# Patient Record
Sex: FEMALE | Race: WHITE | NOT HISPANIC OR LATINO | Employment: OTHER | URBAN - METROPOLITAN AREA
[De-identification: names, ages, dates, MRNs, and addresses within clinical notes are randomized per-mention and may not be internally consistent; named-entity substitution may affect disease eponyms.]

---

## 2017-01-23 ENCOUNTER — HISTORICAL (OUTPATIENT)
Dept: ADMINISTRATIVE | Facility: HOSPITAL | Age: 57
End: 2017-01-23

## 2017-01-23 LAB
ALBUMIN SERPL-MCNC: 4.1 G/DL (ref 3.1–4.7)
ALP SERPL-CCNC: 62 IU/L (ref 40–104)
ALT (SGPT): 8 IU/L (ref 3–33)
AST SERPL-CCNC: 16 IU/L (ref 10–40)
BASOPHILS NFR BLD: 0 K/UL (ref 0–0.2)
BASOPHILS NFR BLD: 0.6 %
BILIRUB SERPL-MCNC: 0.4 MG/DL (ref 0.3–1)
BUN SERPL-MCNC: 16 MG/DL (ref 8–20)
CALCIUM SERPL-MCNC: 9.3 MG/DL (ref 7.7–10.4)
CHLORIDE: 101 MMOL/L (ref 98–110)
CO2 SERPL-SCNC: 29.4 MMOL/L (ref 22.8–31.6)
CREATININE: 0.58 MG/DL (ref 0.6–1.4)
EOSINOPHIL NFR BLD: 0.1 K/UL (ref 0–0.7)
EOSINOPHIL NFR BLD: 1.9 %
ERYTHROCYTE [DISTWIDTH] IN BLOOD BY AUTOMATED COUNT: 13 % (ref 11.7–14.9)
GLUCOSE: 85 MG/DL (ref 70–99)
GRAN #: 2.9 K/UL (ref 1.4–6.5)
GRAN%: 45.3 %
HCT VFR BLD AUTO: 43.7 % (ref 36–48)
HGB BLD-MCNC: 13.8 G/DL (ref 12–15)
IMMATURE GRANS (ABS): 0 K/UL (ref 0–1)
IMMATURE GRANULOCYTES: 0.3 %
LYMPH #: 2.9 K/UL (ref 1.2–3.4)
LYMPH%: 44.6 %
MCH RBC QN AUTO: 29.2 PG (ref 25–35)
MCHC RBC AUTO-ENTMCNC: 31.6 G/DL (ref 31–36)
MCV RBC AUTO: 92.4 FL (ref 79–98)
MONO #: 0.5 K/UL (ref 0.1–0.6)
MONO%: 7.3 %
NUCLEATED RBCS: 0 %
PLATELET # BLD AUTO: 396 K/UL (ref 140–440)
PMV BLD AUTO: 10.4 FL (ref 8.8–12.7)
POTASSIUM SERPL-SCNC: 4.1 MMOL/L (ref 3.5–5)
PROT SERPL-MCNC: 7.6 G/DL (ref 6–8.2)
RBC # BLD AUTO: 4.73 M/UL (ref 3.5–5.5)
SODIUM: 139 MMOL/L (ref 134–144)
TSH SERPL DL<=0.005 MIU/L-ACNC: 0.83 ULU/ML (ref 0.3–5.6)
WBC # BLD AUTO: 6.5 K/UL (ref 5–10)

## 2017-02-06 ENCOUNTER — HISTORICAL (OUTPATIENT)
Dept: ADMINISTRATIVE | Facility: HOSPITAL | Age: 57
End: 2017-02-06

## 2017-07-17 ENCOUNTER — TELEPHONE (OUTPATIENT)
Dept: FAMILY MEDICINE | Facility: CLINIC | Age: 57
End: 2017-07-17

## 2017-07-17 DIAGNOSIS — I10 ESSENTIAL HYPERTENSION: ICD-10-CM

## 2017-07-17 DIAGNOSIS — Z87.448: Primary | ICD-10-CM

## 2017-07-17 DIAGNOSIS — R31.9 HEMATURIA: ICD-10-CM

## 2017-08-14 ENCOUNTER — TELEPHONE (OUTPATIENT)
Dept: FAMILY MEDICINE | Facility: CLINIC | Age: 57
End: 2017-08-14

## 2017-08-14 DIAGNOSIS — Z00.00 PREVENTATIVE HEALTH CARE: Primary | ICD-10-CM

## 2017-08-28 ENCOUNTER — TELEPHONE (OUTPATIENT)
Dept: FAMILY MEDICINE | Facility: CLINIC | Age: 57
End: 2017-08-28

## 2017-08-28 NOTE — TELEPHONE ENCOUNTER
----- Message from Ashlie Roe MA sent at 8/28/2017  4:04 PM CDT -----  Contact: patient      ----- Message -----  From: Crissy Bauer  Sent: 8/28/2017   2:25 PM  To: Desi Valenzuela Staff    9/11 appt, needs lab orders sent, she did not remember where she had them done.   Please call her to let her know where to go   Patient Pt# [phone]

## 2017-08-28 NOTE — TELEPHONE ENCOUNTER
Labs were already ordered and signed by Dr. Bocanegra.  They should be up front as the resulting agency is listed as [facility].

## 2017-09-06 LAB
ALBUMIN SERPL-MCNC: 3.8 G/DL (ref 3.6–5.1)
ALBUMIN/GLOB SERPL: 1.5 (CALC) (ref 1–2.5)
ALP SERPL-CCNC: 47 U/L (ref 33–130)
ALT SERPL-CCNC: 11 U/L (ref 6–29)
APPEARANCE UR: CLEAR
AST SERPL-CCNC: 20 U/L (ref 10–35)
BACTERIA #/AREA URNS HPF: ABNORMAL /HPF
BASOPHILS # BLD AUTO: 41 CELLS/UL (ref 0–200)
BASOPHILS NFR BLD AUTO: 0.8 %
BILIRUB SERPL-MCNC: 0.6 MG/DL (ref 0.2–1.2)
BILIRUB UR QL STRIP: NEGATIVE
BUN SERPL-MCNC: 16 MG/DL (ref 7–25)
BUN/CREAT SERPL: 38 (CALC) (ref 6–22)
CALCIUM SERPL-MCNC: 8.6 MG/DL (ref 8.6–10.4)
CHLORIDE SERPL-SCNC: 110 MMOL/L (ref 98–110)
CHOLEST SERPL-MCNC: 226 MG/DL
CHOLEST/HDLC SERPL: 3.3 (CALC)
CO2 SERPL-SCNC: 24 MMOL/L (ref 20–31)
COLOR UR: YELLOW
CREAT SERPL-MCNC: 0.42 MG/DL (ref 0.5–1.05)
EOSINOPHIL # BLD AUTO: 112 CELLS/UL (ref 15–500)
EOSINOPHIL NFR BLD AUTO: 2.2 %
ERYTHROCYTE [DISTWIDTH] IN BLOOD BY AUTOMATED COUNT: 13 % (ref 11–15)
GFR SERPL CREATININE-BSD FRML MDRD: 115 ML/MIN/1.73M2
GLOBULIN SER CALC-MCNC: 2.6 G/DL (CALC) (ref 1.9–3.7)
GLUCOSE SERPL-MCNC: 92 MG/DL (ref 65–99)
GLUCOSE UR QL STRIP: NEGATIVE
HCT VFR BLD AUTO: 37.8 % (ref 35–45)
HDLC SERPL-MCNC: 69 MG/DL
HGB BLD-MCNC: 12.3 G/DL (ref 11.7–15.5)
HGB UR QL STRIP: ABNORMAL
HYALINE CASTS #/AREA URNS LPF: ABNORMAL /LPF
KETONES UR QL STRIP: NEGATIVE
LDLC SERPL CALC-MCNC: 143 MG/DL (CALC)
LEUKOCYTE ESTERASE UR QL STRIP: ABNORMAL
LYMPHOCYTES # BLD AUTO: 2071 CELLS/UL (ref 850–3900)
LYMPHOCYTES NFR BLD AUTO: 40.6 %
MCH RBC QN AUTO: 29.6 PG (ref 27–33)
MCHC RBC AUTO-ENTMCNC: 32.5 G/DL (ref 32–36)
MCV RBC AUTO: 91.1 FL (ref 80–100)
MONOCYTES # BLD AUTO: 332 CELLS/UL (ref 200–950)
MONOCYTES NFR BLD AUTO: 6.5 %
NEUTROPHILS # BLD AUTO: 2545 CELLS/UL (ref 1500–7800)
NEUTROPHILS NFR BLD AUTO: 49.9 %
NITRITE UR QL STRIP: NEGATIVE
NONHDLC SERPL-MCNC: 157 MG/DL (CALC)
PH UR STRIP: 5.5 [PH] (ref 5–8)
PLATELET # BLD AUTO: 344 THOUSAND/UL (ref 140–400)
PMV BLD REES-ECKER: 9.9 FL (ref 7.5–12.5)
POTASSIUM SERPL-SCNC: 4.2 MMOL/L (ref 3.5–5.3)
PROT SERPL-MCNC: 6.4 G/DL (ref 6.1–8.1)
PROT UR QL STRIP: NEGATIVE
RBC # BLD AUTO: 4.15 MILLION/UL (ref 3.8–5.1)
RBC #/AREA URNS HPF: ABNORMAL /HPF
SODIUM SERPL-SCNC: 143 MMOL/L (ref 135–146)
SP GR UR STRIP: 1.02 (ref 1–1.03)
SQUAMOUS #/AREA URNS HPF: ABNORMAL /HPF
TRIGL SERPL-MCNC: 51 MG/DL
TSH SERPL-ACNC: 0.53 MIU/L
WBC # BLD AUTO: 5.1 THOUSAND/UL (ref 3.8–10.8)
WBC #/AREA URNS HPF: ABNORMAL /HPF

## 2017-09-07 RX ORDER — IBUPROFEN 800 MG/1
1 TABLET ORAL EVERY 8 HOURS PRN
COMMUNITY
Start: 2017-02-07 | End: 2019-02-22

## 2017-09-07 RX ORDER — HYDROCHLOROTHIAZIDE 25 MG/1
1 TABLET ORAL DAILY
COMMUNITY
End: 2017-09-11 | Stop reason: SDUPTHER

## 2017-09-07 RX ORDER — ASPIRIN 325 MG
1 TABLET ORAL DAILY
COMMUNITY
End: 2017-09-11 | Stop reason: SDUPTHER

## 2017-09-11 ENCOUNTER — OFFICE VISIT (OUTPATIENT)
Dept: FAMILY MEDICINE | Facility: CLINIC | Age: 57
End: 2017-09-11
Payer: COMMERCIAL

## 2017-09-11 VITALS
BODY MASS INDEX: 31.82 KG/M2 | OXYGEN SATURATION: 97 % | HEIGHT: 66 IN | SYSTOLIC BLOOD PRESSURE: 136 MMHG | DIASTOLIC BLOOD PRESSURE: 80 MMHG | HEART RATE: 71 BPM | WEIGHT: 198 LBS

## 2017-09-11 DIAGNOSIS — E78.5 DYSLIPIDEMIA: ICD-10-CM

## 2017-09-11 DIAGNOSIS — J30.1 CHRONIC SEASONAL ALLERGIC RHINITIS DUE TO POLLEN: ICD-10-CM

## 2017-09-11 DIAGNOSIS — I10 ESSENTIAL (PRIMARY) HYPERTENSION: Primary | ICD-10-CM

## 2017-09-11 PROBLEM — T75.3XXA SEA SICKNESS: Status: ACTIVE | Noted: 2017-09-11

## 2017-09-11 PROBLEM — Z78.9 NON-SMOKER: Status: ACTIVE | Noted: 2017-09-11

## 2017-09-11 PROBLEM — Z86.79 HISTORY OF CARDIOVASCULAR DISORDER: Status: ACTIVE | Noted: 2017-09-11

## 2017-09-11 PROBLEM — R31.9 BLOOD IN THE URINE: Status: ACTIVE | Noted: 2017-09-11

## 2017-09-11 PROCEDURE — 99214 OFFICE O/P EST MOD 30 MIN: CPT | Mod: ,,, | Performed by: NURSE PRACTITIONER

## 2017-09-11 PROCEDURE — 3008F BODY MASS INDEX DOCD: CPT | Mod: ,,, | Performed by: NURSE PRACTITIONER

## 2017-09-11 RX ORDER — FLUTICASONE PROPIONATE 50 MCG
2 SPRAY, SUSPENSION (ML) NASAL DAILY
Qty: 3 BOTTLE | Refills: 1 | Status: SHIPPED | OUTPATIENT
Start: 2017-09-11 | End: 2018-11-08 | Stop reason: SDUPTHER

## 2017-09-11 RX ORDER — HYDROCHLOROTHIAZIDE 25 MG/1
25 TABLET ORAL DAILY
Qty: 90 TABLET | Refills: 1 | Status: SHIPPED | OUTPATIENT
Start: 2017-09-11 | End: 2018-08-21 | Stop reason: SDUPTHER

## 2017-09-11 NOTE — PROGRESS NOTES
Subjective:       Patient ID: Daylin Lynch is a 56 y.o. female.    Chief Complaint: Hypertension    Daylin is here today to discuss labs and for medication refill; states that she is doing well and trying to eat healthier.  She recently got a treadmill and has just started walking on it nightly.      Hypertension   This is a chronic problem. The current episode started more than 1 year ago. The problem is controlled. Pertinent negatives include no anxiety, blurred vision, chest pain, headaches, malaise/fatigue, neck pain, orthopnea, palpitations, peripheral edema, PND, shortness of breath or sweats. There are no associated agents to hypertension. Risk factors for coronary artery disease include dyslipidemia and obesity. Past treatments include diuretics. The current treatment provides moderate improvement. Compliance problems include exercise.  There is no history of angina, kidney disease, CAD/MI or a thyroid problem. There is no history of chronic renal disease, hyperparathyroidism or a hypertension causing med.   Hyperlipidemia   This is a chronic problem. The current episode started more than 1 year ago. Recent lipid tests were reviewed and are high. Exacerbating diseases include obesity. She has no history of chronic renal disease, diabetes, hypothyroidism, liver disease or nephrotic syndrome. There are no known factors aggravating her hyperlipidemia. Pertinent negatives include no chest pain, myalgias or shortness of breath. Current antihyperlipidemic treatment includes diet change and exercise. The current treatment provides mild improvement of lipids.       Review of Systems   Constitutional: Negative for activity change, appetite change, chills, fatigue and malaise/fatigue.   HENT: Positive for rhinorrhea and sneezing. Negative for congestion and sore throat.    Eyes: Negative for blurred vision, pain and visual disturbance.   Respiratory: Negative for cough and shortness of breath.    Cardiovascular:  Negative for chest pain, palpitations, orthopnea and PND.   Gastrointestinal: Negative for abdominal pain, constipation and diarrhea.   Endocrine: Negative for polydipsia, polyphagia and polyuria.   Genitourinary: Negative for dysuria, frequency and urgency.   Musculoskeletal: Negative for arthralgias, gait problem, myalgias and neck pain.   Skin: Negative for color change, pallor and rash.   Allergic/Immunologic: Negative for immunocompromised state.   Neurological: Negative for dizziness, syncope, numbness and headaches.   Hematological: Negative for adenopathy.   Psychiatric/Behavioral: Negative for confusion, self-injury and suicidal ideas.        Past Surgical History:   Procedure Laterality Date     SECTION, LOW TRANSVERSE      x 3    HYSTERECTOMY  2012    stents both legs         Family History   Problem Relation Age of Onset    Heart disease Mother     Heart failure Father         Social History     Social History    Marital status:      Spouse name: N/A    Number of children: N/A    Years of education: N/A     Social History Main Topics    Smoking status: Former Smoker     Quit date: 1985    Smokeless tobacco: Never Used    Alcohol use Yes    Drug use: No    Sexual activity: Yes     Other Topics Concern    None     Social History Narrative    None       Current Outpatient Prescriptions   Medication Sig Dispense Refill    aspirin 325 MG tablet Take 325 mg by mouth once daily.      fluticasone (FLONASE) 50 mcg/actuation nasal spray 2 sprays by Each Nare route Daily. 3 Bottle 1    hydrochlorothiazide (HYDRODIURIL) 25 MG tablet Take 1 tablet (25 mg total) by mouth once daily. 90 tablet 1    ibuprofen (ADVIL,MOTRIN) 800 MG tablet Take 1 tablet by mouth every 8 (eight) hours as needed.       No current facility-administered medications for this visit.        Review of patient's allergies indicates:  No Known Allergies   Objective:   Blood pressure 136/80, pulse 71, height  "5' 6" (1.676 m), weight 89.8 kg (198 lb), SpO2 97 %. Body mass index is 31.96 kg/m².       Physical Exam   Constitutional: She is oriented to person, place, and time. She appears well-developed and well-nourished.   HENT:   Head: Normocephalic and atraumatic.   Right Ear: External ear normal.   Left Ear: External ear normal.   Nose: Nose normal.   Mouth/Throat: Oropharynx is clear and moist.   Eyes: Conjunctivae, EOM and lids are normal. Pupils are equal, round, and reactive to light. Right eye exhibits no discharge. Left eye exhibits no discharge. No scleral icterus.   Neck: Normal range of motion. Neck supple. Carotid bruit is not present. No thyromegaly present.   Cardiovascular: Normal rate, regular rhythm, normal heart sounds and intact distal pulses.  Exam reveals no friction rub.    No murmur heard.  Pulmonary/Chest: Effort normal and breath sounds normal.   Abdominal: Soft. Bowel sounds are normal. She exhibits no distension. There is no tenderness.   Musculoskeletal: Normal range of motion. She exhibits no edema or tenderness.   Lymphadenopathy:     She has no cervical adenopathy.   Neurological: She is alert and oriented to person, place, and time.   Skin: Skin is warm, dry and intact.   Psychiatric: She has a normal mood and affect. Her behavior is normal. Judgment and thought content normal. She expresses no suicidal plans.        Assessment:       1. Essential (primary) hypertension    2. Dyslipidemia    3. Chronic seasonal allergic rhinitis due to pollen        Plan:       Daylin was seen today for hypertension.    Diagnoses and all orders for this visit:    Essential (primary) hypertension   Stable; continue current medications.  -     hydrochlorothiazide (HYDRODIURIL) 25 MG tablet; Take 1 tablet (25 mg total) by mouth once daily.    Dyslipidemia   Continue diet changes, increase exercise and add daily metamucil; recheck in 6 months    Chronic seasonal allergic rhinitis due to pollen   Stable; continue " current medications.  -     fluticasone (FLONASE) 50 mcg/actuation nasal spray; 2 sprays by Each Nare route Daily.

## 2017-09-11 NOTE — PATIENT INSTRUCTIONS
Allergic Rhinitis  Allergic rhinitis is an allergic reaction that affects the nose, and often the eyes. Its often known as nasal allergies. Nasal allergies are often due to things in the environment that are breathed in. Depending what you are sensitive to, nasal allergies may occur only during certain seasons. Or they may occur year round. Common indoor allergens include house dust mites, mold, cockroaches, and pet dander. Outdoor allergens include pollen from trees, grasses, and weeds.   Symptoms include a drippy, stuffy, and itchy nose. They also include sneezing and red and itchy eyes. You may feel tired more often. Severe allergies may also affect your breathing and trigger a condition called asthma.   Tests can be done to see what allergens are affecting you. You may be referred to an allergy specialist for testing and further evaluation.  Home care  Your healthcare provider may prescribe medicines to help relieve allergy symptoms. These may include oral medicines, nasal sprays, or eye drops.  Ask your provider for advice on how to avoid substances that you are allergic to. Below are a few tips for each type of allergen.  Pet dander:  · Do not have pets with fur and feathers.  · If you can't avoid having a pet, keep it out of your bedroom and off upholstered furniture.  Pollen:  · When pollen counts are high, keep windows of your car and home closed. If possible, use an air conditioner instead.  · Wear a filter mask when mowing or doing yard work.  House dust mites:  · Wash bedding every week in warm water and detergent and dry on a hot setting.  · Cover the mattress, box spring, and pillows with allergy covers.   · If possible, sleep in a room with no carpet, curtains, or upholstered furniture.  Cockroaches:  · Store food in sealed containers.  · Remove garbage from the home promptly.  · Fix water leaks  Mold:  · Keep humidity low by using a dehumidifier or air conditioner. Keep the dehumidifier and air  conditioner clean and free of mold.  · Clean moldy areas with bleach and water.  In general:  · Vacuum once or twice a week. If possible, use a vacuum with a high-efficiency particulate air (HEPA) filter.  · Do not smoke. Avoid cigarette smoke. Cigarette smoke is an irritant that can make symptoms worse.  Follow-up care  Follow up as advised by the healthcare provider or our staff. If you were referred to an allergy specialist, make this appointment promptly.  When to seek medical advice  Call your healthcare provider right away if the following occur:  · Coughing or wheezing  · Fever greater than 100.4°F (38°C)  · Hives (raised red bumps)  · Continuing symptoms, new symptoms, or worsening symptoms  Call 911 right away if you have:  · Trouble breathing  · Severe swelling of the face or severe itching of the eyes or mouth  Date Last Reviewed: 3/1/2017  © 3480-6073 [employer]. [address] [place] [address]. All rights reserved. This information is not intended as a substitute for professional medical care. Always follow your healthcare professional's instructions.        4 Steps for Eating Healthier  Changing the way you eat can improve your health. It can lower your cholesterol and blood pressure, and help you stay at a healthy weight. Your diet doesnt have to be bland and boring to be healthy. Just watch your calories and follow these steps:    1. Eat fewer unhealthy fats  · Choose more fish and lean meats instead of fatty cuts of meat.  · Skip butter and lard, and use less margarine.  · Pass on foods that have palm, coconut, or hydrogenated oils.  · Eat fewer high-fat dairy foods like cheese, ice cream, and whole milk.  · Get a heart-healthy cookbook and try some low-fat recipes.  2. Go light on salt  · Keep the saltshaker off the table.  · Limit high-salt ingredients, such as soy sauce, bouillon, and garlic salt.  · Instead of adding salt when cooking, season your food with herbs and  flavorings. Try lemon, garlic, and onion.  · Limit convenience foods, such as boxed or canned foods and restaurant food.  · Read food labels and choose lower-sodium options.  3. Limit sugar  · Pause before you add sugars to pancakes, cereal, coffee, or tea. This includes white and brown table sugar, syrup, honey, and molasses. Cut your usual amount by half.  · Use non-sugar sweeteners. Stevia, aspartame, and sucralose can satisfy a sweet tooth without adding calories.  · Swap out sugar-filled soda and other drinks. Buy sugar-free or low-calorie beverages. Remember water is always the best choice.  · Read labels and choose foods with less added sugar. Keep in mind that dairy foods and foods with fruit will have some natural sugar.  · Cut the sugar in recipes by 1/3 to 1/2. Boost the flavor with extracts like almond, vanilla, or orange. Or add spices such as cinnamon or nutmeg.  4. Eat more fiber  · Eat fresh fruits and vegetables every day.  · Boost your diet with whole grains. Go for oats, whole-grain rice, and bran.  · Add beans and lentils to your meals.  · Drink more water to match your fiber increase. This is to help prevent constipation.  Date Last Reviewed: 5/11/2015  © 9047-6801 [employer]. [address] [place] [address], [place] [address]. All rights reserved. This information is not intended as a substitute for professional medical care. Always follow your healthcare professional's instructions.        Established High Blood Pressure    High blood pressure (hypertension) is a chronic disease. Often, healthcare providers dont know what causes it. But it can be caused by certain health conditions and medicines.  If you have high blood pressure, you may not have any symptoms. If you do have symptoms, they may include headache, dizziness, changes in your vision, chest pain, and shortness of breath. But even without symptoms, high blood pressure thats not treated raises your risk for heart attack and  stroke. High blood pressure is a serious health risk and shouldnt be ignored.  A blood pressure reading is made up of two numbers: a higher number over a lower number. The top number is the systolic pressure. The bottom number is the diastolic pressure. A normal blood pressure is a systolic pressure of  less than 120 over a diastolic pressure of less than 80. You will see your blood pressure readings written together. For example, a person with a systolic pressure of 188 and a diastolic pressure of 78 will have 118/78 written in the medical record.  High blood pressure is when either the top number is 140 or higher, or the bottom number is 90 or higher. This must be the result when taking your blood pressure a number of times. The blood pressures between normal and high are called prehypertension.  Home care  If you have high blood pressure, you should do what is listed below to lower your blood pressure. If you are taking medicines for high blood pressure, these methods may reduce or end your need for medicines in the future.  · Begin a weight-loss program if you are overweight.  · Cut back on how much salt you get in your diet. Heres how to do this:  ¨ Dont eat foods that have a lot of salt. These include olives, pickles, smoked meats, and salted potato chips.  ¨ Dont add salt to your food at the table.  ¨ Use only small amounts of salt when cooking.  · Start an exercise program. Talk with your healthcare provider about the type of exercise program that would be best for you. It doesn't have to be hard. Even brisk walking for 20 minutes 3 times a week is a good form of exercise.  · Dont take medicines that stimulate the heart. This includes many over-the-counter cold and sinus decongestant pills and sprays, as well as diet pills. Check the warnings about hypertension on the label. Before buying any over-the-counter medicines or supplements, always ask the pharmacist about the product's potential interaction  with your high blood pressure and your high blood pressure medicines.  · Stimulants such as amphetamine or cocaine could be deadly for someone with high blood pressure. Never take these.  · Limit how much caffeine you get in your diet. Switch to caffeine-free products.  · Stop smoking. If you are a long-time smoker, this can be hard. Talk to your healthcare provider about medicines and nicotine replacement options to help you. Also, enroll in a stop-smoking program to make it more likely that you will quit for good.  · Learn how to handle stress. This is an important part of any program to lower blood pressure. Learn about relaxation methods like meditation, yoga, or biofeedback.  · If your provider prescribed medicines, take them exactly as directed. Missing doses may cause your blood pressure get out of control.  · If you miss a dose or doses, check with your healthcare provider or pharmacist about what to do.  · Consider buying an automatic blood pressure machine. Ask your provider for a recommendation. You can get one of these at most pharmacies.     The American Heart Association recommends the following guidelines for home blood pressure monitoring:  · Don't smoke or drink coffee for 30 minutes before taking your blood pressure.  · Go to the bathroom before the test.  · Relax for 5 minutes before taking the measurement.  · Sit with your back supported (don't sit on a couch or soft chair); keep your feet on the floor uncrossed. Place your arm on a solid flat surface (like a table) with the upper part of the arm at heart level. Place the middle of the cuff directly above the eye of the elbow. Check the monitor's instruction manual for an illustration.  · Take multiple readings. When you measure, take 2 to 3 readings one minute apart and record all of the results.  · Take your blood pressure at the same time every day, or as your healthcare provider recommends.  · Record the date, time, and blood pressure  reading.  · Take the record with you to your next medical appointment. If your blood pressure monitor has a built-in memory, simply take the monitor with you to your next appointment.  · Call your provider if you have several high readings. Don't be frightened by a single high blood pressure reading, but if you get several high readings, check in with your healthcare provider.  · Note: When blood pressure reaches a systolic (top number) of 180 or higher OR diastolic (bottom number) of 110 or higher, seek emergency medical treatment.  Follow-up care  You will need to see your healthcare provider regularly. This is to check your blood pressure and to make changes to your medicines. Make a follow-up appointment as directed. Bring the record of your home blood pressure readings to the appointment.  When to seek medical advice  Call your healthcare provider right away if any of these occur:  · Blood pressure reaches a systolic (upper number) of 180 or higher OR a diastolic (bottom number) of 110 or higher  · Chest pain or shortness of breath  · Severe headache  · Throbbing or rushing sound in the ears  · Nosebleed  · Sudden severe pain in your belly (abdomen)  · Extreme drowsiness, confusion, or fainting  · Dizziness or spinning sensation (vertigo)  · Weakness of an arm or leg or one side of the face  · You have problems speaking or seeing   Date Last Reviewed: 12/1/2016  © 4291-5692 [employer]. [address] [place] [address], [place] [address]. All rights reserved. This information is not intended as a substitute for professional medical care. Always follow your healthcare professional's instructions.

## 2017-10-06 DIAGNOSIS — I10 ESSENTIAL HYPERTENSION: ICD-10-CM

## 2017-10-06 DIAGNOSIS — I10 HYPERTENSION, UNSPECIFIED TYPE: Primary | ICD-10-CM

## 2017-10-09 ENCOUNTER — OFFICE VISIT (OUTPATIENT)
Dept: CARDIOLOGY | Facility: CLINIC | Age: 57
End: 2017-10-09
Payer: COMMERCIAL

## 2017-10-09 VITALS
WEIGHT: 197.75 LBS | HEIGHT: 67 IN | OXYGEN SATURATION: 96 % | BODY MASS INDEX: 31.04 KG/M2 | DIASTOLIC BLOOD PRESSURE: 78 MMHG | HEART RATE: 88 BPM | SYSTOLIC BLOOD PRESSURE: 126 MMHG

## 2017-10-09 DIAGNOSIS — I10 ESSENTIAL HYPERTENSION: ICD-10-CM

## 2017-10-09 DIAGNOSIS — I83.893 VARICOSE VEINS OF LOWER EXTREMITIES WITH COMPLICATIONS, BILATERAL: ICD-10-CM

## 2017-10-09 PROCEDURE — 99214 OFFICE O/P EST MOD 30 MIN: CPT | Mod: S$GLB,,, | Performed by: INTERNAL MEDICINE

## 2017-10-09 PROCEDURE — 93000 ELECTROCARDIOGRAM COMPLETE: CPT | Mod: S$GLB,,, | Performed by: INTERNAL MEDICINE

## 2017-10-09 PROCEDURE — 99999 PR PBB SHADOW E&M-EST. PATIENT-LVL III: CPT | Mod: PBBFAC,,, | Performed by: INTERNAL MEDICINE

## 2017-10-09 RX ORDER — VITAMIN B COMPLEX
1 CAPSULE ORAL DAILY
COMMUNITY
End: 2018-08-21

## 2017-10-09 RX ORDER — DICYCLOMINE HYDROCHLORIDE 20 MG/1
TABLET ORAL
Refills: 5 | COMMUNITY
Start: 2017-08-22 | End: 2018-08-21

## 2017-10-09 NOTE — PROGRESS NOTES
[facility]    CC: General cardiology follow-up  Chief Complaint   Patient presents with    [facility]     Establish Care       Patient ID: Daylin Lynch is a 57 y.o. female with a past medical history of varicose veins     HPI  Patient is doing well.  She denies any active cardiovascular symptoms.  She is fairly active taking care of her elderly mother.  She specifically denies any chest pain, shortness of breath, orthopnea, PND, syncope or presyncope.  He has had bilateral venous stenting of the lower extremity vein and she has remained asymptomatic since then.    Past Medical History:   Diagnosis Date    Allergy     Bilateral leg edema     Hyperlipidemia     Hypertension     Leg pain, bilateral     left leg more severe    Varicose vein      Past Surgical History:   Procedure Laterality Date     SECTION, LOW TRANSVERSE      x 3    HYSTERECTOMY  2012    stents both legs       Social History     Social History    Marital status:      Spouse name: N/A    Number of children: N/A    Years of education: N/A     Occupational History    Not on file.     Social History Main Topics    Smoking status: Former Smoker     Quit date: 1985    Smokeless tobacco: Never Used    Alcohol use Yes    Drug use: No    Sexual activity: Yes     Other Topics Concern    Not on file     Social History Narrative    No narrative on file     Family History   Problem Relation Age of Onset    Heart disease Mother     Heart failure Father        Review of patient's allergies indicates:  No Known Allergies    Medication List with Changes/Refills   Current Medications    ASPIRIN 325 MG TABLET    Take 325 mg by mouth once daily.    B COMPLEX VITAMINS CAPSULE    Take 1 capsule by mouth once daily.    DICYCLOMINE (BENTYL) 20 MG TABLET    TAKE 1 TABLET 4 TIMES A DAY    FLUTICASONE (FLONASE) 50 MCG/ACTUATION NASAL SPRAY    2 sprays by Each Nare route Daily.    HYDROCHLOROTHIAZIDE (HYDRODIURIL)  "25 MG TABLET    Take 1 tablet (25 mg total) by mouth once daily.    IBUPROFEN (ADVIL,MOTRIN) 800 MG TABLET    Take 1 tablet by mouth every 8 (eight) hours as needed.    RANITIDINE (ZANTAC) 300 MG TABLET    Take 300 mg by mouth nightly.       Review of Systems  Constitution: Denies chills, fever, and sweats.  HENT: Denies headaches or blurry vision.  Cardiovascular: Denies chest pain or irregular heart beat.  Respiratory: Denies cough or shortness of breath.  Gastrointestinal: Denies abdominal pain, nausea, or vomiting.  Musculoskeletal: Denies muscle cramps.  Neurological: Denies dizziness or focal weakness.  Psychiatric/Behavioral: Normal mental status.  Hematologic/Lymphatic: Denies bleeding problem or easy bruising/bleeding.  Skin: Denies rash or suspicious lesions    Physical Examination  /78 (BP Location: Left arm, Patient Position: Sitting)   Pulse 88   Ht 5' 7" (1.702 m)   Wt 89.7 kg (197 lb 12 oz)   SpO2 96%   BMI 30.97 kg/m²     Constitutional: No acute distress, conversant  HEENT: Sclera anicteric, Pupils equal, round and reactive to light, extraocular motions intact, Oropharynx clear  Neck: No JVD, no carotid bruits  Cardiovascular: regular rate and rhythm, no murmur, rubs or gallops, normal S1/S2  Pulmonary: Clear to auscultation bilaterally  Abdominal: Abdomen soft, nontender, nondistended, positive bowel sounds  Extremities: No lower extremity edema,   Pulses:  Carotid pulses are 2+ on the right side, and 2+ on the left side.  Radial pulses are 2+ on the right side, and 2+ on the left side.     Skin: No ecchymosis, erythema, or ulcers  Psych: Alert and oriented x 3, appropriate affect  Neuro: CNII-XII intact, no focal deficits    Labs:  Most Recent Data  CBC:   Lab Results   Component Value Date    WBC 5.1 09/05/2017    HGB 12.3 09/05/2017    HCT 37.8 09/05/2017     09/05/2017    MCV 91.1 09/05/2017    RDW 13.0 09/05/2017     BMP:   Lab Results   Component Value Date     " 09/05/2017    K 4.2 09/05/2017     09/05/2017    CO2 24 09/05/2017    BUN 16 09/05/2017    CREATININE 0.42 (L) 09/05/2017    GLU 92 09/05/2017    CALCIUM 8.6 09/05/2017     LFTS;   Lab Results   Component Value Date    PROT 6.4 09/05/2017    ALBUMIN 3.8 09/05/2017    BILITOT 0.6 09/05/2017    AST 20 09/05/2017    ALKPHOS 47 09/05/2017    ALT 11 09/05/2017     COAGS:   Lab Results   Component Value Date    INR 1.0 12/13/2013     FLP:   Lab Results   Component Value Date    CHOL 226 (H) 09/05/2017    HDL 69 09/05/2017    LDLCALC 143 (H) 09/05/2017    TRIG 51 09/05/2017    CHOLHDL 3.3 09/05/2017     CARDIAC: No results found for: TROPONINI, CKMB, BNP    Imaging:    EKG: Normal sinus rhythm .  Nonspecific ST-T wave changes      I have personally reviewed these images and echo data    Assessment/Plan:  Daylin was seen today for establish care.    Diagnoses and all orders for this visit:    Essential hypertension  -     EKG 12-lead    Varicose veins of lower extremities with complications, bilateral       she remains asymptomatic from cardiovascular perspective.  Currently we will do primary prevention.  I recommended healthy lifestyle, exercise at least 2-1/2 hours of aerobic exercise per week as well as some muscle strengthening exercises.  I don't think she currently needs any further investigations.    Total duration of face to face visit time 45 minutes.  Total time spent counseling greater than fifty percent of total visit time.  Counseling included discussion regarding imaging findings, diagnosis, possibilities, treatment options, risks and benefits.  The patient had many questions regarding the options and long-term effect which were all answered to my best ability.      Roderick Kimbrough MD,MRCP,RPVI,FACC,FSCAI.  Interventional Cardiology   Phone [phone]

## 2017-12-11 ENCOUNTER — TELEPHONE (OUTPATIENT)
Dept: FAMILY MEDICINE | Facility: CLINIC | Age: 57
End: 2017-12-11

## 2017-12-11 DIAGNOSIS — E78.5 DYSLIPIDEMIA: Primary | ICD-10-CM

## 2017-12-11 DIAGNOSIS — I10 ESSENTIAL HYPERTENSION: ICD-10-CM

## 2017-12-11 NOTE — TELEPHONE ENCOUNTER
Fredi for pt asking how does [facility] want us to notify them of dx code change, new orders? Or letter?. lm

## 2017-12-11 NOTE — TELEPHONE ENCOUNTER
Yes, those are labs that are routinely ordered for hypertension and dyslipidemia.  She has both.  Do I need to reorder or can we send a note with those codes.  I do not know what [facility] needs to resubmit to the insurance company.

## 2017-12-11 NOTE — TELEPHONE ENCOUNTER
----- Message from Ana Abarca sent at 12/11/2017  3:56 PM CST -----  Contact: patient [phone]  Pt had labs in Sept 5, 2017 that were coded for a well visit.  Can it be changed to a dx code.  Her insurance will not cover this as preventative.  Please advise.  lm

## 2017-12-14 NOTE — TELEPHONE ENCOUNTER
Bianca Melvin said its ok to change the dx codes for this [facility] lab order.  Pt called back and said [facility] wants us to call them at [phone] opt 2 to give them the new codes.  It is for invoice # [id number].  I am not sure of the process for getting this done.  Please advise.  lm

## 2018-01-02 ENCOUNTER — TELEPHONE (OUTPATIENT)
Dept: FAMILY MEDICINE | Facility: CLINIC | Age: 58
End: 2018-01-02

## 2018-01-02 NOTE — TELEPHONE ENCOUNTER
States one son dx with the flu and he had been at her home over the weekend. She,josh wellington and another son and elderly parent was with son dx with the flu. Should they all be on Tamaflu.

## 2018-01-03 ENCOUNTER — TELEPHONE (OUTPATIENT)
Dept: FAMILY MEDICINE | Facility: CLINIC | Age: 58
End: 2018-01-03

## 2018-01-03 RX ORDER — OSELTAMIVIR PHOSPHATE 75 MG/1
75 CAPSULE ORAL 2 TIMES DAILY
Qty: 10 CAPSULE | Refills: 0 | Status: SHIPPED | OUTPATIENT
Start: 2018-01-03 | End: 2018-01-08

## 2018-01-03 NOTE — TELEPHONE ENCOUNTER
Her son was there over the weekend and dx with flu, she now has sx of the flu and would like tamaflu

## 2018-08-13 ENCOUNTER — TELEPHONE (OUTPATIENT)
Dept: FAMILY MEDICINE | Facility: CLINIC | Age: 58
End: 2018-08-13

## 2018-08-13 DIAGNOSIS — Z11.59 NEED FOR HEPATITIS C SCREENING TEST: ICD-10-CM

## 2018-08-13 DIAGNOSIS — E78.5 DYSLIPIDEMIA: ICD-10-CM

## 2018-08-13 DIAGNOSIS — I10 ESSENTIAL (PRIMARY) HYPERTENSION: ICD-10-CM

## 2018-08-13 DIAGNOSIS — Z00.00 PREVENTATIVE HEALTH CARE: Primary | ICD-10-CM

## 2018-08-17 LAB
ALBUMIN SERPL-MCNC: 3.9 G/DL (ref 3.6–5.1)
ALBUMIN/GLOB SERPL: 1.4 (CALC) (ref 1–2.5)
ALP SERPL-CCNC: 60 U/L (ref 33–130)
ALT SERPL-CCNC: 7 U/L (ref 6–29)
APPEARANCE UR: CLEAR
AST SERPL-CCNC: 18 U/L (ref 10–35)
BASOPHILS # BLD AUTO: 53 CELLS/UL (ref 0–200)
BASOPHILS NFR BLD AUTO: 0.9 %
BILIRUB SERPL-MCNC: 0.4 MG/DL (ref 0.2–1.2)
BILIRUB UR QL STRIP: NEGATIVE
BUN SERPL-MCNC: 22 MG/DL (ref 7–25)
BUN/CREAT SERPL: NORMAL (CALC) (ref 6–22)
CALCIUM SERPL-MCNC: 9.2 MG/DL (ref 8.6–10.4)
CHLORIDE SERPL-SCNC: 108 MMOL/L (ref 98–110)
CHOLEST SERPL-MCNC: 223 MG/DL
CHOLEST/HDLC SERPL: 3.1 (CALC)
CO2 SERPL-SCNC: 28 MMOL/L (ref 20–32)
COLOR UR: YELLOW
CREAT SERPL-MCNC: 0.63 MG/DL (ref 0.5–1.05)
EOSINOPHIL # BLD AUTO: 159 CELLS/UL (ref 15–500)
EOSINOPHIL NFR BLD AUTO: 2.7 %
ERYTHROCYTE [DISTWIDTH] IN BLOOD BY AUTOMATED COUNT: 12.9 % (ref 11–15)
GFR SERPL CREATININE-BSD FRML MDRD: 100 ML/MIN/1.73M2
GLOBULIN SER CALC-MCNC: 2.7 G/DL (CALC) (ref 1.9–3.7)
GLUCOSE SERPL-MCNC: 91 MG/DL (ref 65–99)
GLUCOSE UR QL STRIP: NEGATIVE
HCT VFR BLD AUTO: 38.6 % (ref 35–45)
HCV AB S/CO SERPL IA: 0.01
HCV AB SERPL QL IA: NORMAL
HDLC SERPL-MCNC: 73 MG/DL
HGB BLD-MCNC: 12.7 G/DL (ref 11.7–15.5)
HGB UR QL STRIP: ABNORMAL
KETONES UR QL STRIP: NEGATIVE
LDLC SERPL CALC-MCNC: 134 MG/DL (CALC)
LEUKOCYTE ESTERASE UR QL STRIP: NEGATIVE
LYMPHOCYTES # BLD AUTO: 2726 CELLS/UL (ref 850–3900)
LYMPHOCYTES NFR BLD AUTO: 46.2 %
MCH RBC QN AUTO: 29.6 PG (ref 27–33)
MCHC RBC AUTO-ENTMCNC: 32.9 G/DL (ref 32–36)
MCV RBC AUTO: 90 FL (ref 80–100)
MONOCYTES # BLD AUTO: 384 CELLS/UL (ref 200–950)
MONOCYTES NFR BLD AUTO: 6.5 %
NEUTROPHILS # BLD AUTO: 2578 CELLS/UL (ref 1500–7800)
NEUTROPHILS NFR BLD AUTO: 43.7 %
NITRITE UR QL STRIP: NEGATIVE
NONHDLC SERPL-MCNC: 150 MG/DL (CALC)
PH UR STRIP: 6 [PH] (ref 5–8)
PLATELET # BLD AUTO: 340 THOUSAND/UL (ref 140–400)
PMV BLD REES-ECKER: 10.1 FL (ref 7.5–12.5)
POTASSIUM SERPL-SCNC: 4.4 MMOL/L (ref 3.5–5.3)
PROT SERPL-MCNC: 6.6 G/DL (ref 6.1–8.1)
PROT UR QL STRIP: NEGATIVE
RBC # BLD AUTO: 4.29 MILLION/UL (ref 3.8–5.1)
SODIUM SERPL-SCNC: 145 MMOL/L (ref 135–146)
SP GR UR STRIP: 1.03 (ref 1–1.03)
TRIGL SERPL-MCNC: 64 MG/DL
TSH SERPL-ACNC: 1.21 MIU/L (ref 0.4–4.5)
WBC # BLD AUTO: 5.9 THOUSAND/UL (ref 3.8–10.8)

## 2018-08-21 ENCOUNTER — OFFICE VISIT (OUTPATIENT)
Dept: FAMILY MEDICINE | Facility: CLINIC | Age: 58
End: 2018-08-21
Payer: COMMERCIAL

## 2018-08-21 VITALS
HEART RATE: 103 BPM | SYSTOLIC BLOOD PRESSURE: 120 MMHG | BODY MASS INDEX: 30.61 KG/M2 | DIASTOLIC BLOOD PRESSURE: 70 MMHG | WEIGHT: 195 LBS | OXYGEN SATURATION: 97 % | HEIGHT: 67 IN

## 2018-08-21 DIAGNOSIS — Z78.0 MENOPAUSE: ICD-10-CM

## 2018-08-21 DIAGNOSIS — E78.5 DYSLIPIDEMIA: ICD-10-CM

## 2018-08-21 DIAGNOSIS — Z23 NEED FOR TETANUS BOOSTER: ICD-10-CM

## 2018-08-21 DIAGNOSIS — I83.893 VARICOSE VEINS OF LOWER EXTREMITIES WITH COMPLICATIONS, BILATERAL: ICD-10-CM

## 2018-08-21 DIAGNOSIS — Z13.820 SCREENING FOR OSTEOPOROSIS: ICD-10-CM

## 2018-08-21 DIAGNOSIS — I10 ESSENTIAL (PRIMARY) HYPERTENSION: Primary | ICD-10-CM

## 2018-08-21 PROCEDURE — 90471 IMMUNIZATION ADMIN: CPT | Mod: ,,, | Performed by: NURSE PRACTITIONER

## 2018-08-21 PROCEDURE — 3074F SYST BP LT 130 MM HG: CPT | Mod: ,,, | Performed by: NURSE PRACTITIONER

## 2018-08-21 PROCEDURE — 3008F BODY MASS INDEX DOCD: CPT | Mod: ,,, | Performed by: NURSE PRACTITIONER

## 2018-08-21 PROCEDURE — 3078F DIAST BP <80 MM HG: CPT | Mod: ,,, | Performed by: NURSE PRACTITIONER

## 2018-08-21 PROCEDURE — 90715 TDAP VACCINE 7 YRS/> IM: CPT | Mod: ,,, | Performed by: NURSE PRACTITIONER

## 2018-08-21 PROCEDURE — 99214 OFFICE O/P EST MOD 30 MIN: CPT | Mod: 25,,, | Performed by: NURSE PRACTITIONER

## 2018-08-21 RX ORDER — HYDROCHLOROTHIAZIDE 25 MG/1
25 TABLET ORAL DAILY
Qty: 90 TABLET | Refills: 1 | Status: SHIPPED | OUTPATIENT
Start: 2018-08-21 | End: 2019-03-25 | Stop reason: SDUPTHER

## 2018-08-21 NOTE — PROGRESS NOTES
"    SUBJECTIVE:      Patient ID: Daylin Lynch is a 57 y.o. female.    Chief Complaint: Hypertension    Daylin is here today for follow up for hypertension. She states she had stopped taking her HCTZ for "a while" but her BP started running high and she has since restarted taking her medication.  She states that she is tolerating it well and denies other complaints.      She does report having stents placed in legs several years ago and is asking about follow up since her cardiologist is no longer in the area.      Hypertension   This is a chronic problem. The current episode started more than 1 year ago. The problem is controlled. Associated symptoms include peripheral edema. Pertinent negatives include no anxiety, blurred vision, chest pain, headaches, malaise/fatigue, neck pain, orthopnea, palpitations, PND, shortness of breath or sweats. There are no associated agents to hypertension. Risk factors for coronary artery disease include sedentary lifestyle and stress. Past treatments include diuretics. The current treatment provides moderate improvement. Compliance problems include diet and exercise.        Past Surgical History:   Procedure Laterality Date     SECTION, LOW TRANSVERSE      x 3    HYSTERECTOMY  2012    stents both legs       Family History   Problem Relation Age of Onset    Heart disease Mother     Heart failure Father       Social History     Socioeconomic History    Marital status:      Spouse name: None    Number of children: None    Years of education: None    Highest education level: None   Social Needs    Financial resource strain: None    Food insecurity - worry: None    Food insecurity - inability: None    Transportation needs - medical: None    Transportation needs - non-medical: None   Occupational History    None   Tobacco Use    Smoking status: Former Smoker     Last attempt to quit: 1985     Years since quittin.7    Smokeless tobacco: Never Used "   Substance and Sexual Activity    Alcohol use: Yes    Drug use: No    Sexual activity: Yes   Other Topics Concern    None   Social History Narrative    None     Current Outpatient Medications   Medication Sig Dispense Refill    aspirin 325 MG tablet Take 325 mg by mouth once daily.      fluticasone (FLONASE) 50 mcg/actuation nasal spray 2 sprays by Each Nare route Daily. 3 Bottle 1    hydroCHLOROthiazide (HYDRODIURIL) 25 MG tablet Take 1 tablet (25 mg total) by mouth once daily. 90 tablet 1    ibuprofen (ADVIL,MOTRIN) 800 MG tablet Take 1 tablet by mouth every 8 (eight) hours as needed.      ranitidine (ZANTAC) 300 MG tablet Take 300 mg by mouth nightly.  3     No current facility-administered medications for this visit.      Review of patient's allergies indicates:  No Known Allergies   Past Medical History:   Diagnosis Date    Allergy     Bilateral leg edema     Hyperlipidemia     Hypertension     Leg pain, bilateral     left leg more severe    Varicose vein      Past Surgical History:   Procedure Laterality Date     SECTION, LOW TRANSVERSE      x 3    HYSTERECTOMY      stents both legs         Review of Systems   Constitutional: Negative for activity change, appetite change, chills, diaphoresis, fatigue, fever, malaise/fatigue and unexpected weight change.   HENT: Negative for congestion, nosebleeds, rhinorrhea, sore throat and voice change.    Eyes: Negative for blurred vision, pain, discharge and visual disturbance.   Respiratory: Negative for apnea, cough, shortness of breath and wheezing.    Cardiovascular: Positive for leg swelling. Negative for chest pain, palpitations, orthopnea and PND.   Gastrointestinal: Negative for abdominal pain, constipation, diarrhea, nausea and vomiting.   Endocrine: Negative for polydipsia, polyphagia and polyuria.   Genitourinary: Negative for difficulty urinating, dysuria, frequency and urgency.   Musculoskeletal: Negative for arthralgias, gait  "problem, myalgias and neck pain.   Skin: Negative for color change, pallor and rash.   Allergic/Immunologic: Negative for immunocompromised state.   Neurological: Negative for dizziness, syncope, weakness, numbness and headaches.   Hematological: Negative for adenopathy. Does not bruise/bleed easily.   Psychiatric/Behavioral: Negative for confusion, self-injury and suicidal ideas.      OBJECTIVE:      Vitals:    08/21/18 1003   BP: 120/70   Pulse: 103   SpO2: 97%   Weight: 88.5 kg (195 lb)   Height: 5' 7" (1.702 m)     Physical Exam   Constitutional: She is oriented to person, place, and time. She appears well-developed and well-nourished. No distress.   HENT:   Head: Normocephalic and atraumatic.   Right Ear: External ear normal.   Left Ear: External ear normal.   Nose: Nose normal.   Mouth/Throat: Oropharynx is clear and moist. No oropharyngeal exudate.   Eyes: Conjunctivae, EOM and lids are normal. Pupils are equal, round, and reactive to light. Right eye exhibits no discharge. Left eye exhibits no discharge. No scleral icterus.   Neck: Normal range of motion. Neck supple. Carotid bruit is not present. No tracheal deviation present. No thyromegaly present.   Cardiovascular: Normal rate, regular rhythm, normal heart sounds and intact distal pulses. Exam reveals no gallop and no friction rub.   No murmur heard.  Varicose veins to bilateral lower extremities.   Pulmonary/Chest: Effort normal and breath sounds normal. No stridor. No respiratory distress. She has no wheezes. She has no rales.   Abdominal: Soft. Bowel sounds are normal. She exhibits no distension and no mass. There is no hepatosplenomegaly. There is no tenderness. There is no rigidity, no rebound, no guarding and no CVA tenderness.   Musculoskeletal: Normal range of motion. She exhibits edema (trace bilat lower ext). She exhibits no tenderness.   Lymphadenopathy:     She has no cervical adenopathy.   Neurological: She is alert and oriented to person, " place, and time.   Skin: Skin is warm, dry and intact. No rash noted. She is not diaphoretic. No erythema.   Psychiatric: She has a normal mood and affect. Her behavior is normal. Judgment and thought content normal. She expresses no suicidal plans.      Assessment:       1. Essential (primary) hypertension    2. Dyslipidemia    3. Varicose veins of lower extremities with complications, bilateral    4. Need for tetanus booster    5. Screening for osteoporosis    6. Menopause        Plan:       Essential (primary) hypertension   Stable; continue current medications.  -     hydroCHLOROthiazide (HYDRODIURIL) 25 MG tablet; Take 1 tablet (25 mg total) by mouth once daily.  Dispense: 90 tablet; Refill: 1    Dyslipidemia   Improving; continue to improve diet and add exercise along with fiber supplement and recheck in 6 months    Varicose veins of lower extremities with complications, bilateral   Continue daily aspirin and follow up with Dr. Almazan as needed    Need for tetanus booster  -     Tdap Vaccine    Screening for osteoporosis  -     DXA Bone Density Spine And Hip    Menopause  -     DXA Bone Density Spine And Hip        Follow-up in about 6 months (around 2/21/2019) for labs/med refill.      8/21/2018 Bianca Weeks, APRN, FNP

## 2018-11-08 DIAGNOSIS — J30.1 CHRONIC SEASONAL ALLERGIC RHINITIS DUE TO POLLEN: ICD-10-CM

## 2018-11-08 RX ORDER — FLUTICASONE PROPIONATE 50 MCG
SPRAY, SUSPENSION (ML) NASAL
Qty: 48 ML | Refills: 0 | Status: SHIPPED | OUTPATIENT
Start: 2018-11-08 | End: 2019-01-13 | Stop reason: SDUPTHER

## 2019-01-13 DIAGNOSIS — J30.1 CHRONIC SEASONAL ALLERGIC RHINITIS DUE TO POLLEN: ICD-10-CM

## 2019-01-14 RX ORDER — FLUTICASONE PROPIONATE 50 MCG
SPRAY, SUSPENSION (ML) NASAL
Qty: 16 ML | Refills: 3 | Status: SHIPPED | OUTPATIENT
Start: 2019-01-14 | End: 2019-08-05 | Stop reason: SDUPTHER

## 2019-02-22 ENCOUNTER — OFFICE VISIT (OUTPATIENT)
Dept: FAMILY MEDICINE | Facility: CLINIC | Age: 59
End: 2019-02-22
Payer: COMMERCIAL

## 2019-02-22 VITALS
DIASTOLIC BLOOD PRESSURE: 88 MMHG | SYSTOLIC BLOOD PRESSURE: 132 MMHG | WEIGHT: 204 LBS | OXYGEN SATURATION: 96 % | HEART RATE: 101 BPM | BODY MASS INDEX: 32.02 KG/M2 | HEIGHT: 67 IN

## 2019-02-22 DIAGNOSIS — M54.41 ACUTE RIGHT-SIDED LOW BACK PAIN WITH RIGHT-SIDED SCIATICA: Primary | ICD-10-CM

## 2019-02-22 DIAGNOSIS — E78.5 DYSLIPIDEMIA: ICD-10-CM

## 2019-02-22 DIAGNOSIS — I10 ESSENTIAL (PRIMARY) HYPERTENSION: ICD-10-CM

## 2019-02-22 PROCEDURE — 3079F PR MOST RECENT DIASTOLIC BLOOD PRESSURE 80-89 MM HG: ICD-10-PCS | Mod: ,,, | Performed by: NURSE PRACTITIONER

## 2019-02-22 PROCEDURE — 99214 OFFICE O/P EST MOD 30 MIN: CPT | Mod: ,,, | Performed by: NURSE PRACTITIONER

## 2019-02-22 PROCEDURE — 3075F SYST BP GE 130 - 139MM HG: CPT | Mod: ,,, | Performed by: NURSE PRACTITIONER

## 2019-02-22 PROCEDURE — 99214 PR OFFICE/OUTPT VISIT, EST, LEVL IV, 30-39 MIN: ICD-10-PCS | Mod: ,,, | Performed by: NURSE PRACTITIONER

## 2019-02-22 PROCEDURE — 3079F DIAST BP 80-89 MM HG: CPT | Mod: ,,, | Performed by: NURSE PRACTITIONER

## 2019-02-22 PROCEDURE — 3075F PR MOST RECENT SYSTOLIC BLOOD PRESS GE 130-139MM HG: ICD-10-PCS | Mod: ,,, | Performed by: NURSE PRACTITIONER

## 2019-02-22 PROCEDURE — 3008F PR BODY MASS INDEX (BMI) DOCUMENTED: ICD-10-PCS | Mod: ,,, | Performed by: NURSE PRACTITIONER

## 2019-02-22 PROCEDURE — 3008F BODY MASS INDEX DOCD: CPT | Mod: ,,, | Performed by: NURSE PRACTITIONER

## 2019-02-22 RX ORDER — CYCLOBENZAPRINE HCL 10 MG
10 TABLET ORAL NIGHTLY
Qty: 14 TABLET | Refills: 0 | Status: SHIPPED | OUTPATIENT
Start: 2019-02-22 | End: 2019-03-08

## 2019-02-22 RX ORDER — MELOXICAM 15 MG/1
15 TABLET ORAL DAILY
Qty: 30 TABLET | Refills: 0 | Status: SHIPPED | OUTPATIENT
Start: 2019-02-22 | End: 2019-03-21 | Stop reason: SDUPTHER

## 2019-02-22 NOTE — PROGRESS NOTES
SUBJECTIVE:      Patient ID: Daylin Lynch is a 58 y.o. female.    Chief Complaint: Back Pain (rt side radiates to buttocks)    Started last Saturday with back pain that radiated in to her right buttock. She did not injure herself. The pain is worse with sitting, especially on her sofa. Also hurts when lying in bed at night. She has been doing some stretching, alternating heat/ice, and taking advil which has helped. Her pain started improving yesterday.       Back Pain   This is a new problem. The problem occurs daily. The problem is unchanged. The pain is present in the lumbar spine. The quality of the pain is described as aching and burning. Radiates to: right buttock. The pain is mild. Pertinent negatives include no abdominal pain, chest pain, fever, headaches, numbness, paresthesias, pelvic pain or weakness.   Hypertension   This is a chronic problem. The current episode started more than 1 year ago. The problem is unchanged. Pertinent negatives include no chest pain, headaches, palpitations or shortness of breath. Risk factors for coronary artery disease include obesity and dyslipidemia. Past treatments include direct vasodilators. The current treatment provides significant improvement.   Hyperlipidemia   This is a chronic problem. The current episode started more than 1 year ago. Exacerbating diseases include obesity. Pertinent negatives include no chest pain or shortness of breath. Risk factors for coronary artery disease include dyslipidemia, obesity and hypertension.       Past Surgical History:   Procedure Laterality Date     SECTION, LOW TRANSVERSE      x 3    HYSTERECTOMY  2012    stents both legs       Family History   Problem Relation Age of Onset    Heart disease Mother     Heart failure Father       Social History     Socioeconomic History    Marital status:      Spouse name: None    Number of children: None    Years of education: None    Highest education level: None    Social Needs    Financial resource strain: None    Food insecurity - worry: None    Food insecurity - inability: None    Transportation needs - medical: None    Transportation needs - non-medical: None   Occupational History    None   Tobacco Use    Smoking status: Former Smoker     Last attempt to quit: 1985     Years since quittin.2    Smokeless tobacco: Never Used   Substance and Sexual Activity    Alcohol use: Yes    Drug use: No    Sexual activity: Yes   Other Topics Concern    None   Social History Narrative    None     Current Outpatient Medications   Medication Sig Dispense Refill    aspirin 325 MG tablet Take 325 mg by mouth once daily.      fluticasone (FLONASE) 50 mcg/actuation nasal spray USE 2 SPRAYS BY EACH NARE ROUTE DAILY. 16 mL 3    hydroCHLOROthiazide (HYDRODIURIL) 25 MG tablet Take 1 tablet (25 mg total) by mouth once daily. 90 tablet 1    cyclobenzaprine (FLEXERIL) 10 MG tablet Take 1 tablet (10 mg total) by mouth every evening. for 14 days 14 tablet 0    meloxicam (MOBIC) 15 MG tablet Take 1 tablet (15 mg total) by mouth once daily. 30 tablet 0     No current facility-administered medications for this visit.      Review of patient's allergies indicates:  No Known Allergies   Past Medical History:   Diagnosis Date    Allergy     Bilateral leg edema     Hyperlipidemia     Hypertension     Leg pain, bilateral     left leg more severe    Varicose vein      Past Surgical History:   Procedure Laterality Date     SECTION, LOW TRANSVERSE      x 3    HYSTERECTOMY  2012    stents both legs         Review of Systems   Constitutional: Negative for appetite change, chills, diaphoresis, fever and unexpected weight change.   HENT: Negative for ear discharge, hearing loss, trouble swallowing and voice change.    Eyes: Negative for photophobia and pain.   Respiratory: Negative for chest tightness, shortness of breath and stridor.    Cardiovascular: Negative for  "chest pain and palpitations.   Gastrointestinal: Negative for abdominal pain, blood in stool and vomiting.   Endocrine: Negative for cold intolerance and heat intolerance.   Genitourinary: Negative for difficulty urinating, flank pain and pelvic pain.   Musculoskeletal: Positive for back pain. Negative for joint swelling and neck stiffness.   Skin: Negative for pallor.   Neurological: Negative for dizziness, speech difficulty, weakness, numbness, headaches and paresthesias.   Hematological: Does not bruise/bleed easily.   Psychiatric/Behavioral: Negative for confusion.      OBJECTIVE:      Vitals:    02/22/19 1004 02/22/19 1043   BP: (!) 150/86 132/88   Pulse: 101    SpO2: 96%    Weight: 92.5 kg (204 lb)    Height: 5' 7" (1.702 m)      Physical Exam   Constitutional: She is oriented to person, place, and time. She appears well-developed and well-nourished.   HENT:   Head: Atraumatic.   Eyes: Conjunctivae and EOM are normal. Pupils are equal, round, and reactive to light.   Neck: Neck supple. No JVD present. Carotid bruit is not present. No thyromegaly present.   Cardiovascular: Normal rate, regular rhythm, normal heart sounds and intact distal pulses.   Pulmonary/Chest: Effort normal and breath sounds normal.   Abdominal: Soft. Bowel sounds are normal. She exhibits no distension. There is no tenderness.   Musculoskeletal: Normal range of motion.        Lumbar back: She exhibits tenderness and spasm. She exhibits normal range of motion.   SLR negative  Right paraspinal tenderness and spasm noted   Lymphadenopathy:     She has no cervical adenopathy.   Neurological: She is alert and oriented to person, place, and time.   Skin: Skin is warm and dry. No rash noted.   Psychiatric: She has a normal mood and affect.   Nursing note and vitals reviewed.     Assessment:       1. Acute right-sided low back pain with right-sided sciatica    2. Essential (primary) hypertension    3. Dyslipidemia        Plan:       Acute " right-sided low back pain with right-sided sciatica  - start    meloxicam (MOBIC) 15 MG tablet; Take 1 tablet (15 mg total) by mouth once daily.  Dispense: 30 tablet; Refill: 0  -    start cyclobenzaprine (FLEXERIL) 10 MG tablet; Take 1 tablet (10 mg total) by mouth every evening. for 14 days  Dispense: 14 tablet; Refill: 0    Essential (primary) hypertension  -     Comprehensive metabolic panel; Future; Expected date: 02/22/2019  -     Urinalysis; Future; Expected date: 02/22/2019    Dyslipidemia  -     Lipid panel; Future; Expected date: 02/22/2019        Follow-up in about 4 weeks (around 3/22/2019) for back pain.      2/22/2019 ROBERTA Meraz, [place]

## 2019-02-22 NOTE — PATIENT INSTRUCTIONS
Self-Care for Low Back Pain    Most people have low back pain now and then. In many cases, it isnt serious and self-care can help. Sometimes low back pain can be a sign of a bigger problem. Call your healthcare provider if your pain returns often or gets worse over time. For the long-term care of your back, get regular exercise, lose any excess weight and learn good posture.  Take a short rest  Lying down during the day may be beneficial for short periods of time if severe pain increases with sitting or standing. Long-term bed rest could be detrimental.  Reduce pain and swelling  Cold reduces swelling. Both cold and heat can reduce pain. Protect your skin by placing a towel between your body and the ice or heat source.  · For the first few days, apply an ice pack for 15 to 20 minutes .  · After the first few days, try heat for 15 minutes at a time to ease pain. Never sleep on a heating pad.  · Over-the-counter medicine can help control pain and swelling. Try aspirin or ibuprofen.  Exercise  Exercise can help your back heal. It also helps your back get stronger and more flexible, preventing any reinjury. Ask your healthcare provider about specific exercises for your back.  Use good posture to avoid reinjury  · When moving, bend at the hips and knees. Dont bend at the waist or twist around.  · When lifting, keep the object close to your body. Dont try to lift more than you can handle.  · When sitting, keep your lower back supported. Use a rolled-up towel as needed.  Seek immediate medical care if:  · Youre unable to stand or walk.  · You have a temperature over 100.4°F (38.0°C)  · You have frequent, painful, or bloody urination.  · You have severe abdominal pain.  · You have a sharp, stabbing pain.  · Your pain is constant.  · You have pain or numbness in your leg.  · You feel pain in a new area of your back.  · You notice that the pain isnt decreasing after more than a week.   Date Last Reviewed: 9/29/2015  ©  7912-2633 [employer]. [address] [place] [address], [place] [address]. All rights reserved. This information is not intended as a substitute for professional medical care. Always follow your healthcare professional's instructions.        Exercises to Strengthen Your Lower Back  Strong lower back and abdominal muscles work together to support your spine. The exercises below will help strengthen the lower back. It is important that you begin exercising slowly and increase levels gradually.  Always begin any exercise program with stretching. If you feel pain while doing any of these exercises, stop and talk to your doctor about a more specific exercise program that better suits your condition.   Low back stretch  The point of stretching is to make you more flexible and increase your range of motion. Stretch only as much as you are able. Stretch slowly. Do not push your stretch to the limit. If at any point you feel pain while stretching, this is your (temporary) limit.  · Lie on your back with your knees bent and both feet on the ground.  · Slowly raise your left knee to your chest as you flatten your lower back against the floor. Hold for 5 seconds.  · Relax and repeat the exercise with your right knee.  · Do 10 of these exercises for each leg.  · Repeat hugging both knees to your chest at the same time.  Building lower back strength  Start your exercise routine with 10 to 30 minutes a day, 1 to 3 times a day.  Initial exercises  Lying on your back:  1. Ankle pumps: Move your foot up and down, towards your head, and then away. Repeat 10 times with each foot.  2. Heel slides: Slowly bend your knee, drawing the heel of your foot towards you. Then slide your heel/foot from you, straightening your knee. Do not lift your foot off the floor (this is not a leg lift).  3. Abdominal contraction: Bend your knees and put your hands on your stomach. Tighten your stomach muscles. Hold for 5 seconds, then relax. Repeat 10  times.  4. Straight leg raise: Bend one leg at the knee and keep the other leg straight. Tighten your stomach muscles. Slowly lift your straight leg 6 to 12 inches off the floor and hold for up to 5 seconds. Repeat 10 times on each side.  Standin. Wall squats: Stand with your back against the wall. Move your feet about 12 inches away from the wall. Tighten your stomach muscles, and slowly bend your knees until they are at about a 45 degree angle. Do not go down too far. Hold about 5 seconds. Then slowly return to your starting position. Repeat 10 times.  2. Heel raises: Stand facing the wall. Slowly raise the heels of your feet up and down, while keeping your toes on the floor. If you have trouble balancing, you can touch the wall with your hands. Repeat 10 times.  More advanced exercises  When you feel comfortable enough, try these exercises.  1. Kneeling lumbar extension: Begin on your hands and knees. At the same time, raise and straighten your right arm and left leg until they are parallel to the ground. Hold for 2 seconds and come back slowly to a starting position. Repeat with left arm and right leg, alternating 10 times.  2. Prone lumbar extension: Lie face down, arms extended overhead, palms on the floor. At the same time, raise your right arm and left leg as high as comfortably possible. Hold for 10 seconds and slowly return to start. Repeat with left arm and right leg, alternating 10 times. Gradually build up to 20 times. (Advanced: Repeat this exercise raising both arms and both legs a few inches off the floor at the same time. Hold for 5 seconds and release.)  3. Pelvic tilt: Lie on the floor on your back with your knees bent at 90 degrees. Your feet should be flat on the floor. Inhale, exhale, then slowly contract your abdominal muscles bringing your navel toward your spine. Let your pelvis rock back until your lower back is flat on the floor. Hold for 10 seconds while breathing  smoothly.  4. Abdominal crunch: Perform a pelvic tilt (above) flattening your lower back against the floor. Holding the tension in your abdominal muscles, take another breath and raise your shoulder blades off the ground (this is not a full sit-up). Keep your head in line with your body (dont bend your neck forward). Hold for 2 seconds, then slowly lower.  Date Last Reviewed: 6/1/2016  © 9529-6152 [employer]. [address] [place] [address]. All rights reserved. This information is not intended as a substitute for professional medical care. Always follow your healthcare professional's instructions.        Back Exercises: Back Release  Do this exercise on your hands and knees. Keep your knees under your hips and your hands under your shoulders.      · Relax your abdominal and buttocks muscles, lift your head, and let your back sag. Be sure to keep your weight evenly distributed. Dont sit back on your hips.   · Hold for 5 seconds.  · Return to starting position.  · Tuck your head and lift (arch) your back.  · Hold for 5 seconds  · Return to starting position.  · Repeat 5 times.  Date Last Reviewed: 8/16/2015  © 1382-9193 [employer]. [address] [place] [address]. All rights reserved. This information is not intended as a substitute for professional medical care. Always follow your healthcare professional's instructions.

## 2019-02-26 LAB
ALBUMIN SERPL-MCNC: 3.8 G/DL (ref 3.6–5.1)
ALBUMIN/GLOB SERPL: 1.4 (CALC) (ref 1–2.5)
ALP SERPL-CCNC: 57 U/L (ref 33–130)
ALT SERPL-CCNC: 6 U/L (ref 6–29)
APPEARANCE UR: CLEAR
AST SERPL-CCNC: 14 U/L (ref 10–35)
BILIRUB SERPL-MCNC: 0.6 MG/DL (ref 0.2–1.2)
BILIRUB UR QL STRIP: NEGATIVE
BUN SERPL-MCNC: 18 MG/DL (ref 7–25)
BUN/CREAT SERPL: 37 (CALC) (ref 6–22)
CALCIUM SERPL-MCNC: 9.1 MG/DL (ref 8.6–10.4)
CHLORIDE SERPL-SCNC: 104 MMOL/L (ref 98–110)
CHOLEST SERPL-MCNC: 222 MG/DL
CHOLEST/HDLC SERPL: 3 (CALC)
CO2 SERPL-SCNC: 28 MMOL/L (ref 20–32)
COLOR UR: YELLOW
CREAT SERPL-MCNC: 0.49 MG/DL (ref 0.5–1.05)
GFRSERPLBLD MDRD-ARVRAT: 108 ML/MIN/1.73M2
GLOBULIN SER CALC-MCNC: 2.7 G/DL (CALC) (ref 1.9–3.7)
GLUCOSE SERPL-MCNC: 85 MG/DL (ref 65–99)
GLUCOSE UR QL STRIP: NEGATIVE
HDLC SERPL-MCNC: 75 MG/DL
HGB UR QL STRIP: ABNORMAL
KETONES UR QL STRIP: NEGATIVE
LDLC SERPL CALC-MCNC: 133 MG/DL (CALC)
LEUKOCYTE ESTERASE UR QL STRIP: ABNORMAL
NITRITE UR QL STRIP: NEGATIVE
NONHDLC SERPL-MCNC: 147 MG/DL (CALC)
PH UR STRIP: 7.5 [PH] (ref 5–8)
POTASSIUM SERPL-SCNC: 4.3 MMOL/L (ref 3.5–5.3)
PROT SERPL-MCNC: 6.5 G/DL (ref 6.1–8.1)
PROT UR QL STRIP: NEGATIVE
SODIUM SERPL-SCNC: 141 MMOL/L (ref 135–146)
SP GR UR STRIP: 1.02 (ref 1–1.03)
TRIGL SERPL-MCNC: 52 MG/DL

## 2019-03-09 ENCOUNTER — OFFICE VISIT (OUTPATIENT)
Dept: URGENT CARE | Facility: CLINIC | Age: 59
End: 2019-03-09
Payer: COMMERCIAL

## 2019-03-09 VITALS
TEMPERATURE: 98 F | RESPIRATION RATE: 15 BRPM | HEART RATE: 96 BPM | BODY MASS INDEX: 31.71 KG/M2 | HEIGHT: 67 IN | SYSTOLIC BLOOD PRESSURE: 139 MMHG | WEIGHT: 202 LBS | OXYGEN SATURATION: 97 % | DIASTOLIC BLOOD PRESSURE: 91 MMHG

## 2019-03-09 DIAGNOSIS — J32.9 SINUSITIS, UNSPECIFIED CHRONICITY, UNSPECIFIED LOCATION: ICD-10-CM

## 2019-03-09 DIAGNOSIS — J40 BRONCHITIS: Primary | ICD-10-CM

## 2019-03-09 PROCEDURE — 96372 PR INJECTION,THERAP/PROPH/DIAG2ST, IM OR SUBCUT: ICD-10-PCS | Mod: S$GLB,,, | Performed by: NURSE PRACTITIONER

## 2019-03-09 PROCEDURE — 3080F DIAST BP >= 90 MM HG: CPT | Mod: CPTII,S$GLB,, | Performed by: NURSE PRACTITIONER

## 2019-03-09 PROCEDURE — 96372 THER/PROPH/DIAG INJ SC/IM: CPT | Mod: S$GLB,,, | Performed by: NURSE PRACTITIONER

## 2019-03-09 PROCEDURE — 3075F SYST BP GE 130 - 139MM HG: CPT | Mod: CPTII,S$GLB,, | Performed by: NURSE PRACTITIONER

## 2019-03-09 PROCEDURE — 3008F PR BODY MASS INDEX (BMI) DOCUMENTED: ICD-10-PCS | Mod: CPTII,S$GLB,, | Performed by: NURSE PRACTITIONER

## 2019-03-09 PROCEDURE — 3075F PR MOST RECENT SYSTOLIC BLOOD PRESS GE 130-139MM HG: ICD-10-PCS | Mod: CPTII,S$GLB,, | Performed by: NURSE PRACTITIONER

## 2019-03-09 PROCEDURE — 99204 PR OFFICE/OUTPT VISIT, NEW, LEVL IV, 45-59 MIN: ICD-10-PCS | Mod: 25,S$GLB,, | Performed by: NURSE PRACTITIONER

## 2019-03-09 PROCEDURE — 3008F BODY MASS INDEX DOCD: CPT | Mod: CPTII,S$GLB,, | Performed by: NURSE PRACTITIONER

## 2019-03-09 PROCEDURE — 99204 OFFICE O/P NEW MOD 45 MIN: CPT | Mod: 25,S$GLB,, | Performed by: NURSE PRACTITIONER

## 2019-03-09 PROCEDURE — 3080F PR MOST RECENT DIASTOLIC BLOOD PRESSURE >= 90 MM HG: ICD-10-PCS | Mod: CPTII,S$GLB,, | Performed by: NURSE PRACTITIONER

## 2019-03-09 RX ORDER — DEXAMETHASONE SODIUM PHOSPHATE 4 MG/ML
8 INJECTION, SOLUTION INTRA-ARTICULAR; INTRALESIONAL; INTRAMUSCULAR; INTRAVENOUS; SOFT TISSUE
Status: COMPLETED | OUTPATIENT
Start: 2019-03-09 | End: 2019-03-09

## 2019-03-09 RX ORDER — CODEINE PHOSPHATE AND GUAIFENESIN 10; 100 MG/5ML; MG/5ML
5 SOLUTION ORAL EVERY 6 HOURS PRN
Qty: 120 ML | Refills: 0 | Status: SHIPPED | OUTPATIENT
Start: 2019-03-09 | End: 2019-03-12

## 2019-03-09 RX ORDER — AZITHROMYCIN 250 MG/1
TABLET, FILM COATED ORAL
Qty: 6 TABLET | Refills: 0 | Status: SHIPPED | OUTPATIENT
Start: 2019-03-09 | End: 2019-03-25 | Stop reason: ALTCHOICE

## 2019-03-09 RX ORDER — METHYLPREDNISOLONE 4 MG/1
4 TABLET ORAL DAILY
Qty: 1 PACKAGE | Refills: 0 | Status: SHIPPED | OUTPATIENT
Start: 2019-03-09 | End: 2019-03-25 | Stop reason: ALTCHOICE

## 2019-03-09 RX ORDER — ALBUTEROL SULFATE 90 UG/1
2 AEROSOL, METERED RESPIRATORY (INHALATION) EVERY 6 HOURS PRN
Qty: 18 G | Refills: 0 | Status: SHIPPED | OUTPATIENT
Start: 2019-03-09 | End: 2020-06-26

## 2019-03-09 RX ADMIN — DEXAMETHASONE SODIUM PHOSPHATE 8 MG: 4 INJECTION, SOLUTION INTRA-ARTICULAR; INTRALESIONAL; INTRAMUSCULAR; INTRAVENOUS; SOFT TISSUE at 11:03

## 2019-03-09 NOTE — PROGRESS NOTES
"Subjective:       Patient ID: Daylin Lynch is a 58 y.o. female.    Vitals:  height is 5' 7" (1.702 m) and weight is 91.6 kg (202 lb). Her temperature is 97.7 °F (36.5 °C). Her blood pressure is 139/91 (abnormal) and her pulse is 96. Her respiration is 15 and oxygen saturation is 97%.     Chief Complaint: Sinus Problem    Pt presents with cough with green sputum production x 1 week. Pt also reports nasal congestion and sinus pressure x 1 week. Pt denies f/c/n/v.       Sinus Problem   The current episode started in the past 7 days. Associated symptoms include congestion, coughing and sinus pressure. Pertinent negatives include no chills, diaphoresis, ear pain, shortness of breath or sore throat. (Sinus drip)       Constitution: Negative for chills, sweating, fatigue and fever.   HENT: Positive for congestion, sinus pain and sinus pressure. Negative for ear pain, sore throat and voice change.    Neck: Negative for painful lymph nodes.   Eyes: Negative for eye redness.   Respiratory: Positive for cough and sputum production. Negative for chest tightness, bloody sputum, COPD, shortness of breath, stridor, wheezing and asthma.    Gastrointestinal: Negative for nausea and vomiting.   Musculoskeletal: Negative for muscle ache.   Skin: Negative for rash.   Allergic/Immunologic: Negative for seasonal allergies and asthma.   Hematologic/Lymphatic: Negative for swollen lymph nodes.       Objective:      Physical Exam   Constitutional: She is oriented to person, place, and time. She appears well-developed and well-nourished. She is cooperative.  Non-toxic appearance. She does not appear ill. No distress.   HENT:   Head: Normocephalic and atraumatic.   Right Ear: Hearing, tympanic membrane, external ear and ear canal normal.   Left Ear: Hearing, tympanic membrane, external ear and ear canal normal.   Nose: Mucosal edema and rhinorrhea present. No nasal deformity. No epistaxis. Right sinus exhibits maxillary sinus tenderness. " Right sinus exhibits no frontal sinus tenderness. Left sinus exhibits maxillary sinus tenderness. Left sinus exhibits no frontal sinus tenderness.   Mouth/Throat: Uvula is midline, oropharynx is clear and moist and mucous membranes are normal. No trismus in the jaw. Normal dentition. No uvula swelling. No posterior oropharyngeal erythema.   Eyes: Conjunctivae and lids are normal. No scleral icterus.   Sclera clear bilat   Neck: Trachea normal, full passive range of motion without pain and phonation normal. Neck supple.   Cardiovascular: Normal rate, regular rhythm, normal heart sounds, intact distal pulses and normal pulses.   Pulmonary/Chest: Effort normal and breath sounds normal. No respiratory distress. She has no wheezes.   Cough with green sputum production   Abdominal: Soft. Normal appearance and bowel sounds are normal. She exhibits no distension. There is no tenderness.   Musculoskeletal: Normal range of motion. She exhibits no edema or deformity.   Neurological: She is alert and oriented to person, place, and time. She exhibits normal muscle tone. Coordination normal.   Skin: Skin is warm, dry and intact. She is not diaphoretic. No pallor.   Psychiatric: She has a normal mood and affect. Her speech is normal and behavior is normal. Judgment and thought content normal. Cognition and memory are normal.   Nursing note and vitals reviewed.      Assessment:       1. Bronchitis    2. Sinusitis, unspecified chronicity, unspecified location        Plan:         Bronchitis    Sinusitis, unspecified chronicity, unspecified location    Other orders  -     dexamethasone injection 8 mg  -     methylPREDNISolone (MEDROL DOSEPACK) 4 mg tablet; Take 1 tablet (4 mg total) by mouth once daily. use as directed  Dispense: 1 Package; Refill: 0  -     azithromycin (Z-NGOZI) 250 MG tablet; Take 2 tablets by mouth on day 1; Take 1 tablet by mouth on days 2-5  Dispense: 6 tablet; Refill: 0  -     guaifenesin-codeine 100-10 mg/5 ml  (CHERATUSSIN AC)  mg/5 mL syrup; Take 5 mLs by mouth every 6 (six) hours as needed.  Dispense: 120 mL; Refill: 0  -     albuterol (PROVENTIL HFA) 90 mcg/actuation inhaler; Inhale 2 puffs into the lungs every 6 (six) hours as needed for Wheezing. Rescue  Dispense: 18 g; Refill: 0

## 2019-03-09 NOTE — PATIENT INSTRUCTIONS
What Is Acute Bronchitis?  Acute bronchitis is when the airways in your lungs (bronchial tubes) become red and swollen (inflamed). It is usually caused by a viral infection. But it can also occur because of a bacteria or allergen. Symptoms include a cough that produces yellow or greenish mucus and can last for days or sometimes weeks.  Inside healthy lungs    Air travels in and out of the lungs through the airways. The linings of these airways produce sticky mucus. This mucus traps particles that enter the lungs. Tiny structures called cilia then sweep the particles out of the airways.     Healthy airway: Airways are normally open. Air moves in and out easily.      Healthy cilia: Tiny, hairlike cilia sweep mucus and particles up and out of the airways.   Lungs with bronchitis  Bronchitis often occurs with a cold or the flu virus. The airways become inflamed (red and swollen). There is a deep hacking cough from the extra mucus. Other symptoms may include:  · Wheezing  · Chest discomfort  · Shortness of breath  · Mild fever  A second infection, this time due to bacteria, may then occur. And airways irritated by allergens or smoke are more likely to get infected.        Inflamed airway: Inflammation and extra mucus narrow the airway, causing shortness of breath.      Impaired cilia: Extra mucus impairs cilia, causing congestion and wheezing. Smoking makes the problem worse.   Making a diagnosis  A physical exam, health history, and certain tests help your healthcare provider make the diagnosis.  Health history  Your healthcare provider will ask you about your symptoms.  The exam  Your provider listens to your chest for signs of congestion. He or she may also check your ears, nose, and throat.  Possible tests  · A sputum test for bacteria. This requires a sample of mucus from your lungs.  · A nasal or throat swab. This tests to see if you have a bacterial infection.  · A chest X-ray. This is done if your healthcare  provider thinks you have pneumonia.  · Tests to check for an underlying condition. Other tests may be done to check for things such as allergies, asthma, or COPD (chronic obstructive pulmonary disease). You may need to see a specialist for more lung function testing.  Treating a cough  The main treatment for bronchitis is easing symptoms. Avoiding smoke, allergens, and other things that trigger coughing can often help. If the infection is bacterial, you may be given antibiotics. During the illness, it's important to get plenty of sleep. To ease symptoms:  · Dont smoke. Also avoid secondhand smoke.  · Use a humidifier. Or try breathing in steam from a hot shower. This may help loosen mucus.  · Drink a lot of water and juice. They can soothe the throat and may help thin mucus.  · Sit up or use extra pillows when in bed. This helps to lessen coughing and congestion.  · Ask your provider about using medicine. Ask about using cough medicine, pain and fever medicine, or a decongestant.  Antibiotics  Most cases of bronchitis are caused by cold or flu viruses. They dont need antibiotics to treat them, even if your mucus is thick and green or yellow. Antibiotics dont treat viral illness and antibiotics have not been shown to have any benefit in cases of acute bronchitis. Taking antibiotics when they are not needed increases your risk of getting an infection later that is antibiotic-resistant. Antibiotics can also cause severe cases of diarrhea that require other antibiotics to treat.  It is important that you accept your healthcare provider's opinion to not use antibiotics. Your provider will prescribe antibiotics if the infection is caused by bacteria. If they are prescribed:  · Take all of the medicine. Take the medicine until it is used up, even if symptoms have improved. If you dont, the bronchitis may come back.  · Take the medicines as directed. For instance, some medicines should be taken with food.  · Ask about  side effects. Ask your provider or pharmacist what side effects are common, and what to do about them.  Follow-up care  You should see your provider again in 2 to 3 weeks. By this time, symptoms should have improved. An infection that lasts longer may mean you have a more serious problem.  Prevention  · Avoid tobacco smoke. If you smoke, quit. Stay away from smoky places. Ask friends and family not to smoke around you, or in your home or car.  · Get checked for allergies.  · Ask your provider about getting a yearly flu shot. Also ask about pneumococcal or pneumonia shots.  · Wash your hands often. This helps reduce the chance of picking up viruses that cause colds and flu.  Call your healthcare provider if:  · Symptoms worsen, or you have new symptoms  · Breathing problems worsen or  become severe  · Symptoms dont get better within a week, or within 3 days of taking antibiotics   Date Last Reviewed: 2/1/2017  © 7329-7053 [employer]. [address] [place] [address]. All rights reserved. This information is not intended as a substitute for professional medical care. Always follow your healthcare professional's instructions.        Sinusitis (Antibiotic Treatment)    The sinuses are air-filled spaces within the bones of the face. They connect to the inside of the nose. Sinusitis is an inflammation of the tissue lining the sinus cavity. Sinus inflammation can occur during a cold. It can also be due to allergies to pollens and other particles in the air. Sinusitis can cause symptoms of sinus congestion and fullness. A sinus infection causes fever, headache and facial pain. There is often green or yellow drainage from the nose or into the back of the throat (post-nasal drip). You have been given antibiotics to treat this condition.  Home care:  · Take the full course of antibiotics as instructed. Do not stop taking them, even if you feel better.  · Drink plenty of water, hot tea, and other  liquids. This may help thin mucus. It also may promote sinus drainage.  · Heat may help soothe painful areas of the face. Use a towel soaked in hot water. Or,  the shower and direct the hot spray onto your face. Using a vaporizer along with a menthol rub at night may also help.   · An expectorant containing guaifenesin may help thin the mucus and promote drainage from the sinuses.  · Over-the-counter decongestants may be used unless a similar medicine was prescribed. Nasal sprays work the fastest. Use one that contains phenylephrine or oxymetazoline. First blow the nose gently. Then use the spray. Do not use these medicines more often than directed on the label or symptoms may get worse. You may also use tablets containing pseudoephedrine. Avoid products that combine ingredients, because side effects may be increased. Read labels. You can also ask the pharmacist for help. (NOTE: Persons with high blood pressure should not use decongestants. They can raise blood pressure.)  · Over-the-counter antihistamines may help if allergies contributed to your sinusitis.    · Do not use nasal rinses or irrigation during an acute sinus infection, unless told to by your health care provider. Rinsing may spread the infection to other sinuses.  · Use acetaminophen or ibuprofen to control pain, unless another pain medicine was prescribed. (If you have chronic liver or kidney disease or ever had a stomach ulcer, talk with your doctor before using these medicines. Aspirin should never be used in anyone under 18 years of age who is ill with a fever. It may cause severe liver damage.)  · Don't smoke. This can worsen symptoms.  Follow-up care  Follow up with your healthcare provider or our staff if you are not improving within the next week.  When to seek medical advice  Call your healthcare provider if any of these occur:  · Facial pain or headache becoming more severe  · Stiff neck  · Unusual drowsiness or confusion  · Swelling  of the forehead or eyelids  · Vision problems, including blurred or double vision  · Fever of 100.4ºF (38ºC) or higher, or as directed by your healthcare provider  · Seizure  · Breathing problems  · Symptoms not resolving within 10 days  Date Last Reviewed: 4/13/2015  © 4884-9091 [employer]. [address] [place] [address], [place] [address]. All rights reserved. This information is not intended as a substitute for professional medical care. Always follow your healthcare professional's instructions.

## 2019-03-21 DIAGNOSIS — M54.41 ACUTE RIGHT-SIDED LOW BACK PAIN WITH RIGHT-SIDED SCIATICA: ICD-10-CM

## 2019-03-21 RX ORDER — MELOXICAM 15 MG/1
TABLET ORAL
Qty: 30 TABLET | Refills: 0 | Status: SHIPPED | OUTPATIENT
Start: 2019-03-21 | End: 2019-03-25 | Stop reason: ALTCHOICE

## 2019-03-25 ENCOUNTER — OFFICE VISIT (OUTPATIENT)
Dept: FAMILY MEDICINE | Facility: CLINIC | Age: 59
End: 2019-03-25
Payer: COMMERCIAL

## 2019-03-25 VITALS
HEIGHT: 67 IN | BODY MASS INDEX: 31.39 KG/M2 | HEART RATE: 89 BPM | SYSTOLIC BLOOD PRESSURE: 132 MMHG | WEIGHT: 200 LBS | DIASTOLIC BLOOD PRESSURE: 86 MMHG | OXYGEN SATURATION: 97 %

## 2019-03-25 DIAGNOSIS — E78.5 DYSLIPIDEMIA: ICD-10-CM

## 2019-03-25 DIAGNOSIS — K21.9 GASTROESOPHAGEAL REFLUX DISEASE, ESOPHAGITIS PRESENCE NOT SPECIFIED: Primary | ICD-10-CM

## 2019-03-25 DIAGNOSIS — M54.41 ACUTE RIGHT-SIDED LOW BACK PAIN WITH RIGHT-SIDED SCIATICA: ICD-10-CM

## 2019-03-25 DIAGNOSIS — R31.9 HEMATURIA, UNSPECIFIED TYPE: ICD-10-CM

## 2019-03-25 DIAGNOSIS — I10 ESSENTIAL (PRIMARY) HYPERTENSION: ICD-10-CM

## 2019-03-25 PROCEDURE — 3079F DIAST BP 80-89 MM HG: CPT | Mod: ,,, | Performed by: NURSE PRACTITIONER

## 2019-03-25 PROCEDURE — 3008F BODY MASS INDEX DOCD: CPT | Mod: ,,, | Performed by: NURSE PRACTITIONER

## 2019-03-25 PROCEDURE — 99214 PR OFFICE/OUTPT VISIT, EST, LEVL IV, 30-39 MIN: ICD-10-PCS | Mod: ,,, | Performed by: NURSE PRACTITIONER

## 2019-03-25 PROCEDURE — 3075F PR MOST RECENT SYSTOLIC BLOOD PRESS GE 130-139MM HG: ICD-10-PCS | Mod: ,,, | Performed by: NURSE PRACTITIONER

## 2019-03-25 PROCEDURE — 3008F PR BODY MASS INDEX (BMI) DOCUMENTED: ICD-10-PCS | Mod: ,,, | Performed by: NURSE PRACTITIONER

## 2019-03-25 PROCEDURE — 3075F SYST BP GE 130 - 139MM HG: CPT | Mod: ,,, | Performed by: NURSE PRACTITIONER

## 2019-03-25 PROCEDURE — 3079F PR MOST RECENT DIASTOLIC BLOOD PRESSURE 80-89 MM HG: ICD-10-PCS | Mod: ,,, | Performed by: NURSE PRACTITIONER

## 2019-03-25 PROCEDURE — 99214 OFFICE O/P EST MOD 30 MIN: CPT | Mod: ,,, | Performed by: NURSE PRACTITIONER

## 2019-03-25 RX ORDER — HYDROCHLOROTHIAZIDE 25 MG/1
25 TABLET ORAL DAILY
Qty: 90 TABLET | Refills: 1 | Status: SHIPPED | OUTPATIENT
Start: 2019-03-25 | End: 2019-09-25 | Stop reason: SDUPTHER

## 2019-03-25 RX ORDER — OMEPRAZOLE 20 MG/1
1 TABLET, ORALLY DISINTEGRATING, DELAYED RELEASE ORAL DAILY
Qty: 30 TABLET | Refills: 0 | Status: SHIPPED | OUTPATIENT
Start: 2019-03-25 | End: 2019-12-27

## 2019-03-25 NOTE — PROGRESS NOTES
SUBJECTIVE:      Patient ID: Daylin Lynch is a 58 y.o. female.    Chief Complaint: Back Pain (follow up  resolved) and Abdominal Pain (upper abd.)    Daylin is here today to f/u on back pain which she says is completely resolved. Complaining of upper abdominal pain and heartburn for the past 2 weeks, taking tums daily without relief. Hematuria noted on labs, pt states she has had that for years. Denies urinary symptoms.     Hypertension   This is a chronic problem. The current episode started more than 1 year ago. The problem is controlled. Pertinent negatives include no anxiety, blurred vision, chest pain, headaches, malaise/fatigue, neck pain, orthopnea, palpitations, peripheral edema, PND, shortness of breath or sweats. There are no associated agents to hypertension. Risk factors for coronary artery disease include dyslipidemia and obesity. Past treatments include diuretics. The current treatment provides moderate improvement. Compliance problems include exercise.  There is no history of angina, kidney disease or CAD/MI. There is no history of chronic renal disease, hyperparathyroidism, a hypertension causing med or a thyroid problem.   Hyperlipidemia   This is a chronic problem. The current episode started more than 1 year ago. Recent lipid tests were reviewed and are high. Exacerbating diseases include obesity. She has no history of chronic renal disease, diabetes, hypothyroidism, liver disease or nephrotic syndrome. There are no known factors aggravating her hyperlipidemia. Pertinent negatives include no chest pain, myalgias or shortness of breath. Current antihyperlipidemic treatment includes diet change and exercise. The current treatment provides mild improvement of lipids. Risk factors for coronary artery disease include dyslipidemia, hypertension, obesity and post-menopausal.       Past Surgical History:   Procedure Laterality Date     SECTION, LOW TRANSVERSE      x 3    HYSTERECTOMY       stents both legs       Family History   Problem Relation Age of Onset    Heart disease Mother     Heart failure Father       Social History     Socioeconomic History    Marital status:      Spouse name: None    Number of children: None    Years of education: None    Highest education level: None   Occupational History    None   Social Needs    Financial resource strain: None    Food insecurity:     Worry: None     Inability: None    Transportation needs:     Medical: None     Non-medical: None   Tobacco Use    Smoking status: Former Smoker     Last attempt to quit: 1985     Years since quittin.3    Smokeless tobacco: Never Used   Substance and Sexual Activity    Alcohol use: Yes    Drug use: No    Sexual activity: Yes   Lifestyle    Physical activity:     Days per week: None     Minutes per session: None    Stress: None   Relationships    Social connections:     Talks on phone: None     Gets together: None     Attends Church service: None     Active member of club or organization: None     Attends meetings of clubs or organizations: None     Relationship status: None    Intimate partner violence:     Fear of current or ex partner: None     Emotionally abused: None     Physically abused: None     Forced sexual activity: None   Other Topics Concern    None   Social History Narrative    None     Current Outpatient Medications   Medication Sig Dispense Refill    albuterol (PROVENTIL HFA) 90 mcg/actuation inhaler Inhale 2 puffs into the lungs every 6 (six) hours as needed for Wheezing. Rescue 18 g 0    aspirin 325 MG tablet Take 325 mg by mouth once daily.      fluticasone (FLONASE) 50 mcg/actuation nasal spray USE 2 SPRAYS BY EACH NARE ROUTE DAILY. 16 mL 3    hydroCHLOROthiazide (HYDRODIURIL) 25 MG tablet Take 1 tablet (25 mg total) by mouth once daily. 90 tablet 1    omeprazole 20 mg TbLD Take 1 tablet by mouth once daily. 30 tablet 0     No current  "facility-administered medications for this visit.      Review of patient's allergies indicates:  No Known Allergies   Past Medical History:   Diagnosis Date    Allergy     Bilateral leg edema     Hyperlipidemia     Hypertension     Leg pain, bilateral     left leg more severe    Varicose vein      Past Surgical History:   Procedure Laterality Date     SECTION, LOW TRANSVERSE      x 3    HYSTERECTOMY  2012    stents both legs         Review of Systems   Constitutional: Negative for appetite change, chills, diaphoresis, malaise/fatigue and unexpected weight change.   HENT: Negative for ear discharge, hearing loss, trouble swallowing and voice change.    Eyes: Negative for blurred vision, photophobia and pain.   Respiratory: Negative for chest tightness, shortness of breath and stridor.    Cardiovascular: Negative for chest pain, palpitations, orthopnea and PND.   Gastrointestinal: Negative for abdominal pain, blood in stool and vomiting.   Endocrine: Negative for cold intolerance and heat intolerance.   Genitourinary: Negative for difficulty urinating and flank pain.   Musculoskeletal: Negative for joint swelling, myalgias, neck pain and neck stiffness.   Skin: Negative for pallor.   Neurological: Negative for dizziness, speech difficulty, weakness and headaches.   Hematological: Does not bruise/bleed easily.   Psychiatric/Behavioral: Negative for confusion.      OBJECTIVE:      Vitals:    19 1058 19 1124   BP: (!) 150/80 132/86   Pulse: (!) 112 89   SpO2: 97%    Weight: 90.7 kg (200 lb)    Height: 5' 7" (1.702 m)      Physical Exam   Constitutional: She is oriented to person, place, and time. She appears well-developed and well-nourished.   HENT:   Head: Atraumatic.   Eyes: Conjunctivae are normal.   Neck: Neck supple.   Cardiovascular: Normal rate, regular rhythm, normal heart sounds and intact distal pulses.   Pulmonary/Chest: Effort normal and breath sounds normal. She has no wheezes. " She has no rhonchi. She has no rales.   Abdominal: Soft. Bowel sounds are normal. She exhibits no distension. There is no hepatosplenomegaly. There is tenderness in the epigastric area. There is no rigidity, no rebound and no guarding.   Musculoskeletal: Normal range of motion.   Lymphadenopathy:     She has no cervical adenopathy.   Neurological: She is alert and oriented to person, place, and time.   Skin: Skin is warm and dry. No rash noted.   Psychiatric: She has a normal mood and affect. Her speech is normal and behavior is normal.   Nursing note and vitals reviewed.     Assessment:       1. Gastroesophageal reflux disease, esophagitis presence not specified    2. Hematuria, unspecified type    3. Essential (primary) hypertension    4. Dyslipidemia    5. Acute right-sided low back pain with right-sided sciatica        Plan:       Gastroesophageal reflux disease, esophagitis presence not specified  -   start  omeprazole 20 mg TbLD; Take 1 tablet by mouth once daily.  Dispense: 30 tablet; Refill: 0    Hematuria, unspecified type  -     Urinalysis w/reflex to Microscopic    Essential (primary) hypertension  -  refill   hydroCHLOROthiazide (HYDRODIURIL) 25 MG tablet; Take 1 tablet (25 mg total) by mouth once daily.  Dispense: 90 tablet; Refill: 1    Dyslipidemia  Stable with diet and exercise    Acute right-sided low back pain with right-sided sciatica  Improved        Follow up in about 6 months (around 9/25/2019) for htn.      3/25/2019 ROBERTA Meraz, FNP

## 2019-03-25 NOTE — PATIENT INSTRUCTIONS
Tips to Control Acid Reflux    To control acid reflux, youll need to make some basic diet and lifestyle changes. The simple steps outlined below may be all youll need to ease discomfort.  Watch what you eat  · Avoid fatty foods and spicy foods.  · Eat fewer acidic foods, such as citrus and tomato-based foods. These can increase symptoms.  · Limit drinking alcohol, caffeine, and fizzy beverages. All increase acid reflux.  · Try limiting chocolate, peppermint, and spearmint. These can worsen acid reflux in some people.  Watch when you eat  · Avoid lying down for 3 hours after eating.  · Do not snack before going to bed.  Raise your head  Raising your head and upper body by 4 to 6 inches helps limit reflux when youre lying down. Put blocks under the head of your bed frame to raise it.  Other changes  · Lose weight, if you need to  · Dont exercise near bedtime  · Avoid tight-fitting clothes  · Limit aspirin and ibuprofen  · Stop smoking   Date Last Reviewed: 7/1/2016  © 4828-2014 [employer]. [address] [place] [address], [place] [address]. All rights reserved. This information is not intended as a substitute for professional medical care. Always follow your healthcare professional's instructions.

## 2019-08-02 ENCOUNTER — OCCUPATIONAL HEALTH (OUTPATIENT)
Dept: URGENT CARE | Facility: CLINIC | Age: 59
End: 2019-08-02

## 2019-08-02 PROCEDURE — 99499 UNLISTED E&M SERVICE: CPT | Mod: S$GLB,,, | Performed by: NURSE PRACTITIONER

## 2019-08-02 PROCEDURE — 99499 PR PHYSICAL - DOT/CDL: ICD-10-PCS | Mod: S$GLB,,, | Performed by: NURSE PRACTITIONER

## 2019-08-05 DIAGNOSIS — J30.1 CHRONIC SEASONAL ALLERGIC RHINITIS DUE TO POLLEN: ICD-10-CM

## 2019-08-05 RX ORDER — FLUTICASONE PROPIONATE 50 MCG
SPRAY, SUSPENSION (ML) NASAL
Qty: 16 ML | Refills: 3 | Status: SHIPPED | OUTPATIENT
Start: 2019-08-05 | End: 2020-06-26

## 2019-09-11 ENCOUNTER — TELEPHONE (OUTPATIENT)
Dept: FAMILY MEDICINE | Facility: CLINIC | Age: 59
End: 2019-09-11

## 2019-09-11 DIAGNOSIS — E78.5 DYSLIPIDEMIA: Primary | ICD-10-CM

## 2019-09-11 DIAGNOSIS — I10 ESSENTIAL (PRIMARY) HYPERTENSION: ICD-10-CM

## 2019-09-16 LAB
ALBUMIN SERPL-MCNC: 4.4 G/DL (ref 3.5–5.5)
ALBUMIN/GLOB SERPL: 1.8 {RATIO} (ref 1.2–2.2)
ALP SERPL-CCNC: 73 IU/L (ref 39–117)
ALT SERPL-CCNC: 7 IU/L (ref 0–32)
APPEARANCE UR: CLEAR
AST SERPL-CCNC: 18 IU/L (ref 0–40)
BACTERIA #/AREA URNS HPF: ABNORMAL /[HPF]
BASOPHILS # BLD AUTO: 0 X10E3/UL (ref 0–0.2)
BASOPHILS NFR BLD AUTO: 1 %
BILIRUB SERPL-MCNC: 0.5 MG/DL (ref 0–1.2)
BILIRUB UR QL STRIP: NEGATIVE
BUN SERPL-MCNC: 13 MG/DL (ref 6–24)
BUN/CREAT SERPL: 24 (ref 9–23)
CALCIUM SERPL-MCNC: 9.6 MG/DL (ref 8.7–10.2)
CHLORIDE SERPL-SCNC: 102 MMOL/L (ref 96–106)
CHOLEST SERPL-MCNC: 247 MG/DL (ref 100–199)
CO2 SERPL-SCNC: 28 MMOL/L (ref 20–29)
COLOR UR: YELLOW
CREAT SERPL-MCNC: 0.54 MG/DL (ref 0.57–1)
EOSINOPHIL # BLD AUTO: 0.1 X10E3/UL (ref 0–0.4)
EOSINOPHIL NFR BLD AUTO: 2 %
EPI CELLS #/AREA URNS HPF: ABNORMAL /HPF (ref 0–10)
ERYTHROCYTE [DISTWIDTH] IN BLOOD BY AUTOMATED COUNT: 14.1 % (ref 12.3–15.4)
GLOBULIN SER CALC-MCNC: 2.4 G/DL (ref 1.5–4.5)
GLUCOSE SERPL-MCNC: 96 MG/DL (ref 65–99)
GLUCOSE UR QL: NEGATIVE
HCT VFR BLD AUTO: 41.3 % (ref 34–46.6)
HDLC SERPL-MCNC: 73 MG/DL
HGB BLD-MCNC: 13.1 G/DL (ref 11.1–15.9)
HGB UR QL STRIP: ABNORMAL
IMM GRANULOCYTES # BLD AUTO: 0 X10E3/UL (ref 0–0.1)
IMM GRANULOCYTES NFR BLD AUTO: 0 %
KETONES UR QL STRIP: NEGATIVE
LDLC SERPL CALC-MCNC: 160 MG/DL (ref 0–99)
LEUKOCYTE ESTERASE UR QL STRIP: ABNORMAL
LYMPHOCYTES # BLD AUTO: 2.7 X10E3/UL (ref 0.7–3.1)
LYMPHOCYTES NFR BLD AUTO: 43 %
MCH RBC QN AUTO: 29.2 PG (ref 26.6–33)
MCHC RBC AUTO-ENTMCNC: 31.7 G/DL (ref 31.5–35.7)
MCV RBC AUTO: 92 FL (ref 79–97)
MICRO URNS: ABNORMAL
MONOCYTES # BLD AUTO: 0.4 X10E3/UL (ref 0.1–0.9)
MONOCYTES NFR BLD AUTO: 7 %
MUCOUS THREADS URNS QL MICRO: PRESENT
NEUTROPHILS # BLD AUTO: 3 X10E3/UL (ref 1.4–7)
NEUTROPHILS NFR BLD AUTO: 47 %
NITRITE UR QL STRIP: NEGATIVE
PH UR STRIP: 7 [PH] (ref 5–7.5)
PLATELET # BLD AUTO: 393 X10E3/UL (ref 150–450)
POTASSIUM SERPL-SCNC: 4.5 MMOL/L (ref 3.5–5.2)
PROT SERPL-MCNC: 6.8 G/DL (ref 6–8.5)
PROT UR QL STRIP: NEGATIVE
RBC # BLD AUTO: 4.48 X10E6/UL (ref 3.77–5.28)
RBC #/AREA URNS HPF: ABNORMAL /HPF (ref 0–2)
SODIUM SERPL-SCNC: 144 MMOL/L (ref 134–144)
SP GR UR: 1.02 (ref 1–1.03)
TRIGL SERPL-MCNC: 71 MG/DL (ref 0–149)
TSH SERPL DL<=0.005 MIU/L-ACNC: 0.78 UIU/ML (ref 0.45–4.5)
UROBILINOGEN UR STRIP-MCNC: 0.2 MG/DL (ref 0.2–1)
VLDLC SERPL CALC-MCNC: 14 MG/DL (ref 5–40)
WBC # BLD AUTO: 6.3 X10E3/UL (ref 3.4–10.8)
WBC #/AREA URNS HPF: ABNORMAL /HPF (ref 0–5)

## 2019-09-17 ENCOUNTER — TELEPHONE (OUTPATIENT)
Dept: FAMILY MEDICINE | Facility: CLINIC | Age: 59
End: 2019-09-17

## 2019-09-17 NOTE — TELEPHONE ENCOUNTER
----- Message from Jonathon Muñoz NP sent at 9/16/2019  7:37 AM CDT -----  Lipids elevated. low chol diet, add daily fiber.  I will review all labs at [facility]

## 2019-09-25 ENCOUNTER — OFFICE VISIT (OUTPATIENT)
Dept: FAMILY MEDICINE | Facility: CLINIC | Age: 59
End: 2019-09-25
Payer: COMMERCIAL

## 2019-09-25 VITALS
BODY MASS INDEX: 30.45 KG/M2 | SYSTOLIC BLOOD PRESSURE: 122 MMHG | OXYGEN SATURATION: 99 % | WEIGHT: 194 LBS | DIASTOLIC BLOOD PRESSURE: 87 MMHG | HEIGHT: 67 IN | HEART RATE: 87 BPM

## 2019-09-25 DIAGNOSIS — I10 ESSENTIAL (PRIMARY) HYPERTENSION: Primary | ICD-10-CM

## 2019-09-25 DIAGNOSIS — R31.9 HEMATURIA, UNSPECIFIED TYPE: ICD-10-CM

## 2019-09-25 DIAGNOSIS — M25.511 ACUTE PAIN OF RIGHT SHOULDER: ICD-10-CM

## 2019-09-25 DIAGNOSIS — E78.5 DYSLIPIDEMIA: ICD-10-CM

## 2019-09-25 PROCEDURE — 3074F SYST BP LT 130 MM HG: CPT | Mod: S$GLB,,, | Performed by: NURSE PRACTITIONER

## 2019-09-25 PROCEDURE — 3008F BODY MASS INDEX DOCD: CPT | Mod: S$GLB,,, | Performed by: NURSE PRACTITIONER

## 2019-09-25 PROCEDURE — 3079F DIAST BP 80-89 MM HG: CPT | Mod: S$GLB,,, | Performed by: NURSE PRACTITIONER

## 2019-09-25 PROCEDURE — 99214 OFFICE O/P EST MOD 30 MIN: CPT | Mod: S$GLB,,, | Performed by: NURSE PRACTITIONER

## 2019-09-25 PROCEDURE — 99214 PR OFFICE/OUTPT VISIT, EST, LEVL IV, 30-39 MIN: ICD-10-PCS | Mod: S$GLB,,, | Performed by: NURSE PRACTITIONER

## 2019-09-25 PROCEDURE — 3079F PR MOST RECENT DIASTOLIC BLOOD PRESSURE 80-89 MM HG: ICD-10-PCS | Mod: S$GLB,,, | Performed by: NURSE PRACTITIONER

## 2019-09-25 PROCEDURE — 3008F PR BODY MASS INDEX (BMI) DOCUMENTED: ICD-10-PCS | Mod: S$GLB,,, | Performed by: NURSE PRACTITIONER

## 2019-09-25 PROCEDURE — 3074F PR MOST RECENT SYSTOLIC BLOOD PRESSURE < 130 MM HG: ICD-10-PCS | Mod: S$GLB,,, | Performed by: NURSE PRACTITIONER

## 2019-09-25 RX ORDER — MELOXICAM 7.5 MG/1
7.5 TABLET ORAL DAILY
Qty: 14 TABLET | Refills: 0 | Status: SHIPPED | OUTPATIENT
Start: 2019-09-25 | End: 2019-12-27

## 2019-09-25 RX ORDER — ATORVASTATIN CALCIUM 10 MG/1
10 TABLET, FILM COATED ORAL NIGHTLY
Qty: 90 TABLET | Refills: 0 | Status: SHIPPED | OUTPATIENT
Start: 2019-09-25 | End: 2020-06-26

## 2019-09-25 RX ORDER — HYDROCHLOROTHIAZIDE 25 MG/1
25 TABLET ORAL DAILY
Qty: 90 TABLET | Refills: 1 | Status: ON HOLD | OUTPATIENT
Start: 2019-09-25 | End: 2020-03-17 | Stop reason: HOSPADM

## 2019-09-25 NOTE — PROGRESS NOTES
"    SUBJECTIVE:      Patient ID: Daylin Lynch is a 59 y.o. female.    Chief Complaint: Hypertension and Hyperlipidemia    Mrs Lynch is here today to f/u on HTN and hyperlipidemia. Lipids are elevated on labs, will discuss medication management. Hematuria noted on labs, we have discussed this in the past and she says she was worked up a few years ago and did not find anything. Will discuss new referral for eval of hematuria. She is complaining of ongoing neck and right shoulder pain for months. She has had a massage which did not help. Says she feels "tight" in her neck and shoulder    Hypertension   This is a chronic problem. The current episode started more than 1 year ago. The problem is controlled. Pertinent negatives include no anxiety, blurred vision, chest pain, headaches, malaise/fatigue, neck pain, orthopnea, palpitations, peripheral edema, PND, shortness of breath or sweats. There are no associated agents to hypertension. Risk factors for coronary artery disease include dyslipidemia and obesity. Past treatments include diuretics. The current treatment provides moderate improvement. Compliance problems include exercise.  There is no history of angina, kidney disease or CAD/MI. There is no history of chronic renal disease, hyperparathyroidism, a hypertension causing med or a thyroid problem.   Hyperlipidemia   This is a chronic problem. The current episode started more than 1 year ago. Recent lipid tests were reviewed and are high. Exacerbating diseases include obesity. She has no history of chronic renal disease, diabetes, hypothyroidism, liver disease or nephrotic syndrome. There are no known factors aggravating her hyperlipidemia. Pertinent negatives include no chest pain, myalgias or shortness of breath. Current antihyperlipidemic treatment includes diet change and exercise. The current treatment provides mild improvement of lipids. Risk factors for coronary artery disease include dyslipidemia, " hypertension, obesity and post-menopausal.       Past Surgical History:   Procedure Laterality Date     SECTION, LOW TRANSVERSE      x 3    HYSTERECTOMY  2012    stents both legs       Family History   Problem Relation Age of Onset    Heart disease Mother     Heart failure Father       Social History     Socioeconomic History    Marital status:      Spouse name: Not on file    Number of children: Not on file    Years of education: Not on file    Highest education level: Not on file   Occupational History    Not on file   Social Needs    Financial resource strain: Not on file    Food insecurity:     Worry: Not on file     Inability: Not on file    Transportation needs:     Medical: Not on file     Non-medical: Not on file   Tobacco Use    Smoking status: Former Smoker     Last attempt to quit: 1985     Years since quittin.8    Smokeless tobacco: Never Used   Substance and Sexual Activity    Alcohol use: Yes    Drug use: No    Sexual activity: Yes   Lifestyle    Physical activity:     Days per week: Not on file     Minutes per session: Not on file    Stress: Not on file   Relationships    Social connections:     Talks on phone: Not on file     Gets together: Not on file     Attends Religion service: Not on file     Active member of club or organization: Not on file     Attends meetings of clubs or organizations: Not on file     Relationship status: Not on file   Other Topics Concern    Not on file   Social History Narrative    Not on file     Current Outpatient Medications   Medication Sig Dispense Refill    albuterol (PROVENTIL HFA) 90 mcg/actuation inhaler Inhale 2 puffs into the lungs every 6 (six) hours as needed for Wheezing. Rescue 18 g 0    aspirin 325 MG tablet Take 325 mg by mouth once daily.      atorvastatin (LIPITOR) 10 MG tablet Take 1 tablet (10 mg total) by mouth every evening. 90 tablet 0    fluticasone propionate (FLONASE) 50 mcg/actuation nasal  spray USE 2 SPRAYS BY EACH NARE ROUTE DAILY. 16 mL 3    hydroCHLOROthiazide (HYDRODIURIL) 25 MG tablet Take 1 tablet (25 mg total) by mouth once daily. 90 tablet 1    meloxicam (MOBIC) 7.5 MG tablet Take 1 tablet (7.5 mg total) by mouth once daily. 14 tablet 0    omeprazole 20 mg TbLD Take 1 tablet by mouth once daily. 30 tablet 0     No current facility-administered medications for this visit.      Review of patient's allergies indicates:  No Known Allergies   Past Medical History:   Diagnosis Date    Allergy     Bilateral leg edema     Hyperlipidemia     Hypertension     Leg pain, bilateral     left leg more severe    Varicose vein      Past Surgical History:   Procedure Laterality Date     SECTION, LOW TRANSVERSE      x 3    HYSTERECTOMY  2012    stents both legs         Review of Systems   Constitutional: Negative for appetite change, chills, diaphoresis, malaise/fatigue and unexpected weight change.   HENT: Negative for ear discharge, hearing loss, trouble swallowing and voice change.    Eyes: Negative for blurred vision, photophobia and pain.   Respiratory: Negative for chest tightness, shortness of breath and stridor.    Cardiovascular: Negative for chest pain, palpitations, orthopnea and PND.   Gastrointestinal: Negative for abdominal pain, blood in stool and vomiting.   Endocrine: Negative for cold intolerance and heat intolerance.   Genitourinary: Negative for difficulty urinating and flank pain.   Musculoskeletal: Positive for arthralgias (shoulder pain). Negative for joint swelling, myalgias, neck pain and neck stiffness.   Skin: Negative for pallor.   Neurological: Negative for dizziness, speech difficulty, weakness, light-headedness and headaches.   Hematological: Does not bruise/bleed easily.   Psychiatric/Behavioral: Negative for confusion, dysphoric mood, self-injury, sleep disturbance and suicidal ideas. The patient is not nervous/anxious.       OBJECTIVE:      Vitals:     "09/25/19 1100   BP: 122/87   Pulse: 87   SpO2: 99%   Weight: 88 kg (194 lb)   Height: 5' 7" (1.702 m)     Physical Exam   Constitutional: She is oriented to person, place, and time. She appears well-developed and well-nourished.   HENT:   Head: Atraumatic.   Eyes: Conjunctivae are normal.   Neck: Neck supple. No JVD present. No thyromegaly present.   Cardiovascular: Normal rate, regular rhythm, normal heart sounds and intact distal pulses.   Pulmonary/Chest: Effort normal and breath sounds normal.   Abdominal: Soft. Bowel sounds are normal. She exhibits no distension. There is no tenderness.   Musculoskeletal: Normal range of motion. She exhibits no edema.        Right shoulder: She exhibits tenderness. She exhibits normal range of motion.   Neurological: She is alert and oriented to person, place, and time.   Skin: Skin is warm and dry.   Psychiatric: She has a normal mood and affect.   Nursing note and vitals reviewed.      Telephone on 09/11/2019   Component Date Value Ref Range Status    Glucose 09/14/2019 96  65 - 99 mg/dL Final    BUN, Bld 09/14/2019 13  6 - 24 mg/dL Final    Creatinine 09/14/2019 0.54* 0.57 - 1.00 mg/dL Final    eGFR if non African American 09/14/2019 105  >59 mL/min/1.73 Final    eGFR if  09/14/2019 121  >59 mL/min/1.73 Final    BUN/Creatinine Ratio 09/14/2019 24* 9 - 23 Final    Sodium 09/14/2019 144  134 - 144 mmol/L Final    Potassium 09/14/2019 4.5  3.5 - 5.2 mmol/L Final    Chloride 09/14/2019 102  96 - 106 mmol/L Final    CO2 09/14/2019 28  20 - 29 mmol/L Final    Calcium 09/14/2019 9.6  8.7 - 10.2 mg/dL Final    Total Protein 09/14/2019 6.8  6.0 - 8.5 g/dL Final    Albumin 09/14/2019 4.4  3.5 - 5.5 g/dL Final    Globulin, Total 09/14/2019 2.4  1.5 - 4.5 g/dL Final    Albumin/Globulin Ratio 09/14/2019 1.8  1.2 - 2.2 Final    Total Bilirubin 09/14/2019 0.5  0.0 - 1.2 mg/dL Final    Alkaline Phosphatase 09/14/2019 73  39 - 117 IU/L Final    AST " 09/14/2019 18  0 - 40 IU/L Final    ALT 09/14/2019 7  0 - 32 IU/L Final    WBC 09/14/2019 6.3  3.4 - 10.8 x10E3/uL Final    RBC 09/14/2019 4.48  3.77 - 5.28 x10E6/uL Final    Hemoglobin 09/14/2019 13.1  11.1 - 15.9 g/dL Final    Hematocrit 09/14/2019 41.3  34.0 - 46.6 % Final    Mean Corpuscular Volume 09/14/2019 92  79 - 97 fL Final    Mean Corpuscular Hemoglobin 09/14/2019 29.2  26.6 - 33.0 pg Final    Mean Corpuscular Hemoglobin Conc 09/14/2019 31.7  31.5 - 35.7 g/dL Final    RDW 09/14/2019 14.1  12.3 - 15.4 % Final    Platelets 09/14/2019 393  150 - 450 x10E3/uL Final    Neutrophils 09/14/2019 47  Not Estab. % Final    Lymph% 09/14/2019 43  Not Estab. % Final    Mono% 09/14/2019 7  Not Estab. % Final    Eosinophil% 09/14/2019 2  Not Estab. % Final    Basophil% 09/14/2019 1  Not Estab. % Final    Neutrophils Absolute 09/14/2019 3.0  1.4 - 7.0 x10E3/uL Final    Lymph # 09/14/2019 2.7  0.7 - 3.1 x10E3/uL Final    Mono # 09/14/2019 0.4  0.1 - 0.9 x10E3/uL Final    Eos # 09/14/2019 0.1  0.0 - 0.4 x10E3/uL Final    Baso # 09/14/2019 0.0  0.0 - 0.2 x10E3/uL Final    Immature Granulocytes 09/14/2019 0  Not Estab. % Final    Immature Grans (Abs) 09/14/2019 0.0  0.0 - 0.1 x10E3/uL Final    Cholesterol 09/14/2019 247* 100 - 199 mg/dL Final    Triglycerides 09/14/2019 71  0 - 149 mg/dL Final    HDL 09/14/2019 73  >39 mg/dL Final    VLDL Cholesterol Bud 09/14/2019 14  5 - 40 mg/dL Final    LDL Calculated 09/14/2019 160* 0 - 99 mg/dL Final    TSH 09/14/2019 0.780  0.450 - 4.500 uIU/mL Final    Specific Gravity, UA 09/14/2019 1.018  1.005 - 1.030 Final    pH, UA 09/14/2019 7.0  5.0 - 7.5 Final    Color, UA 09/14/2019 Yellow  Yellow Final    Clarity, UA 09/14/2019 Clear  Clear Final    Leukocytes, UA 09/14/2019 1+* Negative Final    Protein, UA 09/14/2019 Negative  Negative/Trace Final    Glucose, UA 09/14/2019 Negative  Negative Final    Ketones, UA 09/14/2019 Negative  Negative Final     Occult Blood UA 09/14/2019 1+* Negative Final    Bilirubin, UA 09/14/2019 Negative  Negative Final    Urobilinogen, UA 09/14/2019 0.2  0.2 - 1.0 mg/dL Final    Nitrite, UA 09/14/2019 Negative  Negative Final    Microscopic Examination 09/14/2019 See below:   Final    Microscopic was indicated and was performed.    WBC, UA 09/14/2019 0-5  0 - 5 /hpf Final    RBC, UA 09/14/2019 3-10* 0 - 2 /hpf Final    Epithelial Cells (non renal) 09/14/2019 0-10  0 - 10 /hpf Final    Mucus, UA 09/14/2019 Present  Not Estab. Final    Bacteria, UA 09/14/2019 Few  None seen/Few Final   ]  Assessment:       1. Essential (primary) hypertension    2. Hematuria, unspecified type    3. Dyslipidemia    4. Acute pain of right shoulder        Plan:       Essential (primary) hypertension  -     hydroCHLOROthiazide (HYDRODIURIL) 25 MG tablet; Take 1 tablet (25 mg total) by mouth once daily.  Dispense: 90 tablet; Refill: 1    Hematuria, unspecified type  -     Ambulatory referral to Urology    Dyslipidemia  -  start   atorvastatin (LIPITOR) 10 MG tablet; Take 1 tablet (10 mg total) by mouth every evening.  Dispense: 90 tablet; Refill: 0  -     Comprehensive metabolic panel; Future; Expected date: 09/25/2019  -     Lipid panel; Future; Expected date: 09/25/2019    Acute pain of right shoulder  -  start   meloxicam (MOBIC) 7.5 MG tablet; Take 1 tablet (7.5 mg total) by mouth once daily.  Dispense: 14 tablet; Refill: 0  -     Ambulatory Referral to Physical/Occupational Therapy        Follow up in about 3 months (around 12/25/2019) for hyperlipidemia .      9/25/2019 ROBERTA Meraz, FNP

## 2019-09-25 NOTE — Clinical Note
Mrs Lynch says she was worked up by a urologist a few years ago for hematuria and nothing was found. I am sending her to you for another work up of hematuria. Her last Ct was 2017

## 2019-09-25 NOTE — PATIENT INSTRUCTIONS
Low-Cholesterol Diet  Your body needs cholesterol to build new cells and create certain hormones. There are 2 kinds of cholesterol in your blood:     · HDL (good) cholesterol. This prevents fat deposits (plaque) from building up in your arteries. In this way it protects against heart disease and stroke.  · LDL (bad) cholesterol. This stays in your body and sticks to artery walls. Over time it may block blood flow to the heart and brain. This can cause a heart attack or stroke.  The cholesterol in your blood comes from 2 sources: cholesterol in food that you eat and cholesterol that your liver makes. You should limit the amount of cholesterol in your diet. But the cholesterol that your body makes has the greatest disease risk. And your body makes more cholesterol when your diet is high in bad fats (saturated and trans fats). There are 2 kinds of fats you can eat:  · Good fats, or unsaturated fats (mono-unsaturated and poly-unsaturated). They raise the level of good cholesterol and lower the level of bad cholesterol. Good fats are found in vegetable oils such as olive, sunflower, corn, and soybean oils, and in nuts and seeds.  · Bad fats, or saturated fats (including foods high in cholesterol) and trans fats. These raise your risk of disease. They lower the good cholesterol and raise the level of bad cholesterol. Bad fats are found in animal products, including meat, whole-milk dairy products, and butter. Some plants are also high in bad fats (coconut and palm plants). Trans fats are found in hard (stick) margarines. They are also in many fast foods and commercially baked goods. Soft margarine sold in tubs has fewer trans fats and is safer to use.  High blood cholesterol is usually due to a diet high in saturated fat, along with not being physically active. In some cases, genetics plays a role in causing high cholesterol. The tips below will help you create healthy eating habits that will help lower your blood  cholesterol level.  Create a diet high in good fats, low in bad fats (and low in cholesterol)  The following steps will help you create a diet high in good fats and low in bad fats:  · Talk with your doctor before starting a low cholesterol diet or weight loss program.  · Learn to read nutrition labels and select appropriate portion sizes.  · When cooking, use plant-based unsaturated vegetable oils (sunflower, corn, soybean, canola, peanut, and olive oils).  · Avoid saturated fats found in animal products such as meat, dairy (whole-milk, cheese and ice cream), poultry skin, and egg yolks. Plants high in saturated oils include coconut oil, palm oil, and palm kernel oil.  · If you eat meat, choose smaller portions and lean cuts, such as round, rika, sirloin, or loin. Eat more meatless meals.  · Replace meat with fish at least 2 times a week. Fish is an important source of the unsaturated fat called omega-3 fatty acids. This fat has potential to lower the risk of heart disease.  · Replace whole-milk dairy products with low-fat or nonfat products. Try soy products. Soy helps to reduce total cholesterol.  · Supplement your diet with protective fibers. Eat nuts, seeds, and whole grains rather than white rice and bread. These foods lower both cholesterol and triglyceride levels. (Triglycerides are another fat found in the blood.) Walnuts are one of the best sources of omega-3 fatty acids.  · Eat plenty of fresh fruits and vegetables daily.  · Avoid fast foods and commercial baked goods. Assume they contain saturated fat unless labeled otherwise.  Date Last Reviewed: 8/1/2016  © 4712-3267 [employer]. [address] [place] [address], [place] [address]. All rights reserved. This information is not intended as a substitute for professional medical care. Always follow your healthcare professional's instructions.

## 2019-09-26 ENCOUNTER — TELEPHONE (OUTPATIENT)
Dept: UROLOGY | Facility: CLINIC | Age: 59
End: 2019-09-26

## 2019-09-26 NOTE — TELEPHONE ENCOUNTER
Spoke w pt was informed of referral received for Hematuria from Jonathon morillo. I inquired about pts urological past pt states that she has prevouisly seen Dr Holm. Pt also declined having any outside urine/urine culture done outside of Dr Garcia office. Scheduled pts appt confirmed time and location pt voiced ok and understanding.

## 2019-10-04 ENCOUNTER — TELEPHONE (OUTPATIENT)
Dept: UROLOGY | Facility: CLINIC | Age: 59
End: 2019-10-04

## 2019-10-04 NOTE — TELEPHONE ENCOUNTER
Per Dr Ovalle office per [facility] faxed to there office. Response back from there office states Patient not treated here since 2010. No record avaiable

## 2019-10-13 NOTE — PROGRESS NOTES
[facility] Urology Consult:  Consult from: JOON Muñoz   Consult for: microhematuria    Daylin Lynch is a 58 yo F referred for eval of microhematuria    She recently had primary care fu of HTN and HLD on 19 and was noted to again have blood in urine on labs.  This was worked up prior to  by Dr Rivas (records not available)  UA 19 1+ blood, 1+ leuks, 0-5 wbc, 3-10 rbc  Chart review also notes 1+ blood on UA  and  but no micros, and 2+ blood  at which time micro negative for rbc    No history of kidney stones  Half sister had renal cell carcinoma and nephrectomy in last year found incidentally on scan  Smoker in teenage years only late teens to early 20s.  No UTI history  Has LE venous stents. Takes asa 325.  Had workup including cystoscopy 10 years ago and no significant findings  Has noncon CT in 2017 in system - on personal review has bilateral extrarenal pelves but no gross abnormality  No dysuria, urgency, frequency  Never had gross blood in urine        Past Medical History:   Diagnosis Date    Allergy     Bilateral leg edema     Hyperlipidemia     Hypertension     Leg pain, bilateral     left leg more severe    Varicose vein        Past Surgical History:   Procedure Laterality Date     SECTION, LOW TRANSVERSE      x 3    HYSTERECTOMY  2012    stents both legs         Family History   Problem Relation Age of Onset    Heart disease Mother     Heart failure Father        Social History     Socioeconomic History    Marital status:      Spouse name: Not on file    Number of children: Not on file    Years of education: Not on file    Highest education level: Not on file   Occupational History    Not on file   Social Needs    Financial resource strain: Not on file    Food insecurity:     Worry: Not on file     Inability: Not on file    Transportation needs:     Medical: Not on file     Non-medical: Not on file   Tobacco Use    Smoking  status: Former Smoker     Last attempt to quit: 1985     Years since quittin.8    Smokeless tobacco: Never Used   Substance and Sexual Activity    Alcohol use: Yes    Drug use: No    Sexual activity: Yes   Lifestyle    Physical activity:     Days per week: Not on file     Minutes per session: Not on file    Stress: Not on file   Relationships    Social connections:     Talks on phone: Not on file     Gets together: Not on file     Attends [employer] service: Not on file     Active member of club or organization: Not on file     Attends meetings of clubs or organizations: Not on file     Relationship status: Not on file   Other Topics Concern    Not on file   Social History Narrative    Not on file       Review of patient's allergies indicates:  No Known Allergies    Medications Reviewed: see MAR    ROS:    Constitutional: denies fevers, chills, night sweats, fatigue, malaise  Respiratory: negative for cough, shortness of breath, wheezing, dyspnea.  Cardiovascular: + for high blood pressure, negative for chest pain, varicose veins, ankle swelling, palpitations, syncope.  GI: negative for abdominal pain, heartburn, indigestion, nausea, vomiting, constipation, diarrhea, blood in stool.   Urology: as noted above in HPI  Endocrinology: negative for cold intolerance, excessive thirst, not feeling tired/sluggish, no heat intolerance.   Hematology/Lymph: negative for easy bleeding, easy bruising, swollen glands.  Musculoskeletal: negative for back pain, joint pain, joint swelling, neck pain.  Allergy-Immunology: negative for seasonal allergies, negative for unusual infections.   Skin: negative for boils, breast lumps, hives, itching, rash.   Neurology: negative for, dizziness, headache, tingling/numbness, tremors.   Psych: satisfied with life; negative for, anxiety, depression, suicidal thoughts.     PHYSICAL EXAM:    Vitals:    10/14/19 0950   BP: (!) 140/85   Pulse: 82   Resp: 18     Body mass index is  "30.59 kg/m². Weight: 88.6 kg (195 lb 5.2 oz) Height: 5' 7" (170.2 cm)       General: Alert, cooperative, no distress, appears stated age  Head: Normocephalic, without obvious abnormality, atraumatic  Neck: no masses, no thyromegaly, no lymphadenopathy  Eyes: PERRL, conjunctiva/corneas clear  Lungs: Respirations unlabored, normal effort, no accessory muscle use  CV: Warm and well perfused extremities  Abdomen: Soft, non-tender, no CVA tenderness, no hepatosplenomegaly, no hernia  : deferred until time of cystoscopy  Extremities: Extremities normal, atraumatic, no cyanosis or edema  Skin: Normal color, texture, and turgor, no rashes or lesions  Psych: Appropriate, well oriented, normal affect, normal mood  Neuro: Non-focal    LABS:    Recent Results (from the past 336 hour(s))   POCT URINE DIPSTICK WITHOUT MICROSCOPE    Collection Time: 10/14/19 10:05 AM   Result Value Ref Range    Color, UA yellow     Spec Grav UA 1.020     pH, UA 7     WBC, UA neg     Nitrite, UA neg     Protein neg     Glucose, UA neg     Ketones, UA neg     Urobilinogen, UA 0.2     Bilirubin neg     Blood, UA small          Assessment/Diagnosis:    1. Microhematuria  POCT URINE DIPSTICK WITHOUT MICROSCOPE    Urine culture    Creatinine, serum    Case Request Operating Room: CYSTOSCOPY    Place in Outpatient   2. Asymptomatic microscopic hematuria  CT Urogram Abd Pelvis W WO       Plans:  We did discuss the appropriate evaluation and workup for microscopic hematuria include CT urogram, urine culture, cystoscopy.  Has been many years since cystoscopic evaluation for her microscopic hematuria, and no imaging since 2017 which was noncontrasted. We did review defined microhematuria as >3-5 rbc/hpf, though now with 3-10 and continued urinalysis positivity, with history of microhematuria not evaluated in 10 years had risk benefit discussion about updated workup which she preferred to proceed with.  Cystoscopy scheduled at [facility] 11/26/19 at her request as " will be off work that week, and will get CT urogram in interim and check Ucx.

## 2019-10-14 ENCOUNTER — OFFICE VISIT (OUTPATIENT)
Dept: UROLOGY | Facility: CLINIC | Age: 59
End: 2019-10-14
Payer: COMMERCIAL

## 2019-10-14 VITALS
RESPIRATION RATE: 18 BRPM | DIASTOLIC BLOOD PRESSURE: 85 MMHG | WEIGHT: 195.31 LBS | HEART RATE: 82 BPM | HEIGHT: 67 IN | SYSTOLIC BLOOD PRESSURE: 140 MMHG | BODY MASS INDEX: 30.65 KG/M2

## 2019-10-14 DIAGNOSIS — R31.21 ASYMPTOMATIC MICROSCOPIC HEMATURIA: ICD-10-CM

## 2019-10-14 DIAGNOSIS — R31.29 MICROHEMATURIA: Primary | ICD-10-CM

## 2019-10-14 LAB
BILIRUB SERPL-MCNC: ABNORMAL MG/DL
BLOOD URINE, POC: ABNORMAL
COLOR, POC UA: YELLOW
GLUCOSE UR QL STRIP: ABNORMAL
KETONES UR QL STRIP: ABNORMAL
LEUKOCYTE ESTERASE URINE, POC: ABNORMAL
NITRITE, POC UA: ABNORMAL
PH, POC UA: 7
PROTEIN, POC: ABNORMAL
SPECIFIC GRAVITY, POC UA: 1.02
UROBILINOGEN, POC UA: 0.2

## 2019-10-14 PROCEDURE — 81002 URINALYSIS NONAUTO W/O SCOPE: CPT | Mod: S$GLB,,, | Performed by: UROLOGY

## 2019-10-14 PROCEDURE — 99999 PR PBB SHADOW E&M-EST. PATIENT-LVL IV: CPT | Mod: PBBFAC,,, | Performed by: UROLOGY

## 2019-10-14 PROCEDURE — 99999 PR PBB SHADOW E&M-EST. PATIENT-LVL IV: ICD-10-PCS | Mod: PBBFAC,,, | Performed by: UROLOGY

## 2019-10-14 PROCEDURE — 87086 URINE CULTURE/COLONY COUNT: CPT

## 2019-10-14 PROCEDURE — 99243 PR OFFICE CONSULTATION,LEVEL III: ICD-10-PCS | Mod: 25,S$GLB,, | Performed by: UROLOGY

## 2019-10-14 PROCEDURE — 81002 POCT URINE DIPSTICK WITHOUT MICROSCOPE: ICD-10-PCS | Mod: S$GLB,,, | Performed by: UROLOGY

## 2019-10-14 PROCEDURE — 99243 OFF/OP CNSLTJ NEW/EST LOW 30: CPT | Mod: 25,S$GLB,, | Performed by: UROLOGY

## 2019-10-14 RX ORDER — POLYETHYLENE GLYCOL 3350 17 G/17G
POWDER, FOR SOLUTION ORAL
COMMUNITY
End: 2019-12-27

## 2019-10-14 NOTE — LETTER
October 14, 2019      Jonathon Muñoz NP  [address]  [place] [address]           [facility]  [address] [place] [address] [address]  [facility] [place] [address]  Phone: [phone]  Fax: [phone]          Patient: Daylin Lynch   MR Number: [id number]   YOB: 1960   Date of Visit: 10/14/2019       Dear Jonathon Muñoz:    Thank you for referring Daylin Lynch to me for evaluation. Attached you will find relevant portions of my assessment and plan of care.    If you have questions, please do not hesitate to call me. I look forward to following Daylin Lynch along with you.    Sincerely,    Dejon Carlson MD    Enclosure  CC:  No Recipients    If you would like to receive this communication electronically, please contact [email or web address] or [phone] to request more information on [employer] access.    For providers and/or their staff who would like to refer a patient to [facility], please contact us through our one-stop-shop provider referral line, [facility], at [phone].    If you feel you have received this communication in error or would no longer like to receive these types of communications, please e-mail [email or web address]

## 2019-10-15 LAB — BACTERIA UR CULT: NORMAL

## 2019-10-21 ENCOUNTER — HOSPITAL ENCOUNTER (OUTPATIENT)
Dept: RADIOLOGY | Facility: HOSPITAL | Age: 59
Discharge: HOME | End: 2019-10-21
Attending: UROLOGY
Payer: COMMERCIAL

## 2019-10-21 DIAGNOSIS — R31.21 ASYMPTOMATIC MICROSCOPIC HEMATURIA: ICD-10-CM

## 2019-10-21 PROCEDURE — 74178 CT UROGRAM ABD PELVIS W WO: ICD-10-PCS | Mod: 26,,, | Performed by: RADIOLOGY

## 2019-10-21 PROCEDURE — 74178 CT ABD&PLV WO CNTR FLWD CNTR: CPT | Mod: 26,,, | Performed by: RADIOLOGY

## 2019-10-21 PROCEDURE — 25500020 PHARM REV CODE 255: Performed by: UROLOGY

## 2019-10-21 PROCEDURE — 74178 CT ABD&PLV WO CNTR FLWD CNTR: CPT | Mod: TC

## 2019-10-21 RX ADMIN — IOHEXOL 125 ML: 350 INJECTION, SOLUTION INTRAVENOUS at 11:10

## 2019-10-26 DIAGNOSIS — J30.1 CHRONIC SEASONAL ALLERGIC RHINITIS DUE TO POLLEN: ICD-10-CM

## 2019-10-28 RX ORDER — FLUTICASONE PROPIONATE 50 MCG
SPRAY, SUSPENSION (ML) NASAL
Qty: 16 ML | Refills: 3 | Status: SHIPPED | OUTPATIENT
Start: 2019-10-28 | End: 2019-12-27

## 2019-11-12 ENCOUNTER — TELEPHONE (OUTPATIENT)
Dept: UROLOGY | Facility: CLINIC | Age: 59
End: 2019-11-12

## 2019-11-12 NOTE — TELEPHONE ENCOUNTER
Patient contacted to reschedule procedure sooner to 11/22.  She is not able to reschedule due to being a  and will be out of school on scheduled date.

## 2019-11-21 ENCOUNTER — TELEPHONE (OUTPATIENT)
Dept: UROLOGY | Facility: CLINIC | Age: 59
End: 2019-11-21

## 2019-11-21 NOTE — TELEPHONE ENCOUNTER
----- Message from Maggie Mesa sent at 11/21/2019 11:14 AM CST -----  Contact: Self  Pt is calling to speak with Staff regarding cancelling her procedure & rescheduling to the first of the year.  She is requesting a returned call to [phone].    Thank you.

## 2019-11-21 NOTE — TELEPHONE ENCOUNTER
Spoke w pt states that she wants to cancel her procedure due to high deductible would like to reschedule for possibly Feb will call back when she can based on work schedule pt apologizes.

## 2019-12-24 LAB
ALBUMIN SERPL-MCNC: 4.3 G/DL (ref 3.5–5.5)
ALBUMIN/GLOB SERPL: 1.9 {RATIO} (ref 1.2–2.2)
ALP SERPL-CCNC: 74 IU/L (ref 39–117)
ALT SERPL-CCNC: 5 IU/L (ref 0–32)
AST SERPL-CCNC: 17 IU/L (ref 0–40)
BILIRUB SERPL-MCNC: 0.4 MG/DL (ref 0–1.2)
BUN SERPL-MCNC: 18 MG/DL (ref 6–24)
BUN/CREAT SERPL: 26 (ref 9–23)
CALCIUM SERPL-MCNC: 9.2 MG/DL (ref 8.7–10.2)
CHLORIDE SERPL-SCNC: 103 MMOL/L (ref 96–106)
CHOLEST SERPL-MCNC: 232 MG/DL (ref 100–199)
CO2 SERPL-SCNC: 26 MMOL/L (ref 20–29)
CREAT SERPL-MCNC: 0.7 MG/DL (ref 0.57–1)
GLOBULIN SER CALC-MCNC: 2.3 G/DL (ref 1.5–4.5)
GLUCOSE SERPL-MCNC: 100 MG/DL (ref 65–99)
HDLC SERPL-MCNC: 67 MG/DL
LDLC SERPL CALC-MCNC: 151 MG/DL (ref 0–99)
POTASSIUM SERPL-SCNC: 4.2 MMOL/L (ref 3.5–5.2)
PROT SERPL-MCNC: 6.6 G/DL (ref 6–8.5)
SODIUM SERPL-SCNC: 144 MMOL/L (ref 134–144)
TRIGL SERPL-MCNC: 68 MG/DL (ref 0–149)
VLDLC SERPL CALC-MCNC: 14 MG/DL (ref 5–40)

## 2019-12-27 ENCOUNTER — OFFICE VISIT (OUTPATIENT)
Dept: FAMILY MEDICINE | Facility: CLINIC | Age: 59
End: 2019-12-27
Payer: COMMERCIAL

## 2019-12-27 VITALS
BODY MASS INDEX: 29.19 KG/M2 | WEIGHT: 186 LBS | HEART RATE: 89 BPM | DIASTOLIC BLOOD PRESSURE: 70 MMHG | OXYGEN SATURATION: 97 % | TEMPERATURE: 98 F | SYSTOLIC BLOOD PRESSURE: 110 MMHG | HEIGHT: 67 IN

## 2019-12-27 DIAGNOSIS — I10 ESSENTIAL (PRIMARY) HYPERTENSION: Primary | ICD-10-CM

## 2019-12-27 DIAGNOSIS — E78.5 DYSLIPIDEMIA: ICD-10-CM

## 2019-12-27 PROCEDURE — 3078F DIAST BP <80 MM HG: CPT | Mod: S$GLB,,, | Performed by: NURSE PRACTITIONER

## 2019-12-27 PROCEDURE — 3078F PR MOST RECENT DIASTOLIC BLOOD PRESSURE < 80 MM HG: ICD-10-PCS | Mod: S$GLB,,, | Performed by: NURSE PRACTITIONER

## 2019-12-27 PROCEDURE — 99213 PR OFFICE/OUTPT VISIT, EST, LEVL III, 20-29 MIN: ICD-10-PCS | Mod: S$GLB,,, | Performed by: NURSE PRACTITIONER

## 2019-12-27 PROCEDURE — 99213 OFFICE O/P EST LOW 20 MIN: CPT | Mod: S$GLB,,, | Performed by: NURSE PRACTITIONER

## 2019-12-27 PROCEDURE — 3074F SYST BP LT 130 MM HG: CPT | Mod: S$GLB,,, | Performed by: NURSE PRACTITIONER

## 2019-12-27 PROCEDURE — 3008F BODY MASS INDEX DOCD: CPT | Mod: S$GLB,,, | Performed by: NURSE PRACTITIONER

## 2019-12-27 PROCEDURE — 3008F PR BODY MASS INDEX (BMI) DOCUMENTED: ICD-10-PCS | Mod: S$GLB,,, | Performed by: NURSE PRACTITIONER

## 2019-12-27 PROCEDURE — 3074F PR MOST RECENT SYSTOLIC BLOOD PRESSURE < 130 MM HG: ICD-10-PCS | Mod: S$GLB,,, | Performed by: NURSE PRACTITIONER

## 2019-12-27 RX ORDER — ACETAMINOPHEN, DIPHENHYDRAMINE HCL, PHENYLEPHRINE HCL 325; 25; 5 MG/1; MG/1; MG/1
2 TABLET ORAL NIGHTLY PRN
COMMUNITY

## 2019-12-27 NOTE — PATIENT INSTRUCTIONS
4 Steps for Eating Healthier  Changing the way you eat can improve your health. It can lower your cholesterol and blood pressure, and help you stay at a healthy weight. Your diet doesnt have to be bland and boring to be healthy. Just watch your calories and follow these steps:    1. Eat fewer unhealthy fats  · Choose more fish and lean meats instead of fatty cuts of meat.  · Skip butter and lard, and use less margarine.  · Pass on foods that have palm, coconut, or hydrogenated oils.  · Eat fewer high-fat dairy foods like cheese, ice cream, and whole milk.  · Get a heart-healthy cookbook and try some low-fat recipes.  2. Go light on salt  · Keep the saltshaker off the table.  · Limit high-salt ingredients, such as soy sauce, bouillon, and garlic salt.  · Instead of adding salt when cooking, season your food with herbs and flavorings. Try lemon, garlic, and onion.  · Limit convenience foods, such as boxed or canned foods and restaurant food.  · Read food labels and choose lower-sodium options.  3. Limit sugar  · Pause before you add sugars to pancakes, cereal, coffee, or tea. This includes white and brown table sugar, syrup, honey, and molasses. Cut your usual amount by half.  · Use non-sugar sweeteners. Stevia, aspartame, and sucralose can satisfy a sweet tooth without adding calories.  · Swap out sugar-filled soda and other drinks. Buy sugar-free or low-calorie beverages. Remember water is always the best choice.  · Read labels and choose foods with less added sugar. Keep in mind that dairy foods and foods with fruit will have some natural sugar.  · Cut the sugar in recipes by 1/3 to 1/2. Boost the flavor with extracts like almond, vanilla, or orange. Or add spices such as cinnamon or nutmeg.  4. Eat more fiber  · Eat fresh fruits and vegetables every day.  · Boost your diet with whole grains. Go for oats, whole-grain rice, and bran.  · Add beans and lentils to your meals.  · Drink more water to match your fiber  increase. This is to help prevent constipation.  Date Last Reviewed: 5/11/2015  © 9155-7967 [employer]. [address] [place] [address], [place] [address]. All rights reserved. This information is not intended as a substitute for professional medical care. Always follow your healthcare professional's instructions.

## 2019-12-27 NOTE — PROGRESS NOTES
SUBJECTIVE:      Patient ID: Daylin Lynch is a 59 y.o. female.    Chief Complaint: Hypertension (followup labs) and Hyperlipidemia    Mrs Lynch is here today to f/u on HTN and hyperlipidemia. She was started on lipitor at her previous OV. She did not start the medication because she started weight watchers. She has lost weight, eating better and exercising. Her LDL is slightly improved.     Hypertension   This is a chronic problem. The current episode started more than 1 year ago. The problem is controlled. Pertinent negatives include no anxiety, blurred vision, chest pain, headaches, malaise/fatigue, neck pain, orthopnea, palpitations, peripheral edema, PND, shortness of breath or sweats. There are no associated agents to hypertension. Risk factors for coronary artery disease include dyslipidemia and obesity. Past treatments include diuretics. The current treatment provides moderate improvement. Compliance problems include exercise.  There is no history of angina, kidney disease or CAD/MI. There is no history of chronic renal disease, hyperparathyroidism, a hypertension causing med or a thyroid problem.   Hyperlipidemia   This is a chronic problem. The current episode started more than 1 year ago. Recent lipid tests were reviewed and are high. She has no history of chronic renal disease, diabetes, hypothyroidism, liver disease, obesity or nephrotic syndrome. There are no known factors aggravating her hyperlipidemia. Pertinent negatives include no chest pain, myalgias or shortness of breath. Current antihyperlipidemic treatment includes diet change and exercise. The current treatment provides moderate improvement of lipids. Risk factors for coronary artery disease include dyslipidemia, hypertension, obesity and post-menopausal.       Past Surgical History:   Procedure Laterality Date     SECTION, LOW TRANSVERSE      x 3    HYSTERECTOMY  2012    stents both legs       Family History   Problem Relation  Age of Onset    Heart disease Mother     Heart failure Father       Social History     Socioeconomic History    Marital status:      Spouse name: Not on file    Number of children: Not on file    Years of education: Not on file    Highest education level: Not on file   Occupational History    Not on file   Social Needs    Financial resource strain: Not on file    Food insecurity:     Worry: Not on file     Inability: Not on file    Transportation needs:     Medical: Not on file     Non-medical: Not on file   Tobacco Use    Smoking status: Former Smoker     Last attempt to quit: 1985     Years since quittin.0    Smokeless tobacco: Never Used   Substance and Sexual Activity    Alcohol use: Yes    Drug use: No    Sexual activity: Yes   Lifestyle    Physical activity:     Days per week: Not on file     Minutes per session: Not on file    Stress: Not at all   Relationships    Social connections:     Talks on phone: Not on file     Gets together: Not on file     Attends Anabaptism service: Not on file     Active member of club or organization: Not on file     Attends meetings of clubs or organizations: Not on file     Relationship status: Not on file   Other Topics Concern    Not on file   Social History Narrative    Live with      Current Outpatient Medications   Medication Sig Dispense Refill    albuterol (PROVENTIL HFA) 90 mcg/actuation inhaler Inhale 2 puffs into the lungs every 6 (six) hours as needed for Wheezing. Rescue 18 g 0    aspirin 325 MG tablet Take 325 mg by mouth once daily.      fluticasone propionate (FLONASE) 50 mcg/actuation nasal spray USE 2 SPRAYS BY EACH NARE ROUTE DAILY. 16 mL 3    hydroCHLOROthiazide (HYDRODIURIL) 25 MG tablet Take 1 tablet (25 mg total) by mouth once daily. 90 tablet 1    melatonin 10 mg Tab Take 2 tablets by mouth nightly as needed.      atorvastatin (LIPITOR) 10 MG tablet Take 1 tablet (10 mg total) by mouth every evening.  "(Patient not taking: Reported on 2019) 90 tablet 0     No current facility-administered medications for this visit.      Review of patient's allergies indicates:  No Known Allergies   Past Medical History:   Diagnosis Date    Allergy     Bilateral leg edema     Hyperlipidemia     Hypertension     Leg pain, bilateral     left leg more severe    Varicose vein      Past Surgical History:   Procedure Laterality Date     SECTION, LOW TRANSVERSE      x 3    HYSTERECTOMY  2012    stents both legs         Review of Systems   Constitutional: Negative for appetite change, chills, diaphoresis, malaise/fatigue and unexpected weight change.   HENT: Negative for ear discharge, hearing loss, trouble swallowing and voice change.    Eyes: Negative for blurred vision, photophobia and pain.   Respiratory: Negative for chest tightness, shortness of breath and stridor.    Cardiovascular: Negative for chest pain, palpitations, orthopnea and PND.   Gastrointestinal: Negative for abdominal pain, blood in stool and vomiting.   Endocrine: Negative for cold intolerance and heat intolerance.   Genitourinary: Negative for difficulty urinating and flank pain.   Musculoskeletal: Negative for joint swelling, myalgias, neck pain and neck stiffness.   Skin: Negative for pallor.   Neurological: Negative for dizziness, speech difficulty, weakness and headaches.   Hematological: Does not bruise/bleed easily.   Psychiatric/Behavioral: Negative for confusion and dysphoric mood.      OBJECTIVE:      Vitals:    19 1002   BP: 110/70   BP Location: Left arm   Patient Position: Sitting   Pulse: 89   Temp: 97.9 °F (36.6 °C)   TempSrc: Temporal   SpO2: 97%   Weight: 84.4 kg (186 lb)   Height: 5' 7" (1.702 m)     Physical Exam   Constitutional: She is oriented to person, place, and time. She appears well-developed and well-nourished.   HENT:   Head: Atraumatic.   Eyes: Conjunctivae are normal.   Neck: Neck supple.   Cardiovascular: " Normal rate, regular rhythm, normal heart sounds and intact distal pulses.   Pulmonary/Chest: Effort normal and breath sounds normal.   Abdominal: Soft. Bowel sounds are normal. She exhibits no distension.   Musculoskeletal: Normal range of motion.   Neurological: She is alert and oriented to person, place, and time.   Skin: Skin is warm and dry.   Psychiatric: She has a normal mood and affect.   Nursing note and vitals reviewed.     Assessment:       1. Essential (primary) hypertension    2. Dyslipidemia        Plan:       Essential (primary) hypertension  Stable   Dyslipidemia  -     Lipid panel; Future; Expected date: 03/26/2020    Patient wishes to postpone medication at this time. Will cont diet and exercise, recheck lipid in 3mo    Follow up in about 6 months (around 6/27/2020) for htn .      12/27/2019 ROBERTA Meraz, FNP

## 2020-03-16 ENCOUNTER — HOSPITAL ENCOUNTER (OUTPATIENT)
Facility: HOSPITAL | Age: 60
Discharge: HOME | End: 2020-03-17
Attending: EMERGENCY MEDICINE | Admitting: STUDENT IN AN ORGANIZED HEALTH CARE EDUCATION/TRAINING PROGRAM
Payer: COMMERCIAL

## 2020-03-16 ENCOUNTER — TELEPHONE (OUTPATIENT)
Dept: FAMILY MEDICINE | Facility: CLINIC | Age: 60
End: 2020-03-16

## 2020-03-16 ENCOUNTER — CLINICAL SUPPORT (OUTPATIENT)
Dept: URGENT CARE | Facility: CLINIC | Age: 60
End: 2020-03-16
Payer: COMMERCIAL

## 2020-03-16 VITALS
BODY MASS INDEX: 27.94 KG/M2 | HEART RATE: 89 BPM | DIASTOLIC BLOOD PRESSURE: 84 MMHG | WEIGHT: 178 LBS | HEIGHT: 67 IN | OXYGEN SATURATION: 97 % | SYSTOLIC BLOOD PRESSURE: 144 MMHG | RESPIRATION RATE: 18 BRPM | TEMPERATURE: 97 F

## 2020-03-16 DIAGNOSIS — R55 SYNCOPE: ICD-10-CM

## 2020-03-16 DIAGNOSIS — I10 HYPERTENSION, UNSPECIFIED TYPE: ICD-10-CM

## 2020-03-16 DIAGNOSIS — R07.9 CHEST PAIN: ICD-10-CM

## 2020-03-16 DIAGNOSIS — E78.5 DYSLIPIDEMIA: ICD-10-CM

## 2020-03-16 DIAGNOSIS — I49.9 ARRHYTHMIA: ICD-10-CM

## 2020-03-16 DIAGNOSIS — R55 SYNCOPE, UNSPECIFIED SYNCOPE TYPE: Primary | ICD-10-CM

## 2020-03-16 DIAGNOSIS — R94.30 EJECTION FRACTION < 50%: ICD-10-CM

## 2020-03-16 DIAGNOSIS — R55 SYNCOPE AND COLLAPSE: ICD-10-CM

## 2020-03-16 LAB
ALBUMIN SERPL BCP-MCNC: 3.9 G/DL (ref 3.5–5.2)
ALP SERPL-CCNC: 66 U/L (ref 55–135)
ALT SERPL W/O P-5'-P-CCNC: 9 U/L (ref 10–44)
ANION GAP SERPL CALC-SCNC: 10 MMOL/L (ref 8–16)
AST SERPL-CCNC: 27 U/L (ref 10–40)
BASOPHILS # BLD AUTO: 0.05 K/UL (ref 0–0.2)
BASOPHILS NFR BLD: 0.6 % (ref 0–1.9)
BILIRUB SERPL-MCNC: 1 MG/DL (ref 0.1–1)
BNP SERPL-MCNC: 30 PG/ML (ref 0–99)
BUN SERPL-MCNC: 16 MG/DL (ref 6–20)
CALCIUM SERPL-MCNC: 9.1 MG/DL (ref 8.7–10.5)
CHLORIDE SERPL-SCNC: 100 MMOL/L (ref 95–110)
CO2 SERPL-SCNC: 30 MMOL/L (ref 23–29)
CREAT SERPL-MCNC: 0.6 MG/DL (ref 0.5–1.4)
D DIMER PPP IA.FEU-MCNC: 0.38 UG/ML FEU
DIFFERENTIAL METHOD: NORMAL
EOSINOPHIL # BLD AUTO: 0.4 K/UL (ref 0–0.5)
EOSINOPHIL NFR BLD: 4.5 % (ref 0–8)
ERYTHROCYTE [DISTWIDTH] IN BLOOD BY AUTOMATED COUNT: 13.5 % (ref 11.5–14.5)
EST. GFR  (AFRICAN AMERICAN): >60 ML/MIN/1.73 M^2
EST. GFR  (NON AFRICAN AMERICAN): >60 ML/MIN/1.73 M^2
GLUCOSE SERPL-MCNC: 81 MG/DL (ref 70–110)
GLUCOSE SERPL-MCNC: 82 MG/DL (ref 70–110)
HCT VFR BLD AUTO: 38.8 % (ref 37–48.5)
HGB BLD-MCNC: 12.5 G/DL (ref 12–16)
IMM GRANULOCYTES # BLD AUTO: 0.02 K/UL (ref 0–0.04)
IMM GRANULOCYTES NFR BLD AUTO: 0.3 % (ref 0–0.5)
LYMPHOCYTES # BLD AUTO: 3.1 K/UL (ref 1–4.8)
LYMPHOCYTES NFR BLD: 38.6 % (ref 18–48)
MCH RBC QN AUTO: 29.5 PG (ref 27–31)
MCHC RBC AUTO-ENTMCNC: 32.2 G/DL (ref 32–36)
MCV RBC AUTO: 92 FL (ref 82–98)
MONOCYTES # BLD AUTO: 0.5 K/UL (ref 0.3–1)
MONOCYTES NFR BLD: 5.9 % (ref 4–15)
NEUTROPHILS # BLD AUTO: 4 K/UL (ref 1.8–7.7)
NEUTROPHILS NFR BLD: 50.1 % (ref 38–73)
NRBC BLD-RTO: 0 /100 WBC
PLATELET # BLD AUTO: 349 K/UL (ref 150–350)
PMV BLD AUTO: 10.9 FL (ref 9.2–12.9)
POTASSIUM SERPL-SCNC: 4.4 MMOL/L (ref 3.5–5.1)
PROT SERPL-MCNC: 7.3 G/DL (ref 6–8.4)
RBC # BLD AUTO: 4.24 M/UL (ref 4–5.4)
SODIUM SERPL-SCNC: 140 MMOL/L (ref 136–145)
TROPONIN I SERPL DL<=0.01 NG/ML-MCNC: <0.03 NG/ML
TROPONIN I SERPL DL<=0.01 NG/ML-MCNC: <0.03 NG/ML
TSH SERPL DL<=0.005 MIU/L-ACNC: 0.57 UIU/ML (ref 0.34–5.6)
WBC # BLD AUTO: 7.92 K/UL (ref 3.9–12.7)

## 2020-03-16 PROCEDURE — 36415 COLL VENOUS BLD VENIPUNCTURE: CPT

## 2020-03-16 PROCEDURE — 82962 GLUCOSE BLOOD TEST: CPT

## 2020-03-16 PROCEDURE — 99214 PR OFFICE/OUTPT VISIT, EST, LEVL IV, 30-39 MIN: ICD-10-PCS | Mod: 25,S$GLB,, | Performed by: EMERGENCY MEDICINE

## 2020-03-16 PROCEDURE — 85379 FIBRIN DEGRADATION QUANT: CPT

## 2020-03-16 PROCEDURE — 99285 EMERGENCY DEPT VISIT HI MDM: CPT | Mod: 25

## 2020-03-16 PROCEDURE — G0378 HOSPITAL OBSERVATION PER HR: HCPCS

## 2020-03-16 PROCEDURE — 99214 OFFICE O/P EST MOD 30 MIN: CPT | Mod: 25,S$GLB,, | Performed by: EMERGENCY MEDICINE

## 2020-03-16 PROCEDURE — 83880 ASSAY OF NATRIURETIC PEPTIDE: CPT

## 2020-03-16 PROCEDURE — 84443 ASSAY THYROID STIM HORMONE: CPT

## 2020-03-16 PROCEDURE — 85025 COMPLETE CBC W/AUTO DIFF WBC: CPT

## 2020-03-16 PROCEDURE — 80053 COMPREHEN METABOLIC PANEL: CPT

## 2020-03-16 PROCEDURE — 84484 ASSAY OF TROPONIN QUANT: CPT | Mod: 91

## 2020-03-16 PROCEDURE — 63600175 PHARM REV CODE 636 W HCPCS: Performed by: NURSE PRACTITIONER

## 2020-03-16 PROCEDURE — 84484 ASSAY OF TROPONIN QUANT: CPT

## 2020-03-16 PROCEDURE — 25000003 PHARM REV CODE 250: Performed by: EMERGENCY MEDICINE

## 2020-03-16 PROCEDURE — 93005 ELECTROCARDIOGRAM TRACING: CPT | Performed by: INTERNAL MEDICINE

## 2020-03-16 RX ORDER — ONDANSETRON 2 MG/ML
4 INJECTION INTRAMUSCULAR; INTRAVENOUS EVERY 8 HOURS PRN
Status: DISCONTINUED | OUTPATIENT
Start: 2020-03-16 | End: 2020-03-17 | Stop reason: HOSPADM

## 2020-03-16 RX ORDER — ASPIRIN 325 MG
325 TABLET ORAL
Status: COMPLETED | OUTPATIENT
Start: 2020-03-16 | End: 2020-03-16

## 2020-03-16 RX ORDER — SODIUM CHLORIDE 0.9 % (FLUSH) 0.9 %
10 SYRINGE (ML) INJECTION
Status: DISCONTINUED | OUTPATIENT
Start: 2020-03-16 | End: 2020-03-17 | Stop reason: HOSPADM

## 2020-03-16 RX ORDER — HYDRALAZINE HYDROCHLORIDE 20 MG/ML
10 INJECTION INTRAMUSCULAR; INTRAVENOUS EVERY 6 HOURS PRN
Status: DISCONTINUED | OUTPATIENT
Start: 2020-03-16 | End: 2020-03-17 | Stop reason: HOSPADM

## 2020-03-16 RX ORDER — ASPIRIN 325 MG
325 TABLET ORAL DAILY
Status: DISCONTINUED | OUTPATIENT
Start: 2020-03-17 | End: 2020-03-17 | Stop reason: HOSPADM

## 2020-03-16 RX ORDER — ALBUTEROL SULFATE 90 UG/1
2 AEROSOL, METERED RESPIRATORY (INHALATION) EVERY 6 HOURS PRN
Status: DISCONTINUED | OUTPATIENT
Start: 2020-03-16 | End: 2020-03-17

## 2020-03-16 RX ORDER — ATORVASTATIN CALCIUM 10 MG/1
10 TABLET, FILM COATED ORAL NIGHTLY
Status: DISCONTINUED | OUTPATIENT
Start: 2020-03-16 | End: 2020-03-17 | Stop reason: HOSPADM

## 2020-03-16 RX ORDER — BISACODYL 10 MG
10 SUPPOSITORY, RECTAL RECTAL DAILY PRN
Status: DISCONTINUED | OUTPATIENT
Start: 2020-03-16 | End: 2020-03-17 | Stop reason: HOSPADM

## 2020-03-16 RX ORDER — ACETAMINOPHEN 325 MG/1
650 TABLET ORAL EVERY 4 HOURS PRN
Status: DISCONTINUED | OUTPATIENT
Start: 2020-03-16 | End: 2020-03-17 | Stop reason: HOSPADM

## 2020-03-16 RX ORDER — SODIUM CHLORIDE 9 MG/ML
INJECTION, SOLUTION INTRAVENOUS CONTINUOUS
Status: DISCONTINUED | OUTPATIENT
Start: 2020-03-16 | End: 2020-03-17 | Stop reason: HOSPADM

## 2020-03-16 RX ORDER — FLUTICASONE PROPIONATE 50 MCG
1 SPRAY, SUSPENSION (ML) NASAL DAILY
Status: DISCONTINUED | OUTPATIENT
Start: 2020-03-17 | End: 2020-03-17 | Stop reason: HOSPADM

## 2020-03-16 RX ADMIN — ASPIRIN 325 MG ORAL TABLET 325 MG: 325 PILL ORAL at 08:03

## 2020-03-16 RX ADMIN — SODIUM CHLORIDE: 0.9 INJECTION, SOLUTION INTRAVENOUS at 09:03

## 2020-03-16 NOTE — PROGRESS NOTES
"Subjective:       Patient ID: Daylin Lynch is a 59 y.o. female.    Vitals:  height is 5' 7" (1.702 m) and weight is 80.7 kg (178 lb). Her oral temperature is 97.2 °F (36.2 °C). Her blood pressure is 144/84 (abnormal) and her pulse is 89. Her respiration is 18 and oxygen saturation is 97%.     Chief Complaint: Loss of Consciousness    Pt presents with complaints of "passing out" today while in line at the local grocery store. Pt reports feeling "funny" "I just don't feel right". Also reports a 3 out of 10 constant aching/heavines, non-radiating, to left chest/shoulder. Denies any numbness or tingling. No focal deficits. Pt was talking on cell phone in the check-out line of the grocery store prior to incident and then remembers waking up with people in her face, lying supine on her back. Has no recall of the actual event. Unsure of any head injury; denies headache. Denies head pain. Denies visual changes. Cannot identify any prodrome symptoms prior to event. Does not recall any particular cough, neck movement, prolonged standing, palpitations, or chest pain prior to loss of consciousness. Denies starting any new medications, recent medication changes, recent illness, or stress. Has been following weight watchers; denies any fasting or extreme dieting, ate prior to grocery shopping. Just flew in from [place] yesterday from visiting with a family member. Patient explains that she drove home after the incident and decided to come to the urgent care to be checked out after advice from her daughter who is a nurse.       Loss of Consciousness   This is a new problem. The current episode started in the past 7 days. The problem occurs constantly. The problem has been gradually worsening. She lost consciousness for a period of 1 to 5 minutes. Pertinent negatives include no abdominal pain, back pain, chest pain, dizziness, fever, headaches, light-headedness, nausea, palpitations, vertigo, vomiting or weakness. There is no " history of a clotting disorder.       Constitution: Negative for activity change, appetite change, chills, fatigue, fever and international travel in last 60 days.   HENT: Negative for trouble swallowing.         Dry mouth   Neck: Negative for neck pain.   Cardiovascular: Positive for passing out. Negative for chest trauma, chest pain, leg swelling, palpitations and sob on exertion.        Chest heaviness    Respiratory: Negative for sleep apnea and shortness of breath.    Gastrointestinal: Negative for abdominal pain, nausea, vomiting, diarrhea and heartburn.   Genitourinary: Negative for history of kidney stones.   Musculoskeletal: Negative for back pain.   Skin: Negative for rash.   Neurological: Positive for passing out and loss of consciousness. Negative for dizziness, history of vertigo, light-headedness, facial drooping, speech difficulty, coordination disturbances, loss of balance, headaches, history of migraines, disorientation, altered mental status, numbness, tingling, seizures and tremors.   Hematologic/Lymphatic: Negative for history of blood clots.   Psychiatric/Behavioral: Negative for altered mental status, disorientation and nervous/anxious. The patient is not nervous/anxious.        Objective:      Physical Exam   Constitutional: She is oriented to person, place, and time. She appears well-developed and well-nourished. She is cooperative.  Non-toxic appearance. She does not appear ill. No distress.   HENT:   Head: Normocephalic and atraumatic.   Right Ear: Hearing, tympanic membrane, external ear and ear canal normal.   Left Ear: Hearing, tympanic membrane, external ear and ear canal normal.   Nose: Nose normal. No mucosal edema, rhinorrhea or nasal deformity. No epistaxis. Right sinus exhibits no maxillary sinus tenderness and no frontal sinus tenderness. Left sinus exhibits no maxillary sinus tenderness and no frontal sinus tenderness.   Mouth/Throat: Uvula is midline, oropharynx is clear and  "moist and mucous membranes are normal. No trismus in the jaw. Normal dentition. No uvula swelling. No posterior oropharyngeal erythema.   Eyes: Pupils are equal, round, and reactive to light. Conjunctivae, EOM and lids are normal. Right eye exhibits no discharge. Left eye exhibits no discharge. No scleral icterus.   Neck: Trachea normal, normal range of motion, full passive range of motion without pain and phonation normal. Neck supple.   Cardiovascular: Normal rate, regular rhythm, normal heart sounds, intact distal pulses and normal pulses.   Pulses:       Carotid pulses are 2+ on the right side, and 2+ on the left side.       Radial pulses are 2+ on the right side, and 2+ on the left side.        Posterior tibial pulses are 2+ on the right side, and 2+ on the left side.   Pulmonary/Chest: Effort normal and breath sounds normal. No respiratory distress.   Abdominal: Soft. Normal appearance and bowel sounds are normal. She exhibits no distension, no pulsatile midline mass and no mass. There is no tenderness.   Musculoskeletal: Normal range of motion. She exhibits no edema or deformity.   Neurological: She is alert and oriented to person, place, and time. She displays no atrophy and no tremor. No cranial nerve deficit or sensory deficit. She exhibits normal muscle tone. She displays no seizure activity. Coordination and gait normal.   Skin: Skin is warm, dry, intact, not diaphoretic and not pale.   Psychiatric: She has a normal mood and affect. Her speech is normal and behavior is normal. Judgment and thought content normal. Cognition and memory are normal.   Nursing note and vitals reviewed.        Assessment:       1. Syncope, unspecified syncope type        Plan:    Patient appears neurologically intact upon exam. VS stable. No distress. Complains of "not feeling right" and reports experiencing a syncopal episode a few hours ago. No prior syncopal history. EKG reveals ST changes. Discussed the urgency of seeking " ER care, [facility]; offered to call EMS for transport. Pt refuses EMS and requests that her  bring her to the ER. Spoke with  and reemphasized the significance of transporting immediately to ER;  verbalizes understanding.       Syncope, unspecified syncope type

## 2020-03-16 NOTE — TELEPHONE ENCOUNTER
Pt says she passed out at [facility] today. Does not feel sick and this has not happened before.

## 2020-03-16 NOTE — PATIENT INSTRUCTIONS
Uncertain Causes of Fall  You have had a fall today. But the cause of your fall is not certain. Falls can occur due to slipping, tripping or losing your balance. A fall can also occur from a fainting spell or seizure.  While a fall can happen for a simple reason (tripping over something), falls in elderly people are often caused by a combination of things:  · Age-related decline in function with worsening balance, stability, vision, and muscle strength  · Chronic illness such as heart arrhythmias, heart valve disease, vascular disease, COPD, diabetes, strokes, arthritis  · Effects or side effects of medicines  · Dehydration.  · Environmental hazards such as uneven or slippery ground, unfamiliar place, obstacles, uneven surfaces, or slippery ground  · Situational factors (related to the activity being done, e.g., rushing to the bathroom)  Because the cause of your fall today is not certain, it is possible that a fainting spell or seizure was the cause. This means that it could happen again, without warning. If you fall again, without a cause, then you should return to this facility promptly to have further tests. Otherwise, follow up with your doctor as explained below.  It is normal to feel sore and tight in your muscles and back the next day, and not just the muscles you initially injured. Remember, all the parts of your body are connected, so while initially one area hurts, the next day another may hurt. Also, when you injure yourself, it causes inflammation, which then causes the muscles to tighten up and hurt more. After the initial worsening, it should gradually improve over the next few days. However, more severe pain should be reported.  Even without a definite head injury, you can still get a concussion. Concussions and even bleeding can still occur, especially if you have had a recent injury or take blood thinner medicine. It is not unusual to have a mild headache and feel tired and even nauseous or  dizzy.  Home care  · Rest today and resume your normal activities as soon as you are feeling back to normal. It is best to remain with someone who can check on you for the next 24 hours to watch for another episode of falling.  · If you were injured during the fall, follow the advice from your doctor regarding care of your injury.  ·  If you become light-headed or dizzy, lie down immediately or sit and lean forward with your head down.  · As a precaution, do not drive a car or operate dangerous equipment, do not take a bath alone (use a shower instead) and do not swim alone until you see your doctor. A condition causing fainting or seizures must be ruled out before resuming these activities.  · You may use acetaminophen or ibuprofen to control pain, unless another pain medicine was prescribed. If you have chronic liver or kidney disease or ever had a stomach ulcer or gastrointestinal bleeding, talk with your doctor before using these medicines.  · Keep your appointments for any further testing that may have been scheduled for you.  Follow-up care  Follow up with your healthcare provider, or as advised.  If X-rays or CT scan were done, you will be notified if there is a change in the reading, especially if it affects treatment.  Call 911  Call 911 if any of these occur:  · Trouble breathing  · Confused or difficulty arousing  · Fainting or loss of consciousness  · Rapid or very slow heart rate  · Seizure  · Difficulty with speech or vision, weakness of an arm or leg  · Difficulty walking or talking, loss of balance, numbness or weakness in one side of your body, facial droop  When to seek medical advice  Call your healthcare provider right away if any of these occur:  · Another unexplained fall  · Dizziness  · Severe headache  · Nausea and vomiting  · Blood in vomit, stools (black or red color)  Date Last Reviewed: 11/5/2015  © 6943-3864 [employer]. [address] [place] [address], [place] [address]. All rights  reserved. This information is not intended as a substitute for professional medical care. Always follow your healthcare professional's instructions.        Causes of Syncope  Syncope (fainting) has many causes. Sometimes it is not serious. In other cases, syncope is a sign of a heart problem. But treatment can help    When syncope is not serious  Your healthcare provider may call your problem vasovagal syncope, reflex syncope, or orthostatic hypotension. These types of syncope are generally not serious. They can be caused by:  · Strong feelings, such as anxiety or fear. A nerve signal may briefly change your heart rate and lower your blood pressure too much.  · Standing for too long. Standing may cause blood to pool in your legs. When this happens, your brain may not receive all the blood it needs.  · Standing up too quickly. Your blood pressure may not adjust fast enough to changes in posture and may drop too low. Certain medicines can also cause this problem. Examples of medicines that can cause a drop in blood pressure include diuretics, blood pressure medicines, and medicines for chest pain. Your pharmacist or healthcare provider can discuss these with you.  · Reaction to normal body functions. When you go to the bathroom, have gastrointestinal discomfort, nausea, or pain, your heart may have a natural reflex to slow down and lower blood pressure. This can result in syncope. This may also follow exercise, eating, laughter, weight lifting, or playing musical instruments like the trumpet or trombone.  When heart trouble causes syncope  A heart problem can decrease the amount of oxygen-rich blood that reaches the brain. Heart trouble can be serious and even life threatening if not treated:  · A slow heart rate. Electrical signals tell the chambers of the heart when to pump. But the signals may be slowed or blocked (heart block) as they travel on the hearts electrical pathways. This can be caused by aging, scarred  heart tissue, or damage from heart disease. When the heart rate slows, not enough blood is pumped.  · A fast heart rate. Certain problems can make the heart race. For instance, after a heart attack, also known as acute myocardial infarction, or AMI, abnormal electrical signals may be created. These signals can make the heart suddenly beat very fast. The heart pumps before the chambers can fill with blood. So less blood reaches the brain and other parts of the body. Illegal drugs, certain medicines, heart disease, or an inherited condition can also cause this.  · A heart valve problem. Blood travels through the chambers of the heart as it is pumped. Heart valves open and close to help move blood in the right direction. But a valve may not open or close fully, if its hardened or scarred. As a result, less blood is pumped through the heart to the brain and body. Most often, syncope occurs when a person's aortic valve is critically narrowed and he or she participates in  a strenuous activity.  · A heart muscle problem. Some people develop a thickened heart muscle that blocks blood flow out of the heart to the body. This is called hypertrophic cardiomyopathy. Being dehydrated and having hypertrophic cardiomyopathy can increase the risk for syncope.  Whatever the cause of syncope, it is important to be evaluated by your healthcare provider. You may need to be seen by a cardiologist, neurologist, or an ear, nose, and throat specialist. Do not drive, operate heavy machinery, or participate in activities in which you would be at risk for falls and injury if you have syncope and have not been evaluated.  Date Last Reviewed: 5/1/2016 © 2000-2017 [email or web address]. [address] [place] [address], [place] [address]. All rights reserved. This information is not intended as a substitute for professional medical care. Always follow your healthcare professional's instructions.        Causes of Syncope  Syncope (fainting) has many  causes. Sometimes it is not serious. In other cases, syncope is a sign of a heart problem. But treatment can help    When syncope is not serious  Your healthcare provider may call your problem vasovagal syncope, reflex syncope, or orthostatic hypotension. These types of syncope are generally not serious. They can be caused by:  · Strong feelings, such as anxiety or fear. A nerve signal may briefly change your heart rate and lower your blood pressure too much.  · Standing for too long. Standing may cause blood to pool in your legs. When this happens, your brain may not receive all the blood it needs.  · Standing up too quickly. Your blood pressure may not adjust fast enough to changes in posture and may drop too low. Certain medicines can also cause this problem. Examples of medicines that can cause a drop in blood pressure include diuretics, blood pressure medicines, and medicines for chest pain. Your pharmacist or healthcare provider can discuss these with you.  · Reaction to normal body functions. When you go to the bathroom, have gastrointestinal discomfort, nausea, or pain, your heart may have a natural reflex to slow down and lower blood pressure. This can result in syncope. This may also follow exercise, eating, laughter, weight lifting, or playing musical instruments like the trumpet or trombone.  When heart trouble causes syncope  A heart problem can decrease the amount of oxygen-rich blood that reaches the brain. Heart trouble can be serious and even life threatening if not treated:  · A slow heart rate. Electrical signals tell the chambers of the heart when to pump. But the signals may be slowed or blocked (heart block) as they travel on the hearts electrical pathways. This can be caused by aging, scarred heart tissue, or damage from heart disease. When the heart rate slows, not enough blood is pumped.  · A fast heart rate. Certain problems can make the heart race. For instance, after a heart attack, also  known as acute myocardial infarction, or AMI, abnormal electrical signals may be created. These signals can make the heart suddenly beat very fast. The heart pumps before the chambers can fill with blood. So less blood reaches the brain and other parts of the body. Illegal drugs, certain medicines, heart disease, or an inherited condition can also cause this.  · A heart valve problem. Blood travels through the chambers of the heart as it is pumped. Heart valves open and close to help move blood in the right direction. But a valve may not open or close fully, if its hardened or scarred. As a result, less blood is pumped through the heart to the brain and body. Most often, syncope occurs when a person's aortic valve is critically narrowed and he or she participates in  a strenuous activity.  · A heart muscle problem. Some people develop a thickened heart muscle that blocks blood flow out of the heart to the body. This is called hypertrophic cardiomyopathy. Being dehydrated and having hypertrophic cardiomyopathy can increase the risk for syncope.  Whatever the cause of syncope, it is important to be evaluated by your healthcare provider. You may need to be seen by a cardiologist, neurologist, or an ear, nose, and throat specialist. Do not drive, operate heavy machinery, or participate in activities in which you would be at risk for falls and injury if you have syncope and have not been evaluated.  Date Last Reviewed: 5/1/2016 © 2000-2017 [employer]. [address] [place] [id number]. All rights reserved. This information is not intended as a substitute for professional medical care. Always follow your healthcare professional's instructions.

## 2020-03-16 NOTE — ED TRIAGE NOTES
Pt states standing in line at the grocery and had a syncopal episode. Pt states having no symptoms prior.

## 2020-03-17 ENCOUNTER — CLINICAL SUPPORT (OUTPATIENT)
Dept: CARDIOLOGY | Facility: HOSPITAL | Age: 60
End: 2020-03-17
Payer: COMMERCIAL

## 2020-03-17 VITALS
OXYGEN SATURATION: 98 % | TEMPERATURE: 98 F | DIASTOLIC BLOOD PRESSURE: 87 MMHG | BODY MASS INDEX: 27.62 KG/M2 | HEART RATE: 81 BPM | RESPIRATION RATE: 20 BRPM | WEIGHT: 176 LBS | HEIGHT: 67 IN | SYSTOLIC BLOOD PRESSURE: 145 MMHG

## 2020-03-17 PROBLEM — R94.30 EJECTION FRACTION < 50%: Status: ACTIVE | Noted: 2020-03-17

## 2020-03-17 PROBLEM — R55 SYNCOPE: Status: RESOLVED | Noted: 2020-03-16 | Resolved: 2020-03-17

## 2020-03-17 LAB
ANION GAP SERPL CALC-SCNC: 7 MMOL/L (ref 8–16)
AORTIC ROOT ANNULUS: 2.8 CM
AORTIC VALVE CUSP SEPERATION: 1.89 CM
AV INDEX (PROSTH): 0.86
AV MEAN GRADIENT: 4 MMHG
AV PEAK GRADIENT: 7 MMHG
AV VALVE AREA: 2.72 CM2
AV VELOCITY RATIO: 64.45
BASOPHILS # BLD AUTO: 0.05 K/UL (ref 0–0.2)
BASOPHILS NFR BLD: 0.8 % (ref 0–1.9)
BSA FOR ECHO PROCEDURE: 1.95 M2
BUN SERPL-MCNC: 14 MG/DL (ref 6–20)
CALCIUM SERPL-MCNC: 8.4 MG/DL (ref 8.7–10.5)
CHLORIDE SERPL-SCNC: 107 MMOL/L (ref 95–110)
CO2 SERPL-SCNC: 27 MMOL/L (ref 23–29)
CREAT SERPL-MCNC: 0.4 MG/DL (ref 0.5–1.4)
CV ECHO LV RWT: 0.32 CM
DIFFERENTIAL METHOD: ABNORMAL
DOP CALC AO PEAK VEL: 1.32 M/S
DOP CALC AO VTI: 23.73 CM
DOP CALC LVOT AREA: 3.2 CM2
DOP CALC LVOT DIAMETER: 2.01 CM
DOP CALC LVOT PEAK VEL: 85.07 M/S
DOP CALC LVOT STROKE VOLUME: 64.6 CM3
DOP CALCLVOT PEAK VEL VTI: 20.37 CM
E WAVE DECELERATION TIME: 248.63 MSEC
E/A RATIO: 0.93
E/E' RATIO: 9.57 M/S
ECHO LV POSTERIOR WALL: 0.95 CM (ref 0.6–1.1)
EOSINOPHIL # BLD AUTO: 0.6 K/UL (ref 0–0.5)
EOSINOPHIL NFR BLD: 8.8 % (ref 0–8)
ERYTHROCYTE [DISTWIDTH] IN BLOOD BY AUTOMATED COUNT: 13.6 % (ref 11.5–14.5)
EST. GFR  (AFRICAN AMERICAN): >60 ML/MIN/1.73 M^2
EST. GFR  (NON AFRICAN AMERICAN): >60 ML/MIN/1.73 M^2
FRACTIONAL SHORTENING: 18 % (ref 28–44)
GLUCOSE SERPL-MCNC: 104 MG/DL (ref 70–110)
HCT VFR BLD AUTO: 36.3 % (ref 37–48.5)
HGB BLD-MCNC: 11.7 G/DL (ref 12–16)
IMM GRANULOCYTES # BLD AUTO: 0.01 K/UL (ref 0–0.04)
IMM GRANULOCYTES NFR BLD AUTO: 0.2 % (ref 0–0.5)
INTERVENTRICULAR SEPTUM: 0.94 CM (ref 0.6–1.1)
LEFT ATRIUM SIZE: 3.88 CM
LEFT INTERNAL DIMENSION IN SYSTOLE: 4.88 CM (ref 2.1–4)
LEFT VENTRICLE DIASTOLIC VOLUME INDEX: 102.24 ML/M2
LEFT VENTRICLE DIASTOLIC VOLUME: 195.8 ML
LEFT VENTRICLE MASS INDEX: 119 G/M2
LEFT VENTRICLE SYSTOLIC VOLUME INDEX: 54.6 ML/M2
LEFT VENTRICLE SYSTOLIC VOLUME: 104.5 ML
LEFT VENTRICULAR INTERNAL DIMENSION IN DIASTOLE: 5.97 CM (ref 3.5–6)
LEFT VENTRICULAR MASS: 227.58 G
LV LATERAL E/E' RATIO: 8.38 M/S
LV SEPTAL E/E' RATIO: 11.17 M/S
LYMPHOCYTES # BLD AUTO: 2.5 K/UL (ref 1–4.8)
LYMPHOCYTES NFR BLD: 38.7 % (ref 18–48)
MCH RBC QN AUTO: 29.6 PG (ref 27–31)
MCHC RBC AUTO-ENTMCNC: 32.2 G/DL (ref 32–36)
MCV RBC AUTO: 92 FL (ref 82–98)
MONOCYTES # BLD AUTO: 0.4 K/UL (ref 0.3–1)
MONOCYTES NFR BLD: 6.8 % (ref 4–15)
MV PEAK A VEL: 0.72 M/S
MV PEAK E VEL: 0.67 M/S
NEUTROPHILS # BLD AUTO: 2.8 K/UL (ref 1.8–7.7)
NEUTROPHILS NFR BLD: 44.7 % (ref 38–73)
NRBC BLD-RTO: 0 /100 WBC
PLATELET # BLD AUTO: 313 K/UL (ref 150–350)
PMV BLD AUTO: 10 FL (ref 9.2–12.9)
POTASSIUM SERPL-SCNC: 3.5 MMOL/L (ref 3.5–5.1)
PV PEAK VELOCITY: 94.11 CM/S
RA PRESSURE: 3 MMHG
RBC # BLD AUTO: 3.95 M/UL (ref 4–5.4)
RIGHT VENTRICULAR END-DIASTOLIC DIMENSION: 245 CM
SODIUM SERPL-SCNC: 141 MMOL/L (ref 136–145)
TDI LATERAL: 0.08 M/S
TDI SEPTAL: 0.06 M/S
TDI: 0.07 M/S
TROPONIN I SERPL DL<=0.01 NG/ML-MCNC: <0.03 NG/ML
WBC # BLD AUTO: 6.35 K/UL (ref 3.9–12.7)

## 2020-03-17 PROCEDURE — 94761 N-INVAS EAR/PLS OXIMETRY MLT: CPT

## 2020-03-17 PROCEDURE — 80048 BASIC METABOLIC PNL TOTAL CA: CPT

## 2020-03-17 PROCEDURE — G0378 HOSPITAL OBSERVATION PER HR: HCPCS

## 2020-03-17 PROCEDURE — 93306 TTE W/DOPPLER COMPLETE: CPT

## 2020-03-17 PROCEDURE — 85025 COMPLETE CBC W/AUTO DIFF WBC: CPT

## 2020-03-17 PROCEDURE — 36415 COLL VENOUS BLD VENIPUNCTURE: CPT

## 2020-03-17 PROCEDURE — 99900035 HC TECH TIME PER 15 MIN (STAT)

## 2020-03-17 PROCEDURE — 84484 ASSAY OF TROPONIN QUANT: CPT

## 2020-03-17 PROCEDURE — 25000003 PHARM REV CODE 250: Performed by: NURSE PRACTITIONER

## 2020-03-17 PROCEDURE — 25000242 PHARM REV CODE 250 ALT 637 W/ HCPCS: Performed by: NURSE PRACTITIONER

## 2020-03-17 RX ORDER — LISINOPRIL 2.5 MG/1
2.5 TABLET ORAL DAILY
Qty: 30 TABLET | Refills: 1 | Status: SHIPPED | OUTPATIENT
Start: 2020-03-17 | End: 2020-06-26

## 2020-03-17 RX ADMIN — FLUTICASONE PROPIONATE 50 MCG: 50 SPRAY, METERED NASAL at 09:03

## 2020-03-17 RX ADMIN — ASPIRIN 325 MG ORAL TABLET 325 MG: 325 PILL ORAL at 09:03

## 2020-03-17 NOTE — ASSESSMENT & PLAN NOTE
EKG:  Sinus rhythm, rate 80, T-wave inversion in V4 to V6,  millisecond, no ST elevation  Chest radiograph showed no overt heart failure  Normal BNP  Negative troponin  Cardiac monitor showed no arrhythmia  Check orthostatic vital signs Q shift  Trend troponin  Echocardiogram  Carotid ultrasound  Gentle hydration  Hold hydrochlorothiazide

## 2020-03-17 NOTE — ED PROVIDER NOTES
Encounter Date: 3/16/2020       History     Chief Complaint   Patient presents with    Loss of Consciousness     59-year-old female presents to the emergency department states she was at a local grocery store standing in line to check out when she had a syncopal episode.  Patient denies any type of preceding symptoms she states that she called her  to meet her at the grocery store when she woke up and she went home she states she rested for few hours she called a family member who is a nurse who told her to go get checked she went to a local urgent care had a EKG and was sent here for further evaluation.  The patient has no current complaints.         Review of patient's allergies indicates:  No Known Allergies  Past Medical History:   Diagnosis Date    Allergy     Bilateral leg edema     Hyperlipidemia     Hypertension     Leg pain, bilateral     left leg more severe    Varicose vein      Past Surgical History:   Procedure Laterality Date     SECTION, LOW TRANSVERSE      x 3    HYSTERECTOMY  2012    stents both legs       Family History   Problem Relation Age of Onset    Heart disease Mother     Heart failure Father      Social History     Tobacco Use    Smoking status: Former Smoker     Last attempt to quit: 1985     Years since quittin.2    Smokeless tobacco: Never Used   Substance Use Topics    Alcohol use: Yes    Drug use: No     Review of Systems   Constitutional: Negative.    HENT: Negative.    Respiratory: Negative.    Cardiovascular: Negative.    Gastrointestinal: Negative.    Genitourinary: Negative.    Musculoskeletal: Negative.    Skin: Negative.    Neurological: Positive for syncope.   Hematological: Negative.    Psychiatric/Behavioral: Negative.    All other systems reviewed and are negative.      Physical Exam     Initial Vitals [20 1723]   BP Pulse Resp Temp SpO2   (!) 153/97 87 17 98 °F (36.7 °C) 100 %      MAP       --         Physical Exam    Nursing  note and vitals reviewed.  Constitutional: She appears well-developed and well-nourished.   HENT:   Head: Normocephalic and atraumatic.   Right Ear: External ear normal.   Left Ear: External ear normal.   Nose: Nose normal.   Mouth/Throat: Oropharynx is clear and moist.   Eyes: Conjunctivae and EOM are normal. Pupils are equal, round, and reactive to light.   Neck: Normal range of motion.   Cardiovascular: Normal rate, regular rhythm, normal heart sounds and intact distal pulses.   Pulmonary/Chest: Breath sounds normal.   Abdominal: Soft. Bowel sounds are normal.   Musculoskeletal: Normal range of motion. She exhibits no tenderness.   Neurological: She is alert and oriented to person, place, and time. She has normal strength and normal reflexes. GCS score is 15. GCS eye subscore is 4. GCS verbal subscore is 5. GCS motor subscore is 6.   Skin: Skin is warm and dry.   Psychiatric: She has a normal mood and affect.         ED Course   Procedures  Labs Reviewed   COMPREHENSIVE METABOLIC PANEL - Abnormal; Notable for the following components:       Result Value    CO2 30 (*)     All other components within normal limits   CBC W/ AUTO DIFFERENTIAL   D DIMER, QUANTITATIVE   TROPONIN I   B-TYPE NATRIURETIC PEPTIDE   POCT GLUCOSE          Imaging Results    None                                          Clinical Impression:       ICD-10-CM ICD-9-CM   1. Syncope, unspecified syncope type R55 780.2   2. Syncope R55 780.2   3. Chest pain R07.9 786.50   4. Syncope and collapse R55 780.2                                STEPHEN Haskins  03/16/20 2016

## 2020-03-17 NOTE — ASSESSMENT & PLAN NOTE
Stable  Monitor  Hold hydrochlorothiazide for now  Hydralazine IV p.r.n. for systolic blood pressure above 170

## 2020-03-17 NOTE — HPI
The patient is a 59 year old male with history of hypertension and PTA with stent of leg vein by Dr. Cuco Martínez. She states that she passed out earlier today at a grocery store while waiting in line. She believes that she was out for about a minute or so. She woke up on the floor and others had offered her juice or candies to eat. Her  came to take her home. She states that she feels tired and attempted to lay down. She reports vague heaviness left upper chest at home. She denies shortness of breath or diaphoresis. Her daughter in law who is a RN advised her to seek medical attention. She was evaluated at an urgent care and told that she had abnormal EKG. She was subsequently advised to come to the ED.     Currently, she denies pain/discomfort. She denies shortness of breath or chest pain.    Work up in the ED was unremarkable. EKG, personally reviewed, showed sinus rhythm, rate 80, with T-wave inversion in lead V4 to V6,  millisecond.  Chest radiograph, personally reviewed, showed no obvious infiltrates or overt heart failure.

## 2020-03-17 NOTE — SUBJECTIVE & OBJECTIVE
Past Medical History:   Diagnosis Date    Allergy     Bilateral leg edema     Hyperlipidemia     Hypertension     Leg pain, bilateral     left leg more severe    Varicose vein        Past Surgical History:   Procedure Laterality Date     SECTION, LOW TRANSVERSE      x 3    HYSTERECTOMY  2012    stents both legs         Review of patient's allergies indicates:  No Known Allergies    No current facility-administered medications on file prior to encounter.      Current Outpatient Medications on File Prior to Encounter   Medication Sig    albuterol (PROVENTIL HFA) 90 mcg/actuation inhaler Inhale 2 puffs into the lungs every 6 (six) hours as needed for Wheezing. Rescue    aspirin 325 MG tablet Take 325 mg by mouth once daily.    atorvastatin (LIPITOR) 10 MG tablet Take 1 tablet (10 mg total) by mouth every evening.    fluticasone propionate (FLONASE) 50 mcg/actuation nasal spray USE 2 SPRAYS BY EACH NARE ROUTE DAILY.    hydroCHLOROthiazide (HYDRODIURIL) 25 MG tablet Take 1 tablet (25 mg total) by mouth once daily.    melatonin 10 mg Tab Take 2 tablets by mouth nightly as needed.     Family History     Problem Relation (Age of Onset)    Heart disease Mother    Heart failure Father        Tobacco Use    Smoking status: Former Smoker     Last attempt to quit: 1985     Years since quittin.2    Smokeless tobacco: Never Used   Substance and Sexual Activity    Alcohol use: Yes    Drug use: No    Sexual activity: Yes     Review of Systems   Gastrointestinal: Positive for constipation.   All other systems reviewed and are negative.    Objective:     Vital Signs (Most Recent):  Temp: 98 °F (36.7 °C) (20)  Pulse: 78 (20)  Resp: 15 (20)  BP: 130/69 (20)  SpO2: 99 % (20) Vital Signs (24h Range):  Temp:  [97.2 °F (36.2 °C)-98 °F (36.7 °C)] 98 °F (36.7 °C)  Pulse:  [73-89] 78  Resp:  [15-21] 15  SpO2:  [97 %-100 %] 99 %  BP: (122-153)/(62-97)  130/69     Weight: 80.7 kg (178 lb)  Body mass index is 27.88 kg/m².    Physical Exam   Constitutional: She is oriented to person, place, and time. She appears well-developed and well-nourished.   HENT:   Head: Normocephalic and atraumatic.   Eyes: Right eye exhibits no discharge. Left eye exhibits no discharge.   Neck: Normal range of motion. Neck supple.   Cardiovascular: Normal rate and regular rhythm. Exam reveals no gallop and no friction rub.   No murmur heard.  Pulmonary/Chest: Effort normal and breath sounds normal.   Abdominal: Soft.   Genitourinary:   Genitourinary Comments: No CVA tenderness   Musculoskeletal: She exhibits no edema, tenderness or deformity.   Neurological: She is alert and oriented to person, place, and time.   No focal deficits   Skin: Skin is warm and dry. Capillary refill takes less than 2 seconds.   Psychiatric: She has a normal mood and affect.   Vitals reviewed.          Significant Labs:   CBC:   Recent Labs   Lab 03/16/20  1825   WBC 7.92   HGB 12.5   HCT 38.8        CMP:   Recent Labs   Lab 03/16/20  1825      K 4.4      CO2 30*   GLU 82   BUN 16   CREATININE 0.6   CALCIUM 9.1   PROT 7.3   ALBUMIN 3.9   BILITOT 1.0   ALKPHOS 66   AST 27   ALT 9*   ANIONGAP 10   EGFRNONAA >60.0     Cardiac Markers:   Recent Labs   Lab 03/16/20  1825   BNP 30     Troponin:   Recent Labs   Lab 03/16/20  1825   TROPONINI <0.030       Significant Imaging: I have reviewed all pertinent imaging results/findings within the past 24 hours.

## 2020-03-17 NOTE — PROGRESS NOTES
[facility] Medicine    Progress Note    Patient Name: Daylin Lynch  MRN: [id number]  Patient Class: OP- Observation   Admission Date: 3/16/2020  5:20 PM  Length of Stay: 0  Attending Physician: TYLER PRIETO MD  Primary Care Provider: STEPHEN Chowdhury  Face-to-Face encounter date: 03/17/2020  Chief Complaint: Loss of Consciousness         Patient ID: Daylin Lynch is a 59 y.o. female admitted for   Active Hospital Problems    Diagnosis  POA    *Syncope [R55]  Yes    Hypertension [I10]  Unknown      Resolved Hospital Problems   No resolved problems to display.      HPI by Pedro DUPREE 03/16/2020: The patient is a 59 year old male with history of hypertension and PTA with stent of leg vein by Dr. Cuco Martínez. She states that she passed out earlier today at a grocery store while waiting in line. She believes that she was out for about a minute or so. She woke up on the floor and others had offered her juice or candies to eat. Her  came to take her home. She states that she feels tired and attempted to lay down. She reports vague heaviness left upper chest at home. She denies shortness of breath or diaphoresis. Her daughter in law who is a RN advised her to seek medical attention. She was evaluated at an urgent care and told that she had abnormal EKG. She was subsequently advised to come to the ED.   Currently, she denies pain/discomfort. She denies shortness of breath or chest pain.  Work up in the ED was unremarkable. EKG, personally reviewed, showed sinus rhythm, rate 80, with T-wave inversion in lead V4 to V6,  millisecond.  Chest radiograph, personally reviewed, showed no obvious infiltrates or overt heart failure.    Subjective:    Interval History: Patient seen and examined. Sitting in bed, reports doing much better than yesterday. Did mentioned that she was told to take her hydrochlorothiazide at night which makes her urinate all night and the morning yesterday she did not  drink water but did had breakfast and went for closer show being.  Patient said while on the phone she passed out.  Not sure how long she was out and none of the bystander informed her of the fact..   Orthostatic blood pressure were negative yesterday..   No Family present at bedside.  No concerns/issues overnight reported by the patient or the nursing staff.  Discussed with patient that her carotid ultrasound and cardiac enzymes are so far negative, awaiting echocardiogram, verbalized understanding.  Patient does not have a cardiologist.  But used to see the vascular surgeon Dr. Petersen in the past    Review of Systems All other Review of Systems were found to be negative expect for that mentioned already in HPI.     Objective:     Physical Exam  Vitals:    03/17/20 0752   BP: 107/68   Pulse: 80   Resp: 18   Temp: 97.7 °F (36.5 °C)     Wt Readings from Last 1 Encounters:   03/17/20 79.8 kg (176 lb)      Body mass index is 27.57 kg/m².    Vitals reviewed.  Constitutional: No distress.  Comfortable  HENT:  Normocephalic  Head: Atraumatic.   Cardiovascular: Normal rate, regular rhythm and normal heart sounds.   Pulmonary/Chest: Effort normal. No wheezes.   Abdominal: Soft. Bowel sounds are normal. No distension and no mass. No tenderness  Neurological: Alert.   Skin: Skin is warm and dry.     Following labs were Reviewed   CBC:  Recent Labs   Lab 03/17/20  0534   WBC 6.35   HGB 11.7*   HCT 36.3*        CMP:  Recent Labs   Lab 03/16/20  1825 03/17/20  0534   CALCIUM 9.1 8.4*   ALBUMIN 3.9  --    PROT 7.3  --     141   K 4.4 3.5   CO2 30* 27    107   BUN 16 14   CREATININE 0.6 0.4*   ALKPHOS 66  --    ALT 9*  --    AST 27  --    BILITOT 1.0  --      Last 72 hour POCT GLUCOSE  No results found for: POCTGLUCOSE     Microbiology Results (last 7 days)     ** No results found for the last 168 hours. **            US Carotid Bilateral   Final Result      Mild atheromatous changes without evidence of  hemodynamically significant carotid arterial stenosis.         Electronically signed by: Forest Malik IV., MD   Date:    03/17/2020   Time:    07:12      X-Ray Chest PA And Lateral   Final Result      No acute cardiopulmonary abnormality.         Electronically signed by: Vince Harvey MD   Date:    03/17/2020   Time:    07:33            Scheduled Meds:   aspirin  325 mg Oral Daily    atorvastatin  10 mg Oral QHS    fluticasone propionate  1 spray Each Nostril Daily     Continuous Infusions:   sodium chloride 0.9% 75 mL/hr at 03/16/20 2133     PRN Meds:.acetaminophen, albuterol, bisacodyL, hydrALAZINE, ondansetron, promethazine (PHENERGAN) IVPB, sodium chloride 0.9%      Assessment & Plan:     * Syncope  Differential diagnosis includes: Hypovolemic due to Diuretic vs hypotension vs vasovagal being in the check out line   EKG:  Sinus rhythm, rate 80, T-wave inversion in V4 to V6,  millisecond, no ST elevation on admission  Chest radiograph showed no overt heart failure  Normal BNP  Troponins x3 negative  Orthostatic done yesterday was negative, requested this morning a repeat orthostatic values  Echocardiogram- pending  Carotid ultrasound shows mild atheromatous changes without evidence of hemodynamically significant carotid arterial stenosis  Gentle hydration with normal saline at 75 cc/hour  Hold hydrochlorothiazide for now        Hypertension  Stable and controlled  Continue Monitor  Hold hydrochlorothiazide for now  Hydralazine IV p.r.n. for systolic blood pressure above 170    Discharge Planning:   No mobility needs. Patient will be discharged in once workup is complete    Above encounter included review of the medical records, interviewing and examining the patient face-to-face, discussion with family and other health care providers, ordering and interpreting lab/test results and formulating a plan of care.     Medical Decision Making:      [_] Low Complexity  [x] Moderate Complexity  [ ] High  Shagufta Benson MD  Department of Hospital Medicine   [facility]

## 2020-03-17 NOTE — HOSPITAL COURSE
59-year-old white female with history of hypertension and peripheral vascular disease with stent of leg vein in the past admitted 03/16/2020 after she had a syncopal episode at [facility].  Patient reports she was standing in her line for checkout when she passed out.  Patient does not know how long she was out but when she woke up, bystanders were feeding her candy and juices.  Patient did informed that she was started on hydrochlorothiazide for lower extremity edema and was told to take it every night.  Patient reports 4-5 times urination per night.  Patient did mention that she did not drink enough water that morning before going to grocery shopping for her in-laws.  Patient said her  came and picked her up though she felt tired and attempted to lay down but felt heaviness in her left upper chest so she came to the ER.  Work up in the ED was unremarkable. EKG, personally reviewed, showed sinus rhythm, rate 80, with T-wave inversion in lead V4 to V6,  millisecond. Chest radiograph, personally reviewed, showed no obvious infiltrates or overt heart failure.  Hydrochlorothiazide was held  Patient was admitted as observation on cardiac floor.  Cardiac enzymes were ordered.  Cardiac enzymes remain negative x3.  EKG remained unchanged.  Patient denies any history of chest pain during hospital 1st set or orthostatic BP readings were negative on 3/16/2020  2nd set done 03/17/2020 slightly positive but remained asymptomatic   Carotid ultrasound was ordered which showed mild atheromatous changes without evidence of hemodynamically significant carotid artery disease  Echocardiogram was also done which showed mild eccentric left ventricular hypertrophy with normal right ventricular systolic function but moderately decreased left ventricular systolic function with estimated EF 40%.  Mild grade 1 left ventricular diastolic dysfunction consistent with impaired relaxation  I consulted with Dr. VANESSA Ames.  Discussed the  case.  Discussed that I will discontinue her hydrochlorothiazide and instead start her on lisinopril 2.5 mg daily.  Dr. VANESSA Ames said they can follow-up the patient in their office within this week.  Agreed with the plan.  I discussed the above with the patient in depth.  Discussed I will send her new prescription to her [facility] pharmacy.  Also discussed with patient low-sodium diet, continue regular walking as exercise and hydrate well.  Patient verbalized understanding  Patient's wants to go home.  Advised patient to call Dr. VNAESSA Ames office and make an appointment for this week.  Verbalized understanding    For physical exam, see note from earlier

## 2020-03-17 NOTE — PROGRESS NOTES
[facility]     Daylin Lynch   [id number]   3/17/2020         Cardiac Rehab Phase Taught: Phase 1    Teaching Method: Verbal    Handouts: None    Educational Videos: None    Understanding:  Learning indicated by feedback and Verbalize understanding    Comments: pt in bed, discussed CAD and risk factors, questions answered. Pt voiced understanding. Will follow for further education    Total Time Spent:15mins            Vanessa Travis RN

## 2020-03-17 NOTE — DISCHARGE SUMMARY
Critical access hospital Medicine  Discharge Summary      Patient Name: Daylin Lynch  MRN: [id number]  Admission Date: 3/16/2020  Hospital Length of Stay: 0 days  Discharge Date and Time:  03/17/2020 2:53 PM  Attending Physician: Rodolfo Benson MD   Discharging Provider: Rodolfo Benson MD  Primary Care Provider: STEPHEN Chowdhury      HPI:   The patient is a 59 year old male with history of hypertension and PTA with stent of leg vein by Dr. Cuco Martínez. She states that she passed out earlier today at a grocery store while waiting in line. She believes that she was out for about a minute or so. She woke up on the floor and others had offered her juice or candies to eat. Her  came to take her home. She states that she feels tired and attempted to lay down. She reports vague heaviness left upper chest at home. She denies shortness of breath or diaphoresis. Her daughter in law who is a RN advised her to seek medical attention. She was evaluated at an urgent care and told that she had abnormal EKG. She was subsequently advised to come to the ED.     Currently, she denies pain/discomfort. She denies shortness of breath or chest pain.    Work up in the ED was unremarkable. EKG, personally reviewed, showed sinus rhythm, rate 80, with T-wave inversion in lead V4 to V6,  millisecond.  Chest radiograph, personally reviewed, showed no obvious infiltrates or overt heart failure.    * No surgery found *      Hospital Course:   59-year-old white female with history of hypertension and peripheral vascular disease with stent of leg vein in the past admitted 03/16/2020 after she had a syncopal episode at [facility].  Patient reports she was standing in her line for checkout when she passed out.  Patient does not know how long she was out but when she woke up, bystanders were feeding her candy and juices.  Patient did informed that she was started on hydrochlorothiazide for lower extremity edema and was told to take it  every night.  Patient reports 4-5 times urination per night.  Patient did mention that she did not drink enough water that morning before going to grocery shopping for her in-laws.  Patient said her  came and picked her up though she felt tired and attempted to lay down but felt heaviness in her left upper chest so she came to the ER.  Work up in the ED was unremarkable. EKG, personally reviewed, showed sinus rhythm, rate 80, with T-wave inversion in lead V4 to V6,  millisecond. Chest radiograph, personally reviewed, showed no obvious infiltrates or overt heart failure.  Hydrochlorothiazide was held  Patient was admitted as observation on cardiac floor.  Cardiac enzymes were ordered.  Cardiac enzymes remain negative x3.  EKG remained unchanged.  Patient denies any history of chest pain during hospital 1st set or orthostatic BP readings were negative on 3/16/2020  2nd set done 03/17/2020 slightly positive but remained asymptomatic   Carotid ultrasound was ordered which showed mild atheromatous changes without evidence of hemodynamically significant carotid artery disease  Echocardiogram was also done which showed mild eccentric left ventricular hypertrophy with normal right ventricular systolic function but moderately decreased left ventricular systolic function with estimated EF 40%.  Mild grade 1 left ventricular diastolic dysfunction consistent with impaired relaxation  I consulted with Dr. VANESSA Ames.  Discussed the case.  Discussed that I will discontinue her hydrochlorothiazide and instead start her on lisinopril 2.5 mg daily.  Dr. VANESSA Ames said they can follow-up the patient in their office within this week.  Agreed with the plan.  I discussed the above with the patient in depth.  Discussed I will send her new prescription to her [facility] pharmacy.  Also discussed with patient low-sodium diet, continue regular walking as exercise and hydrate well.  Patient verbalized understanding  Patient's wants to  go home.  Advised patient to call Dr. VANESSA aLmas office and make an appointment for this week.  Verbalized understanding    For physical exam, see note from earlier     Consults:   Consults (From admission, onward)        Status Ordering Provider     Inpatient consult to Hospitalist  Once     Provider:  ROBERTA Park MICHELLE          No new Assessment & Plan notes have been filed under this hospital service since the last note was generated.  Service: Hospital Medicine    Final Active Diagnoses:    Diagnosis Date Noted POA    Ejection fraction 40% 03/17/2020 [R94.30] 03/17/2020 Yes    Hypertension [I10] 09/11/2017 Yes      Problems Resolved During this Admission:    Diagnosis Date Noted Date Resolved POA    PRINCIPAL PROBLEM:  Syncope [R55] 03/16/2020 03/17/2020 Yes       Discharged Condition: good    Disposition: Home or Self Care    Follow Up:  Follow-up Information     STEPHEN Chowdhury. Schedule an appointment as soon as possible for a visit in 2 weeks.    Specialty:  Family Medicine  Why:  Post hospital discharge follow-up for syncope.  Note HCTZ discontinued  Contact information:  [address] [facility] [id number] [phone]             Janak Lamas MD. Schedule an appointment as soon as possible for a visit in 3 days.    Specialties:  Cardiovascular Disease, Cardiology, INTERVENTIONAL CARDIOLOGY  Why:  Post hospital discharge follow-up for syncope, lisinopril 2.5 started, EF 40%.  Case personally discussed with Dr. VANESSA Lamas  Contact information:  [address]  [address]  [place] [address]  [phone]                 Patient Instructions:      Ambulatory referral/consult to Cardiology   Standing Status: Future   Referral Priority: Routine Referral Type: Consultation   Referral Reason: Specialty Services Required   Referred to Provider: JANAK LAMAS Requested Specialty: Cardiology   Number of Visits Requested: 1     Diet Cardiac   Order Comments:  Low-salt diet     Notify your health care provider if you experience any of the following:  persistent dizziness, light-headedness, or visual disturbances     Activity as tolerated       Significant Diagnostic Studies: Labs:   BMP:   Recent Labs   Lab 03/16/20 1825 03/17/20  0534   GLU 82 104    141   K 4.4 3.5    107   CO2 30* 27   BUN 16 14   CREATININE 0.6 0.4*   CALCIUM 9.1 8.4*   , CBC   Recent Labs   Lab 03/16/20 1825 03/17/20  0534   WBC 7.92 6.35   HGB 12.5 11.7*   HCT 38.8 36.3*    313   , INR   Lab Results   Component Value Date    INR 1.0 12/13/2013   , Lipid Panel   Lab Results   Component Value Date    CHOL 232 (H) 12/23/2019    HDL 67 12/23/2019    LDLCALC 151 (H) 12/23/2019    TRIG 68 12/23/2019    CHOLHDL 3.0 02/25/2019   , Troponin   Recent Labs   Lab 03/17/20  0534   TROPONINI <0.030    and A1C: No results for input(s): HGBA1C in the last 4320 hours.    Pending Diagnostic Studies:     None         Medications:  Reconciled Home Medications:      Medication List      START taking these medications    lisinopriL 2.5 MG tablet  Commonly known as:  PRINIVIL,ZESTRIL  Take 1 tablet (2.5 mg total) by mouth once daily.        CONTINUE taking these medications    albuterol 90 mcg/actuation inhaler  Commonly known as:  PROVENTIL HFA  Inhale 2 puffs into the lungs every 6 (six) hours as needed for Wheezing. Rescue     aspirin 325 MG tablet  Take 325 mg by mouth once daily.     atorvastatin 10 MG tablet  Commonly known as:  LIPITOR  Take 1 tablet (10 mg total) by mouth every evening.     fluticasone propionate 50 mcg/actuation nasal spray  Commonly known as:  FLONASE  USE 2 SPRAYS BY EACH NARE ROUTE DAILY.     melatonin 10 mg Tab  Take 2 tablets by mouth nightly as needed.        STOP taking these medications    hydroCHLOROthiazide 25 MG tablet  Commonly known as:  HYDRODIURIL            Indwelling Lines/Drains at time of discharge:   Lines/Drains/Airways     None                 Time spent  on the discharge of patient: 30 minutes  Patient was seen and examined on the date of discharge and determined to be suitable for discharge.         Rodolfo Benson MD  Department of Hospital Medicine  [facility]

## 2020-03-17 NOTE — PLAN OF CARE
03/17/20 0847   Patient Assessment/Suction   Level of Consciousness (AVPU) alert   Respiratory Effort Normal;Unlabored   Expansion/Accessory Muscles/Retractions no use of accessory muscles;no retractions   All Lung Fields Breath Sounds clear   Rhythm/Pattern, Respiratory unlabored;pattern regular;depth regular   PRE-TX-O2   O2 Device (Oxygen Therapy) room air   SpO2 97 %   Pulse Oximetry Type Intermittent   $ Pulse Oximetry - Multiple Charge Pulse Oximetry - Multiple   Pulse 86   Resp 16   Inhaler   $ Inhaler Charges PRN treatment not required   Respiratory Evaluation   $ Care Plan Tech Time 15 min   Evaluation For New Orders

## 2020-03-17 NOTE — H&P
[facility] Medicine  History & Physical  Date of service : 3/16/2020  At 2015    Patient Name: Daylin Lynch  MRN: [id number]  Admission Date: 3/16/2020  Attending Physician: Lizet Conley DO   Primary Care Provider: STEPHEN Chowdhury         Patient information was obtained from patient and ER records.     Subjective:     Principal Problem:Syncope    Chief Complaint:   Chief Complaint   Patient presents with    Loss of Consciousness        HPI: The patient is a 59 year old male with history of hypertension and PTA with stent of leg vein by Dr. Cuco Martínez. She states that she passed out earlier today at a grocery store while waiting in line. She believes that she was out for about a minute or so. She woke up on the floor and others had offered her juice or candies to eat. Her  came to take her home. She states that she feels tired and attempted to lay down. She reports vague heaviness left upper chest at home. She denies shortness of breath or diaphoresis. Her daughter in law who is a RN advised her to seek medical attention. She was evaluated at an urgent care and told that she had abnormal EKG. She was subsequently advised to come to the ED.     Currently, she denies pain/discomfort. She denies shortness of breath or chest pain.    Work up in the ED was unremarkable. EKG, personally reviewed, showed sinus rhythm, rate 80, with T-wave inversion in lead V4 to V6,  millisecond.  Chest radiograph, personally reviewed, showed no obvious infiltrates or overt heart failure.    Past Medical History:   Diagnosis Date    Allergy     Bilateral leg edema     Hyperlipidemia     Hypertension     Leg pain, bilateral     left leg more severe    Varicose vein        Past Surgical History:   Procedure Laterality Date     SECTION, LOW TRANSVERSE      x 3    HYSTERECTOMY  2012    stents both legs         Review of patient's allergies indicates:  No Known Allergies    No  current facility-administered medications on file prior to encounter.      Current Outpatient Medications on File Prior to Encounter   Medication Sig    albuterol (PROVENTIL HFA) 90 mcg/actuation inhaler Inhale 2 puffs into the lungs every 6 (six) hours as needed for Wheezing. Rescue    aspirin 325 MG tablet Take 325 mg by mouth once daily.    atorvastatin (LIPITOR) 10 MG tablet Take 1 tablet (10 mg total) by mouth every evening.    fluticasone propionate (FLONASE) 50 mcg/actuation nasal spray USE 2 SPRAYS BY EACH NARE ROUTE DAILY.    hydroCHLOROthiazide (HYDRODIURIL) 25 MG tablet Take 1 tablet (25 mg total) by mouth once daily.    melatonin 10 mg Tab Take 2 tablets by mouth nightly as needed.     Family History     Problem Relation (Age of Onset)    Heart disease Mother    Heart failure Father        Tobacco Use    Smoking status: Former Smoker     Last attempt to quit: 1985     Years since quittin.2    Smokeless tobacco: Never Used   Substance and Sexual Activity    Alcohol use: Yes    Drug use: No    Sexual activity: Yes     Review of Systems   Gastrointestinal: Positive for constipation.   All other systems reviewed and are negative.    Objective:     Vital Signs (Most Recent):  Temp: 98 °F (36.7 °C) (20 1723)  Pulse: 78 (20)  Resp: 15 (20)  BP: 130/69 (20)  SpO2: 99 % (20) Vital Signs (24h Range):  Temp:  [97.2 °F (36.2 °C)-98 °F (36.7 °C)] 98 °F (36.7 °C)  Pulse:  [73-89] 78  Resp:  [15-21] 15  SpO2:  [97 %-100 %] 99 %  BP: (122-153)/(62-97) 130/69     Weight: 80.7 kg (178 lb)  Body mass index is 27.88 kg/m².    Physical Exam   Constitutional: She is oriented to person, place, and time. She appears well-developed and well-nourished.   HENT:   Head: Normocephalic and atraumatic.   Eyes: Right eye exhibits no discharge. Left eye exhibits no discharge.   Neck: Normal range of motion. Neck supple.   Cardiovascular: Normal rate and regular  rhythm. Exam reveals no gallop and no friction rub.   No murmur heard.  Pulmonary/Chest: Effort normal and breath sounds normal.   Abdominal: Soft.   Genitourinary:   Genitourinary Comments: No CVA tenderness   Musculoskeletal: She exhibits no edema, tenderness or deformity.   Neurological: She is alert and oriented to person, place, and time.   No focal deficits   Skin: Skin is warm and dry. Capillary refill takes less than 2 seconds.   Psychiatric: She has a normal mood and affect.   Vitals reviewed.          Significant Labs:   CBC:   Recent Labs   Lab 03/16/20  1825   WBC 7.92   HGB 12.5   HCT 38.8        CMP:   Recent Labs   Lab 03/16/20 1825      K 4.4      CO2 30*   GLU 82   BUN 16   CREATININE 0.6   CALCIUM 9.1   PROT 7.3   ALBUMIN 3.9   BILITOT 1.0   ALKPHOS 66   AST 27   ALT 9*   ANIONGAP 10   EGFRNONAA >60.0     Cardiac Markers:   Recent Labs   Lab 03/16/20 1825   BNP 30     Troponin:   Recent Labs   Lab 03/16/20 1825   TROPONINI <0.030       Significant Imaging: I have reviewed all pertinent imaging results/findings within the past 24 hours.    Assessment/Plan:     * Syncope  EKG:  Sinus rhythm, rate 80, T-wave inversion in V4 to V6,  millisecond, no ST elevation  Chest radiograph showed no overt heart failure  Normal BNP  Negative troponin  Cardiac monitor showed no arrhythmia  Check orthostatic vital signs Q shift  Trend troponin  Echocardiogram  Carotid ultrasound  Gentle hydration  Hold hydrochlorothiazide      Hypertension  Stable  Monitor  Hold hydrochlorothiazide for now  Hydralazine IV p.r.n. for systolic blood pressure above 170      VTE Risk Mitigation (From admission, onward)    None             ROBERTA Sales  Department of Hospital Medicine   [facility]

## 2020-03-17 NOTE — PLAN OF CARE
03/17/20 1140   Discharge Assessment   Assessment Type Discharge Planning Reassessment     5 Wishes booklet given to and discussed with pt. And pt verbalized an understanding. CM will follow for DC Planning Needs.

## 2020-05-03 RX ORDER — LISINOPRIL 2.5 MG/1
TABLET ORAL
Qty: 30 TABLET | Refills: 1 | OUTPATIENT
Start: 2020-05-03

## 2020-05-21 RX ORDER — LISINOPRIL 2.5 MG/1
TABLET ORAL
Qty: 30 TABLET | Refills: 1 | OUTPATIENT
Start: 2020-05-21

## 2020-06-26 ENCOUNTER — OFFICE VISIT (OUTPATIENT)
Dept: FAMILY MEDICINE | Facility: CLINIC | Age: 60
End: 2020-06-26
Payer: COMMERCIAL

## 2020-06-26 VITALS
SYSTOLIC BLOOD PRESSURE: 110 MMHG | HEIGHT: 67 IN | HEART RATE: 92 BPM | DIASTOLIC BLOOD PRESSURE: 70 MMHG | WEIGHT: 179 LBS | BODY MASS INDEX: 28.09 KG/M2 | TEMPERATURE: 99 F | OXYGEN SATURATION: 97 %

## 2020-06-26 DIAGNOSIS — I42.9 CARDIOMYOPATHY, UNSPECIFIED TYPE: ICD-10-CM

## 2020-06-26 DIAGNOSIS — I10 ESSENTIAL (PRIMARY) HYPERTENSION: ICD-10-CM

## 2020-06-26 DIAGNOSIS — E78.5 DYSLIPIDEMIA: Primary | ICD-10-CM

## 2020-06-26 DIAGNOSIS — Z12.11 SCREEN FOR COLON CANCER: ICD-10-CM

## 2020-06-26 DIAGNOSIS — Z12.31 SCREENING MAMMOGRAM, ENCOUNTER FOR: ICD-10-CM

## 2020-06-26 PROCEDURE — 3008F BODY MASS INDEX DOCD: CPT | Mod: S$GLB,,, | Performed by: NURSE PRACTITIONER

## 2020-06-26 PROCEDURE — 3074F PR MOST RECENT SYSTOLIC BLOOD PRESSURE < 130 MM HG: ICD-10-PCS | Mod: S$GLB,,, | Performed by: NURSE PRACTITIONER

## 2020-06-26 PROCEDURE — 99214 PR OFFICE/OUTPT VISIT, EST, LEVL IV, 30-39 MIN: ICD-10-PCS | Mod: S$GLB,,, | Performed by: NURSE PRACTITIONER

## 2020-06-26 PROCEDURE — 3078F PR MOST RECENT DIASTOLIC BLOOD PRESSURE < 80 MM HG: ICD-10-PCS | Mod: S$GLB,,, | Performed by: NURSE PRACTITIONER

## 2020-06-26 PROCEDURE — 3074F SYST BP LT 130 MM HG: CPT | Mod: S$GLB,,, | Performed by: NURSE PRACTITIONER

## 2020-06-26 PROCEDURE — 3078F DIAST BP <80 MM HG: CPT | Mod: S$GLB,,, | Performed by: NURSE PRACTITIONER

## 2020-06-26 PROCEDURE — 99214 OFFICE O/P EST MOD 30 MIN: CPT | Mod: S$GLB,,, | Performed by: NURSE PRACTITIONER

## 2020-06-26 PROCEDURE — 3008F PR BODY MASS INDEX (BMI) DOCUMENTED: ICD-10-PCS | Mod: S$GLB,,, | Performed by: NURSE PRACTITIONER

## 2020-06-26 RX ORDER — CARVEDILOL 6.25 MG/1
6.25 TABLET ORAL 2 TIMES DAILY
COMMUNITY
Start: 2020-06-01

## 2020-06-26 RX ORDER — SACUBITRIL AND VALSARTAN 49; 51 MG/1; MG/1
TABLET, FILM COATED ORAL
COMMUNITY

## 2020-06-26 NOTE — PATIENT INSTRUCTIONS
4 Steps for Eating Healthier  Changing the way you eat can improve your health. It can lower your cholesterol and blood pressure, and help you stay at a healthy weight. Your diet doesnt have to be bland and boring to be healthy. Just watch your calories and follow these steps:    1. Eat fewer unhealthy fats  · Choose more fish and lean meats instead of fatty cuts of meat.  · Skip butter and lard, and use less margarine.  · Pass on foods that have palm, coconut, or hydrogenated oils.  · Eat fewer high-fat dairy foods like cheese, ice cream, and whole milk.  · Get a heart-healthy cookbook and try some low-fat recipes.  2. Go light on salt  · Keep the saltshaker off the table.  · Limit high-salt ingredients, such as soy sauce, bouillon, and garlic salt.  · Instead of adding salt when cooking, season your food with herbs and flavorings. Try lemon, garlic, and onion.  · Limit convenience foods, such as boxed or canned foods and restaurant food.  · Read food labels and choose lower-sodium options.  3. Limit sugar  · Pause before you add sugars to pancakes, cereal, coffee, or tea. This includes white and brown table sugar, syrup, honey, and molasses. Cut your usual amount by half.  · Use non-sugar sweeteners. Stevia, aspartame, and sucralose can satisfy a sweet tooth without adding calories.  · Swap out sugar-filled soda and other drinks. Buy sugar-free or low-calorie beverages. Remember water is always the best choice.  · Read labels and choose foods with less added sugar. Keep in mind that dairy foods and foods with fruit will have some natural sugar.  · Cut the sugar in recipes by 1/3 to 1/2. Boost the flavor with extracts like almond, vanilla, or orange. Or add spices such as cinnamon or nutmeg.  4. Eat more fiber  · Eat fresh fruits and vegetables every day.  · Boost your diet with whole grains. Go for oats, whole-grain rice, and bran.  · Add beans and lentils to your meals.  · Drink more water to match your fiber  increase. This is to help prevent constipation.  Date Last Reviewed: 5/11/2015  © 1856-3369 [employer]. [address] [place] [address], [place] [address]. All rights reserved. This information is not intended as a substitute for professional medical care. Always follow your healthcare professional's instructions.

## 2020-06-26 NOTE — PROGRESS NOTES
SUBJECTIVE:      Patient ID: Daylin Lynch is a 59 y.o. female.    Chief Complaint: Hypertension    Mrs Lynch is here today to f/u on HTN and hyperlipidemia. She had a syncopal episode in March, was hospitalized and had a complete work up. She was found to have cardiomyopathy. Now following with Dr Jaffe for cardiology and had labs drawn earlier this month.  She is feeling good today but a little stressed with her daughters wedding next month     Hypertension  This is a chronic problem. The current episode started more than 1 year ago. The problem is controlled. Pertinent negatives include no anxiety, blurred vision, chest pain, headaches, malaise/fatigue, neck pain, orthopnea, palpitations, peripheral edema, PND, shortness of breath or sweats. There are no associated agents to hypertension. Risk factors for coronary artery disease include dyslipidemia and obesity. Past treatments include angiotensin blockers. The current treatment provides moderate improvement. Compliance problems include exercise.  There is no history of angina, kidney disease or CAD/MI. There is no history of chronic renal disease, hyperparathyroidism, a hypertension causing med or a thyroid problem.   Hyperlipidemia  This is a chronic problem. The current episode started more than 1 year ago. Recent lipid tests were reviewed and are high. She has no history of chronic renal disease, diabetes, hypothyroidism, liver disease, obesity or nephrotic syndrome. There are no known factors aggravating her hyperlipidemia. Pertinent negatives include no chest pain, myalgias or shortness of breath. Current antihyperlipidemic treatment includes diet change and exercise. The current treatment provides moderate improvement of lipids. Risk factors for coronary artery disease include dyslipidemia, hypertension, obesity and post-menopausal.       Past Surgical History:   Procedure Laterality Date     SECTION, LOW TRANSVERSE      x 3    HYSTERECTOMY   2012    stents both legs       Family History   Problem Relation Age of Onset    Heart disease Mother     Heart failure Father       Social History     Socioeconomic History    Marital status:      Spouse name: Not on file    Number of children: Not on file    Years of education: Not on file    Highest education level: Not on file   Occupational History    Not on file   Social Needs    Financial resource strain: Not on file    Food insecurity     Worry: Not on file     Inability: Not on file    Transportation needs     Medical: Not on file     Non-medical: Not on file   Tobacco Use    Smoking status: Former Smoker     Quit date: 1985     Years since quittin.5    Smokeless tobacco: Never Used   Substance and Sexual Activity    Alcohol use: Yes    Drug use: No    Sexual activity: Yes   Lifestyle    Physical activity     Days per week: Not on file     Minutes per session: Not on file    Stress: Not at all   Relationships    Social connections     Talks on phone: Not on file     Gets together: Not on file     Attends Confucianist service: Not on file     Active member of club or organization: Not on file     Attends meetings of clubs or organizations: Not on file     Relationship status: Not on file   Other Topics Concern    Not on file   Social History Narrative    Live with      Current Outpatient Medications   Medication Sig Dispense Refill    aspirin 325 MG tablet Take 325 mg by mouth once daily.      carvediloL (COREG) 6.25 MG tablet Take 3.125 mg by mouth 2 (two) times daily.      melatonin 10 mg Tab Take 2 tablets by mouth nightly as needed.      sacubitriL-valsartan (ENTRESTO) 49-51 mg per tablet Entresto 49 mg-51 mg tablet   Take 1 tablet twice a day by oral route.       No current facility-administered medications for this visit.      Review of patient's allergies indicates:  No Known Allergies   Past Medical History:   Diagnosis Date    Allergy     Bilateral leg  "edema     Hyperlipidemia     Hypertension     Leg pain, bilateral     left leg more severe    Varicose vein      Past Surgical History:   Procedure Laterality Date     SECTION, LOW TRANSVERSE      x 3    HYSTERECTOMY  2012    stents both legs         Review of Systems   Constitutional: Negative for appetite change, chills, diaphoresis, malaise/fatigue and unexpected weight change.   HENT: Negative for ear discharge, hearing loss, trouble swallowing and voice change.    Eyes: Negative for blurred vision, photophobia and pain.   Respiratory: Negative for chest tightness, shortness of breath and stridor.    Cardiovascular: Negative for chest pain, palpitations, orthopnea and PND.   Gastrointestinal: Negative for abdominal pain, blood in stool and vomiting.   Endocrine: Negative for cold intolerance and heat intolerance.   Genitourinary: Negative for difficulty urinating and flank pain.   Musculoskeletal: Negative for joint swelling, myalgias, neck pain and neck stiffness.   Skin: Negative for pallor.   Neurological: Negative for dizziness, speech difficulty, weakness and headaches.   Hematological: Does not bruise/bleed easily.   Psychiatric/Behavioral: Negative for confusion, dysphoric mood and sleep disturbance.      OBJECTIVE:      Vitals:    20 0958   BP: 110/70   Pulse: 92   Temp: 98.6 °F (37 °C)   TempSrc: Skin   SpO2: 97%   Weight: 81.2 kg (179 lb)   Height: 5' 7" (1.702 m)     Physical Exam  Vitals signs and nursing note reviewed.   Constitutional:       Appearance: She is well-developed.   HENT:      Head: Atraumatic.   Eyes:      Conjunctiva/sclera: Conjunctivae normal.   Neck:      Musculoskeletal: Neck supple.      Vascular: No carotid bruit.   Cardiovascular:      Rate and Rhythm: Normal rate and regular rhythm.      Pulses: Normal pulses.      Heart sounds: Normal heart sounds.   Pulmonary:      Effort: Pulmonary effort is normal.      Breath sounds: Normal breath sounds.   Abdominal: "      General: Bowel sounds are normal. There is no distension.      Palpations: Abdomen is soft.      Tenderness: There is no abdominal tenderness.   Musculoskeletal: Normal range of motion.   Skin:     General: Skin is warm and dry.   Neurological:      Mental Status: She is alert and oriented to person, place, and time.   Psychiatric:         Mood and Affect: Mood normal.         Behavior: Behavior normal.         Thought Content: Thought content normal.        Assessment:       1. Dyslipidemia    2. Essential (primary) hypertension    3. Cardiomyopathy, unspecified type    4. Screen for colon cancer    5. Screening mammogram, encounter for        Plan:       Dyslipidemia  -     Lipid Panel; Future; Expected date: 06/26/2020    Essential (primary) hypertension  -     Urinalysis; Future; Expected date: 06/26/2020  -     Lipid Panel; Future; Expected date: 06/26/2020  Stable on current meds    Cardiomyopathy, unspecified type  F/u with Dr Jaffe as scheduled    Screen for colon cancer  -     Ambulatory referral/consult to Gastroenterology; Future; Expected date: 07/03/2020    Screening mammogram, encounter for  -     Mammo Digital Screening Bilat without CA; Future; Expected date: 06/26/2020        Follow up in about 6 months (around 12/26/2020) for htn .      6/26/2020 Jonathon Muñoz, ROBERTA, FNP

## 2020-07-07 ENCOUNTER — HOSPITAL ENCOUNTER (OUTPATIENT)
Dept: RADIOLOGY | Facility: HOSPITAL | Age: 60
Discharge: HOME | End: 2020-07-07
Attending: NURSE PRACTITIONER
Payer: COMMERCIAL

## 2020-07-07 ENCOUNTER — TELEPHONE (OUTPATIENT)
Dept: FAMILY MEDICINE | Facility: CLINIC | Age: 60
End: 2020-07-07

## 2020-07-07 ENCOUNTER — LAB VISIT (OUTPATIENT)
Dept: LAB | Facility: HOSPITAL | Age: 60
End: 2020-07-07
Attending: NURSE PRACTITIONER
Payer: COMMERCIAL

## 2020-07-07 DIAGNOSIS — E78.5 DYSLIPIDEMIA: ICD-10-CM

## 2020-07-07 DIAGNOSIS — I10 ESSENTIAL (PRIMARY) HYPERTENSION: ICD-10-CM

## 2020-07-07 DIAGNOSIS — Z12.31 SCREENING MAMMOGRAM, ENCOUNTER FOR: ICD-10-CM

## 2020-07-07 LAB
BACTERIA #/AREA URNS HPF: NEGATIVE /HPF
BILIRUB UR QL STRIP: NEGATIVE
CHOLEST SERPL-MCNC: 235 MG/DL (ref 120–199)
CHOLEST/HDLC SERPL: 3.4 {RATIO} (ref 2–5)
CLARITY UR: CLEAR
COLOR UR: YELLOW
GLUCOSE UR QL STRIP: NEGATIVE
HDLC SERPL-MCNC: 69 MG/DL (ref 40–75)
HDLC SERPL: 29.4 % (ref 20–50)
HGB UR QL STRIP: ABNORMAL
HYALINE CASTS #/AREA URNS LPF: 12 /LPF
KETONES UR QL STRIP: NEGATIVE
LDLC SERPL CALC-MCNC: 156 MG/DL (ref 63–159)
LEUKOCYTE ESTERASE UR QL STRIP: NEGATIVE
MICROSCOPIC COMMENT: ABNORMAL
NITRITE UR QL STRIP: NEGATIVE
NONHDLC SERPL-MCNC: 166 MG/DL
PH UR STRIP: 6 [PH] (ref 5–8)
PROT UR QL STRIP: NEGATIVE
RBC #/AREA URNS HPF: 9 /HPF (ref 0–4)
SP GR UR STRIP: 1.02 (ref 1–1.03)
SQUAMOUS #/AREA URNS HPF: 2 /HPF
TRIGL SERPL-MCNC: 50 MG/DL (ref 30–150)
URN SPEC COLLECT METH UR: ABNORMAL
UROBILINOGEN UR STRIP-ACNC: NEGATIVE EU/DL
WBC #/AREA URNS HPF: 3 /HPF (ref 0–5)

## 2020-07-07 PROCEDURE — 36415 COLL VENOUS BLD VENIPUNCTURE: CPT

## 2020-07-07 PROCEDURE — 81001 URINALYSIS AUTO W/SCOPE: CPT

## 2020-07-07 PROCEDURE — 77067 SCR MAMMO BI INCL CAD: CPT | Mod: TC,PO

## 2020-07-07 PROCEDURE — 80061 LIPID PANEL: CPT

## 2020-07-07 NOTE — TELEPHONE ENCOUNTER
----- Message from Jonathon Muñoz NP sent at 7/7/2020 12:50 PM CDT -----  LDL is elevated, low chol diet and we need to fax results to her cardiologist Dr Jaffe. She should f/u with him concerning her cholesterol if not she needs to let us know. Hematuria followed by Dr Carlson, please fax to his office

## 2020-11-18 ENCOUNTER — OFFICE VISIT (OUTPATIENT)
Dept: URGENT CARE | Facility: CLINIC | Age: 60
End: 2020-11-18
Payer: COMMERCIAL

## 2020-11-18 VITALS
DIASTOLIC BLOOD PRESSURE: 77 MMHG | SYSTOLIC BLOOD PRESSURE: 114 MMHG | OXYGEN SATURATION: 96 % | HEART RATE: 88 BPM | TEMPERATURE: 98 F | RESPIRATION RATE: 16 BRPM

## 2020-11-18 DIAGNOSIS — R05.9 COUGH: Primary | ICD-10-CM

## 2020-11-18 LAB
CTP QC/QA: YES
SARS-COV-2 RDRP RESP QL NAA+PROBE: NEGATIVE

## 2020-11-18 PROCEDURE — 99213 PR OFFICE/OUTPT VISIT, EST, LEVL III, 20-29 MIN: ICD-10-PCS | Mod: S$GLB,,, | Performed by: EMERGENCY MEDICINE

## 2020-11-18 PROCEDURE — U0002 COVID-19 LAB TEST NON-CDC: HCPCS | Mod: QW,S$GLB,, | Performed by: EMERGENCY MEDICINE

## 2020-11-18 PROCEDURE — 99213 OFFICE O/P EST LOW 20 MIN: CPT | Mod: S$GLB,,, | Performed by: EMERGENCY MEDICINE

## 2020-11-18 PROCEDURE — U0002: ICD-10-PCS | Mod: QW,S$GLB,, | Performed by: EMERGENCY MEDICINE

## 2020-11-18 NOTE — PROGRESS NOTES
Subjective:       Patient ID: Daylin Lynch is a 60 y.o. female.    Chief Complaint: No chief complaint on file.    Patient is here for COVID testing. She was exposed and complains of a cough.     Review of Systems   Constitutional: Negative for chills, fatigue and fever.   HENT: Negative for congestion.    Respiratory: Positive for cough. Negative for shortness of breath.    Cardiovascular: Negative for chest pain.   Gastrointestinal: Negative for abdominal pain, diarrhea, nausea and vomiting.   Genitourinary: Negative for dysuria.   Musculoskeletal: Negative for myalgias and neck stiffness.   Neurological: Negative for dizziness, light-headedness and headaches.       Objective:      Vitals:    11/18/20 1537   BP: 114/77   Pulse: 88   Resp: 16   Temp: 98 °F (36.7 °C)     Physical Exam  Constitutional:       General: She is not in acute distress.     Appearance: Normal appearance. She is normal weight. She is not ill-appearing.   HENT:      Head: Normocephalic and atraumatic.      Right Ear: External ear normal.      Left Ear: External ear normal.   Eyes:      General: No scleral icterus.  Cardiovascular:      Rate and Rhythm: Normal rate and regular rhythm.      Heart sounds: Normal heart sounds.   Pulmonary:      Effort: Pulmonary effort is normal.      Breath sounds: Normal breath sounds.   Skin:     Capillary Refill: Capillary refill takes less than 2 seconds.   Neurological:      Mental Status: She is alert and oriented to person, place, and time.   Psychiatric:         Mood and Affect: Mood normal.         Behavior: Behavior normal.         Thought Content: Thought content normal.         Judgment: Judgment normal.         Assessment:       1. Cough        Plan:       Cough  -     POCT COVID-19 Rapid Screening    Rapid COVID test is negative. Patient notified in clinic.    No follow-ups on file.

## 2020-12-23 ENCOUNTER — OFFICE VISIT (OUTPATIENT)
Dept: FAMILY MEDICINE | Facility: CLINIC | Age: 60
End: 2020-12-23
Payer: COMMERCIAL

## 2020-12-23 VITALS
TEMPERATURE: 98 F | WEIGHT: 189 LBS | DIASTOLIC BLOOD PRESSURE: 70 MMHG | BODY MASS INDEX: 29.66 KG/M2 | HEART RATE: 81 BPM | SYSTOLIC BLOOD PRESSURE: 100 MMHG | HEIGHT: 67 IN | OXYGEN SATURATION: 96 %

## 2020-12-23 DIAGNOSIS — Z23 NEED FOR INFLUENZA VACCINATION: ICD-10-CM

## 2020-12-23 DIAGNOSIS — E78.5 DYSLIPIDEMIA: ICD-10-CM

## 2020-12-23 DIAGNOSIS — I10 ESSENTIAL (PRIMARY) HYPERTENSION: ICD-10-CM

## 2020-12-23 DIAGNOSIS — R53.83 FATIGUE, UNSPECIFIED TYPE: Primary | ICD-10-CM

## 2020-12-23 PROCEDURE — 90686 FLU VACCINE (QUAD) GREATER THAN OR EQUAL TO 3YO PRESERVATIVE FREE IM: ICD-10-PCS | Mod: S$GLB,,, | Performed by: NURSE PRACTITIONER

## 2020-12-23 PROCEDURE — 99214 OFFICE O/P EST MOD 30 MIN: CPT | Mod: 25,S$GLB,, | Performed by: NURSE PRACTITIONER

## 2020-12-23 PROCEDURE — 90686 IIV4 VACC NO PRSV 0.5 ML IM: CPT | Mod: S$GLB,,, | Performed by: NURSE PRACTITIONER

## 2020-12-23 PROCEDURE — 3008F BODY MASS INDEX DOCD: CPT | Mod: S$GLB,,, | Performed by: NURSE PRACTITIONER

## 2020-12-23 PROCEDURE — 3074F PR MOST RECENT SYSTOLIC BLOOD PRESSURE < 130 MM HG: ICD-10-PCS | Mod: S$GLB,,, | Performed by: NURSE PRACTITIONER

## 2020-12-23 PROCEDURE — 99214 PR OFFICE/OUTPT VISIT, EST, LEVL IV, 30-39 MIN: ICD-10-PCS | Mod: 25,S$GLB,, | Performed by: NURSE PRACTITIONER

## 2020-12-23 PROCEDURE — 90471 IMMUNIZATION ADMIN: CPT | Mod: S$GLB,,, | Performed by: NURSE PRACTITIONER

## 2020-12-23 PROCEDURE — 3078F PR MOST RECENT DIASTOLIC BLOOD PRESSURE < 80 MM HG: ICD-10-PCS | Mod: S$GLB,,, | Performed by: NURSE PRACTITIONER

## 2020-12-23 PROCEDURE — 3078F DIAST BP <80 MM HG: CPT | Mod: S$GLB,,, | Performed by: NURSE PRACTITIONER

## 2020-12-23 PROCEDURE — 3008F PR BODY MASS INDEX (BMI) DOCUMENTED: ICD-10-PCS | Mod: S$GLB,,, | Performed by: NURSE PRACTITIONER

## 2020-12-23 PROCEDURE — 3074F SYST BP LT 130 MM HG: CPT | Mod: S$GLB,,, | Performed by: NURSE PRACTITIONER

## 2020-12-23 PROCEDURE — 90471 FLU VACCINE (QUAD) GREATER THAN OR EQUAL TO 3YO PRESERVATIVE FREE IM: ICD-10-PCS | Mod: S$GLB,,, | Performed by: NURSE PRACTITIONER

## 2020-12-23 RX ORDER — ESCITALOPRAM OXALATE 10 MG/1
10 TABLET ORAL DAILY
COMMUNITY
Start: 2020-12-10

## 2020-12-23 NOTE — PROGRESS NOTES
SUBJECTIVE:      Patient ID: Daylin Lynch is a 60 y.o. female.    Chief Complaint: Hypertension    Mrs Lynch is here today to f/u on HTN and hyperlipidemia. She is following with Dr Jaffe for cardiomyopathy. Had a defibrillator placed in Sept. Seeing Dr Jaffe next month. She has been feeling fatigued for the past few months. She does not have the energy she used to. She has filed for social security.     Hypertension  This is a chronic problem. The current episode started more than 1 year ago. The problem is controlled. Pertinent negatives include no anxiety, blurred vision, chest pain, headaches, malaise/fatigue, neck pain, orthopnea, palpitations, peripheral edema, PND, shortness of breath or sweats. There are no associated agents to hypertension. Risk factors for coronary artery disease include dyslipidemia and obesity. Past treatments include angiotensin blockers. The current treatment provides moderate improvement. Compliance problems include exercise.  There is no history of angina, kidney disease or CAD/MI. There is no history of chronic renal disease, hyperparathyroidism, a hypertension causing med or a thyroid problem.   Hyperlipidemia  This is a chronic problem. The current episode started more than 1 year ago. The problem is controlled. Recent lipid tests were reviewed and are high. She has no history of chronic renal disease, diabetes, hypothyroidism, liver disease, obesity or nephrotic syndrome. There are no known factors aggravating her hyperlipidemia. Pertinent negatives include no chest pain, myalgias or shortness of breath. Current antihyperlipidemic treatment includes diet change and exercise. The current treatment provides moderate improvement of lipids. Risk factors for coronary artery disease include dyslipidemia, hypertension, obesity and post-menopausal.       Past Surgical History:   Procedure Laterality Date     SECTION, LOW TRANSVERSE      x 3    HYSTERECTOMY      pace  maker  2020    stents both legs       Family History   Problem Relation Age of Onset    Heart disease Mother     Heart failure Father       Social History     Socioeconomic History    Marital status:      Spouse name: Not on file    Number of children: Not on file    Years of education: Not on file    Highest education level: Not on file   Occupational History    Not on file   Social Needs    Financial resource strain: Not on file    Food insecurity     Worry: Not on file     Inability: Not on file    Transportation needs     Medical: Not on file     Non-medical: Not on file   Tobacco Use    Smoking status: Former Smoker     Quit date: 1985     Years since quittin.0    Smokeless tobacco: Never Used   Substance and Sexual Activity    Alcohol use: Yes    Drug use: No    Sexual activity: Yes   Lifestyle    Physical activity     Days per week: Not on file     Minutes per session: Not on file    Stress: Not at all   Relationships    Social connections     Talks on phone: Not on file     Gets together: Not on file     Attends [employer] service: Not on file     Active member of club or organization: Not on file     Attends meetings of clubs or organizations: Not on file     Relationship status: Not on file   Other Topics Concern    Not on file   Social History Narrative    Live with      Current Outpatient Medications   Medication Sig Dispense Refill    aspirin 325 MG tablet Take 325 mg by mouth once daily.      carvediloL (COREG) 6.25 MG tablet Take 6.25 mg by mouth 2 (two) times daily.       escitalopram oxalate (LEXAPRO) 10 MG tablet Take 10 mg by mouth once daily.      melatonin 10 mg Tab Take 2 tablets by mouth nightly as needed.      sacubitriL-valsartan (ENTRESTO) 49-51 mg per tablet Entresto 49 mg-51 mg tablet   Take 1 tablet twice a day by oral route.       No current facility-administered medications for this visit.      Review of patient's allergies  "indicates:  No Known Allergies   Past Medical History:   Diagnosis Date    Allergy     Bilateral leg edema     Hyperlipidemia     Hypertension     Leg pain, bilateral     left leg more severe    Varicose vein      Past Surgical History:   Procedure Laterality Date     SECTION, LOW TRANSVERSE      x 3    HYSTERECTOMY      pace maker  2020    stents both legs         Review of Systems   Constitutional: Negative for appetite change, chills, diaphoresis, malaise/fatigue and unexpected weight change.   HENT: Negative for ear discharge, hearing loss, trouble swallowing and voice change.    Eyes: Negative for blurred vision, photophobia and pain.   Respiratory: Negative for chest tightness, shortness of breath and stridor.    Cardiovascular: Negative for chest pain, palpitations, orthopnea and PND.   Gastrointestinal: Negative for abdominal pain, blood in stool and vomiting.   Endocrine: Negative for cold intolerance and heat intolerance.   Genitourinary: Negative for difficulty urinating and flank pain.   Musculoskeletal: Negative for joint swelling, myalgias, neck pain and neck stiffness.   Skin: Negative for pallor.   Neurological: Negative for dizziness, speech difficulty, weakness, light-headedness and headaches.   Hematological: Does not bruise/bleed easily.   Psychiatric/Behavioral: Negative for confusion and dysphoric mood. The patient is not nervous/anxious.       OBJECTIVE:      Vitals:    20 1008   BP: 100/70   Pulse: 81   Temp: 98.1 °F (36.7 °C)   SpO2: 96%   Weight: 85.7 kg (189 lb)   Height: 5' 7" (1.702 m)     Physical Exam  Vitals signs and nursing note reviewed.   Constitutional:       General: She is not in acute distress.     Appearance: She is well-developed.   HENT:      Head: Normocephalic and atraumatic.      Right Ear: Tympanic membrane normal.      Left Ear: Tympanic membrane normal.      Nose: Nose normal.      Mouth/Throat:      Pharynx: Uvula midline.   Eyes:    "   General: Lids are normal.      Conjunctiva/sclera: Conjunctivae normal.      Pupils: Pupils are equal, round, and reactive to light.      Right eye: Pupil is round and reactive.      Left eye: Pupil is round and reactive.   Neck:      Musculoskeletal: Normal range of motion and neck supple.      Thyroid: No thyromegaly.      Vascular: No JVD.      Trachea: Trachea normal.   Cardiovascular:      Rate and Rhythm: Normal rate and regular rhythm.      Pulses: Normal pulses.      Heart sounds: Normal heart sounds. No murmur.   Pulmonary:      Effort: Pulmonary effort is normal. No tachypnea or respiratory distress.      Breath sounds: Normal breath sounds.   Abdominal:      General: Bowel sounds are normal.      Palpations: Abdomen is soft.      Tenderness: There is no abdominal tenderness.   Musculoskeletal: Normal range of motion.   Lymphadenopathy:      Cervical: No cervical adenopathy.   Skin:     General: Skin is warm and dry.      Findings: No rash.   Neurological:      Mental Status: She is alert and oriented to person, place, and time.   Psychiatric:         Mood and Affect: Mood normal.         Speech: Speech normal.         Behavior: Behavior normal. Behavior is cooperative.         Thought Content: Thought content normal.        Assessment:       1. Fatigue, unspecified type    2. Dyslipidemia    3. Essential (primary) hypertension    4. Need for influenza vaccination        Plan:       Fatigue, unspecified type  -     Vitamin B12; Future; Expected date: 01/06/2021  -     Vitamin D; Future; Expected date: 01/06/2021  -     TSH; Future; Expected date: 02/03/2021  -     CBC Auto Differential; Future; Expected date: 01/06/2021    Dyslipidemia  -     Comprehensive Metabolic Panel; Future; Expected date: 01/06/2021  -     Lipid Panel; Future; Expected date: 01/06/2021    Essential (primary) hypertension  -     TSH; Future; Expected date: 02/03/2021  -     CBC Auto Differential; Future; Expected date:  01/06/2021  -     Comprehensive Metabolic Panel; Future; Expected date: 01/06/2021    Need for influenza vaccination  -     Influenza - Quadrivalent (PF)        Follow up in about 6 months (around 6/23/2021) for htn.      12/23/2020 ROBERTA Meraz, FNP

## 2020-12-23 NOTE — PATIENT INSTRUCTIONS
4 Steps for Eating Healthier  Changing the way you eat can improve your health. It can lower your cholesterol and blood pressure, and help you stay at a healthy weight. Your diet doesnt have to be bland and boring to be healthy. Just watch your calories and follow these steps:    1. Eat fewer unhealthy fats  · Choose more fish and lean meats instead of fatty cuts of meat.  · Skip butter and lard, and use less margarine.  · Pass on foods that have palm, coconut, or hydrogenated oils.  · Eat fewer high-fat dairy foods like cheese, ice cream, and whole milk.  · Get a heart-healthy cookbook and try some low-fat recipes.  2. Go light on salt  · Keep the saltshaker off the table.  · Limit high-salt ingredients, such as soy sauce, bouillon, and garlic salt.  · Instead of adding salt when cooking, season your food with herbs and flavorings. Try lemon, garlic, and onion.  · Limit convenience foods, such as boxed or canned foods and restaurant food.  · Read food labels and choose lower-sodium options.  3. Limit sugar  · Pause before you add sugars to pancakes, cereal, coffee, or tea. This includes white and brown table sugar, syrup, honey, and molasses. Cut your usual amount by half.  · Use non-sugar sweeteners. Stevia, aspartame, and sucralose can satisfy a sweet tooth without adding calories.  · Swap out sugar-filled soda and other drinks. Buy sugar-free or low-calorie beverages. Remember water is always the best choice.  · Read labels and choose foods with less added sugar. Keep in mind that dairy foods and foods with fruit will have some natural sugar.  · Cut the sugar in recipes by 1/3 to 1/2. Boost the flavor with extracts like almond, vanilla, or orange. Or add spices such as cinnamon or nutmeg.  4. Eat more fiber  · Eat fresh fruits and vegetables every day.  · Boost your diet with whole grains. Go for oats, whole-grain rice, and bran.  · Add beans and lentils to your meals.  · Drink more water to match your fiber  increase. This is to help prevent constipation.  Date Last Reviewed: 5/11/2015  © 9106-5661 [employer]. [address] [place] [address], [place] [address]. All rights reserved. This information is not intended as a substitute for professional medical care. Always follow your healthcare professional's instructions.

## 2020-12-28 ENCOUNTER — LAB VISIT (OUTPATIENT)
Dept: LAB | Facility: HOSPITAL | Age: 60
End: 2020-12-28
Attending: NURSE PRACTITIONER
Payer: COMMERCIAL

## 2020-12-28 DIAGNOSIS — R53.83 FATIGUE, UNSPECIFIED TYPE: ICD-10-CM

## 2020-12-28 DIAGNOSIS — I10 ESSENTIAL (PRIMARY) HYPERTENSION: ICD-10-CM

## 2020-12-28 DIAGNOSIS — E78.5 DYSLIPIDEMIA: ICD-10-CM

## 2020-12-28 LAB
25(OH)D3+25(OH)D2 SERPL-MCNC: 13 NG/ML (ref 30–96)
ALBUMIN SERPL BCP-MCNC: 3.5 G/DL (ref 3.5–5.2)
ALP SERPL-CCNC: 49 U/L (ref 55–135)
ALT SERPL W/O P-5'-P-CCNC: 12 U/L (ref 10–44)
ANION GAP SERPL CALC-SCNC: 9 MMOL/L (ref 8–16)
AST SERPL-CCNC: 21 U/L (ref 10–40)
BASOPHILS # BLD AUTO: 0.06 K/UL (ref 0–0.2)
BASOPHILS NFR BLD: 1 % (ref 0–1.9)
BILIRUB SERPL-MCNC: 0.7 MG/DL (ref 0.1–1)
BUN SERPL-MCNC: 14 MG/DL (ref 6–20)
CALCIUM SERPL-MCNC: 8.8 MG/DL (ref 8.7–10.5)
CHLORIDE SERPL-SCNC: 109 MMOL/L (ref 95–110)
CHOLEST SERPL-MCNC: 245 MG/DL (ref 120–199)
CHOLEST/HDLC SERPL: 3.3 {RATIO} (ref 2–5)
CO2 SERPL-SCNC: 26 MMOL/L (ref 23–29)
CREAT SERPL-MCNC: 0.5 MG/DL (ref 0.5–1.4)
DIFFERENTIAL METHOD: ABNORMAL
EOSINOPHIL # BLD AUTO: 0.1 K/UL (ref 0–0.5)
EOSINOPHIL NFR BLD: 2.2 % (ref 0–8)
ERYTHROCYTE [DISTWIDTH] IN BLOOD BY AUTOMATED COUNT: 13.3 % (ref 11.5–14.5)
EST. GFR  (AFRICAN AMERICAN): >60 ML/MIN/1.73 M^2
EST. GFR  (NON AFRICAN AMERICAN): >60 ML/MIN/1.73 M^2
GLUCOSE SERPL-MCNC: 101 MG/DL (ref 70–110)
HCT VFR BLD AUTO: 37.8 % (ref 37–48.5)
HDLC SERPL-MCNC: 75 MG/DL (ref 40–75)
HDLC SERPL: 30.6 % (ref 20–50)
HGB BLD-MCNC: 11.9 G/DL (ref 12–16)
IMM GRANULOCYTES # BLD AUTO: 0.02 K/UL (ref 0–0.04)
IMM GRANULOCYTES NFR BLD AUTO: 0.3 % (ref 0–0.5)
LDLC SERPL CALC-MCNC: 159.8 MG/DL (ref 63–159)
LYMPHOCYTES # BLD AUTO: 2.6 K/UL (ref 1–4.8)
LYMPHOCYTES NFR BLD: 42 % (ref 18–48)
MCH RBC QN AUTO: 30.2 PG (ref 27–31)
MCHC RBC AUTO-ENTMCNC: 31.5 G/DL (ref 32–36)
MCV RBC AUTO: 96 FL (ref 82–98)
MONOCYTES # BLD AUTO: 0.4 K/UL (ref 0.3–1)
MONOCYTES NFR BLD: 6.4 % (ref 4–15)
NEUTROPHILS # BLD AUTO: 3 K/UL (ref 1.8–7.7)
NEUTROPHILS NFR BLD: 48.1 % (ref 38–73)
NONHDLC SERPL-MCNC: 170 MG/DL
NRBC BLD-RTO: 0 /100 WBC
PLATELET # BLD AUTO: 360 K/UL (ref 150–350)
PMV BLD AUTO: 9.9 FL (ref 9.2–12.9)
POTASSIUM SERPL-SCNC: 4 MMOL/L (ref 3.5–5.1)
PROT SERPL-MCNC: 6.4 G/DL (ref 6–8.4)
RBC # BLD AUTO: 3.94 M/UL (ref 4–5.4)
SODIUM SERPL-SCNC: 144 MMOL/L (ref 136–145)
TRIGL SERPL-MCNC: 51 MG/DL (ref 30–150)
TSH SERPL DL<=0.005 MIU/L-ACNC: 0.67 UIU/ML (ref 0.34–5.6)
VIT B12 SERPL-MCNC: 169 PG/ML (ref 210–950)
WBC # BLD AUTO: 6.26 K/UL (ref 3.9–12.7)

## 2020-12-28 PROCEDURE — 82306 VITAMIN D 25 HYDROXY: CPT

## 2020-12-28 PROCEDURE — 82607 VITAMIN B-12: CPT

## 2020-12-28 PROCEDURE — 36415 COLL VENOUS BLD VENIPUNCTURE: CPT

## 2020-12-28 PROCEDURE — 84443 ASSAY THYROID STIM HORMONE: CPT

## 2020-12-28 PROCEDURE — 80053 COMPREHEN METABOLIC PANEL: CPT

## 2020-12-28 PROCEDURE — 80061 LIPID PANEL: CPT

## 2020-12-28 PROCEDURE — 85025 COMPLETE CBC W/AUTO DIFF WBC: CPT

## 2020-12-29 ENCOUNTER — TELEPHONE (OUTPATIENT)
Dept: FAMILY MEDICINE | Facility: CLINIC | Age: 60
End: 2020-12-29

## 2020-12-29 NOTE — TELEPHONE ENCOUNTER
----- Message from Jonathon Muñoz NP sent at 12/29/2020  7:41 AM CST -----  Patient needs office visit to discuss results. Can be virtual visit

## 2020-12-30 ENCOUNTER — TELEPHONE (OUTPATIENT)
Dept: FAMILY MEDICINE | Facility: CLINIC | Age: 60
End: 2020-12-30

## 2020-12-30 ENCOUNTER — OFFICE VISIT (OUTPATIENT)
Dept: FAMILY MEDICINE | Facility: CLINIC | Age: 60
End: 2020-12-30
Payer: COMMERCIAL

## 2020-12-30 DIAGNOSIS — E78.00 ELEVATED LDL CHOLESTEROL LEVEL: ICD-10-CM

## 2020-12-30 DIAGNOSIS — R53.83 FATIGUE, UNSPECIFIED TYPE: ICD-10-CM

## 2020-12-30 DIAGNOSIS — E55.9 VITAMIN D DEFICIENCY: ICD-10-CM

## 2020-12-30 DIAGNOSIS — E53.8 VITAMIN B 12 DEFICIENCY: Primary | ICD-10-CM

## 2020-12-30 PROCEDURE — 99214 OFFICE O/P EST MOD 30 MIN: CPT | Mod: 95,,, | Performed by: NURSE PRACTITIONER

## 2020-12-30 PROCEDURE — 99214 PR OFFICE/OUTPT VISIT, EST, LEVL IV, 30-39 MIN: ICD-10-PCS | Mod: 95,,, | Performed by: NURSE PRACTITIONER

## 2020-12-30 RX ORDER — ASPIRIN 325 MG
50000 TABLET, DELAYED RELEASE (ENTERIC COATED) ORAL
Qty: 12 CAPSULE | Refills: 0 | Status: SHIPPED | OUTPATIENT
Start: 2020-12-30 | End: 2021-04-07

## 2020-12-30 RX ORDER — CYANOCOBALAMIN 1000 UG/ML
1000 INJECTION, SOLUTION INTRAMUSCULAR; SUBCUTANEOUS DAILY
Qty: 7 ML | Refills: 0 | Status: SHIPPED | OUTPATIENT
Start: 2020-12-30 | End: 2021-01-07 | Stop reason: SDUPTHER

## 2020-12-30 NOTE — PATIENT INSTRUCTIONS
Vitamin B-12  Does this test have other names?  VB12, serum cobalamin  What is this test?  This test measures the level of vitamin B-12 in your blood. You need this vitamin to make red blood cells and for your nervous system to function as it should.  You get vitamin B-12 from eating foods that come from animals, such as meat, eggs, and dairy products. Vitamin B-12 is also added to some cereals. You can also take this vitamin as a supplement in pill form.  Why do I need this test?  You may need this test if your healthcare provider suspects that your vitamin B-12 level is low. A low level of vitamin B-12 is called vitamin B-12 deficiency. You are more likely to have vitamin deficiency if you are an older adult, have a digestive disorder called malabsorption, have had gastrointestinal surgery, or eat a vegan diet. Women who are pregnant or breastfeeding and eat a vegetarian-type diet are at high risk for this deficiency.  You may also need this test if you've been diagnosed with or your healthcare provider suspects a disease called pernicious anemia. Pernicious anemia affects the lining of your stomach and makes it hard to absorb vitamin B-12.  These are common symptoms of vitamin B-12 deficiency:  · Fatigue  · Weakness  · Weight loss  · Tingling or numbness of the hands and feet  · Constipation  · Poor balance  · Confusion  · Depression  · Memory loss  · Soreness of the mouth or tongue  What other tests might I have along with this test?  Your healthcare provider may order other tests to help find out the cause of your vitamin B-12 deficiency. These tests may include:  · Complete blood count  · Peripheral blood smear, which involves looking at your blood cells under a microscope  · Folic acid level. This vitamin is also important for red blood cell production.  What do my test results mean?  Many things may affect your lab test results. These include the method each lab uses to do the test. Even if your test  results are different from the normal value, you may not have a problem. To learn what the results mean for you, talk with your healthcare provider.  Vitamin B-12 is measured in picograms per milliliter (pg/mL). Normal results are:  · 200 to 835 pg/mL for adults  · 160 to 1,300 pg/mL for newborns  If your results are low, you may have:  · Pernicious anemia  · Malabsorption from inflammatory bowel disease or other causes  · Poor absorption because of surgery  · Tapeworm infection  · Too little intake of animal protein  · Folic acid deficiency  · Iron deficiency  If your levels are high, you may have:  · Liver or kidney disease  · Diabetes  · Obesity  · White blood cell cancer  High levels may also mean that you have chronic obstructive pulmonary disease , congestive heart failure, or a thickening of the blood called polycythemia vera.  How is this test done?  The test requires a blood sample, which is drawn through a needle from a vein in your arm.  Does this test pose any risks?  Taking a blood sample with a needle carries risks that include bleeding, infection, bruising, or feeling dizzy. When the needle pricks your arm, you may feel a slight stinging sensation or pain. Afterward, the site may be slightly sore.  What might affect my test results?  Certain conditions may affect your test results. These include:  · Pregnancy  · Recent blood transfusions  · Smoking  Medicines in general may also affect your results. Specific medicines include supplements of vitamin A or C and birth control pills.  How do I get ready for this test?  You must fast before this test. You may be able to drink water, but you should not eat or drink anything else after midnight on the night before the test. If you are not having the test in the morning, ask your healthcare provider how long you need to fast before the test. You should not have a vitamin B-12 injection before the test. Also be sure your provider knows about all medicines,  herbs, vitamins, and supplements you are taking. This includes medicines that don't need a prescription and any illicit drugs you may use.  © 4895-1638 [employer]. [address] [place] [address], [place] [address]. All rights reserved. This information is not intended as a substitute for professional medical care. Always follow your healthcare professional's instructions.

## 2020-12-30 NOTE — PROGRESS NOTES
Subjective:        The chief complaint leading to consultation is:f/u fatigue  The patient location is:  Home [facility]  Visit type: Virtual visit with synchronous audio/video or audio only  This was a video visit in lieu of in-person visit due to the coronavirus emergency. Patient acknowledged and consented to the video visit encounter.     Patient is present via virtual visit to f/u on fatigue and review labs. B12 and D are low on labs, she is currently not supplementing. LDL is also elevated on labs, will discuss management.     Fatigue  This is a new problem. The current episode started more than 1 month ago. The problem occurs daily. The problem has been gradually worsening. Associated symptoms include fatigue. Pertinent negatives include no abdominal pain, arthralgias, chest pain, chills, diaphoresis, headaches, joint swelling, neck pain, vomiting or weakness. Nothing aggravates the symptoms. She has tried nothing for the symptoms.       Past Surgical History:   Procedure Laterality Date     SECTION, LOW TRANSVERSE      x 3    HYSTERECTOMY      pace maker  2020    stents both legs       Past Medical History:   Diagnosis Date    Allergy     Bilateral leg edema     Hyperlipidemia     Hypertension     Leg pain, bilateral     left leg more severe    Varicose vein      Family History   Problem Relation Age of Onset    Heart disease Mother     Heart failure Father         Social History:   Marital Status:   Alcohol History:  reports current alcohol use.  Tobacco History:  reports that she quit smoking about 35 years ago. She has never used smokeless tobacco.  Drug History:  reports no history of drug use.    Review of patient's allergies indicates:  No Known Allergies    Current Outpatient Medications   Medication Sig Dispense Refill    aspirin 325 MG tablet Take 325 mg by mouth once daily.      carvediloL (COREG) 6.25 MG tablet Take 6.25 mg by mouth 2 (two) times daily.        cholecalciferol, vitamin D3, 1,250 mcg (50,000 unit) capsule Take 1 capsule (50,000 Units total) by mouth every 7 days. 12 capsule 0    cyanocobalamin 1,000 mcg/mL injection Inject 1 mL (1,000 mcg total) into the skin once daily. for 7 days 7 mL 0    escitalopram oxalate (LEXAPRO) 10 MG tablet Take 10 mg by mouth once daily.      melatonin 10 mg Tab Take 2 tablets by mouth nightly as needed.      sacubitriL-valsartan (ENTRESTO) 49-51 mg per tablet Entresto 49 mg-51 mg tablet   Take 1 tablet twice a day by oral route.       No current facility-administered medications for this visit.        Review of Systems   Constitutional: Positive for fatigue. Negative for activity change, appetite change, chills, diaphoresis and unexpected weight change.   HENT: Negative for ear discharge, hearing loss, rhinorrhea, trouble swallowing and voice change.    Eyes: Negative for photophobia, pain, discharge and visual disturbance.   Respiratory: Negative for chest tightness, shortness of breath, wheezing and stridor.    Cardiovascular: Negative for chest pain and palpitations.   Gastrointestinal: Negative for abdominal pain, blood in stool, constipation, diarrhea and vomiting.   Endocrine: Negative for cold intolerance, heat intolerance, polydipsia and polyuria.   Genitourinary: Negative for difficulty urinating, dysuria, flank pain, hematuria and menstrual problem.   Musculoskeletal: Negative for arthralgias, joint swelling, neck pain and neck stiffness.   Skin: Negative for pallor.   Neurological: Negative for speech difficulty, weakness and headaches.   Hematological: Does not bruise/bleed easily.   Psychiatric/Behavioral: Negative for confusion and dysphoric mood. The patient is not nervous/anxious.          Objective:        Physical Exam:   Physical Exam  Pulmonary:      Effort: Pulmonary effort is normal. No respiratory distress.   Psychiatric:         Attention and Perception: Attention normal.         Mood and  Affect: Mood normal.         Speech: Speech normal.         Behavior: Behavior normal. Behavior is cooperative.         Thought Content: Thought content normal.       Lab Visit on 12/28/2020   Component Date Value Ref Range Status    Vitamin B-12 12/28/2020 169* 210 - 950 pg/mL Final    Vit D, 25-Hydroxy 12/28/2020 13* 30 - 96 ng/mL Final    Comment: Vitamin D deficiency.........<10 ng/mL                              Vitamin D insufficiency......10-29 ng/mL       Vitamin D sufficiency........> or equal to 30 ng/mL  Vitamin D toxicity............>100 ng/mL      TSH 12/28/2020 0.670  0.340 - 5.600 uIU/mL Final    WBC 12/28/2020 6.26  3.90 - 12.70 K/uL Final    RBC 12/28/2020 3.94* 4.00 - 5.40 M/uL Final    Hemoglobin 12/28/2020 11.9* 12.0 - 16.0 g/dL Final    Hematocrit 12/28/2020 37.8  37.0 - 48.5 % Final    MCV 12/28/2020 96  82 - 98 fL Final    MCH 12/28/2020 30.2  27.0 - 31.0 pg Final    MCHC 12/28/2020 31.5* 32.0 - 36.0 g/dL Final    RDW 12/28/2020 13.3  11.5 - 14.5 % Final    Platelets 12/28/2020 360* 150 - 350 K/uL Final    MPV 12/28/2020 9.9  9.2 - 12.9 fL Final    Immature Granulocytes 12/28/2020 0.3  0.0 - 0.5 % Final    Gran # (ANC) 12/28/2020 3.0  1.8 - 7.7 K/uL Final    Immature Grans (Abs) 12/28/2020 0.02  0.00 - 0.04 K/uL Final    Comment: Mild elevation in immature granulocytes is non specific and   can be seen in a variety of conditions including stress response,   acute inflammation, trauma and pregnancy. Correlation with other   laboratory and clinical findings is essential.      Lymph # 12/28/2020 2.6  1.0 - 4.8 K/uL Final    Mono # 12/28/2020 0.4  0.3 - 1.0 K/uL Final    Eos # 12/28/2020 0.1  0.0 - 0.5 K/uL Final    Baso # 12/28/2020 0.06  0.00 - 0.20 K/uL Final    nRBC 12/28/2020 0  0 /100 WBC Final    Gran % 12/28/2020 48.1  38.0 - 73.0 % Final    Lymph % 12/28/2020 42.0  18.0 - 48.0 % Final    Mono % 12/28/2020 6.4  4.0 - 15.0 % Final    Eosinophil % 12/28/2020 2.2  0.0  - 8.0 % Final    Basophil % 12/28/2020 1.0  0.0 - 1.9 % Final    Differential Method 12/28/2020 Automated   Final    Sodium 12/28/2020 144  136 - 145 mmol/L Final    Potassium 12/28/2020 4.0  3.5 - 5.1 mmol/L Final    Chloride 12/28/2020 109  95 - 110 mmol/L Final    CO2 12/28/2020 26  23 - 29 mmol/L Final    Glucose 12/28/2020 101  70 - 110 mg/dL Final    BUN 12/28/2020 14  6 - 20 mg/dL Final    Creatinine 12/28/2020 0.5  0.5 - 1.4 mg/dL Final    Calcium 12/28/2020 8.8  8.7 - 10.5 mg/dL Final    Total Protein 12/28/2020 6.4  6.0 - 8.4 g/dL Final    Albumin 12/28/2020 3.5  3.5 - 5.2 g/dL Final    Total Bilirubin 12/28/2020 0.7  0.1 - 1.0 mg/dL Final    Comment: For infants and newborns, interpretation of results should be based  on gestational age, weight and in agreement with clinical  observations.  Premature Infant recommended reference ranges:  Up to 24 hours.............<8.0 mg/dL  Up to 48 hours............<12.0 mg/dL  3-5 days..................<15.0 mg/dL  6-29 days.................<15.0 mg/dL      Alkaline Phosphatase 12/28/2020 49* 55 - 135 U/L Final    AST 12/28/2020 21  10 - 40 U/L Final    ALT 12/28/2020 12  10 - 44 U/L Final    Anion Gap 12/28/2020 9  8 - 16 mmol/L Final    eGFR if African American 12/28/2020 >60.0  >60 mL/min/1.73 m^2 Final    eGFR if non African American 12/28/2020 >60.0  >60 mL/min/1.73 m^2 Final    Comment: Calculation used to obtain the estimated glomerular filtration  rate (eGFR) is the CKD-EPI equation.       Cholesterol 12/28/2020 245* 120 - 199 mg/dL Final    Comment: The National Cholesterol Education Program (NCEP) has set the  following guidelines (reference ranges) for Cholesterol:  Optimal.....................<200 mg/dL  Borderline High.............200-239 mg/dL  High........................> or = 240 mg/dL      Triglycerides 12/28/2020 51  30 - 150 mg/dL Final    Comment: The National Cholesterol Education Program (NCEP) has set the  following  guidelines (reference values) for triglycerides:  Normal......................<150 mg/dL  Borderline High.............150-199 mg/dL  High........................200-499 mg/dL      HDL 12/28/2020 75  40 - 75 mg/dL Final    Comment: The National Cholesterol Education Program (NCEP) has set the  following guidelines (reference values) for HDL Cholesterol:  Low...............<40 mg/dL  Optimal...........>60 mg/dL      LDL Cholesterol 12/28/2020 159.8* 63.0 - 159.0 mg/dL Final    Comment: The National Cholesterol Education Program (NCEP) has set the  following guidelines (reference values) for LDL Cholesterol:  Optimal.......................<130 mg/dL  Borderline High...............130-159 mg/dL  High..........................160-189 mg/dL  Very High.....................>190 mg/dL      HDL/Cholesterol Ratio 12/28/2020 30.6  20.0 - 50.0 % Final    Total Cholesterol/HDL Ratio 12/28/2020 3.3  2.0 - 5.0 Final    Non-HDL Cholesterol 12/28/2020 170  mg/dL Final    Comment: Risk category and Non-HDL cholesterol goals:  Coronary heart disease (CHD)or equivalent (10-year risk of CHD >20%):  Non-HDL cholesterol goal     <130 mg/dL  Two or more CHD risk factors and 10-year risk of CHD <= 20%:  Non-HDL cholesterol goal     <160 mg/dL  0 to 1 CHD risk factor:  Non-HDL cholesterol goal     <190 mg/dL     ]       Assessment:       1. Vitamin B 12 deficiency    2. Vitamin D deficiency    3. Fatigue, unspecified type    4. Elevated LDL cholesterol level      Plan:   Vitamin B 12 deficiency  - start  cyanocobalamin 1,000 mcg/mL injection; Inject 1 mL (1,000 mcg total) into the skin once daily. for 7 days  Dispense: 7 mL; Refill: 0  B12 1000mcg daily x7d then weekly x6 weeks then monthly    Vitamin D deficiency  -   start  cholecalciferol, vitamin D3, 1,250 mcg (50,000 unit) capsule; Take 1 capsule (50,000 Units total) by mouth every 7 days.  Dispense: 12 capsule; Refill: 0  D3 50,000 units weekly x12 weeks then 2000 units  daily    Fatigue, unspecified type  Vitamin def likely contributing, will replace vitamins and add otc multivitamin    Elevated LDL cholesterol level  LDL is elevated; recommend high fiber, low fat diet, daily metamucil supplement and daily aerobic exercise. We discussed medication management. She will discuss with Dr Jaffe next month at her appt    Follow up in about 3 months (around 3/30/2021) for fatigue.    Total time spent with patient:20 minutes    Each patient to whom he or she provides medical services by telemedicine is:  (1) informed of the relationship between the physician and patient and the respective role of any other health care provider with respect to management of the patient; and (2) notified that he or she may decline to receive medical services by telemedicine and may withdraw from such care at any time.

## 2020-12-30 NOTE — TELEPHONE ENCOUNTER
----- Message from Jonathon Muñoz NP sent at 12/30/2020  7:51 AM CST -----  Please schedule 3mo f/u. She will need to call for labs prior to ov

## 2021-01-06 ENCOUNTER — TELEPHONE (OUTPATIENT)
Dept: FAMILY MEDICINE | Facility: CLINIC | Age: 61
End: 2021-01-06

## 2021-01-06 DIAGNOSIS — E53.8 VITAMIN B 12 DEFICIENCY: ICD-10-CM

## 2021-01-07 RX ORDER — CYANOCOBALAMIN 1000 UG/ML
1000 INJECTION, SOLUTION INTRAMUSCULAR; SUBCUTANEOUS
Qty: 6 ML | Refills: 0 | Status: SHIPPED | OUTPATIENT
Start: 2021-01-07 | End: 2021-02-02

## 2021-01-13 ENCOUNTER — TELEPHONE (OUTPATIENT)
Dept: UROLOGY | Facility: CLINIC | Age: 61
End: 2021-01-13

## 2021-01-19 ENCOUNTER — TELEPHONE (OUTPATIENT)
Dept: FAMILY MEDICINE | Facility: CLINIC | Age: 61
End: 2021-01-19

## 2021-02-10 ENCOUNTER — TELEPHONE (OUTPATIENT)
Dept: FAMILY MEDICINE | Facility: CLINIC | Age: 61
End: 2021-02-10

## 2021-02-10 DIAGNOSIS — E53.8 VITAMIN B 12 DEFICIENCY: ICD-10-CM

## 2021-02-11 RX ORDER — CYANOCOBALAMIN 1000 UG/ML
1000 INJECTION, SOLUTION INTRAMUSCULAR; SUBCUTANEOUS
Qty: 3 ML | Refills: 1 | Status: SHIPPED | OUTPATIENT
Start: 2021-02-11 | End: 2021-03-22 | Stop reason: SDUPTHER

## 2021-03-08 ENCOUNTER — PATIENT MESSAGE (OUTPATIENT)
Dept: FAMILY MEDICINE | Facility: CLINIC | Age: 61
End: 2021-03-08

## 2021-03-09 ENCOUNTER — IMMUNIZATION (OUTPATIENT)
Dept: PRIMARY CARE CLINIC | Facility: CLINIC | Age: 61
End: 2021-03-09
Payer: COMMERCIAL

## 2021-03-09 DIAGNOSIS — Z23 NEED FOR VACCINATION: Primary | ICD-10-CM

## 2021-03-09 PROCEDURE — 0001A COVID-19, MRNA, LNP-S, PF, 30 MCG/0.3 ML DOSE VACCINE: CPT | Mod: CV19,S$GLB,, | Performed by: FAMILY MEDICINE

## 2021-03-09 PROCEDURE — 0001A COVID-19, MRNA, LNP-S, PF, 30 MCG/0.3 ML DOSE VACCINE: ICD-10-PCS | Mod: CV19,S$GLB,, | Performed by: FAMILY MEDICINE

## 2021-03-09 PROCEDURE — 91300 COVID-19, MRNA, LNP-S, PF, 30 MCG/0.3 ML DOSE VACCINE: CPT | Mod: S$GLB,,, | Performed by: FAMILY MEDICINE

## 2021-03-09 PROCEDURE — 91300 COVID-19, MRNA, LNP-S, PF, 30 MCG/0.3 ML DOSE VACCINE: ICD-10-PCS | Mod: S$GLB,,, | Performed by: FAMILY MEDICINE

## 2021-03-15 ENCOUNTER — TELEPHONE (OUTPATIENT)
Dept: FAMILY MEDICINE | Facility: CLINIC | Age: 61
End: 2021-03-15

## 2021-03-15 DIAGNOSIS — E53.8 VITAMIN B 12 DEFICIENCY: ICD-10-CM

## 2021-03-15 DIAGNOSIS — I10 ESSENTIAL (PRIMARY) HYPERTENSION: ICD-10-CM

## 2021-03-15 DIAGNOSIS — E78.2 MIXED HYPERLIPIDEMIA: ICD-10-CM

## 2021-03-15 DIAGNOSIS — E55.9 VITAMIN D DEFICIENCY: Primary | ICD-10-CM

## 2021-03-19 ENCOUNTER — LAB VISIT (OUTPATIENT)
Dept: LAB | Facility: HOSPITAL | Age: 61
End: 2021-03-19
Attending: NURSE PRACTITIONER
Payer: COMMERCIAL

## 2021-03-19 DIAGNOSIS — E78.2 MIXED HYPERLIPIDEMIA: ICD-10-CM

## 2021-03-19 DIAGNOSIS — E53.8 VITAMIN B 12 DEFICIENCY: ICD-10-CM

## 2021-03-19 DIAGNOSIS — I10 ESSENTIAL (PRIMARY) HYPERTENSION: ICD-10-CM

## 2021-03-19 DIAGNOSIS — E55.9 VITAMIN D DEFICIENCY: ICD-10-CM

## 2021-03-19 LAB
25(OH)D3+25(OH)D2 SERPL-MCNC: 39 NG/ML (ref 30–96)
ALBUMIN SERPL BCP-MCNC: 3.9 G/DL (ref 3.5–5.2)
ALP SERPL-CCNC: 48 U/L (ref 55–135)
ALT SERPL W/O P-5'-P-CCNC: 16 U/L (ref 10–44)
ANION GAP SERPL CALC-SCNC: 11 MMOL/L (ref 8–16)
AST SERPL-CCNC: 28 U/L (ref 10–40)
BASOPHILS # BLD AUTO: 0.05 K/UL (ref 0–0.2)
BASOPHILS NFR BLD: 0.8 % (ref 0–1.9)
BILIRUB SERPL-MCNC: 0.7 MG/DL (ref 0.1–1)
BUN SERPL-MCNC: 15 MG/DL (ref 6–20)
CALCIUM SERPL-MCNC: 9.3 MG/DL (ref 8.7–10.5)
CHLORIDE SERPL-SCNC: 102 MMOL/L (ref 95–110)
CHOLEST SERPL-MCNC: 274 MG/DL (ref 120–199)
CHOLEST/HDLC SERPL: 3.6 {RATIO} (ref 2–5)
CO2 SERPL-SCNC: 29 MMOL/L (ref 23–29)
CREAT SERPL-MCNC: 0.6 MG/DL (ref 0.5–1.4)
DIFFERENTIAL METHOD: ABNORMAL
EOSINOPHIL # BLD AUTO: 0.2 K/UL (ref 0–0.5)
EOSINOPHIL NFR BLD: 2.7 % (ref 0–8)
ERYTHROCYTE [DISTWIDTH] IN BLOOD BY AUTOMATED COUNT: 13.5 % (ref 11.5–14.5)
EST. GFR  (AFRICAN AMERICAN): >60 ML/MIN/1.73 M^2
EST. GFR  (NON AFRICAN AMERICAN): >60 ML/MIN/1.73 M^2
GLUCOSE SERPL-MCNC: 96 MG/DL (ref 70–110)
HCT VFR BLD AUTO: 40.5 % (ref 37–48.5)
HDLC SERPL-MCNC: 76 MG/DL (ref 40–75)
HDLC SERPL: 27.7 % (ref 20–50)
HGB BLD-MCNC: 12.8 G/DL (ref 12–16)
IMM GRANULOCYTES # BLD AUTO: 0.02 K/UL (ref 0–0.04)
IMM GRANULOCYTES NFR BLD AUTO: 0.3 % (ref 0–0.5)
LDLC SERPL CALC-MCNC: 187 MG/DL (ref 63–159)
LYMPHOCYTES # BLD AUTO: 2.5 K/UL (ref 1–4.8)
LYMPHOCYTES NFR BLD: 43.1 % (ref 18–48)
MCH RBC QN AUTO: 30.1 PG (ref 27–31)
MCHC RBC AUTO-ENTMCNC: 31.6 G/DL (ref 32–36)
MCV RBC AUTO: 95 FL (ref 82–98)
MONOCYTES # BLD AUTO: 0.4 K/UL (ref 0.3–1)
MONOCYTES NFR BLD: 6.6 % (ref 4–15)
NEUTROPHILS # BLD AUTO: 2.7 K/UL (ref 1.8–7.7)
NEUTROPHILS NFR BLD: 46.5 % (ref 38–73)
NONHDLC SERPL-MCNC: 198 MG/DL
NRBC BLD-RTO: 0 /100 WBC
PLATELET # BLD AUTO: 392 K/UL (ref 150–350)
PMV BLD AUTO: 10 FL (ref 9.2–12.9)
POTASSIUM SERPL-SCNC: 4.3 MMOL/L (ref 3.5–5.1)
PROT SERPL-MCNC: 7.4 G/DL (ref 6–8.4)
RBC # BLD AUTO: 4.25 M/UL (ref 4–5.4)
SODIUM SERPL-SCNC: 142 MMOL/L (ref 136–145)
TRIGL SERPL-MCNC: 55 MG/DL (ref 30–150)
VIT B12 SERPL-MCNC: 566 PG/ML (ref 210–950)
WBC # BLD AUTO: 5.89 K/UL (ref 3.9–12.7)

## 2021-03-19 PROCEDURE — 80053 COMPREHEN METABOLIC PANEL: CPT | Performed by: NURSE PRACTITIONER

## 2021-03-19 PROCEDURE — 85025 COMPLETE CBC W/AUTO DIFF WBC: CPT | Performed by: NURSE PRACTITIONER

## 2021-03-19 PROCEDURE — 82607 VITAMIN B-12: CPT | Performed by: NURSE PRACTITIONER

## 2021-03-19 PROCEDURE — 80061 LIPID PANEL: CPT | Performed by: NURSE PRACTITIONER

## 2021-03-19 PROCEDURE — 82306 VITAMIN D 25 HYDROXY: CPT | Performed by: NURSE PRACTITIONER

## 2021-03-19 PROCEDURE — 36415 COLL VENOUS BLD VENIPUNCTURE: CPT | Performed by: NURSE PRACTITIONER

## 2021-03-22 DIAGNOSIS — E53.8 VITAMIN B 12 DEFICIENCY: ICD-10-CM

## 2021-03-22 DIAGNOSIS — E55.9 VITAMIN D DEFICIENCY: ICD-10-CM

## 2021-03-22 RX ORDER — ASPIRIN 325 MG
50000 TABLET, DELAYED RELEASE (ENTERIC COATED) ORAL
Qty: 12 CAPSULE | Refills: 0 | Status: CANCELLED | OUTPATIENT
Start: 2021-03-22

## 2021-03-22 RX ORDER — CYANOCOBALAMIN 1000 UG/ML
1000 INJECTION, SOLUTION INTRAMUSCULAR; SUBCUTANEOUS
Qty: 3 ML | Refills: 1 | Status: SHIPPED | OUTPATIENT
Start: 2021-03-22 | End: 2021-04-07 | Stop reason: SDUPTHER

## 2021-03-30 ENCOUNTER — IMMUNIZATION (OUTPATIENT)
Dept: PRIMARY CARE CLINIC | Facility: CLINIC | Age: 61
End: 2021-03-30
Payer: COMMERCIAL

## 2021-03-30 DIAGNOSIS — Z23 NEED FOR VACCINATION: Primary | ICD-10-CM

## 2021-03-30 PROCEDURE — 91300 COVID-19, MRNA, LNP-S, PF, 30 MCG/0.3 ML DOSE VACCINE: CPT | Mod: S$GLB,,, | Performed by: FAMILY MEDICINE

## 2021-03-30 PROCEDURE — 91300 COVID-19, MRNA, LNP-S, PF, 30 MCG/0.3 ML DOSE VACCINE: ICD-10-PCS | Mod: S$GLB,,, | Performed by: FAMILY MEDICINE

## 2021-03-30 PROCEDURE — 0002A COVID-19, MRNA, LNP-S, PF, 30 MCG/0.3 ML DOSE VACCINE: ICD-10-PCS | Mod: CV19,S$GLB,, | Performed by: FAMILY MEDICINE

## 2021-03-30 PROCEDURE — 0002A COVID-19, MRNA, LNP-S, PF, 30 MCG/0.3 ML DOSE VACCINE: CPT | Mod: CV19,S$GLB,, | Performed by: FAMILY MEDICINE

## 2021-04-07 ENCOUNTER — OFFICE VISIT (OUTPATIENT)
Dept: FAMILY MEDICINE | Facility: CLINIC | Age: 61
End: 2021-04-07
Payer: COMMERCIAL

## 2021-04-07 VITALS
OXYGEN SATURATION: 98 % | DIASTOLIC BLOOD PRESSURE: 70 MMHG | HEIGHT: 67 IN | SYSTOLIC BLOOD PRESSURE: 112 MMHG | TEMPERATURE: 98 F | BODY MASS INDEX: 31.02 KG/M2 | WEIGHT: 197.63 LBS | HEART RATE: 84 BPM

## 2021-04-07 DIAGNOSIS — E55.9 VITAMIN D DEFICIENCY: Primary | ICD-10-CM

## 2021-04-07 DIAGNOSIS — M54.41 ACUTE RIGHT-SIDED LOW BACK PAIN WITH RIGHT-SIDED SCIATICA: ICD-10-CM

## 2021-04-07 DIAGNOSIS — I10 ESSENTIAL (PRIMARY) HYPERTENSION: ICD-10-CM

## 2021-04-07 DIAGNOSIS — E78.2 MIXED HYPERLIPIDEMIA: ICD-10-CM

## 2021-04-07 DIAGNOSIS — E53.8 VITAMIN B 12 DEFICIENCY: ICD-10-CM

## 2021-04-07 PROCEDURE — 3078F DIAST BP <80 MM HG: CPT | Mod: S$GLB,,, | Performed by: NURSE PRACTITIONER

## 2021-04-07 PROCEDURE — 3074F PR MOST RECENT SYSTOLIC BLOOD PRESSURE < 130 MM HG: ICD-10-PCS | Mod: S$GLB,,, | Performed by: NURSE PRACTITIONER

## 2021-04-07 PROCEDURE — 96372 THER/PROPH/DIAG INJ SC/IM: CPT | Mod: S$GLB,,, | Performed by: NURSE PRACTITIONER

## 2021-04-07 PROCEDURE — 3008F BODY MASS INDEX DOCD: CPT | Mod: S$GLB,,, | Performed by: NURSE PRACTITIONER

## 2021-04-07 PROCEDURE — 99214 PR OFFICE/OUTPT VISIT, EST, LEVL IV, 30-39 MIN: ICD-10-PCS | Mod: 25,S$GLB,, | Performed by: NURSE PRACTITIONER

## 2021-04-07 PROCEDURE — 99214 OFFICE O/P EST MOD 30 MIN: CPT | Mod: 25,S$GLB,, | Performed by: NURSE PRACTITIONER

## 2021-04-07 PROCEDURE — 1125F AMNT PAIN NOTED PAIN PRSNT: CPT | Mod: S$GLB,,, | Performed by: NURSE PRACTITIONER

## 2021-04-07 PROCEDURE — 1125F PR PAIN SEVERITY QUANTIFIED, PAIN PRESENT: ICD-10-PCS | Mod: S$GLB,,, | Performed by: NURSE PRACTITIONER

## 2021-04-07 PROCEDURE — 3078F PR MOST RECENT DIASTOLIC BLOOD PRESSURE < 80 MM HG: ICD-10-PCS | Mod: S$GLB,,, | Performed by: NURSE PRACTITIONER

## 2021-04-07 PROCEDURE — 3008F PR BODY MASS INDEX (BMI) DOCUMENTED: ICD-10-PCS | Mod: S$GLB,,, | Performed by: NURSE PRACTITIONER

## 2021-04-07 PROCEDURE — 96372 PR INJECTION,THERAP/PROPH/DIAG2ST, IM OR SUBCUT: ICD-10-PCS | Mod: S$GLB,,, | Performed by: NURSE PRACTITIONER

## 2021-04-07 PROCEDURE — 3074F SYST BP LT 130 MM HG: CPT | Mod: S$GLB,,, | Performed by: NURSE PRACTITIONER

## 2021-04-07 RX ORDER — ROSUVASTATIN CALCIUM 5 MG/1
5 TABLET, COATED ORAL DAILY
COMMUNITY

## 2021-04-07 RX ORDER — BACLOFEN 10 MG/1
10 TABLET ORAL 2 TIMES DAILY
Qty: 60 TABLET | Refills: 0 | Status: SHIPPED | OUTPATIENT
Start: 2021-04-07 | End: 2021-05-02

## 2021-04-07 RX ORDER — METHYLPREDNISOLONE ACETATE 80 MG/ML
80 INJECTION, SUSPENSION INTRA-ARTICULAR; INTRALESIONAL; INTRAMUSCULAR; SOFT TISSUE
Status: COMPLETED | OUTPATIENT
Start: 2021-04-07 | End: 2021-04-07

## 2021-04-07 RX ORDER — CYANOCOBALAMIN 1000 UG/ML
1000 INJECTION, SOLUTION INTRAMUSCULAR; SUBCUTANEOUS
Qty: 3 ML | Refills: 1 | Status: SHIPPED | OUTPATIENT
Start: 2021-04-07 | End: 2021-07-06

## 2021-04-07 RX ADMIN — METHYLPREDNISOLONE ACETATE 80 MG: 80 INJECTION, SUSPENSION INTRA-ARTICULAR; INTRALESIONAL; INTRAMUSCULAR; SOFT TISSUE at 02:04

## 2021-04-30 DIAGNOSIS — M54.16 RADICULOPATHY OF LUMBAR REGION: Primary | ICD-10-CM

## 2021-05-07 ENCOUNTER — HOSPITAL ENCOUNTER (OUTPATIENT)
Dept: RADIOLOGY | Facility: HOSPITAL | Age: 61
Discharge: HOME | End: 2021-05-07
Attending: NEUROLOGICAL SURGERY
Payer: COMMERCIAL

## 2021-05-07 DIAGNOSIS — M54.16 RADICULOPATHY OF LUMBAR REGION: ICD-10-CM

## 2021-05-07 PROCEDURE — 72148 MRI LUMBAR SPINE W/O DYE: CPT | Mod: TC

## 2021-06-01 ENCOUNTER — TELEPHONE (OUTPATIENT)
Dept: FAMILY MEDICINE | Facility: CLINIC | Age: 61
End: 2021-06-01

## 2021-06-30 ENCOUNTER — TELEPHONE (OUTPATIENT)
Dept: FAMILY MEDICINE | Facility: CLINIC | Age: 61
End: 2021-06-30

## 2021-06-30 DIAGNOSIS — E78.2 MIXED HYPERLIPIDEMIA: ICD-10-CM

## 2021-06-30 DIAGNOSIS — E53.8 VITAMIN B 12 DEFICIENCY: Primary | ICD-10-CM

## 2021-07-06 ENCOUNTER — TELEPHONE (OUTPATIENT)
Dept: FAMILY MEDICINE | Facility: CLINIC | Age: 61
End: 2021-07-06

## 2021-07-06 DIAGNOSIS — E53.8 VITAMIN B 12 DEFICIENCY: Primary | ICD-10-CM

## 2021-07-06 DIAGNOSIS — E78.2 MIXED HYPERLIPIDEMIA: ICD-10-CM

## 2021-07-22 ENCOUNTER — LAB VISIT (OUTPATIENT)
Dept: LAB | Facility: HOSPITAL | Age: 61
End: 2021-07-22
Attending: NURSE PRACTITIONER
Payer: COMMERCIAL

## 2021-07-22 DIAGNOSIS — E78.2 MIXED HYPERLIPIDEMIA: ICD-10-CM

## 2021-07-22 DIAGNOSIS — E53.8 VITAMIN B 12 DEFICIENCY: ICD-10-CM

## 2021-07-22 LAB
ALBUMIN SERPL BCP-MCNC: 3.7 G/DL (ref 3.5–5.2)
ALP SERPL-CCNC: 51 U/L (ref 55–135)
ALT SERPL W/O P-5'-P-CCNC: 14 U/L (ref 10–44)
ANION GAP SERPL CALC-SCNC: 11 MMOL/L (ref 8–16)
AST SERPL-CCNC: 20 U/L (ref 10–40)
BILIRUB SERPL-MCNC: 0.8 MG/DL (ref 0.1–1)
BUN SERPL-MCNC: 15 MG/DL (ref 6–20)
CALCIUM SERPL-MCNC: 9.1 MG/DL (ref 8.7–10.5)
CHLORIDE SERPL-SCNC: 105 MMOL/L (ref 95–110)
CHOLEST SERPL-MCNC: 190 MG/DL (ref 120–199)
CHOLEST/HDLC SERPL: 2.6 {RATIO} (ref 2–5)
CO2 SERPL-SCNC: 26 MMOL/L (ref 23–29)
CREAT SERPL-MCNC: 0.5 MG/DL (ref 0.5–1.4)
EST. GFR  (AFRICAN AMERICAN): >60 ML/MIN/1.73 M^2
EST. GFR  (NON AFRICAN AMERICAN): >60 ML/MIN/1.73 M^2
GLUCOSE SERPL-MCNC: 97 MG/DL (ref 70–110)
HDLC SERPL-MCNC: 73 MG/DL (ref 40–75)
HDLC SERPL: 38.4 % (ref 20–50)
LDLC SERPL CALC-MCNC: 104.8 MG/DL (ref 63–159)
NONHDLC SERPL-MCNC: 117 MG/DL
POTASSIUM SERPL-SCNC: 3.8 MMOL/L (ref 3.5–5.1)
PROT SERPL-MCNC: 7.3 G/DL (ref 6–8.4)
SODIUM SERPL-SCNC: 142 MMOL/L (ref 136–145)
TRIGL SERPL-MCNC: 61 MG/DL (ref 30–150)
VIT B12 SERPL-MCNC: 623 PG/ML (ref 210–950)

## 2021-07-22 PROCEDURE — 82607 VITAMIN B-12: CPT | Performed by: NURSE PRACTITIONER

## 2021-07-22 PROCEDURE — 80053 COMPREHEN METABOLIC PANEL: CPT | Performed by: NURSE PRACTITIONER

## 2021-07-22 PROCEDURE — 36415 COLL VENOUS BLD VENIPUNCTURE: CPT | Performed by: NURSE PRACTITIONER

## 2021-07-22 PROCEDURE — 80061 LIPID PANEL: CPT | Performed by: NURSE PRACTITIONER

## 2021-07-23 ENCOUNTER — PATIENT MESSAGE (OUTPATIENT)
Dept: FAMILY MEDICINE | Facility: CLINIC | Age: 61
End: 2021-07-23

## 2021-09-15 DIAGNOSIS — Z12.31 ENCOUNTER FOR SCREENING MAMMOGRAM FOR BREAST CANCER: Primary | ICD-10-CM

## 2021-09-17 ENCOUNTER — PATIENT MESSAGE (OUTPATIENT)
Dept: FAMILY MEDICINE | Facility: CLINIC | Age: 61
End: 2021-09-17

## 2021-09-17 ENCOUNTER — HOSPITAL ENCOUNTER (OUTPATIENT)
Dept: RADIOLOGY | Facility: HOSPITAL | Age: 61
Discharge: HOME | End: 2021-09-17
Attending: NURSE PRACTITIONER
Payer: COMMERCIAL

## 2021-09-17 DIAGNOSIS — Z12.31 ENCOUNTER FOR SCREENING MAMMOGRAM FOR BREAST CANCER: ICD-10-CM

## 2021-09-17 PROCEDURE — 77067 SCR MAMMO BI INCL CAD: CPT | Mod: TC,PO

## 2021-10-07 ENCOUNTER — OFFICE VISIT (OUTPATIENT)
Dept: FAMILY MEDICINE | Facility: CLINIC | Age: 61
End: 2021-10-07
Payer: COMMERCIAL

## 2021-10-07 VITALS
HEIGHT: 67 IN | TEMPERATURE: 98 F | BODY MASS INDEX: 30.29 KG/M2 | DIASTOLIC BLOOD PRESSURE: 70 MMHG | OXYGEN SATURATION: 95 % | WEIGHT: 193 LBS | HEART RATE: 98 BPM | SYSTOLIC BLOOD PRESSURE: 90 MMHG

## 2021-10-07 DIAGNOSIS — I10 ESSENTIAL (PRIMARY) HYPERTENSION: ICD-10-CM

## 2021-10-07 DIAGNOSIS — E53.8 VITAMIN B 12 DEFICIENCY: Primary | ICD-10-CM

## 2021-10-07 DIAGNOSIS — E55.9 VITAMIN D DEFICIENCY: ICD-10-CM

## 2021-10-07 PROCEDURE — 3008F PR BODY MASS INDEX (BMI) DOCUMENTED: ICD-10-PCS | Mod: S$GLB,,, | Performed by: NURSE PRACTITIONER

## 2021-10-07 PROCEDURE — 1160F PR REVIEW ALL MEDS BY PRESCRIBER/CLIN PHARMACIST DOCUMENTED: ICD-10-PCS | Mod: S$GLB,,, | Performed by: NURSE PRACTITIONER

## 2021-10-07 PROCEDURE — 4010F PR ACE/ARB THEARPY RXD/TAKEN: ICD-10-PCS | Mod: S$GLB,,, | Performed by: NURSE PRACTITIONER

## 2021-10-07 PROCEDURE — 99213 OFFICE O/P EST LOW 20 MIN: CPT | Mod: S$GLB,,, | Performed by: NURSE PRACTITIONER

## 2021-10-07 PROCEDURE — 3078F DIAST BP <80 MM HG: CPT | Mod: S$GLB,,, | Performed by: NURSE PRACTITIONER

## 2021-10-07 PROCEDURE — 3074F PR MOST RECENT SYSTOLIC BLOOD PRESSURE < 130 MM HG: ICD-10-PCS | Mod: S$GLB,,, | Performed by: NURSE PRACTITIONER

## 2021-10-07 PROCEDURE — 99213 PR OFFICE/OUTPT VISIT, EST, LEVL III, 20-29 MIN: ICD-10-PCS | Mod: S$GLB,,, | Performed by: NURSE PRACTITIONER

## 2021-10-07 PROCEDURE — 4010F ACE/ARB THERAPY RXD/TAKEN: CPT | Mod: S$GLB,,, | Performed by: NURSE PRACTITIONER

## 2021-10-07 PROCEDURE — 3074F SYST BP LT 130 MM HG: CPT | Mod: S$GLB,,, | Performed by: NURSE PRACTITIONER

## 2021-10-07 PROCEDURE — 3078F PR MOST RECENT DIASTOLIC BLOOD PRESSURE < 80 MM HG: ICD-10-PCS | Mod: S$GLB,,, | Performed by: NURSE PRACTITIONER

## 2021-10-07 PROCEDURE — 1160F RVW MEDS BY RX/DR IN RCRD: CPT | Mod: S$GLB,,, | Performed by: NURSE PRACTITIONER

## 2021-10-07 PROCEDURE — 3008F BODY MASS INDEX DOCD: CPT | Mod: S$GLB,,, | Performed by: NURSE PRACTITIONER

## 2021-10-07 RX ORDER — SPIRONOLACTONE 25 MG/1
25 TABLET ORAL DAILY
COMMUNITY
Start: 2021-09-19 | End: 2022-06-09 | Stop reason: DRUGHIGH

## 2021-10-07 RX ORDER — FUROSEMIDE 40 MG/1
1 TABLET ORAL
COMMUNITY
Start: 2021-09-07

## 2021-10-07 RX ORDER — EZETIMIBE 10 MG/1
1 TABLET ORAL DAILY
COMMUNITY
Start: 2021-09-09

## 2021-10-07 RX ORDER — ASPIRIN 325 MG
TABLET, DELAYED RELEASE (ENTERIC COATED) ORAL
COMMUNITY
End: 2022-10-10

## 2021-10-10 LAB
25(OH)D3+25(OH)D2 SERPL-MCNC: 53.3 NG/ML (ref 30–100)
BASOPHILS # BLD AUTO: 0 X10E3/UL (ref 0–0.2)
BASOPHILS NFR BLD AUTO: 1 %
EOSINOPHIL # BLD AUTO: 0.1 X10E3/UL (ref 0–0.4)
EOSINOPHIL NFR BLD AUTO: 2 %
ERYTHROCYTE [DISTWIDTH] IN BLOOD BY AUTOMATED COUNT: 12.5 % (ref 11.7–15.4)
HCT VFR BLD AUTO: 39.2 % (ref 34–46.6)
HGB BLD-MCNC: 12.8 G/DL (ref 11.1–15.9)
IMM GRANULOCYTES # BLD AUTO: 0 X10E3/UL (ref 0–0.1)
IMM GRANULOCYTES NFR BLD AUTO: 0 %
LYMPHOCYTES # BLD AUTO: 2.2 X10E3/UL (ref 0.7–3.1)
LYMPHOCYTES NFR BLD AUTO: 46 %
MCH RBC QN AUTO: 30.2 PG (ref 26.6–33)
MCHC RBC AUTO-ENTMCNC: 32.7 G/DL (ref 31.5–35.7)
MCV RBC AUTO: 93 FL (ref 79–97)
MONOCYTES # BLD AUTO: 0.3 X10E3/UL (ref 0.1–0.9)
MONOCYTES NFR BLD AUTO: 7 %
NEUTROPHILS # BLD AUTO: 2 X10E3/UL (ref 1.4–7)
NEUTROPHILS NFR BLD AUTO: 44 %
PLATELET # BLD AUTO: 354 X10E3/UL (ref 150–450)
RBC # BLD AUTO: 4.24 X10E6/UL (ref 3.77–5.28)
VIT B12 SERPL-MCNC: 951 PG/ML (ref 232–1245)
WBC # BLD AUTO: 4.7 X10E3/UL (ref 3.4–10.8)

## 2021-10-11 ENCOUNTER — TELEPHONE (OUTPATIENT)
Dept: FAMILY MEDICINE | Facility: CLINIC | Age: 61
End: 2021-10-11

## 2021-10-11 RX ORDER — UBIDECARENONE 75 MG
500 CAPSULE ORAL DAILY
COMMUNITY
End: 2022-04-07

## 2021-12-29 ENCOUNTER — IMMUNIZATION (OUTPATIENT)
Dept: PRIMARY CARE CLINIC | Facility: CLINIC | Age: 61
End: 2021-12-29
Payer: COMMERCIAL

## 2021-12-29 DIAGNOSIS — Z23 NEED FOR VACCINATION: Primary | ICD-10-CM

## 2021-12-29 PROCEDURE — 91300 COVID-19, MRNA, LNP-S, PF, 30 MCG/0.3 ML DOSE VACCINE: ICD-10-PCS | Mod: S$GLB,,, | Performed by: FAMILY MEDICINE

## 2021-12-29 PROCEDURE — 0004A COVID-19, MRNA, LNP-S, PF, 30 MCG/0.3 ML DOSE VACCINE: CPT | Mod: S$GLB,,, | Performed by: FAMILY MEDICINE

## 2021-12-29 PROCEDURE — 91300 COVID-19, MRNA, LNP-S, PF, 30 MCG/0.3 ML DOSE VACCINE: CPT | Mod: S$GLB,,, | Performed by: FAMILY MEDICINE

## 2021-12-29 PROCEDURE — 0004A COVID-19, MRNA, LNP-S, PF, 30 MCG/0.3 ML DOSE VACCINE: ICD-10-PCS | Mod: S$GLB,,, | Performed by: FAMILY MEDICINE

## 2022-03-08 ENCOUNTER — OFFICE VISIT (OUTPATIENT)
Dept: FAMILY MEDICINE | Facility: CLINIC | Age: 62
End: 2022-03-08
Payer: COMMERCIAL

## 2022-03-08 VITALS
SYSTOLIC BLOOD PRESSURE: 90 MMHG | HEIGHT: 67 IN | BODY MASS INDEX: 31.55 KG/M2 | HEART RATE: 84 BPM | TEMPERATURE: 97 F | WEIGHT: 201 LBS | OXYGEN SATURATION: 96 % | DIASTOLIC BLOOD PRESSURE: 72 MMHG

## 2022-03-08 DIAGNOSIS — R05.9 COUGH: Primary | ICD-10-CM

## 2022-03-08 LAB
CTP QC/QA: YES
SARS-COV-2 RDRP RESP QL NAA+PROBE: NEGATIVE

## 2022-03-08 PROCEDURE — 4010F PR ACE/ARB THEARPY RXD/TAKEN: ICD-10-PCS | Mod: S$GLB,,, | Performed by: INTERNAL MEDICINE

## 2022-03-08 PROCEDURE — 99213 PR OFFICE/OUTPT VISIT, EST, LEVL III, 20-29 MIN: ICD-10-PCS | Mod: S$GLB,,, | Performed by: INTERNAL MEDICINE

## 2022-03-08 PROCEDURE — U0002 COVID-19 LAB TEST NON-CDC: HCPCS | Mod: QW,S$GLB,, | Performed by: INTERNAL MEDICINE

## 2022-03-08 PROCEDURE — U0002: ICD-10-PCS | Mod: QW,S$GLB,, | Performed by: INTERNAL MEDICINE

## 2022-03-08 PROCEDURE — 3008F BODY MASS INDEX DOCD: CPT | Mod: S$GLB,,, | Performed by: INTERNAL MEDICINE

## 2022-03-08 PROCEDURE — 3008F PR BODY MASS INDEX (BMI) DOCUMENTED: ICD-10-PCS | Mod: S$GLB,,, | Performed by: INTERNAL MEDICINE

## 2022-03-08 PROCEDURE — 99213 OFFICE O/P EST LOW 20 MIN: CPT | Mod: S$GLB,,, | Performed by: INTERNAL MEDICINE

## 2022-03-08 PROCEDURE — 4010F ACE/ARB THERAPY RXD/TAKEN: CPT | Mod: S$GLB,,, | Performed by: INTERNAL MEDICINE

## 2022-03-08 RX ORDER — CETIRIZINE HYDROCHLORIDE 10 MG/1
10 TABLET ORAL DAILY
Refills: 0 | COMMUNITY
Start: 2022-03-08 | End: 2023-10-06

## 2022-03-08 NOTE — PROGRESS NOTES
Subjective:       Patient ID: Daylin Lynch is a 61 y.o. female.    Chief Complaint: Cough (No covid test done) and Nasal Congestion    Cough  This is a new problem. The current episode started in the past 7 days. Episode frequency: mostly at night. The cough is non-productive. Associated symptoms include nasal congestion, postnasal drip, rhinorrhea, shortness of breath and wheezing. Pertinent negatives include no chest pain, chills, ear pain, eye redness, fever, headaches, hemoptysis, myalgias, rash or sore throat. Exacerbated by: she was visiting her daughter in [place] and they were doing some renovations so there was a lot of sheetrock dust in the house. She has tried OTC cough suppressant for the symptoms. The treatment provided mild relief. Her past medical history is significant for bronchitis. There is no history of environmental allergies. seeing Pulmonology tomorrow for disability evaluation     Review of Systems   Constitutional: Negative for activity change, appetite change, chills, diaphoresis, fatigue, fever and unexpected weight change.   HENT: Positive for congestion, postnasal drip, rhinorrhea and voice change. Negative for ear discharge, ear pain, hearing loss, mouth sores, nosebleeds, sinus pressure, sinus pain, sneezing, sore throat, tinnitus and trouble swallowing.    Eyes: Negative for photophobia, pain, discharge, redness, itching and visual disturbance.   Respiratory: Positive for cough, chest tightness, shortness of breath and wheezing. Negative for apnea, hemoptysis and choking.    Cardiovascular: Positive for leg swelling. Negative for chest pain and palpitations.   Gastrointestinal: Negative for abdominal distention, abdominal pain, blood in stool, constipation, diarrhea, nausea and vomiting.   Endocrine: Negative for cold intolerance, heat intolerance, polydipsia and polyuria.   Genitourinary: Negative for decreased urine volume, difficulty urinating, dyspareunia, dysuria, enuresis,  frequency, genital sores, hematuria, menstrual problem, pelvic pain, urgency, vaginal bleeding, vaginal discharge and vaginal pain.   Musculoskeletal: Negative for arthralgias, back pain, gait problem, joint swelling, myalgias, neck pain and neck stiffness.   Skin: Negative for rash and wound.   Allergic/Immunologic: Negative for environmental allergies, food allergies and immunocompromised state.   Neurological: Positive for dizziness and light-headedness. Negative for tremors, seizures, syncope, facial asymmetry, speech difficulty, weakness, numbness and headaches.   Hematological: Negative for adenopathy. Does not bruise/bleed easily.   Psychiatric/Behavioral: Negative for confusion, decreased concentration, hallucinations, self-injury, sleep disturbance and suicidal ideas. The patient is not nervous/anxious.        Past Medical History:   Diagnosis Date    Allergy     Bilateral leg edema     Hyperlipidemia     Hypertension     Leg pain, bilateral     left leg more severe    Varicose vein       Past Surgical History:   Procedure Laterality Date     SECTION, LOW TRANSVERSE      x 3    HYSTERECTOMY      pace maker  2020    SPINE SURGERY  2021    stents both legs         Family History   Problem Relation Age of Onset    Heart disease Mother     Heart failure Father        Social History     Socioeconomic History    Marital status:    Tobacco Use    Smoking status: Former Smoker     Quit date: 1985     Years since quittin.2    Smokeless tobacco: Never Used   Substance and Sexual Activity    Alcohol use: Yes    Drug use: No    Sexual activity: Yes   Social History Narrative    Live with        Current Outpatient Medications   Medication Sig Dispense Refill    aspirin 325 MG tablet Take 325 mg by mouth once daily.      carvediloL (COREG) 6.25 MG tablet Take 6.25 mg by mouth 2 (two) times daily.       cholecalciferol, vitamin D3, 1,250 mcg (50,000 unit)  "capsule cholecalciferol (vitamin D3) 1,250 mcg (50,000 unit) capsule   Take 1 capsule every week by oral route for 84 days.      cyanocobalamin 500 MCG tablet Take 500 mcg by mouth once daily.      escitalopram oxalate (LEXAPRO) 10 MG tablet Take 10 mg by mouth once daily.      ezetimibe (ZETIA) 10 mg tablet 1 tablet once daily.      furosemide (LASIX) 40 MG tablet 1 tablet as needed.      melatonin 10 mg Tab Take 2 tablets by mouth nightly as needed.      rosuvastatin (CRESTOR) 5 MG tablet Take 5 mg by mouth once daily.      sacubitriL-valsartan (ENTRESTO) 49-51 mg per tablet Entresto 49 mg-51 mg tablet   Take 1 tablet twice a day by oral route.      spironolactone (ALDACTONE) 25 MG tablet Take 25 mg by mouth once daily.       No current facility-administered medications for this visit.       Review of patient's allergies indicates:  No Known Allergies  Objective:    HPI     Cough      Additional comments: No covid test done          Last edited by Rj Mendoza MA on 3/8/2022 10:57 AM. (History)      Pulse 84, temperature 96.7 °F (35.9 °C), temperature source Temporal, height 5' 7" (1.702 m), weight 91.2 kg (201 lb), SpO2 96 %. Body mass index is 31.48 kg/m².   Physical Exam        Assessment:       No diagnosis found.    Plan:       There are no diagnoses linked to this encounter.       "

## 2022-03-22 ENCOUNTER — TELEPHONE (OUTPATIENT)
Dept: FAMILY MEDICINE | Facility: CLINIC | Age: 62
End: 2022-03-22
Payer: COMMERCIAL

## 2022-03-22 DIAGNOSIS — E55.9 VITAMIN D DEFICIENCY: ICD-10-CM

## 2022-03-22 DIAGNOSIS — E53.8 VITAMIN B 12 DEFICIENCY: Primary | ICD-10-CM

## 2022-03-22 DIAGNOSIS — E78.2 MIXED HYPERLIPIDEMIA: ICD-10-CM

## 2022-03-22 DIAGNOSIS — I10 ESSENTIAL (PRIMARY) HYPERTENSION: ICD-10-CM

## 2022-03-22 NOTE — TELEPHONE ENCOUNTER
Pt called requesting lab orders. Would like a u/a as well. She said she noticed some blood stains in undergarment and does not have a cycle anymore.

## 2022-03-25 ENCOUNTER — TELEPHONE (OUTPATIENT)
Dept: FAMILY MEDICINE | Facility: CLINIC | Age: 62
End: 2022-03-25
Payer: COMMERCIAL

## 2022-03-25 ENCOUNTER — PATIENT MESSAGE (OUTPATIENT)
Dept: FAMILY MEDICINE | Facility: CLINIC | Age: 62
End: 2022-03-25
Payer: COMMERCIAL

## 2022-03-25 LAB
25(OH)D3+25(OH)D2 SERPL-MCNC: 65.3 NG/ML (ref 30–100)
ALBUMIN SERPL-MCNC: 4.4 G/DL (ref 3.8–4.9)
ALBUMIN/GLOB SERPL: 1.6 {RATIO} (ref 1.2–2.2)
ALP SERPL-CCNC: 62 IU/L (ref 44–121)
ALT SERPL-CCNC: 16 IU/L (ref 0–32)
APPEARANCE UR: CLEAR
AST SERPL-CCNC: 30 IU/L (ref 0–40)
BASOPHILS # BLD AUTO: 0 X10E3/UL (ref 0–0.2)
BASOPHILS NFR BLD AUTO: 1 %
BILIRUB SERPL-MCNC: 0.5 MG/DL (ref 0–1.2)
BILIRUB UR QL STRIP: NEGATIVE
BUN SERPL-MCNC: 24 MG/DL (ref 8–27)
BUN/CREAT SERPL: 28 (ref 12–28)
CALCIUM SERPL-MCNC: 9.6 MG/DL (ref 8.7–10.3)
CHLORIDE SERPL-SCNC: 104 MMOL/L (ref 96–106)
CHOLEST SERPL-MCNC: 164 MG/DL (ref 100–199)
CO2 SERPL-SCNC: 23 MMOL/L (ref 20–29)
COLOR UR: YELLOW
CREAT SERPL-MCNC: 0.86 MG/DL (ref 0.57–1)
EOSINOPHIL # BLD AUTO: 0.2 X10E3/UL (ref 0–0.4)
EOSINOPHIL NFR BLD AUTO: 5 %
ERYTHROCYTE [DISTWIDTH] IN BLOOD BY AUTOMATED COUNT: 12.7 % (ref 11.7–15.4)
EST. GFR  (NO RACE VARIABLE): 77 ML/MIN/1.73
GLOBULIN SER CALC-MCNC: 2.8 G/DL (ref 1.5–4.5)
GLUCOSE SERPL-MCNC: 95 MG/DL (ref 65–99)
GLUCOSE UR QL STRIP: NEGATIVE
HCT VFR BLD AUTO: 41.6 % (ref 34–46.6)
HDLC SERPL-MCNC: 54 MG/DL
HGB BLD-MCNC: 13.1 G/DL (ref 11.1–15.9)
HGB UR QL STRIP: NEGATIVE
IMM GRANULOCYTES # BLD AUTO: 0 X10E3/UL (ref 0–0.1)
IMM GRANULOCYTES NFR BLD AUTO: 0 %
KETONES UR QL STRIP: NEGATIVE
LDLC SERPL CALC-MCNC: 95 MG/DL (ref 0–99)
LEUKOCYTE ESTERASE UR QL STRIP: NEGATIVE
LYMPHOCYTES # BLD AUTO: 2.3 X10E3/UL (ref 0.7–3.1)
LYMPHOCYTES NFR BLD AUTO: 48 %
MCH RBC QN AUTO: 29.7 PG (ref 26.6–33)
MCHC RBC AUTO-ENTMCNC: 31.5 G/DL (ref 31.5–35.7)
MCV RBC AUTO: 94 FL (ref 79–97)
MICRO URNS: NORMAL
MONOCYTES # BLD AUTO: 0.4 X10E3/UL (ref 0.1–0.9)
MONOCYTES NFR BLD AUTO: 8 %
NEUTROPHILS # BLD AUTO: 1.8 X10E3/UL (ref 1.4–7)
NEUTROPHILS NFR BLD AUTO: 38 %
NITRITE UR QL STRIP: NEGATIVE
PH UR STRIP: 5.5 [PH] (ref 5–7.5)
PLATELET # BLD AUTO: 419 X10E3/UL (ref 150–450)
POTASSIUM SERPL-SCNC: 5.3 MMOL/L (ref 3.5–5.2)
PROT SERPL-MCNC: 7.2 G/DL (ref 6–8.5)
PROT UR QL STRIP: NORMAL
RBC # BLD AUTO: 4.41 X10E6/UL (ref 3.77–5.28)
SODIUM SERPL-SCNC: 141 MMOL/L (ref 134–144)
SP GR UR STRIP: 1.02 (ref 1–1.03)
TRIGL SERPL-MCNC: 78 MG/DL (ref 0–149)
TSH SERPL DL<=0.005 MIU/L-ACNC: 1.12 UIU/ML (ref 0.45–4.5)
UROBILINOGEN UR STRIP-MCNC: 0.2 MG/DL (ref 0.2–1)
VIT B12 SERPL-MCNC: >2000 PG/ML (ref 232–1245)
VLDLC SERPL CALC-MCNC: 15 MG/DL (ref 5–40)
WBC # BLD AUTO: 4.8 X10E3/UL (ref 3.4–10.8)

## 2022-03-25 NOTE — TELEPHONE ENCOUNTER
----- Message from Jonathon Muñoz NP sent at 3/25/2022  7:09 AM CDT -----  Potassium is a little high, decrease potassium in the diet. Will review all labs at ov

## 2022-03-25 NOTE — TELEPHONE ENCOUNTER
Called pt to review labs and recommendations. No answer. Left voicemail and portal message. Awaiting response.

## 2022-04-07 ENCOUNTER — OFFICE VISIT (OUTPATIENT)
Dept: FAMILY MEDICINE | Facility: CLINIC | Age: 62
End: 2022-04-07
Payer: COMMERCIAL

## 2022-04-07 VITALS
OXYGEN SATURATION: 97 % | HEART RATE: 80 BPM | BODY MASS INDEX: 31.39 KG/M2 | DIASTOLIC BLOOD PRESSURE: 70 MMHG | HEIGHT: 67 IN | TEMPERATURE: 98 F | SYSTOLIC BLOOD PRESSURE: 100 MMHG | WEIGHT: 200 LBS

## 2022-04-07 DIAGNOSIS — E55.9 VITAMIN D DEFICIENCY: ICD-10-CM

## 2022-04-07 DIAGNOSIS — E78.2 MIXED HYPERLIPIDEMIA: ICD-10-CM

## 2022-04-07 DIAGNOSIS — I10 ESSENTIAL (PRIMARY) HYPERTENSION: Primary | ICD-10-CM

## 2022-04-07 DIAGNOSIS — E53.8 VITAMIN B 12 DEFICIENCY: ICD-10-CM

## 2022-04-07 PROCEDURE — 99214 OFFICE O/P EST MOD 30 MIN: CPT | Mod: S$GLB,,, | Performed by: NURSE PRACTITIONER

## 2022-04-07 PROCEDURE — 3008F BODY MASS INDEX DOCD: CPT | Mod: S$GLB,,, | Performed by: NURSE PRACTITIONER

## 2022-04-07 PROCEDURE — 1160F PR REVIEW ALL MEDS BY PRESCRIBER/CLIN PHARMACIST DOCUMENTED: ICD-10-PCS | Mod: S$GLB,,, | Performed by: NURSE PRACTITIONER

## 2022-04-07 PROCEDURE — 4010F PR ACE/ARB THEARPY RXD/TAKEN: ICD-10-PCS | Mod: S$GLB,,, | Performed by: NURSE PRACTITIONER

## 2022-04-07 PROCEDURE — 4010F ACE/ARB THERAPY RXD/TAKEN: CPT | Mod: S$GLB,,, | Performed by: NURSE PRACTITIONER

## 2022-04-07 PROCEDURE — 3074F PR MOST RECENT SYSTOLIC BLOOD PRESSURE < 130 MM HG: ICD-10-PCS | Mod: S$GLB,,, | Performed by: NURSE PRACTITIONER

## 2022-04-07 PROCEDURE — 1160F RVW MEDS BY RX/DR IN RCRD: CPT | Mod: S$GLB,,, | Performed by: NURSE PRACTITIONER

## 2022-04-07 PROCEDURE — 99214 PR OFFICE/OUTPT VISIT, EST, LEVL IV, 30-39 MIN: ICD-10-PCS | Mod: S$GLB,,, | Performed by: NURSE PRACTITIONER

## 2022-04-07 PROCEDURE — 3078F PR MOST RECENT DIASTOLIC BLOOD PRESSURE < 80 MM HG: ICD-10-PCS | Mod: S$GLB,,, | Performed by: NURSE PRACTITIONER

## 2022-04-07 PROCEDURE — 3008F PR BODY MASS INDEX (BMI) DOCUMENTED: ICD-10-PCS | Mod: S$GLB,,, | Performed by: NURSE PRACTITIONER

## 2022-04-07 PROCEDURE — 3078F DIAST BP <80 MM HG: CPT | Mod: S$GLB,,, | Performed by: NURSE PRACTITIONER

## 2022-04-07 PROCEDURE — 3074F SYST BP LT 130 MM HG: CPT | Mod: S$GLB,,, | Performed by: NURSE PRACTITIONER

## 2022-04-07 RX ORDER — CODEINE PHOSPHATE AND GUAIFENESIN 10; 100 MG/5ML; MG/5ML
SOLUTION ORAL
COMMUNITY
End: 2022-06-09 | Stop reason: SDUPTHER

## 2022-04-07 RX ORDER — VERICIGUAT 2.5 MG/1
1 TABLET, FILM COATED ORAL DAILY
COMMUNITY
End: 2022-06-09

## 2022-04-07 NOTE — PROGRESS NOTES
SUBJECTIVE:      Patient ID: Daylin Lynch is a 61 y.o. female.    Chief Complaint: Hypertension    Mrs Lynch is here today to f/u on HTN and Vitamin def. She is following with Dr Jaffe for cardiomyopathy. She is taking her vitamin supplements as prescribed.She is filing for disability      Hypertension  This is a chronic problem. The current episode started more than 1 year ago. The problem is controlled. Pertinent negatives include no anxiety, blurred vision, chest pain, headaches, malaise/fatigue, neck pain, orthopnea, palpitations, peripheral edema, PND, shortness of breath or sweats. There are no associated agents to hypertension. Risk factors for coronary artery disease include dyslipidemia and obesity. Past treatments include angiotensin blockers. The current treatment provides moderate improvement. Compliance problems include exercise.  There is no history of angina, kidney disease or CAD/MI. There is no history of chronic renal disease, hyperparathyroidism, a hypertension causing med or a thyroid problem.   Hyperlipidemia  This is a chronic problem. The current episode started more than 1 year ago. The problem is controlled. Recent lipid tests were reviewed and are normal. She has no history of chronic renal disease, diabetes, hypothyroidism, liver disease, obesity or nephrotic syndrome. There are no known factors aggravating her hyperlipidemia. Pertinent negatives include no chest pain, myalgias or shortness of breath. Current antihyperlipidemic treatment includes diet change, exercise and ezetimibe. The current treatment provides moderate improvement of lipids. Risk factors for coronary artery disease include dyslipidemia, hypertension, obesity and post-menopausal.       Past Surgical History:   Procedure Laterality Date     SECTION, LOW TRANSVERSE      x 3    HYSTERECTOMY      pace maker  2020    SPINE SURGERY  2021    stents both legs       Family History   Problem Relation  Age of Onset    Heart disease Mother     Heart failure Father       Social History     Socioeconomic History    Marital status:    Tobacco Use    Smoking status: Former Smoker     Quit date: 1985     Years since quittin.3    Smokeless tobacco: Never Used   Substance and Sexual Activity    Alcohol use: Yes    Drug use: No    Sexual activity: Yes   Social History Narrative    Live with      Current Outpatient Medications   Medication Sig Dispense Refill    aspirin 325 MG tablet Take 325 mg by mouth once daily.      carvediloL (COREG) 6.25 MG tablet Take 6.25 mg by mouth 2 (two) times daily.       cetirizine (ZYRTEC) 10 MG tablet Take 1 tablet (10 mg total) by mouth once daily.  0    cholecalciferol, vitamin D3, 1,250 mcg (50,000 unit) capsule cholecalciferol (vitamin D3) 1,250 mcg (50,000 unit) capsule   Take 1 capsule every week by oral route for 84 days.      escitalopram oxalate (LEXAPRO) 10 MG tablet Take 10 mg by mouth once daily.      ezetimibe (ZETIA) 10 mg tablet 1 tablet once daily.      furosemide (LASIX) 40 MG tablet 1 tablet as needed.      guaiFENesin-codeine 100-10 mg/5 ml (TUSSI-ORGANIDIN NR)  mg/5 mL syrup Guaiatussin AC 10 mg-100 mg/5 mL oral liquid   TAKE 10 ML BY MOUTH EVERY 4 HOURS      melatonin 10 mg Tab Take 2 tablets by mouth nightly as needed.      rosuvastatin (CRESTOR) 5 MG tablet Take 5 mg by mouth once daily.      sacubitriL-valsartan (ENTRESTO) 49-51 mg per tablet Entresto 49 mg-51 mg tablet   Take 1 tablet twice a day by oral route.      spironolactone (ALDACTONE) 25 MG tablet Take 25 mg by mouth once daily.      vericiguat (VERQUVO) 2.5 mg Tab 1 tablet Daily.       No current facility-administered medications for this visit.     Review of patient's allergies indicates:  No Known Allergies   Past Medical History:   Diagnosis Date    Allergy     Bilateral leg edema     Hyperlipidemia     Hypertension     Leg pain, bilateral     left  "leg more severe    Varicose vein      Past Surgical History:   Procedure Laterality Date     SECTION, LOW TRANSVERSE      x 3    HYSTERECTOMY  2012    pace maker  2020    SPINE SURGERY  2021    stents both legs         Review of Systems   Constitutional: Negative for activity change, appetite change, chills, diaphoresis, malaise/fatigue and unexpected weight change.   HENT: Positive for rhinorrhea. Negative for ear discharge, hearing loss, trouble swallowing and voice change.    Eyes: Negative for blurred vision, photophobia, pain, discharge and visual disturbance.   Respiratory: Negative for chest tightness, shortness of breath, wheezing and stridor.    Cardiovascular: Negative for chest pain, palpitations, orthopnea and PND.   Gastrointestinal: Negative for abdominal pain, blood in stool, constipation, diarrhea and vomiting.   Endocrine: Negative for cold intolerance, heat intolerance, polydipsia and polyuria.   Genitourinary: Negative for difficulty urinating, dysuria, flank pain, hematuria and menstrual problem.   Musculoskeletal: Negative for arthralgias, joint swelling, myalgias, neck pain and neck stiffness.   Skin: Negative for pallor.   Neurological: Negative for speech difficulty, weakness and headaches.   Hematological: Does not bruise/bleed easily.   Psychiatric/Behavioral: Negative for confusion, dysphoric mood and self-injury. The patient is not nervous/anxious.       OBJECTIVE:      Vitals:    22 1042   BP: 100/70   Pulse: 80   Temp: 97.9 °F (36.6 °C)   SpO2: 97%   Weight: 90.7 kg (200 lb)   Height: 5' 7" (1.702 m)     Physical Exam  Vitals and nursing note reviewed.   Constitutional:       General: She is not in acute distress.     Appearance: She is well-developed.   HENT:      Head: Normocephalic and atraumatic.      Right Ear: Tympanic membrane normal.      Left Ear: Tympanic membrane normal.      Nose: Nose normal.      Mouth/Throat:      Pharynx: Uvula midline. "   Eyes:      General: Lids are normal.      Conjunctiva/sclera: Conjunctivae normal.      Pupils: Pupils are equal, round, and reactive to light.      Right eye: Pupil is round and reactive.      Left eye: Pupil is round and reactive.   Neck:      Thyroid: No thyromegaly.      Vascular: No JVD.      Trachea: Trachea normal.   Cardiovascular:      Rate and Rhythm: Normal rate and regular rhythm.      Pulses: Normal pulses.      Heart sounds: Normal heart sounds.   Pulmonary:      Effort: Pulmonary effort is normal. No tachypnea or respiratory distress.      Breath sounds: Normal breath sounds. No wheezing.   Abdominal:      General: Bowel sounds are normal.      Palpations: Abdomen is soft.      Tenderness: There is no abdominal tenderness.   Musculoskeletal:         General: Normal range of motion.      Cervical back: Normal range of motion and neck supple.      Right lower leg: No edema.      Left lower leg: No edema.   Lymphadenopathy:      Cervical: No cervical adenopathy.   Skin:     General: Skin is warm and dry.      Findings: No rash.   Neurological:      Mental Status: She is alert and oriented to person, place, and time.   Psychiatric:         Mood and Affect: Mood normal.         Speech: Speech normal.         Behavior: Behavior normal. Behavior is cooperative.         Thought Content: Thought content normal.         Judgment: Judgment normal.         Telephone on 03/22/2022   Component Date Value Ref Range Status    WBC 03/24/2022 4.8  3.4 - 10.8 x10E3/uL Final    RBC 03/24/2022 4.41  3.77 - 5.28 x10E6/uL Final    Hemoglobin 03/24/2022 13.1  11.1 - 15.9 g/dL Final    Hematocrit 03/24/2022 41.6  34.0 - 46.6 % Final    MCV 03/24/2022 94  79 - 97 fL Final    MCH 03/24/2022 29.7  26.6 - 33.0 pg Final    MCHC 03/24/2022 31.5  31.5 - 35.7 g/dL Final    RDW 03/24/2022 12.7  11.7 - 15.4 % Final    Platelets 03/24/2022 419  150 - 450 x10E3/uL Final    Neutrophils 03/24/2022 38  Not Estab. % Final     Lymphs 03/24/2022 48  Not Estab. % Final    Monocytes 03/24/2022 8  Not Estab. % Final    Eos 03/24/2022 5  Not Estab. % Final    Basos 03/24/2022 1  Not Estab. % Final    Neutrophils (Absolute) 03/24/2022 1.8  1.4 - 7.0 x10E3/uL Final    Lymphs (Absolute) 03/24/2022 2.3  0.7 - 3.1 x10E3/uL Final    Monocytes(Absolute) 03/24/2022 0.4  0.1 - 0.9 x10E3/uL Final    Eos (Absolute) 03/24/2022 0.2  0.0 - 0.4 x10E3/uL Final    Baso (Absolute) 03/24/2022 0.0  0.0 - 0.2 x10E3/uL Final    Immature Granulocytes 03/24/2022 0  Not Estab. % Final    Immature Grans (Abs) 03/24/2022 0.0  0.0 - 0.1 x10E3/uL Final    Glucose 03/24/2022 95  65 - 99 mg/dL Final    BUN 03/24/2022 24  8 - 27 mg/dL Final    Creatinine 03/24/2022 0.86  0.57 - 1.00 mg/dL Final    eGFR no Race variable 03/24/2022 77  >59 mL/min/1.73 Final    BUN/Creatinine Ratio 03/24/2022 28  12 - 28 Final    Sodium 03/24/2022 141  134 - 144 mmol/L Final    Potassium 03/24/2022 5.3 (A) 3.5 - 5.2 mmol/L Final    Chloride 03/24/2022 104  96 - 106 mmol/L Final    CO2 03/24/2022 23  20 - 29 mmol/L Final    Calcium 03/24/2022 9.6  8.7 - 10.3 mg/dL Final    Protein, Total 03/24/2022 7.2  6.0 - 8.5 g/dL Final    Albumin 03/24/2022 4.4  3.8 - 4.9 g/dL Final    Globulin, Total 03/24/2022 2.8  1.5 - 4.5 g/dL Final    Albumin/Globulin Ratio 03/24/2022 1.6  1.2 - 2.2 Final    Total Bilirubin 03/24/2022 0.5  0.0 - 1.2 mg/dL Final    Alkaline Phosphatase 03/24/2022 62  44 - 121 IU/L Final    AST 03/24/2022 30  0 - 40 IU/L Final    ALT 03/24/2022 16  0 - 32 IU/L Final    Cholesterol 03/24/2022 164  100 - 199 mg/dL Final    Triglycerides 03/24/2022 78  0 - 149 mg/dL Final    HDL 03/24/2022 54  >39 mg/dL Final    VLDL Cholesterol Bud 03/24/2022 15  5 - 40 mg/dL Final    LDL Calculated 03/24/2022 95  0 - 99 mg/dL Final    TSH 03/24/2022 1.120  0.450 - 4.500 uIU/mL Final    Specific Gravity, UA 03/24/2022 1.025  1.005 - 1.030 Final    pH, UA 03/24/2022 5.5   5.0 - 7.5 Final    Color, UA 03/24/2022 Yellow  Yellow Final    Clarity, UA 03/24/2022 Clear  Clear Final    Leukocytes, UA 03/24/2022 Negative  Negative Final    Protein, UA 03/24/2022 Trace  Negative/Trace Final    Glucose, UA 03/24/2022 Negative  Negative Final    Ketones, UA 03/24/2022 Negative  Negative Final    Occult Blood UA 03/24/2022 Negative  Negative Final    Bilirubin, UA 03/24/2022 Negative  Negative Final    Urobilinogen, UA 03/24/2022 0.2  0.2 - 1.0 mg/dL Final    Nitrite, UA 03/24/2022 Negative  Negative Final    Microscopic Examination 03/24/2022 Comment   Final    Microscopic not indicated and not performed.    Vit D, 25-Hydroxy 03/24/2022 65.3  30.0 - 100.0 ng/mL Final    Comment: Vitamin D deficiency has been defined by the [place] of  [employer] and an Endocrine Society practice guideline as a  level of serum 25-OH vitamin D less than 20 ng/mL (1,2).  The Endocrine Society went on to further define vitamin D  insufficiency as a level between 21 and 29 ng/mL (2).  1. IOM ([place] [employer]). 2010. Dietary reference     intakes for calcium and D. [place]: The     National Academies Press.  2. Ny MF, Carrie NC, Martínez HARDY, et al.     Evaluation, treatment, and prevention of vitamin D     deficiency: an Endocrine Society clinical practice     guideline. JCEM. 2011 Jul; 96(7):1911-30.      Vitamin B-12 03/24/2022 >2000 (A) 232 - 1245 pg/mL Final   Office Visit on 03/08/2022   Component Date Value Ref Range Status    POC Rapid COVID 03/08/2022 Negative  Negative Final     Acceptable 03/08/2022 Yes   Final   ]  Assessment:       1. Essential (primary) hypertension    2. Vitamin B 12 deficiency    3. Vitamin D deficiency    4. Mixed hyperlipidemia        Plan:       Essential (primary) hypertension  Stable on current meds    Vitamin B 12 deficiency  Decrease B12 to 500mg 3x/week    Vitamin D deficiency  Stable on current otc supplement    Mixed  hyperlipidemia  Stable on current meds      Follow up in about 6 months (around 10/7/2022) for htn.      4/7/2022 ROBERTA Meraz, FNP

## 2022-06-09 ENCOUNTER — HOSPITAL ENCOUNTER (OUTPATIENT)
Dept: RADIOLOGY | Facility: HOSPITAL | Age: 62
Discharge: HOME | DRG: 194 | End: 2022-06-09
Attending: INTERNAL MEDICINE
Payer: COMMERCIAL

## 2022-06-09 ENCOUNTER — OFFICE VISIT (OUTPATIENT)
Dept: FAMILY MEDICINE | Facility: CLINIC | Age: 62
End: 2022-06-09
Payer: COMMERCIAL

## 2022-06-09 VITALS
HEIGHT: 67 IN | SYSTOLIC BLOOD PRESSURE: 104 MMHG | HEART RATE: 95 BPM | RESPIRATION RATE: 18 BRPM | TEMPERATURE: 98 F | WEIGHT: 204.81 LBS | BODY MASS INDEX: 32.15 KG/M2 | OXYGEN SATURATION: 95 % | DIASTOLIC BLOOD PRESSURE: 68 MMHG

## 2022-06-09 DIAGNOSIS — R05.3 CHRONIC COUGH: Primary | ICD-10-CM

## 2022-06-09 DIAGNOSIS — R05.3 CHRONIC COUGH: ICD-10-CM

## 2022-06-09 PROBLEM — R93.1 ECHOCARDIOGRAM ABNORMAL: Status: ACTIVE | Noted: 2020-06-01

## 2022-06-09 PROBLEM — I50.22 CHRONIC SYSTOLIC HEART FAILURE: Status: ACTIVE | Noted: 2020-09-03

## 2022-06-09 PROBLEM — I42.9 PRIMARY CARDIOMYOPATHY: Status: ACTIVE | Noted: 2020-06-01

## 2022-06-09 PROBLEM — E53.8 COBALAMIN DEFICIENCY: Status: ACTIVE | Noted: 2021-01-18

## 2022-06-09 PROBLEM — I49.5 SINUS NODE DYSFUNCTION: Status: ACTIVE | Noted: 2020-09-25

## 2022-06-09 PROBLEM — I87.2 PERIPHERAL VENOUS INSUFFICIENCY: Status: ACTIVE | Noted: 2021-04-22

## 2022-06-09 PROBLEM — E55.9 VITAMIN D DEFICIENCY: Status: ACTIVE | Noted: 2021-01-18

## 2022-06-09 PROBLEM — I50.20 HEART FAILURE WITH REDUCED EJECTION FRACTION: Status: ACTIVE | Noted: 2021-01-18

## 2022-06-09 PROBLEM — E78.5 HYPERLIPIDEMIA: Status: ACTIVE | Noted: 2021-03-22

## 2022-06-09 PROBLEM — Z95.810 AUTOMATIC IMPLANTABLE CARDIAC DEFIBRILLATOR IN SITU: Status: ACTIVE | Noted: 2020-09-25

## 2022-06-09 PROBLEM — R06.09 DYSPNEA ON EXERTION: Status: ACTIVE | Noted: 2020-06-01

## 2022-06-09 PROCEDURE — 3008F BODY MASS INDEX DOCD: CPT | Mod: CPTII,S$GLB,, | Performed by: INTERNAL MEDICINE

## 2022-06-09 PROCEDURE — 3078F DIAST BP <80 MM HG: CPT | Mod: CPTII,S$GLB,, | Performed by: INTERNAL MEDICINE

## 2022-06-09 PROCEDURE — 3074F PR MOST RECENT SYSTOLIC BLOOD PRESSURE < 130 MM HG: ICD-10-PCS | Mod: CPTII,S$GLB,, | Performed by: INTERNAL MEDICINE

## 2022-06-09 PROCEDURE — 3008F PR BODY MASS INDEX (BMI) DOCUMENTED: ICD-10-PCS | Mod: CPTII,S$GLB,, | Performed by: INTERNAL MEDICINE

## 2022-06-09 PROCEDURE — 4010F ACE/ARB THERAPY RXD/TAKEN: CPT | Mod: CPTII,S$GLB,, | Performed by: INTERNAL MEDICINE

## 2022-06-09 PROCEDURE — 3074F SYST BP LT 130 MM HG: CPT | Mod: CPTII,S$GLB,, | Performed by: INTERNAL MEDICINE

## 2022-06-09 PROCEDURE — 4010F PR ACE/ARB THEARPY RXD/TAKEN: ICD-10-PCS | Mod: CPTII,S$GLB,, | Performed by: INTERNAL MEDICINE

## 2022-06-09 PROCEDURE — 99213 OFFICE O/P EST LOW 20 MIN: CPT | Mod: S$GLB,,, | Performed by: INTERNAL MEDICINE

## 2022-06-09 PROCEDURE — 1159F PR MEDICATION LIST DOCUMENTED IN MEDICAL RECORD: ICD-10-PCS | Mod: CPTII,S$GLB,, | Performed by: INTERNAL MEDICINE

## 2022-06-09 PROCEDURE — 1159F MED LIST DOCD IN RCRD: CPT | Mod: CPTII,S$GLB,, | Performed by: INTERNAL MEDICINE

## 2022-06-09 PROCEDURE — 1160F RVW MEDS BY RX/DR IN RCRD: CPT | Mod: CPTII,S$GLB,, | Performed by: INTERNAL MEDICINE

## 2022-06-09 PROCEDURE — 71046 X-RAY EXAM CHEST 2 VIEWS: CPT | Mod: TC,PO

## 2022-06-09 PROCEDURE — 3078F PR MOST RECENT DIASTOLIC BLOOD PRESSURE < 80 MM HG: ICD-10-PCS | Mod: CPTII,S$GLB,, | Performed by: INTERNAL MEDICINE

## 2022-06-09 PROCEDURE — 99213 PR OFFICE/OUTPT VISIT, EST, LEVL III, 20-29 MIN: ICD-10-PCS | Mod: S$GLB,,, | Performed by: INTERNAL MEDICINE

## 2022-06-09 PROCEDURE — 1160F PR REVIEW ALL MEDS BY PRESCRIBER/CLIN PHARMACIST DOCUMENTED: ICD-10-PCS | Mod: CPTII,S$GLB,, | Performed by: INTERNAL MEDICINE

## 2022-06-09 RX ORDER — CODEINE PHOSPHATE AND GUAIFENESIN 10; 100 MG/5ML; MG/5ML
5 SOLUTION ORAL NIGHTLY PRN
Qty: 118 ML | Refills: 1 | Status: SHIPPED | OUTPATIENT
Start: 2022-06-09 | End: 2022-06-21

## 2022-06-09 RX ORDER — PANTOPRAZOLE SODIUM 40 MG/1
40 TABLET, DELAYED RELEASE ORAL DAILY
Qty: 30 TABLET | Refills: 1 | Status: SHIPPED | OUTPATIENT
Start: 2022-06-09 | End: 2022-07-01

## 2022-06-09 RX ORDER — SPIRONOLACTONE 50 MG/1
50 TABLET, FILM COATED ORAL DAILY
COMMUNITY
Start: 2022-04-24

## 2022-06-09 NOTE — PATIENT INSTRUCTIONS
Reduce exposure to your allergic triggers    Indoor Allergens    House dust mites  Dust mites are microscopic creatures that live in house dust and feed on dead skin flakes.     Encase mattresses, pillows and box springs in allergen-proof coverings  Wash bedding weekly in 130?F hot water  Keep house clean by vacuuming and reducing clutter  Wear an appropriate mask while cleaning and avoid area 20 minutes after cleaning  Change furnace and air conditioner filters  Use a humidifier to reduce the humidity in your home    Cockroaches  Cockroach saliva, fecal material, shed skins are the main sensitizers for humans.    Wash dishes, vacuum, keep food and garbage in closed containers and take out garbage regularly  Dont store paper bags, newspapers or cardboard boxes in your home  Place bait traps or call a professional  to eliminate cockroaches  Seal plumbing openings, cracks and crevices    Molds (Indoor)  Molds live both indoors and outdoors.  They give off spores that can cause allergic reactions throughout the year.     Identify and clean moldy areas with fungicide or bleach  Use a home dehumidifier to reduce the humidity in your home  Fix water leaks  Clean furnace filters, refrigerator and dehumidifier (and clean drip pans with bleach)  Thoroughly dry clothes before storing    Rodents   Integrated pest management (IPM) approaches offer effective means of eliminating rodents from the home.     Seal holes and cracks from home to outside  Seal passages through interior floors, walls, ceiling and gaps between the bottom of cabinetry or built-in furniture and the floor  Keep bushes and trees at least three feet from homes  Ensure trash is stored in secure containers  Store good in rodent-proof containers            Animal dander  Allergy to an animal (such as a cat or dog) is actually a sensitivity to the pets skin flakes and fur.     Confine the pet to a room with a polished floor and wipeable  furniture  Restrict your furry pet from the bedroom and keep the animal off furniture  Use high-efficiency particulate air (HEPA) filters and vacuum   Wash your pet weekly in warm water and soap  If you own a furry pet, try to keep it outdoors or find it a new home      Outdoor Allergens    Pollens  Pollens are the tiny airborne particles given off by trees, weeds and grasses.      Shower after working outside - wash hair, eyes and eyelashes  Remove work clothes outdoors after working outside and carry them in a bag to the washing machine  Take allergy medicines 30 minutes before going outdoors  Stay indoors when pollen counts are high for pollens you are allergic to.  Check reports for pollen count forecasts or log on to www.pollen.com  Have someone else do your yard work or wear a microfiber facemask  At home and when driving, keep windows closed and when possible use an air conditioner  Use high efficiency particulate air (HEPA) filters for furnace and vacuum     Molds (Outdoor)    Avoid mowing grass, handling mulch, compost or raking leaves  Avoid using fans that draw in outside air.  When possible, use an air conditioner on recirculate and keep windows and doors closed    If specific IgE sensitization is not detected, your doctor may consider the following non-allergic triggers:   Cigarette smoke  Alcohol  Paint/cleaning agents  Air pollution  Temperature change  Perfume  Infection  Aerosol sprays

## 2022-06-09 NOTE — PROGRESS NOTES
Subjective:       Patient ID: Daylin Lynch is a 61 y.o. female.    Chief Complaint: Cough (Excessive cough/ chest burning for wks)    Cough  This is a recurrent problem. The current episode started more than 1 month ago. The problem has been waxing and waning. The problem occurs every few minutes. The cough is non-productive. Associated symptoms include chest pain, heartburn (recently has had burning in chest and bitter taste in her mouth; worse when she is laying down), rhinorrhea (mainly in the morning) and shortness of breath (mostly feels SOB when flying). Pertinent negatives include no chills, ear congestion, ear pain, eye redness, fever, headaches, hemoptysis, myalgias, nasal congestion, postnasal drip, rash, sore throat, sweats, weight loss or wheezing. The symptoms are aggravated by lying down. Risk factors for lung disease include animal exposure. She has tried prescription cough suppressant (antihsitamine) for the symptoms. The treatment provided moderate relief. Her past medical history is significant for bronchitis. There is no history of asthma, bronchiectasis, COPD, emphysema, environmental allergies or pneumonia.     Review of Systems   Constitutional: Negative for activity change, appetite change, chills, diaphoresis, fatigue, fever, unexpected weight change and weight loss.   HENT: Positive for rhinorrhea (mainly in the morning). Negative for congestion, ear discharge, ear pain, hearing loss, mouth sores, nosebleeds, postnasal drip, sinus pressure, sinus pain, sneezing, sore throat, tinnitus, trouble swallowing and voice change.    Eyes: Negative for photophobia, pain, discharge, redness, itching and visual disturbance.   Respiratory: Positive for cough and shortness of breath (mostly feels SOB when flying). Negative for apnea, hemoptysis, choking, chest tightness and wheezing.    Cardiovascular: Positive for chest pain. Negative for palpitations and leg swelling.   Gastrointestinal: Positive for  heartburn (recently has had burning in chest and bitter taste in her mouth; worse when she is laying down). Negative for abdominal distention, abdominal pain, blood in stool, constipation, diarrhea, nausea and vomiting.   Endocrine: Negative for cold intolerance, heat intolerance, polydipsia and polyuria.   Genitourinary: Negative for decreased urine volume, difficulty urinating, dyspareunia, dysuria, enuresis, frequency, genital sores, hematuria, menstrual problem, pelvic pain, urgency, vaginal bleeding, vaginal discharge and vaginal pain.   Musculoskeletal: Negative for arthralgias, back pain, gait problem, joint swelling, myalgias, neck pain and neck stiffness.   Skin: Negative for rash and wound.   Allergic/Immunologic: Negative for environmental allergies, food allergies and immunocompromised state.   Neurological: Negative for dizziness, tremors, seizures, syncope, facial asymmetry, speech difficulty, weakness, light-headedness, numbness and headaches.   Hematological: Negative for adenopathy. Does not bruise/bleed easily.   Psychiatric/Behavioral: Negative for confusion, decreased concentration, hallucinations, self-injury, sleep disturbance and suicidal ideas. The patient is not nervous/anxious.        Past Medical History:   Diagnosis Date    Allergy     Bilateral leg edema     Hyperlipidemia     Hypertension     Leg pain, bilateral     left leg more severe    Varicose vein       Past Surgical History:   Procedure Laterality Date     SECTION, LOW TRANSVERSE      x 3    HYSTERECTOMY      pace maker  2020    SPINE SURGERY  2021    stents both legs         Family History   Problem Relation Age of Onset    Heart disease Mother     Heart failure Father        Social History     Socioeconomic History    Marital status:    Tobacco Use    Smoking status: Former Smoker     Quit date: 1985     Years since quittin.5    Smokeless tobacco: Never Used   Substance and  "Sexual Activity    Alcohol use: Yes    Drug use: No    Sexual activity: Yes   Social History Narrative    Live with        Current Outpatient Medications   Medication Sig Dispense Refill    aspirin 325 MG tablet Take 325 mg by mouth once daily.      carvediloL (COREG) 6.25 MG tablet Take 6.25 mg by mouth 2 (two) times daily.       cetirizine (ZYRTEC) 10 MG tablet Take 1 tablet (10 mg total) by mouth once daily.  0    cholecalciferol, vitamin D3, 1,250 mcg (50,000 unit) capsule cholecalciferol (vitamin D3) 1,250 mcg (50,000 unit) capsule   Take 1 capsule every week by oral route for 84 days.      escitalopram oxalate (LEXAPRO) 10 MG tablet Take 10 mg by mouth once daily.      ezetimibe (ZETIA) 10 mg tablet 1 tablet once daily.      furosemide (LASIX) 40 MG tablet 1 tablet as needed.      melatonin 10 mg Tab Take 2 tablets by mouth nightly as needed.      rosuvastatin (CRESTOR) 5 MG tablet Take 5 mg by mouth once daily.      sacubitriL-valsartan (ENTRESTO) 49-51 mg per tablet Entresto 49 mg-51 mg tablet   Take 1 tablet twice a day by oral route.      guaiFENesin-codeine 100-10 mg/5 ml (TUSSI-ORGANIDIN NR)  mg/5 mL syrup Take 5 mLs by mouth nightly as needed for Cough. 118 mL 1    pantoprazole (PROTONIX) 40 MG tablet Take 1 tablet (40 mg total) by mouth once daily. 30 tablet 1    spironolactone (ALDACTONE) 50 MG tablet Take 50 mg by mouth once daily.       No current facility-administered medications for this visit.       Review of patient's allergies indicates:  No Known Allergies  Objective:    HPI     Cough      Additional comments: Excessive cough/ chest burning for wks          Last edited by Lashawn Calle LPN on 6/9/2022  2:47 PM. (History)      Blood pressure 104/68, pulse 95, temperature 97.9 °F (36.6 °C), resp. rate 18, height 5' 7" (1.702 m), weight 92.9 kg (204 lb 12.8 oz), SpO2 95 %. Body mass index is 32.08 kg/m².   Physical Exam  Vitals and nursing note reviewed. "   Constitutional:       General: She is not in acute distress.     Appearance: She is well-developed. She is obese. She is not ill-appearing, toxic-appearing or diaphoretic.   HENT:      Head: Normocephalic and atraumatic.   Eyes:      General: No scleral icterus.        Right eye: No discharge.         Left eye: No discharge.      Conjunctiva/sclera: Conjunctivae normal.   Neck:      Vascular: No carotid bruit.   Cardiovascular:      Rate and Rhythm: Normal rate and regular rhythm.      Heart sounds: Normal heart sounds. No murmur heard.  Pulmonary:      Effort: Pulmonary effort is normal. No respiratory distress.      Breath sounds: Normal breath sounds. No decreased breath sounds, wheezing, rhonchi or rales.   Abdominal:      General: There is no distension.      Palpations: Abdomen is soft.      Tenderness: There is no abdominal tenderness. There is no guarding or rebound.   Musculoskeletal:      Right lower leg: No edema.      Left lower leg: No edema.   Skin:     General: Skin is warm and dry.   Neurological:      Mental Status: She is alert.      Motor: No tremor.   Psychiatric:         Mood and Affect: Mood normal.         Speech: Speech normal.         Behavior: Behavior normal.             Assessment:       1. Chronic cough        Plan:       Daylin was seen today for cough.    Diagnoses and all orders for this visit:    Chronic cough  Comments:  GERd vs AR vs CHF are most likley causes.  Already on antihistamine.  Given burning and bitter taste, trial PPI  Orders:  -     pantoprazole (PROTONIX) 40 MG tablet; Take 1 tablet (40 mg total) by mouth once daily.  -     X-Ray Chest PA And Lateral; Future  -     guaiFENesin-codeine 100-10 mg/5 ml (TUSSI-ORGANIDIN NR)  mg/5 mL syrup; Take 5 mLs by mouth nightly as needed for Cough.

## 2022-06-12 ENCOUNTER — HOSPITAL ENCOUNTER (EMERGENCY)
Facility: HOSPITAL | Age: 62
Discharge: HOME | End: 2022-06-12
Attending: EMERGENCY MEDICINE
Payer: COMMERCIAL

## 2022-06-12 ENCOUNTER — HOSPITAL ENCOUNTER (INPATIENT)
Facility: HOSPITAL | Age: 62
LOS: 2 days | Discharge: HOME | DRG: 194 | End: 2022-06-14
Attending: FAMILY MEDICINE | Admitting: FAMILY MEDICINE
Payer: COMMERCIAL

## 2022-06-12 VITALS
WEIGHT: 204 LBS | SYSTOLIC BLOOD PRESSURE: 135 MMHG | TEMPERATURE: 98 F | BODY MASS INDEX: 32.02 KG/M2 | OXYGEN SATURATION: 97 % | HEIGHT: 67 IN | RESPIRATION RATE: 18 BRPM | DIASTOLIC BLOOD PRESSURE: 82 MMHG | HEART RATE: 92 BPM

## 2022-06-12 DIAGNOSIS — R05.9 COUGH: ICD-10-CM

## 2022-06-12 DIAGNOSIS — Z95.810 AUTOMATIC IMPLANTABLE CARDIAC DEFIBRILLATOR IN SITU: ICD-10-CM

## 2022-06-12 DIAGNOSIS — R06.02 SHORTNESS OF BREATH: ICD-10-CM

## 2022-06-12 DIAGNOSIS — R79.89 ELEVATED TROPONIN: Primary | ICD-10-CM

## 2022-06-12 DIAGNOSIS — R07.9 CHEST PAIN, UNSPECIFIED TYPE: ICD-10-CM

## 2022-06-12 DIAGNOSIS — R07.9 CHEST PAIN: ICD-10-CM

## 2022-06-12 DIAGNOSIS — I21.4 NSTEMI (NON-ST ELEVATED MYOCARDIAL INFARCTION): ICD-10-CM

## 2022-06-12 DIAGNOSIS — J18.9 ATYPICAL PNEUMONIA: Primary | ICD-10-CM

## 2022-06-12 PROBLEM — R69 MULTIPLE CHRONIC DISEASES: Status: ACTIVE | Noted: 2022-06-12

## 2022-06-12 LAB
ALBUMIN SERPL BCP-MCNC: 3.4 G/DL (ref 3.5–5.2)
ALP SERPL-CCNC: 61 U/L (ref 55–135)
ALT SERPL W/O P-5'-P-CCNC: 17 U/L (ref 10–44)
ANION GAP SERPL CALC-SCNC: 11 MMOL/L (ref 8–16)
AST SERPL-CCNC: 28 U/L (ref 10–40)
BASOPHILS # BLD AUTO: 0.05 K/UL (ref 0–0.2)
BASOPHILS NFR BLD: 0.8 % (ref 0–1.9)
BILIRUB SERPL-MCNC: 0.3 MG/DL (ref 0.1–1)
BNP SERPL-MCNC: 20 PG/ML (ref 0–99)
BUN SERPL-MCNC: 18 MG/DL (ref 6–20)
CALCIUM SERPL-MCNC: 8.9 MG/DL (ref 8.7–10.5)
CHLORIDE SERPL-SCNC: 106 MMOL/L (ref 95–110)
CO2 SERPL-SCNC: 23 MMOL/L (ref 23–29)
CREAT SERPL-MCNC: 1.1 MG/DL (ref 0.5–1.4)
DIFFERENTIAL METHOD: ABNORMAL
EOSINOPHIL # BLD AUTO: 0.5 K/UL (ref 0–0.5)
EOSINOPHIL NFR BLD: 8.4 % (ref 0–8)
ERYTHROCYTE [DISTWIDTH] IN BLOOD BY AUTOMATED COUNT: 13.3 % (ref 11.5–14.5)
EST. GFR  (AFRICAN AMERICAN): >60 ML/MIN/1.73 M^2
EST. GFR  (NON AFRICAN AMERICAN): 55 ML/MIN/1.73 M^2
GLUCOSE SERPL-MCNC: 105 MG/DL (ref 70–110)
HCT VFR BLD AUTO: 35.2 % (ref 37–48.5)
HGB BLD-MCNC: 11.7 G/DL (ref 12–16)
IMM GRANULOCYTES # BLD AUTO: 0.01 K/UL (ref 0–0.04)
IMM GRANULOCYTES NFR BLD AUTO: 0.2 % (ref 0–0.5)
INFLUENZA A, MOLECULAR: NEGATIVE
INFLUENZA B, MOLECULAR: NEGATIVE
LYMPHOCYTES # BLD AUTO: 2 K/UL (ref 1–4.8)
LYMPHOCYTES NFR BLD: 34.1 % (ref 18–48)
MCH RBC QN AUTO: 30.9 PG (ref 27–31)
MCHC RBC AUTO-ENTMCNC: 33.2 G/DL (ref 32–36)
MCV RBC AUTO: 93 FL (ref 82–98)
MONOCYTES # BLD AUTO: 0.6 K/UL (ref 0.3–1)
MONOCYTES NFR BLD: 10.4 % (ref 4–15)
NEUTROPHILS # BLD AUTO: 2.8 K/UL (ref 1.8–7.7)
NEUTROPHILS NFR BLD: 46.1 % (ref 38–73)
NRBC BLD-RTO: 0 /100 WBC
PLATELET # BLD AUTO: 310 K/UL (ref 150–450)
PMV BLD AUTO: 9.8 FL (ref 9.2–12.9)
POTASSIUM SERPL-SCNC: 4 MMOL/L (ref 3.5–5.1)
PROT SERPL-MCNC: 6.8 G/DL (ref 6–8.4)
RBC # BLD AUTO: 3.79 M/UL (ref 4–5.4)
SARS-COV-2 RDRP RESP QL NAA+PROBE: NEGATIVE
SODIUM SERPL-SCNC: 140 MMOL/L (ref 136–145)
SPECIMEN SOURCE: NORMAL
TROPONIN I SERPL DL<=0.01 NG/ML-MCNC: 0.6 NG/ML (ref 0–0.03)
WBC # BLD AUTO: 5.98 K/UL (ref 3.9–12.7)

## 2022-06-12 PROCEDURE — 93010 EKG 12-LEAD: ICD-10-PCS | Mod: ,,, | Performed by: SPECIALIST

## 2022-06-12 PROCEDURE — 93010 ELECTROCARDIOGRAM REPORT: CPT | Mod: ,,, | Performed by: SPECIALIST

## 2022-06-12 PROCEDURE — 83880 ASSAY OF NATRIURETIC PEPTIDE: CPT | Performed by: EMERGENCY MEDICINE

## 2022-06-12 PROCEDURE — 99285 EMERGENCY DEPT VISIT HI MDM: CPT | Mod: 25

## 2022-06-12 PROCEDURE — U0002 COVID-19 LAB TEST NON-CDC: HCPCS | Performed by: EMERGENCY MEDICINE

## 2022-06-12 PROCEDURE — 21400001 HC TELEMETRY ROOM

## 2022-06-12 PROCEDURE — 84484 ASSAY OF TROPONIN QUANT: CPT | Performed by: EMERGENCY MEDICINE

## 2022-06-12 PROCEDURE — 93005 ELECTROCARDIOGRAM TRACING: CPT

## 2022-06-12 PROCEDURE — 25000003 PHARM REV CODE 250: Performed by: FAMILY MEDICINE

## 2022-06-12 PROCEDURE — 85025 COMPLETE CBC W/AUTO DIFF WBC: CPT | Performed by: EMERGENCY MEDICINE

## 2022-06-12 PROCEDURE — 25000003 PHARM REV CODE 250: Performed by: EMERGENCY MEDICINE

## 2022-06-12 PROCEDURE — 36415 COLL VENOUS BLD VENIPUNCTURE: CPT | Performed by: EMERGENCY MEDICINE

## 2022-06-12 PROCEDURE — 80053 COMPREHEN METABOLIC PANEL: CPT | Performed by: EMERGENCY MEDICINE

## 2022-06-12 PROCEDURE — 63600175 PHARM REV CODE 636 W HCPCS: Performed by: FAMILY MEDICINE

## 2022-06-12 PROCEDURE — 87502 INFLUENZA DNA AMP PROBE: CPT | Performed by: EMERGENCY MEDICINE

## 2022-06-12 RX ORDER — MAG HYDROX/ALUMINUM HYD/SIMETH 200-200-20
30 SUSPENSION, ORAL (FINAL DOSE FORM) ORAL 4 TIMES DAILY PRN
Status: DISCONTINUED | OUTPATIENT
Start: 2022-06-12 | End: 2022-06-14 | Stop reason: HOSPADM

## 2022-06-12 RX ORDER — ASPIRIN 325 MG
325 TABLET ORAL
Status: COMPLETED | OUTPATIENT
Start: 2022-06-12 | End: 2022-06-12

## 2022-06-12 RX ORDER — CARVEDILOL 6.25 MG/1
6.25 TABLET ORAL 2 TIMES DAILY
Status: DISCONTINUED | OUTPATIENT
Start: 2022-06-12 | End: 2022-06-14 | Stop reason: HOSPADM

## 2022-06-12 RX ORDER — ASPIRIN 325 MG
325 TABLET ORAL DAILY
Status: DISCONTINUED | OUTPATIENT
Start: 2022-06-13 | End: 2022-06-14 | Stop reason: HOSPADM

## 2022-06-12 RX ORDER — HYDROCODONE BITARTRATE AND ACETAMINOPHEN 5; 325 MG/1; MG/1
1 TABLET ORAL EVERY 6 HOURS PRN
Status: DISCONTINUED | OUTPATIENT
Start: 2022-06-12 | End: 2022-06-14 | Stop reason: HOSPADM

## 2022-06-12 RX ORDER — CODEINE PHOSPHATE AND GUAIFENESIN 10; 100 MG/5ML; MG/5ML
5 SOLUTION ORAL EVERY 4 HOURS PRN
Status: DISCONTINUED | OUTPATIENT
Start: 2022-06-12 | End: 2022-06-14 | Stop reason: HOSPADM

## 2022-06-12 RX ORDER — BUDESONIDE 0.5 MG/2ML
0.5 INHALANT ORAL EVERY 12 HOURS
Status: DISCONTINUED | OUTPATIENT
Start: 2022-06-12 | End: 2022-06-14 | Stop reason: HOSPADM

## 2022-06-12 RX ORDER — IBUPROFEN 200 MG
16 TABLET ORAL
Status: DISCONTINUED | OUTPATIENT
Start: 2022-06-12 | End: 2022-06-14 | Stop reason: HOSPADM

## 2022-06-12 RX ORDER — ATORVASTATIN CALCIUM 20 MG/1
20 TABLET, FILM COATED ORAL DAILY
Status: DISCONTINUED | OUTPATIENT
Start: 2022-06-13 | End: 2022-06-14 | Stop reason: HOSPADM

## 2022-06-12 RX ORDER — PANTOPRAZOLE SODIUM 40 MG/1
40 TABLET, DELAYED RELEASE ORAL
Status: DISCONTINUED | OUTPATIENT
Start: 2022-06-13 | End: 2022-06-14 | Stop reason: HOSPADM

## 2022-06-12 RX ORDER — ACETAMINOPHEN 325 MG/1
650 TABLET ORAL EVERY 8 HOURS PRN
Status: DISCONTINUED | OUTPATIENT
Start: 2022-06-12 | End: 2022-06-14 | Stop reason: HOSPADM

## 2022-06-12 RX ORDER — ACETAMINOPHEN 325 MG/1
650 TABLET ORAL EVERY 4 HOURS PRN
Status: DISCONTINUED | OUTPATIENT
Start: 2022-06-12 | End: 2022-06-14 | Stop reason: HOSPADM

## 2022-06-12 RX ORDER — ENOXAPARIN SODIUM 100 MG/ML
40 INJECTION SUBCUTANEOUS EVERY 24 HOURS
Status: DISCONTINUED | OUTPATIENT
Start: 2022-06-12 | End: 2022-06-14 | Stop reason: HOSPADM

## 2022-06-12 RX ORDER — LANOLIN ALCOHOL/MO/W.PET/CERES
800 CREAM (GRAM) TOPICAL
Status: DISCONTINUED | OUTPATIENT
Start: 2022-06-12 | End: 2022-06-14 | Stop reason: HOSPADM

## 2022-06-12 RX ORDER — NALOXONE HCL 0.4 MG/ML
0.02 VIAL (ML) INJECTION
Status: DISCONTINUED | OUTPATIENT
Start: 2022-06-12 | End: 2022-06-14 | Stop reason: HOSPADM

## 2022-06-12 RX ORDER — FUROSEMIDE 40 MG/1
40 TABLET ORAL DAILY
Status: DISCONTINUED | OUTPATIENT
Start: 2022-06-13 | End: 2022-06-14 | Stop reason: HOSPADM

## 2022-06-12 RX ORDER — IPRATROPIUM BROMIDE AND ALBUTEROL SULFATE 2.5; .5 MG/3ML; MG/3ML
3 SOLUTION RESPIRATORY (INHALATION) EVERY 6 HOURS PRN
Status: DISCONTINUED | OUTPATIENT
Start: 2022-06-12 | End: 2022-06-14 | Stop reason: HOSPADM

## 2022-06-12 RX ORDER — MORPHINE SULFATE 4 MG/ML
4 INJECTION, SOLUTION INTRAMUSCULAR; INTRAVENOUS EVERY 4 HOURS PRN
Status: DISCONTINUED | OUTPATIENT
Start: 2022-06-12 | End: 2022-06-14 | Stop reason: HOSPADM

## 2022-06-12 RX ORDER — SODIUM CHLORIDE 0.9 % (FLUSH) 0.9 %
10 SYRINGE (ML) INJECTION EVERY 12 HOURS PRN
Status: DISCONTINUED | OUTPATIENT
Start: 2022-06-12 | End: 2022-06-14 | Stop reason: HOSPADM

## 2022-06-12 RX ORDER — TALC
6 POWDER (GRAM) TOPICAL NIGHTLY PRN
Status: DISCONTINUED | OUTPATIENT
Start: 2022-06-12 | End: 2022-06-14 | Stop reason: HOSPADM

## 2022-06-12 RX ORDER — IBUPROFEN 200 MG
24 TABLET ORAL
Status: DISCONTINUED | OUTPATIENT
Start: 2022-06-12 | End: 2022-06-14 | Stop reason: HOSPADM

## 2022-06-12 RX ORDER — ONDANSETRON 2 MG/ML
4 INJECTION INTRAMUSCULAR; INTRAVENOUS EVERY 8 HOURS PRN
Status: DISCONTINUED | OUTPATIENT
Start: 2022-06-12 | End: 2022-06-14 | Stop reason: HOSPADM

## 2022-06-12 RX ORDER — GLUCAGON 1 MG
1 KIT INJECTION
Status: DISCONTINUED | OUTPATIENT
Start: 2022-06-12 | End: 2022-06-14 | Stop reason: HOSPADM

## 2022-06-12 RX ORDER — SPIRONOLACTONE 50 MG/1
50 TABLET, FILM COATED ORAL DAILY
Status: DISCONTINUED | OUTPATIENT
Start: 2022-06-13 | End: 2022-06-14 | Stop reason: HOSPADM

## 2022-06-12 RX ORDER — ESCITALOPRAM OXALATE 10 MG/1
10 TABLET ORAL DAILY
Status: DISCONTINUED | OUTPATIENT
Start: 2022-06-13 | End: 2022-06-14 | Stop reason: HOSPADM

## 2022-06-12 RX ORDER — EZETIMIBE 10 MG/1
10 TABLET ORAL DAILY
Status: DISCONTINUED | OUTPATIENT
Start: 2022-06-13 | End: 2022-06-14 | Stop reason: HOSPADM

## 2022-06-12 RX ORDER — PROCHLORPERAZINE EDISYLATE 5 MG/ML
5 INJECTION INTRAMUSCULAR; INTRAVENOUS EVERY 6 HOURS PRN
Status: DISCONTINUED | OUTPATIENT
Start: 2022-06-12 | End: 2022-06-14 | Stop reason: HOSPADM

## 2022-06-12 RX ADMIN — CARVEDILOL 6.25 MG: 6.25 TABLET, FILM COATED ORAL at 11:06

## 2022-06-12 RX ADMIN — SACUBITRIL AND VALSARTAN 1 TABLET: 49; 51 TABLET, FILM COATED ORAL at 11:06

## 2022-06-12 RX ADMIN — GUAIFENESIN AND CODEINE PHOSPHATE 5 ML: 100; 10 SOLUTION ORAL at 11:06

## 2022-06-12 RX ADMIN — ASPIRIN 325 MG ORAL TABLET 325 MG: 325 PILL ORAL at 07:06

## 2022-06-12 RX ADMIN — ENOXAPARIN SODIUM 40 MG: 40 INJECTION SUBCUTANEOUS at 11:06

## 2022-06-12 NOTE — ED PROVIDER NOTES
Encounter Date: 2022    SCRIBE #1 NOTE: I, Tash Centeno, am scribing for, and in the presence of, Ashlie Smith MD.       History     Chief Complaint   Patient presents with    Cough     Cxr Thursday  / worsening      Time seen by provider: 5:11 PM on 2022    Daylin Lynch is a 61 y.o. female who presents to the ED with an onset of cough, SOB, and CP which began 1 week ago. Patient reports associated Sx of rhinorrhea.  Patient states she presented to PCP 4 days ago who completed a chest X-Ray which was clear. She endorses taking a negative COVID-19 test today. Patient notes she has gained about 10 pounds over the past month. The patient denies N/V/D, abdominal pain, hematuria, blood in stool, or any other symptoms at this time. PMHx of HLD, CHF, HTN. PSHx of hysterectomy, pacemaker placement, and spine surgery.       The history is provided by the patient.     Review of patient's allergies indicates:  No Known Allergies  Past Medical History:   Diagnosis Date    Allergy     Bilateral leg edema     Hyperlipidemia     Hypertension     Leg pain, bilateral     left leg more severe    Varicose vein      Past Surgical History:   Procedure Laterality Date     SECTION, LOW TRANSVERSE      x 3    HYSTERECTOMY      pace maker  2020    SPINE SURGERY  2021    stents both legs       Family History   Problem Relation Age of Onset    Heart disease Mother     Heart failure Father      Social History     Tobacco Use    Smoking status: Former Smoker     Quit date: 1985     Years since quittin.5    Smokeless tobacco: Never Used   Substance Use Topics    Alcohol use: Yes    Drug use: No     Review of Systems   Constitutional: Positive for unexpected weight change (gain). Negative for fever.   HENT: Positive for rhinorrhea. Negative for sore throat.    Respiratory: Positive for cough and shortness of breath.    Cardiovascular: Positive for chest pain.   Gastrointestinal:  Negative for abdominal pain, blood in stool, diarrhea, nausea and vomiting.   Genitourinary: Negative for dysuria and hematuria.   Musculoskeletal: Negative for back pain.   Skin: Negative for rash.   Neurological: Negative for weakness.   Hematological: Does not bruise/bleed easily.       Physical Exam     Initial Vitals [06/12/22 1701]   BP Pulse Resp Temp SpO2   121/70 102 18 98.3 °F (36.8 °C) 95 %      MAP       --         Physical Exam    Nursing note and vitals reviewed.  Constitutional: She appears well-developed and well-nourished. She is not diaphoretic. No distress.   HENT:   Head: Normocephalic and atraumatic.   Eyes: EOM are normal. Pupils are equal, round, and reactive to light.   Neck: Neck supple.   Normal range of motion.  Cardiovascular: Normal rate, regular rhythm, normal heart sounds and intact distal pulses. Exam reveals no gallop and no friction rub.    No murmur heard.  Pulmonary/Chest: Breath sounds normal. No respiratory distress. She has no wheezes. She has no rhonchi. She has no rales.   Patient has a defibrillator in the L upper chest.    Abdominal: Abdomen is soft. Bowel sounds are normal. There is no abdominal tenderness. There is no rebound and no guarding.   Musculoskeletal:         General: Normal range of motion.      Cervical back: Normal range of motion and neck supple.     Neurological: She is alert and oriented to person, place, and time.   Skin: Skin is warm and dry.   Psychiatric: Her behavior is normal. Judgment and thought content normal. Her mood appears anxious.         ED Course   Procedures  Labs Reviewed   CBC W/ AUTO DIFFERENTIAL - Abnormal; Notable for the following components:       Result Value    RBC 3.79 (*)     Hemoglobin 11.7 (*)     Hematocrit 35.2 (*)     Eosinophil % 8.4 (*)     All other components within normal limits   COMPREHENSIVE METABOLIC PANEL - Abnormal; Notable for the following components:    Albumin 3.4 (*)     eGFR if non  55 (*)      All other components within normal limits   TROPONIN I - Abnormal; Notable for the following components:    Troponin I 0.600 (*)     All other components within normal limits   INFLUENZA A & B BY MOLECULAR   B-TYPE NATRIURETIC PEPTIDE   SARS-COV-2 RNA AMPLIFICATION, QUAL     EKG Readings: (Independently Interpreted)   Initial Reading: No STEMI. Rhythm: Normal Sinus Rhythm. Heart Rate: 85. Conduction: 1st Degree AV Block. Axis: Normal.   There are new Q-waves in V3,4, and 5 from previous EKG on 03/2020.        Imaging Results          X-Ray Chest PA And Lateral (In process)                  Medications   aspirin tablet 325 mg (325 mg Oral Given 6/12/22 1940)     Medical Decision Making:   History:   Old Medical Records: I decided to obtain old medical records.  Initial Assessment:   61-year-old female presents with a cough and shortness of breath.  Differential Diagnosis:   Initial differential diagnosis included but not limited to CHF, pneumonia, and viral illness.  Independently Interpreted Test(s):   I have ordered and independently interpreted X-rays - see prior notes.  I have ordered and independently interpreted EKG Reading(s) - see prior notes  Clinical Tests:   Lab Tests: Ordered and Reviewed  Radiological Study: Ordered and Reviewed  Medical Tests: Ordered and Reviewed  ED Management:  The patient was emergently evaluated in the emergency department, her evaluation was significant for an elderly female with a normal lung exam.  The patient's EKG was significant for new Q-waves in V3, V4, and V5 per my independent interpretation.  Patient's chest x-ray showed no acute abnormalities.  The patient's labs were significant for an elevated troponin, but a normal BNP and normal creatinine.  The case was discussed with our cardiologist on-call, Dr. Fry.  We both believe that the patient that the patient likely has had an MI recently.  He recommends transfer to [facility] where she can get a  cardiac catheterization and further care.  The case was discussed with the [facility] transfer center as well as the hospitalist and cardiologist as [facility].  She has been accepted there in transfer.  Other:   I have discussed this case with another health care provider.          Scribe Attestation:   Scribe #1: I performed the above scribed service and the documentation accurately describes the services I performed. I attest to the accuracy of the note.              I, Dr. Agusto Smith, personally performed the services described in this documentation. All medical record entries made by the scribe were at my direction and in my presence.  I have reviewed the chart and agree that the record reflects my personal performance and is accurate and complete. Agusto Smith MD.  8:58 PM 06/12/2022      Clinical Impression:   Final diagnoses:  [R06.02] Shortness of breath  [R05.9] Cough  [R77.8] Elevated troponin (Primary)          ED Disposition Condition    Transfer to Another Facility Stable              Agusto Smith MD  06/12/22 6676

## 2022-06-13 ENCOUNTER — CLINICAL SUPPORT (OUTPATIENT)
Dept: CARDIOLOGY | Facility: HOSPITAL | Age: 62
DRG: 194 | End: 2022-06-13
Attending: FAMILY MEDICINE
Payer: COMMERCIAL

## 2022-06-13 VITALS — HEIGHT: 67 IN | WEIGHT: 203 LBS | BODY MASS INDEX: 31.86 KG/M2

## 2022-06-13 PROBLEM — R05.9 COUGH: Status: ACTIVE | Noted: 2022-06-13

## 2022-06-13 PROBLEM — J18.9 ATYPICAL PNEUMONIA: Status: ACTIVE | Noted: 2022-06-13

## 2022-06-13 LAB
ANION GAP SERPL CALC-SCNC: 8 MMOL/L (ref 8–16)
AV INDEX (PROSTH): 0.71
AV MEAN GRADIENT: 4 MMHG
AV VALVE AREA: 2.34 CM2
BASOPHILS # BLD AUTO: 0.03 K/UL (ref 0–0.2)
BASOPHILS NFR BLD: 0.4 % (ref 0–1.9)
BSA FOR ECHO PROCEDURE: 2.09 M2
BUN SERPL-MCNC: 21 MG/DL (ref 6–20)
CALCIUM SERPL-MCNC: 8.5 MG/DL (ref 8.7–10.5)
CHLORIDE SERPL-SCNC: 104 MMOL/L (ref 95–110)
CK MB SERPL-MCNC: 0.9 NG/ML (ref 0.1–6.5)
CK SERPL-CCNC: 147 U/L (ref 20–180)
CO2 SERPL-SCNC: 26 MMOL/L (ref 23–29)
CREAT SERPL-MCNC: 0.8 MG/DL (ref 0.5–1.4)
DIFFERENTIAL METHOD: ABNORMAL
DOP CALC AO VTI: 25.01 CM
DOP CALC LVOT AREA: 3.3 CM2
DOP CALC LVOT DIAMETER: 2.05 CM
DOP CALC LVOT PEAK VEL: 90.44 M/S
DOP CALC LVOT STROKE VOLUME: 58.56 CM3
DOP CALCLVOT PEAK VEL VTI: 17.75 CM
E WAVE DECELERATION TIME: 194.76 MSEC
E/A RATIO: 0.87
E/E' RATIO: 6.42 M/S
EJECTION FRACTION: 40 %
EOSINOPHIL # BLD AUTO: 0.5 K/UL (ref 0–0.5)
EOSINOPHIL NFR BLD: 6.4 % (ref 0–8)
ERYTHROCYTE [DISTWIDTH] IN BLOOD BY AUTOMATED COUNT: 13.4 % (ref 11.5–14.5)
EST. GFR  (AFRICAN AMERICAN): >60 ML/MIN/1.73 M^2
EST. GFR  (NON AFRICAN AMERICAN): >60 ML/MIN/1.73 M^2
FRACTIONAL SHORTENING: 26 % (ref 28–44)
GLUCOSE SERPL-MCNC: 95 MG/DL (ref 70–110)
HCT VFR BLD AUTO: 33.8 % (ref 37–48.5)
HGB BLD-MCNC: 10.8 G/DL (ref 12–16)
IMM GRANULOCYTES # BLD AUTO: 0.02 K/UL (ref 0–0.04)
IMM GRANULOCYTES NFR BLD AUTO: 0.3 % (ref 0–0.5)
LEFT ATRIUM SIZE: 4.53 CM
LEFT INTERNAL DIMENSION IN SYSTOLE: 4.11 CM (ref 2.1–4)
LEFT VENTRICLE DIASTOLIC VOLUME INDEX: 84.86 ML/M2
LEFT VENTRICLE DIASTOLIC VOLUME: 172.26 ML
LEFT VENTRICLE SYSTOLIC VOLUME INDEX: 34.2 ML/M2
LEFT VENTRICLE SYSTOLIC VOLUME: 69.39 ML
LEFT VENTRICULAR INTERNAL DIMENSION IN DIASTOLE: 5.56 CM (ref 3.5–6)
LV LATERAL E/E' RATIO: 7.63 M/S
LV SEPTAL E/E' RATIO: 5.55 M/S
LYMPHOCYTES # BLD AUTO: 2.2 K/UL (ref 1–4.8)
LYMPHOCYTES NFR BLD: 31.7 % (ref 18–48)
MAGNESIUM SERPL-MCNC: 2 MG/DL (ref 1.6–2.6)
MCH RBC QN AUTO: 29.8 PG (ref 27–31)
MCHC RBC AUTO-ENTMCNC: 32 G/DL (ref 32–36)
MCV RBC AUTO: 93 FL (ref 82–98)
MONOCYTES # BLD AUTO: 0.7 K/UL (ref 0.3–1)
MONOCYTES NFR BLD: 9.9 % (ref 4–15)
MV PEAK A VEL: 0.7 M/S
MV PEAK E VEL: 0.61 M/S
NEUTROPHILS # BLD AUTO: 3.6 K/UL (ref 1.8–7.7)
NEUTROPHILS NFR BLD: 51.3 % (ref 38–73)
NRBC BLD-RTO: 0 /100 WBC
PISA TR MAX VEL: 1.34 M/S
PLATELET # BLD AUTO: 296 K/UL (ref 150–450)
PMV BLD AUTO: 9.9 FL (ref 9.2–12.9)
POTASSIUM SERPL-SCNC: 3.9 MMOL/L (ref 3.5–5.1)
RA PRESSURE: 3 MMHG
RBC # BLD AUTO: 3.63 M/UL (ref 4–5.4)
RIGHT VENTRICULAR END-DIASTOLIC DIMENSION: 332 CM
SODIUM SERPL-SCNC: 138 MMOL/L (ref 136–145)
TDI LATERAL: 0.08 M/S
TDI SEPTAL: 0.11 M/S
TDI: 0.1 M/S
TR MAX PG: 7 MMHG
TRICUSPID ANNULAR PLANE SYSTOLIC EXCURSION: 181 CM
TROPONIN I SERPL DL<=0.01 NG/ML-MCNC: <0.03 NG/ML
TROPONIN I SERPL DL<=0.01 NG/ML-MCNC: <0.03 NG/ML
TV REST PULMONARY ARTERY PRESSURE: 10 MMHG
WBC # BLD AUTO: 7.04 K/UL (ref 3.9–12.7)

## 2022-06-13 PROCEDURE — 84484 ASSAY OF TROPONIN QUANT: CPT | Mod: 91 | Performed by: FAMILY MEDICINE

## 2022-06-13 PROCEDURE — 83735 ASSAY OF MAGNESIUM: CPT | Performed by: FAMILY MEDICINE

## 2022-06-13 PROCEDURE — 25000242 PHARM REV CODE 250 ALT 637 W/ HCPCS: Performed by: FAMILY MEDICINE

## 2022-06-13 PROCEDURE — 93005 ELECTROCARDIOGRAM TRACING: CPT | Performed by: INTERNAL MEDICINE

## 2022-06-13 PROCEDURE — 21400001 HC TELEMETRY ROOM

## 2022-06-13 PROCEDURE — 82553 CREATINE MB FRACTION: CPT | Performed by: FAMILY MEDICINE

## 2022-06-13 PROCEDURE — 84484 ASSAY OF TROPONIN QUANT: CPT | Performed by: FAMILY MEDICINE

## 2022-06-13 PROCEDURE — 93010 EKG 12-LEAD: ICD-10-PCS | Mod: ,,, | Performed by: INTERNAL MEDICINE

## 2022-06-13 PROCEDURE — 25000003 PHARM REV CODE 250: Performed by: FAMILY MEDICINE

## 2022-06-13 PROCEDURE — 63600175 PHARM REV CODE 636 W HCPCS: Performed by: FAMILY MEDICINE

## 2022-06-13 PROCEDURE — 93306 TTE W/DOPPLER COMPLETE: CPT

## 2022-06-13 PROCEDURE — 63600175 PHARM REV CODE 636 W HCPCS: Performed by: HOSPITALIST

## 2022-06-13 PROCEDURE — 80048 BASIC METABOLIC PNL TOTAL CA: CPT | Performed by: FAMILY MEDICINE

## 2022-06-13 PROCEDURE — 63700000 PHARM REV CODE 250 ALT 637 W/O HCPCS: Performed by: HOSPITALIST

## 2022-06-13 PROCEDURE — 94761 N-INVAS EAR/PLS OXIMETRY MLT: CPT

## 2022-06-13 PROCEDURE — 94640 AIRWAY INHALATION TREATMENT: CPT

## 2022-06-13 PROCEDURE — 93010 ELECTROCARDIOGRAM REPORT: CPT | Mod: ,,, | Performed by: INTERNAL MEDICINE

## 2022-06-13 PROCEDURE — 93306 ECHO (CUPID ONLY): ICD-10-PCS | Mod: 26,,, | Performed by: INTERNAL MEDICINE

## 2022-06-13 PROCEDURE — 99900031 HC PATIENT EDUCATION (STAT)

## 2022-06-13 PROCEDURE — 25500020 PHARM REV CODE 255: Performed by: HOSPITALIST

## 2022-06-13 PROCEDURE — 99223 1ST HOSP IP/OBS HIGH 75: CPT | Mod: ,,, | Performed by: INTERNAL MEDICINE

## 2022-06-13 PROCEDURE — 99900035 HC TECH TIME PER 15 MIN (STAT)

## 2022-06-13 PROCEDURE — 36415 COLL VENOUS BLD VENIPUNCTURE: CPT | Performed by: FAMILY MEDICINE

## 2022-06-13 PROCEDURE — 82550 ASSAY OF CK (CPK): CPT | Performed by: FAMILY MEDICINE

## 2022-06-13 PROCEDURE — 85025 COMPLETE CBC W/AUTO DIFF WBC: CPT | Performed by: FAMILY MEDICINE

## 2022-06-13 PROCEDURE — 93306 TTE W/DOPPLER COMPLETE: CPT | Mod: 26,,, | Performed by: INTERNAL MEDICINE

## 2022-06-13 PROCEDURE — 99223 PR INITIAL HOSPITAL CARE,LEVL III: ICD-10-PCS | Mod: ,,, | Performed by: INTERNAL MEDICINE

## 2022-06-13 PROCEDURE — 25000003 PHARM REV CODE 250: Performed by: HOSPITALIST

## 2022-06-13 RX ORDER — AZITHROMYCIN 250 MG/1
500 TABLET, FILM COATED ORAL ONCE
Status: COMPLETED | OUTPATIENT
Start: 2022-06-13 | End: 2022-06-13

## 2022-06-13 RX ORDER — AZITHROMYCIN 250 MG/1
250 TABLET, FILM COATED ORAL DAILY
Status: DISCONTINUED | OUTPATIENT
Start: 2022-06-14 | End: 2022-06-14 | Stop reason: HOSPADM

## 2022-06-13 RX ADMIN — SACUBITRIL AND VALSARTAN 1 TABLET: 49; 51 TABLET, FILM COATED ORAL at 12:06

## 2022-06-13 RX ADMIN — Medication 6 MG: at 08:06

## 2022-06-13 RX ADMIN — AZITHROMYCIN MONOHYDRATE 500 MG: 250 TABLET ORAL at 12:06

## 2022-06-13 RX ADMIN — ATORVASTATIN CALCIUM 20 MG: 20 TABLET, FILM COATED ORAL at 12:06

## 2022-06-13 RX ADMIN — GUAIFENESIN AND CODEINE PHOSPHATE 5 ML: 100; 10 SOLUTION ORAL at 08:06

## 2022-06-13 RX ADMIN — GUAIFENESIN AND CODEINE PHOSPHATE 5 ML: 100; 10 SOLUTION ORAL at 05:06

## 2022-06-13 RX ADMIN — SPIRONOLACTONE 50 MG: 50 TABLET ORAL at 12:06

## 2022-06-13 RX ADMIN — CARVEDILOL 6.25 MG: 6.25 TABLET, FILM COATED ORAL at 12:06

## 2022-06-13 RX ADMIN — BUDESONIDE 0.5 MG: 0.5 INHALANT RESPIRATORY (INHALATION) at 08:06

## 2022-06-13 RX ADMIN — ACETAMINOPHEN 650 MG: 325 TABLET ORAL at 01:06

## 2022-06-13 RX ADMIN — ESCITALOPRAM OXALATE 10 MG: 10 TABLET ORAL at 12:06

## 2022-06-13 RX ADMIN — EZETIMIBE 10 MG: 10 TABLET ORAL at 12:06

## 2022-06-13 RX ADMIN — CARVEDILOL 6.25 MG: 6.25 TABLET, FILM COATED ORAL at 08:06

## 2022-06-13 RX ADMIN — FUROSEMIDE 40 MG: 40 TABLET ORAL at 12:06

## 2022-06-13 RX ADMIN — GUAIFENESIN AND CODEINE PHOSPHATE 5 ML: 100; 10 SOLUTION ORAL at 09:06

## 2022-06-13 RX ADMIN — IPRATROPIUM BROMIDE AND ALBUTEROL SULFATE 3 ML: .5; 3 SOLUTION RESPIRATORY (INHALATION) at 01:06

## 2022-06-13 RX ADMIN — METHYLPREDNISOLONE SODIUM SUCCINATE 60 MG: 40 INJECTION, POWDER, FOR SOLUTION INTRAMUSCULAR; INTRAVENOUS at 12:06

## 2022-06-13 RX ADMIN — ACETAMINOPHEN 650 MG: 325 TABLET ORAL at 08:06

## 2022-06-13 RX ADMIN — SACUBITRIL AND VALSARTAN 1 TABLET: 49; 51 TABLET, FILM COATED ORAL at 08:06

## 2022-06-13 RX ADMIN — ENOXAPARIN SODIUM 40 MG: 40 INJECTION SUBCUTANEOUS at 05:06

## 2022-06-13 RX ADMIN — ASPIRIN 325 MG ORAL TABLET 325 MG: 325 PILL ORAL at 12:06

## 2022-06-13 RX ADMIN — IOHEXOL 100 ML: 350 INJECTION, SOLUTION INTRAVENOUS at 11:06

## 2022-06-13 RX ADMIN — IPRATROPIUM BROMIDE AND ALBUTEROL SULFATE 3 ML: .5; 3 SOLUTION RESPIRATORY (INHALATION) at 08:06

## 2022-06-13 RX ADMIN — CEFTRIAXONE SODIUM 1 G: 1 INJECTION, POWDER, FOR SOLUTION INTRAMUSCULAR; INTRAVENOUS at 01:06

## 2022-06-13 RX ADMIN — BUDESONIDE 0.5 MG: 0.5 INHALANT RESPIRATORY (INHALATION) at 01:06

## 2022-06-13 RX ADMIN — PANTOPRAZOLE SODIUM 40 MG: 40 TABLET, DELAYED RELEASE ORAL at 05:06

## 2022-06-13 NOTE — HPI
61-year-old with a past medical history significant for CHF, AICD placed 9/20 hypertension, hyperlipidemia transferred from [place] for suspected recent NSTEMI.    Symptoms initially started with intractable nonproductive cough 4 days ago.  COVID and flu negative.  Worse lying down.  Associated chest pain with the cough.  Chest pain is nonexertional and no associated symptoms of diaphoresis palpitations.  Patient has been taking guaifenesin with codeine with some improvement of the cough.    Patient reports history of CHF.  At 1 point her EF was 25% for which an AICD was placed.  She reports her most recent EF few months ago was around  50%  Reports that she last had a cardiac catheterization 2020. She has had any stents placed.      At [place] ED patient was noted have elevated troponin at 0.6.  EKG significant for  new Q-waves in V3, V4, and V5.  Lab work otherwise unrevealing  Chest x-ray without cardiomegaly or evidence of consolidation CHF on my read.  Official radiology read to follow.    Family history:  CAD-father, 2 sisters  Cancer-denies    Social history  Remote tobacco use when teenager.  No current use.

## 2022-06-13 NOTE — H&P
[facility]  History & Physical    Patient Name: Daylin Lynch  MRN: [id number]  Patient Class: IP- Inpatient  Admission Date: 6/12/2022  Attending Physician: Cam Buck MD  Primary Care Provider: Jonathon Muñoz NP         Patient information was obtained from patient, spouse/SO, past medical records and ER records.     Subjective:     Principal Problem:NSTEMI (non-ST elevated myocardial infarction)    Chief Complaint: No chief complaint on file.       HPI: 61-year-old with a past medical history significant for CHF, AICD placed 9/20 hypertension, hyperlipidemia transferred from [facility] for suspected recent NSTEMI.    Symptoms initially started with intractable nonproductive cough 4 days ago.  COVID and flu negative.  Worse lying down.  Associated chest pain with the cough.  Chest pain is nonexertional and no associated symptoms of diaphoresis palpitations.  Patient has been taking guaifenesin with codeine with some improvement of the cough.    Patient reports history of CHF.  At 1 point her EF was 25% for which an AICD was placed.  She reports her most recent EF few months ago was around  50%  Reports that she last had a cardiac catheterization 2020. She has had any stents placed.      At [facility] ED patient was noted have elevated troponin at 0.6.  EKG significant for  new Q-waves in V3, V4, and V5.  Lab work otherwise unrevealing  Chest x-ray without cardiomegaly or evidence of consolidation CHF on my read.  Official radiology read to follow.    Family history:  CAD-father, 2 sisters  Cancer-denies    Social history  Remote tobacco use when teenager.  No current use.      No new subjective & objective note has been filed under this hospital service since the last note was generated.    Assessment/Plan:     * NSTEMI (non-ST elevated myocardial infarction)  Subacute given new Q-waves.  Troponin elevated at 0.6.  Trend Troponins  Consult cardiology  NPO after midnight in  anticipation cardiac catheterization  Will obtain limited echo to evaluate EF.    Multiple chronic diseases    Continue home medications      VTE Risk Mitigation (From admission, onward)         Ordered     enoxaparin injection 40 mg  Daily         06/12/22 2239     IP VTE HIGH RISK PATIENT  Once         06/12/22 2239     Place sequential compression device  Until discontinued         06/12/22 2239                   Cam Buck MD  Department of Hospital Medicine   [facility]

## 2022-06-13 NOTE — NURSING
Pt arrived to room via EMS. Pt denies any chest pains. Pt oriented to room, call light in reach, bed in lowest position, bed wheels locked. Educated on fall risk, pt verbalizes understanding and declines bed alarm. No acute distress noted at this time.

## 2022-06-13 NOTE — PROGRESS NOTES
[facility]  Progress Note    Patient name: Daylin Lynch  MRN: [id number]  Admit Date: 6/12/2022   LOS: 1 day     SUBJECTIVE:     Principal problem: Atypical pneumonia    Interval History:  Patient was seen and examined bedside, upon further asking patient is mentions that her main problem is constant cough, cough related chest pain that led to presentation to ED.  no active chest pain on my interview.  Cardiac enzymes negative x2.     Scheduled Meds:   aspirin  325 mg Oral Daily    atorvastatin  20 mg Oral Daily    [START ON 6/14/2022] azithromycin  250 mg Oral Daily    budesonide  0.5 mg Nebulization Q12H    carvediloL  6.25 mg Oral BID    cefTRIAXone (ROCEPHIN) IVPB  1 g Intravenous Q24H    enoxaparin  40 mg Subcutaneous Daily    EScitalopram oxalate  10 mg Oral Daily    ezetimibe  10 mg Oral Daily    furosemide  40 mg Oral Daily    pantoprazole  40 mg Oral Before breakfast    sacubitriL-valsartan  1 tablet Oral BID    spironolactone  50 mg Oral Daily     Continuous Infusions:  PRN Meds:acetaminophen, acetaminophen, albuterol-ipratropium, aluminum-magnesium hydroxide-simethicone, dextrose 10%, dextrose 10%, glucagon (human recombinant), glucose, glucose, guaiFENesin-codeine 100-10 mg/5 ml, HYDROcodone-acetaminophen, magnesium oxide, magnesium oxide, melatonin, morphine, naloxone, ondansetron, potassium bicarbonate, potassium bicarbonate, potassium bicarbonate, prochlorperazine, sodium chloride 0.9%    Review of patient's allergies indicates:  No Known Allergies    Review of Systems: As per interval history    OBJECTIVE:     Vital Signs (Most Recent)  Temp: 98.4 °F (36.9 °C) (06/13/22 1518)  Pulse: 70 (06/13/22 1518)  Resp: 20 (06/13/22 1518)  BP: (!) 101/59 (06/13/22 1518)  SpO2: 97 % (06/13/22 1518)    Vital Signs Range (Last 24H):  Temp:  [97.6 °F (36.4 °C)-98.5 °F (36.9 °C)]   Pulse:  []   Resp:  [18-20]   BP: ()/(52-89)   SpO2:  [93 %-97 %]     I & O  (Last 24H):    Intake/Output Summary (Last 24 hours) at 6/13/2022 1658  Last data filed at 6/13/2022 1508  Gross per 24 hour   Intake 120 ml   Output 2200 ml   Net -2080 ml       Physical Exam:  General: Patient resting comfortably in no acute distress. Appears as stated age. Calm  Eyes: No conjunctival injection. No scleral icterus.  ENT: Hearing grossly intact. No discharge from ears. No nasal discharge.   Neck: Supple, trachea midline. No JVD  CVS: RRR. No LE edema BL  Lungs:  Bilateral wheezing noted, left lower lung rhonchi noted, No tachypnea or accessory muscle use.   Abdomen:  Soft, nontender and nondistended.  No organomegaly  Neuro: AOx3. Moves all extremities. Follows commands. Responds appropriately   Skin:  No rash or erythema noted    Laboratory:  I have reviewed all pertinent lab results within the past 24 hours.    Diagnostic Results:  Reviewed all imaging    ASSESSMENT/PLAN:     61-year-old lady with numerous medical problems including chronic 6 systolic CHF who has been suffering with dry cough presented to ED with cough and cough related pain found to have mildly elevated troponin and was subsequently transferred to [facility].  Overall symptomatology is more consistent with pulmonary in origin, today CTA chest showed atypical pneumonia    Active Hospital Problems    Diagnosis  POA    *Atypical pneumonia [J18.9]  Yes    Cough [R05.9]  Yes    Multiple chronic diseases [R69]  Yes    Chronic systolic heart failure [I50.22]  Yes    Dyspnea on exertion [R06.00]  Yes    Hypertension [I10]  Yes      Resolved Hospital Problems   No resolved problems to display.         Plan:   Overall her symptomatology is more consistent with pulmonary etiology.  She has wheezing, some rhonchi in left lower lung field, ordered CTA chest which confirmed atypical pneumonia  Will start Rocephin and azithromycin  Will give 1 dose of IV steroid  DuoNebs, antitussives  Discussed with Cardiology, no suspicion for acute coronary  syndrome  Incentive spirometer  2D echo findings noted  Continue essential home medications      VTE Risk Mitigation (From admission, onward)         Ordered     enoxaparin injection 40 mg  Daily         06/12/22 2239     IP VTE HIGH RISK PATIENT  Once         06/12/22 2239     Place sequential compression device  Until discontinued         06/12/22 2239                    Department Hospital Medicine  [facility]  Stephenie Claery MD  Date of service: 06/13/2022

## 2022-06-13 NOTE — CONSULTS
[facility]  Department of Cardiology  Consult Note      PATIENT NAME: Daylin Lynch  MRN: [id number]  TODAY'S DATE: 06/13/2022  ADMIT DATE: 6/12/2022                          CONSULT REQUESTED BY: Stephenie Cleary MD    SUBJECTIVE     PRINCIPAL PROBLEM: NSTEMI (non-ST elevated myocardial infarction)      REASON FOR CONSULT:  Chest pain       HPI:  61-year-old female with a past medical history of CHF status post AICD placement, central hypertension who presented to the ED at [facility] for suspected NSTEMI with a troponin of 0.6.  However repeat troponin draws here at [facility] revealed 2-troponins.  She states that she has had a chronic nonproductive cough since January and has been treated for bronchitis with no improvement.  She has chest pain with coughing as well as shortness of breath.  She does not know what started the cough.  She has been COVID and flu negative.  There reports that there were new Q-waves in anterior lateral leads on EKG at [facility] however I do not see any recent EKG in the chart both paper or electronic.     Per H and P:    61-year-old with a past medical history significant for CHF, AICD placed 9/20 hypertension, hyperlipidemia transferred from [facility] for suspected recent NSTEMI.     Symptoms initially started with intractable nonproductive cough 4 days ago.  COVID and flu negative.  Worse lying down.  Associated chest pain with the cough.  Chest pain is nonexertional and no associated symptoms of diaphoresis palpitations.  Patient has been taking guaifenesin with codeine with some improvement of the cough.     Patient reports history of CHF.  At 1 point her EF was 25% for which an AICD was placed.  She reports her most recent EF few months ago was around  50%  Reports that she last had a cardiac catheterization 2020. She has had any stents placed.       At [facility] ED patient was noted have elevated troponin at 0.6.  EKG significant for  new Q-waves in V3, V4, and  V5.  Lab work otherwise unrevealing  Chest x-ray without cardiomegaly or evidence of consolidation CHF on my read.  Official radiology read to follow.    Review of patient's allergies indicates:  No Known Allergies    Past Medical History:   Diagnosis Date    Allergy     Bilateral leg edema     Hyperlipidemia     Hypertension     Leg pain, bilateral     left leg more severe    Varicose vein      Past Surgical History:   Procedure Laterality Date     SECTION, LOW TRANSVERSE      x 3    HYSTERECTOMY      pace maker  2020    SPINE SURGERY  2021    stents both legs       Social History     Tobacco Use    Smoking status: Former Smoker     Quit date: 1985     Years since quittin.5    Smokeless tobacco: Never Used   Substance Use Topics    Alcohol use: Yes    Drug use: No        REVIEW OF SYSTEMS  CONSTITUTIONAL: Negative for chills, fatigue and fever.   EYES: No double vision, No blurred vision  NEURO: No headaches, No dizziness  RESPIRATORY: + cough + SOB    CARDIOVASCULAR: + pleuritic chest pains.  Negative for palpitations and leg swelling.   GI: Negative for abdominal pain, No melena, diarrhea, nausea and vomiting.   : Negative for dysuria and frequency, Negative for hematuria  SKIN: Negative for bruising, Negative for edema or discoloration noted.   ENDOCRINE: Negative for polyphagia, Negative for heat intolerance, Negative for cold intolerance  PSYCHIATRIC: Negative for depression, Negative for anxiety, Negative for memory loss  MUSCULOSKELETAL: Negative for neck pain, Negative for muscle weakness, Negative for back pain     OBJECTIVE     VITAL SIGNS (Most Recent)  Temp: 98.1 °F (36.7 °C) (22 0712)  Pulse: 81 (22 0807)  Resp: 20 (22 0850)  BP: 108/66 (22 0712)  SpO2: 97 % (22 0807)    VENTILATION STATUS  Resp: 20 (22 0850)  SpO2: 97 % (22 0807)       I & O (Last 24H):    Intake/Output Summary (Last 24 hours) at 2022  1105  Last data filed at 6/13/2022 0800  Gross per 24 hour   Intake 0 ml   Output 800 ml   Net -800 ml       WEIGHTS  Wt Readings from Last 3 Encounters:   06/13/22 0300 92.5 kg (203 lb 14.8 oz)   06/12/22 2140 92.5 kg (203 lb 14.8 oz)   06/13/22 1053 92.1 kg (203 lb)   06/12/22 1701 92.5 kg (204 lb)       PHYSICAL EXAM  GENERAL: well built, well nourished, well-developed in no apparent distress alert and oriented.   HEENT: Normocephalic. Pupils normal and conjunctivae normal.  Mucous membranes normal, no cyanosis or icterus, trachea central,no pallor or icterus is noted..   NECK: No JVD. No bruit..   THYROID: Thyroid not enlarged. No nodules present..   CARDIAC: Regular rate and rhythm. S1 is normal.S2 is normal.No gallops, clicks or murmurs noted at this time.  CHEST ANATOMY: normal.   LUNGS: Expiratory wheezes throughout    ABDOMEN: Soft no masses or organomegaly.  No abdomen pulsations or bruits.  Normal bowel sounds. No pulsations and no masses felt, No guarding or rebound.   URINARY: No pardo catheter   EXTREMITIES: Mild nonpitting edema   PERIPHERAL VASCULAR SYSTEM: Good palpable distal pulses.   CENTRAL NERVOUS SYSTEM: No focal motor or sensory deficits noted.   SKIN: Skin without lesions, moist, well perfused.   MUSCLE STRENGTH & TONE: No noteable weakness, atrophy or abnormal movement.     HOME MEDICATIONS:  Current Facility-Administered Medications on File Prior to Encounter   Medication Dose Route Frequency Provider Last Rate Last Admin    [COMPLETED] aspirin tablet 325 mg  325 mg Oral ED 1 Time Agusto Smith MD   325 mg at 06/12/22 1940     Current Outpatient Medications on File Prior to Encounter   Medication Sig Dispense Refill    aspirin 325 MG tablet Take 325 mg by mouth once daily.      carvediloL (COREG) 6.25 MG tablet Take 6.25 mg by mouth 2 (two) times daily.       cetirizine (ZYRTEC) 10 MG tablet Take 1 tablet (10 mg total) by mouth once daily.  0    cholecalciferol, vitamin D3, 1,250  mcg (50,000 unit) capsule cholecalciferol (vitamin D3) 1,250 mcg (50,000 unit) capsule   Take 1 capsule every week by oral route for 84 days.      escitalopram oxalate (LEXAPRO) 10 MG tablet Take 10 mg by mouth once daily.      ezetimibe (ZETIA) 10 mg tablet 1 tablet once daily.      furosemide (LASIX) 40 MG tablet 1 tablet as needed.      guaiFENesin-codeine 100-10 mg/5 ml (TUSSI-ORGANIDIN NR)  mg/5 mL syrup Take 5 mLs by mouth nightly as needed for Cough. 118 mL 1    melatonin 10 mg Tab Take 2 tablets by mouth nightly as needed.      pantoprazole (PROTONIX) 40 MG tablet Take 1 tablet (40 mg total) by mouth once daily. 30 tablet 1    rosuvastatin (CRESTOR) 5 MG tablet Take 5 mg by mouth once daily.      sacubitriL-valsartan (ENTRESTO) 49-51 mg per tablet Entresto 49 mg-51 mg tablet   Take 1 tablet twice a day by oral route.      spironolactone (ALDACTONE) 50 MG tablet Take 50 mg by mouth once daily.         SCHEDULED MEDS:   aspirin  325 mg Oral Daily    atorvastatin  20 mg Oral Daily    [START ON 6/14/2022] azithromycin  250 mg Oral Daily    azithromycin  500 mg Oral Once    budesonide  0.5 mg Nebulization Q12H    carvediloL  6.25 mg Oral BID    enoxaparin  40 mg Subcutaneous Daily    EScitalopram oxalate  10 mg Oral Daily    ezetimibe  10 mg Oral Daily    furosemide  40 mg Oral Daily    methylPREDNISolone sodium succinate injection  60 mg Intravenous Once    pantoprazole  40 mg Oral Before breakfast    sacubitriL-valsartan  1 tablet Oral BID    spironolactone  50 mg Oral Daily       CONTINUOUS INFUSIONS:    PRN MEDS:acetaminophen, acetaminophen, albuterol-ipratropium, aluminum-magnesium hydroxide-simethicone, dextrose 10%, dextrose 10%, glucagon (human recombinant), glucose, glucose, guaiFENesin-codeine 100-10 mg/5 ml, HYDROcodone-acetaminophen, magnesium oxide, magnesium oxide, melatonin, morphine, naloxone, ondansetron, potassium bicarbonate, potassium bicarbonate, potassium  bicarbonate, prochlorperazine, sodium chloride 0.9%    LABS AND DIAGNOSTICS     CBC LAST 3 DAYS  Recent Labs   Lab 06/12/22 1715 06/13/22  0433   WBC 5.98 7.04   RBC 3.79* 3.63*   HGB 11.7* 10.8*   HCT 35.2* 33.8*   MCV 93 93   MCH 30.9 29.8   MCHC 33.2 32.0   RDW 13.3 13.4    296   MPV 9.8 9.9   GRAN 46.1  2.8 51.3  3.6   LYMPH 34.1  2.0 31.7  2.2   MONO 10.4  0.6 9.9  0.7   BASO 0.05 0.03   NRBC 0 0       COAGULATION LAST 3 DAYS  No results for input(s): LABPT, INR, APTT in the last 168 hours.    CHEMISTRY LAST 3 DAYS  Recent Labs   Lab 06/12/22 1715 06/13/22  0433    138   K 4.0 3.9    104   CO2 23 26   ANIONGAP 11 8   BUN 18 21*   CREATININE 1.1 0.8    95   CALCIUM 8.9 8.5*   MG  --  2.0   ALBUMIN 3.4*  --    PROT 6.8  --    ALKPHOS 61  --    ALT 17  --    AST 28  --    BILITOT 0.3  --        CARDIAC PROFILE LAST 3 DAYS  Recent Labs   Lab 06/12/22 1715 06/13/22  0030 06/13/22  0433   BNP 20  --   --    CPK  --   --  147   CPKMB  --   --  0.9   TROPONINI 0.600* <0.030 <0.030       ENDOCRINE LAST 3 DAYS  No results for input(s): TSH, PROCAL in the last 168 hours.    LAST ARTERIAL BLOOD GAS  ABG  No results for input(s): PH, PO2, PCO2, HCO3, BE in the last 168 hours.    LAST 7 DAYS MICROBIOLOGY   Microbiology Results (last 7 days)     ** No results found for the last 168 hours. **          MOST RECENT IMAGING  X-Ray Chest PA And Lateral  Narrative: EXAMINATION:  XR CHEST PA AND LATERAL    CLINICAL HISTORY:  Cough, unspecified    TECHNIQUE:  PA and lateral views of the chest were performed.    COMPARISON:  Chest of June 9, 2022    FINDINGS:  An AICD is noted in place on the left with leads to the right heart.  The cardiac size is within normal limits.  Intrapulmonary mass or infiltrate is not seen.  There is no pneumothorax or pleural effusion.  Impression: AICD in place otherwise negative chest    Electronically signed by: Joseph Garza,  MD  Date:    06/13/2022  Time:    07:52      ECHOCARDIOGRAM RESULTS (last 5)  Results for orders placed during the hospital encounter of 03/16/20    Echo Color Flow Doppler? Yes    Interpretation Summary  · Mild eccentric left ventricular hypertrophy.  · Normal right ventricular systolic function.  · Moderate mitral regurgitation.  · Mild left ventricular enlargement.  · Moderately decreased left ventricular systolic function. The estimated ejection fraction is 40%.  · Grade I (mild) left ventricular diastolic dysfunction consistent with impaired relaxation.      CURRENT/PREVIOUS VISIT EKG  Results for orders placed or performed during the hospital encounter of 03/16/20   EKG 12-lead    Collection Time: 03/16/20  5:34 PM    Narrative    Test Reason : R55,    Vent. Rate : 080 BPM     Atrial Rate : 080 BPM     P-R Int : 192 ms          QRS Dur : 108 ms      QT Int : 402 ms       P-R-T Axes : 017 -21 073 degrees     QTc Int : 463 ms    Normal sinus rhythm  Minimal voltage criteria for LVH, may be normal variant  Nonspecific T wave abnormality  Prolonged QT  Abnormal ECG  When compared with ECG of 09-OCT-2017 08:09,  Inverted T waves have replaced nonspecific T wave abnormality in Lateral  leads  Confirmed by Janak Ames MD ([id number]) on 3/17/2020 4:30:02 PM    Referred By: [email or web address]   SELF           Confirmed By:Janak Ames MD           ASSESSMENT/PLAN:     Active Hospital Problems    Diagnosis    *NSTEMI (non-ST elevated myocardial infarction)    Multiple chronic diseases    Chronic systolic heart failure    Dyspnea on exertion    Hypertension       ASSESSMENT & PLAN:   # Chest pain   # Dry cough   # Chronic systolic heart failure AICD in situ   # Essential HTN       RECOMMENDATIONS:  Echo interpretation pending   Repeat EKG to assess for reported Q-waves  Patient what seems to have been an erroneous troponin elevated at [facility]   Chest pain occurs with cough as it has been nagging for 6 months   Discussed  with primary who is ordering CT Scan as well as steroids and antibiotics   She can follow up with primary cardiologists upon discharge         Lacy Briones PA-C  [facility]  Department of Cardiology  Date of Service: 06/13/2022

## 2022-06-13 NOTE — ASSESSMENT & PLAN NOTE
Subacute given new Q-waves.  Troponin elevated at 0.6.  Trend Troponins  Consult cardiology  NPO after midnight in anticipation cardiac catheterization  Will obtain limited echo to evaluate EF.

## 2022-06-13 NOTE — ED NOTES
Daylin Lynch now accepted in transfer to [facility] room 2504 by Dr. Buck; call report to:  [phone].  Transfer Center arranging ASAP transport.

## 2022-06-13 NOTE — PLAN OF CARE
[facility]  Initial Discharge Assessment       Primary Care Provider: Jonathon Muñoz NP    Admission Diagnosis: NSTEMI (non-ST elevated myocardial infarction) [I21.4]    Admission Date: 6/12/2022  Expected Discharge Date:     Discharge Barriers Identified: (P) None    Payor: BLUE CROSS BLUE SHIELD / Plan: [place] PPO / Product Type: PPO /     Extended Emergency Contact Information  Primary Emergency Contact: Boy Lynch  Address: [address] [place] [address]           [place] [address] [facility]  Home Phone: [phone]  Mobile Phone: [phone]  Relation: Spouse    Discharge Plan A: (P) Home with family  Discharge Plan B: (P) Home with family      [id number] [place] - [address]  [address] [facility] [place] [address]  Phone: [phone] Fax: [phone]      Initial Assessment (most recent)       Adult Discharge Assessment - 06/13/22 1450          Discharge Assessment    Assessment Type Discharge Planning Assessment (P)      Confirmed/corrected address, phone number and insurance Yes (P)      Confirmed Demographics Correct on Facesheet (P)      Source of Information patient;family (P)      When was your last doctors appointment? 06/09/22 (P)      Does patient/caregiver understand observation status Yes (P)      Communicated ARRON with patient/caregiver Date not available/Unable to determine (P)      Reason For Admission possible NSTEMI (P)      Lives With spouse (P)      Facility Arrived From: home (P)      Do you expect to return to your current living situation? Yes (P)      Do you have help at home or someone to help you manage your care at home? Yes (P)      Who are your caregiver(s) and their phone number(s)? Boy Lynch (Spouse)   [phone] (Mobile) (P)      Prior to hospitilization cognitive status: Alert/Oriented (P)      Current cognitive status: Alert/Oriented (P)      Walking or Climbing Stairs Difficulty none (P)      Dressing/Bathing  Difficulty none (P)      Equipment Currently Used at Home none (P)      Readmission within 30 days? No (P)      Patient currently being followed by outpatient case management? No (P)      Do you currently have service(s) that help you manage your care at home? No (P)      Do you take prescription medications? Yes (P)      Do you have prescription coverage? Yes (P)      Coverage [place] (P)      Do you have any problems affording any of your prescribed medications? No (P)      Is the patient taking medications as prescribed? yes (P)      Who is going to help you get home at discharge? Boy Lynch (Spouse)   [phone] (Mobile) (P)      How do you get to doctors appointments? car, drives self (P)      Are you on dialysis? No (P)      Do you take coumadin? No (P)      Discharge Plan A Home with family (P)      Discharge Plan B Home with family (P)      DME Needed Upon Discharge  none (P)      Discharge Plan discussed with: Spouse/sig other;Patient (P)      Name(s) and Number(s) Boy Lynch (Spouse)   [phone] (Mobile) (P)      Discharge Barriers Identified None (P)         Relationship/Environment    Name(s) of Who Lives With Patient Boy Lynch (Spouse)   [phone] (Mobile) (P)

## 2022-06-13 NOTE — PROVIDER TRANSFER
(Physician in Lead of Transfers)   Outside Transfer Acceptance Note / Regional Referral Center    Referring facility: [facility]   Referring provider: LORIE PETE  Accepting facility: [facility]  Accepting provider: CESAR WHITE  Reason for transfer:  Higher level of care  Transfer diagnosis: NSTEMI  Transfer specialty requested: Cardiology  Transfer specialty notified: Yes  Transfer level: 2  Isolation status: No active isolations   Admission class or status: IP- Inpatient      Narrative     Patient is a 61 y.o. female who has a past medical history of Allergy, Bilateral leg edema, Hyperlipidemia, Hypertension, Leg pain, bilateral, and Varicose vein presented with chest pain. Work up showed elevated troponin of 0.600. EKG with no ST changes. See below labs and imaging. Hemodynamically stable. No evidence of fluid overload and BNP WNL. Facility seeking transfer for cardiology evaluation. Stable for transfer.     Objective     Vitals: Temp: 98.3 °F (36.8 °C) (06/12/22 1701)  Pulse: 83 (06/12/22 1903)  Resp: 18 (06/12/22 1701)  BP: (!) 102/59 (06/12/22 1903)  SpO2: 95 % (06/12/22 1903)    Recent Labs: see EPIC  CBC:  Recent Labs   Lab 06/12/22  1715   WBC 5.98   HGB 11.7*   HCT 35.2*        CMP:  Recent Labs   Lab 06/12/22  1715   CALCIUM 8.9   ALBUMIN 3.4*   PROT 6.8      K 4.0   CO2 23      BUN 18   CREATININE 1.1   ALKPHOS 61   ALT 17   AST 28   BILITOT 0.3     No results for input(s): LACTATE in the last 72 hours.  Recent Labs   Lab 06/12/22  1715   TROPONINI 0.600*     BNP  Recent Labs   Lab 06/12/22  1715   BNP 20     ABG  No results for input(s): PH, PO2, PCO2, HCO3, BE in the last 168 hours.   Lab Results   Component Value Date    INR 1.0 12/13/2013       Recent imaging:   X-Ray Chest PA And Lateral  Narrative: EXAMINATION:  XR CHEST PA AND LATERAL    CLINICAL HISTORY:  Chronic cough    FINDINGS:  PA and lateral chest compared with 03/16/2020 shows  normal cardiomediastinal silhouette. Dual lead left transvenous ICD has been placed in the interval.    Lungs are clear. Pulmonary vasculature is normal. No acute osseous abnormality.  Impression: No acute cardiopulmonary abnormality.    Electronically signed by: Vince Harvey MD  Date:    06/10/2022  Time:    06:44      Airway:     Vent settings:     IV access:        Peripheral IV - Single Lumen 06/12/22 1740 20 G Right Wrist (Active)   Line Status Saline locked;Flushed 06/12/22 1743     Allergies: Review of patient's allergies indicates:  No Known Allergies   NPO: No      Anticoagulation:   Anticoagulants     None           Instructions      [facility]  Admit to Hospital Medicine  Upon patient arrival to floor, please contact Hospital Medicine on call.

## 2022-06-14 VITALS
BODY MASS INDEX: 31.38 KG/M2 | OXYGEN SATURATION: 95 % | RESPIRATION RATE: 18 BRPM | TEMPERATURE: 98 F | HEART RATE: 83 BPM | DIASTOLIC BLOOD PRESSURE: 67 MMHG | WEIGHT: 199.94 LBS | SYSTOLIC BLOOD PRESSURE: 126 MMHG | HEIGHT: 67 IN

## 2022-06-14 PROBLEM — R06.09 DYSPNEA ON EXERTION: Status: RESOLVED | Noted: 2020-06-01 | Resolved: 2022-06-14

## 2022-06-14 LAB
ANION GAP SERPL CALC-SCNC: 11 MMOL/L (ref 8–16)
BASOPHILS # BLD AUTO: 0.01 K/UL (ref 0–0.2)
BASOPHILS NFR BLD: 0.1 % (ref 0–1.9)
BUN SERPL-MCNC: 22 MG/DL (ref 6–20)
CALCIUM SERPL-MCNC: 8.7 MG/DL (ref 8.7–10.5)
CHLORIDE SERPL-SCNC: 103 MMOL/L (ref 95–110)
CO2 SERPL-SCNC: 21 MMOL/L (ref 23–29)
CREAT SERPL-MCNC: 0.7 MG/DL (ref 0.5–1.4)
DIFFERENTIAL METHOD: ABNORMAL
EOSINOPHIL # BLD AUTO: 0 K/UL (ref 0–0.5)
EOSINOPHIL NFR BLD: 0 % (ref 0–8)
ERYTHROCYTE [DISTWIDTH] IN BLOOD BY AUTOMATED COUNT: 13.2 % (ref 11.5–14.5)
EST. GFR  (AFRICAN AMERICAN): >60 ML/MIN/1.73 M^2
EST. GFR  (NON AFRICAN AMERICAN): >60 ML/MIN/1.73 M^2
GLUCOSE SERPL-MCNC: 238 MG/DL (ref 70–110)
HCT VFR BLD AUTO: 36.6 % (ref 37–48.5)
HGB BLD-MCNC: 11.9 G/DL (ref 12–16)
IMM GRANULOCYTES # BLD AUTO: 0.03 K/UL (ref 0–0.04)
IMM GRANULOCYTES NFR BLD AUTO: 0.4 % (ref 0–0.5)
LYMPHOCYTES # BLD AUTO: 1.6 K/UL (ref 1–4.8)
LYMPHOCYTES NFR BLD: 19.8 % (ref 18–48)
MAGNESIUM SERPL-MCNC: 2.1 MG/DL (ref 1.6–2.6)
MCH RBC QN AUTO: 30.7 PG (ref 27–31)
MCHC RBC AUTO-ENTMCNC: 32.5 G/DL (ref 32–36)
MCV RBC AUTO: 94 FL (ref 82–98)
MONOCYTES # BLD AUTO: 0.2 K/UL (ref 0.3–1)
MONOCYTES NFR BLD: 2.2 % (ref 4–15)
NEUTROPHILS # BLD AUTO: 6.1 K/UL (ref 1.8–7.7)
NEUTROPHILS NFR BLD: 77.5 % (ref 38–73)
NRBC BLD-RTO: 0 /100 WBC
PLATELET # BLD AUTO: 331 K/UL (ref 150–450)
PMV BLD AUTO: 9.9 FL (ref 9.2–12.9)
POTASSIUM SERPL-SCNC: 3.6 MMOL/L (ref 3.5–5.1)
RBC # BLD AUTO: 3.88 M/UL (ref 4–5.4)
SODIUM SERPL-SCNC: 135 MMOL/L (ref 136–145)
WBC # BLD AUTO: 7.87 K/UL (ref 3.9–12.7)

## 2022-06-14 PROCEDURE — 94761 N-INVAS EAR/PLS OXIMETRY MLT: CPT

## 2022-06-14 PROCEDURE — 83735 ASSAY OF MAGNESIUM: CPT | Performed by: FAMILY MEDICINE

## 2022-06-14 PROCEDURE — 25000003 PHARM REV CODE 250: Performed by: FAMILY MEDICINE

## 2022-06-14 PROCEDURE — 80048 BASIC METABOLIC PNL TOTAL CA: CPT | Performed by: FAMILY MEDICINE

## 2022-06-14 PROCEDURE — 63700000 PHARM REV CODE 250 ALT 637 W/O HCPCS: Performed by: HOSPITALIST

## 2022-06-14 PROCEDURE — 94640 AIRWAY INHALATION TREATMENT: CPT

## 2022-06-14 PROCEDURE — 85025 COMPLETE CBC W/AUTO DIFF WBC: CPT | Performed by: FAMILY MEDICINE

## 2022-06-14 PROCEDURE — 25000003 PHARM REV CODE 250: Performed by: HOSPITALIST

## 2022-06-14 PROCEDURE — 63600175 PHARM REV CODE 636 W HCPCS: Performed by: HOSPITALIST

## 2022-06-14 PROCEDURE — 36415 COLL VENOUS BLD VENIPUNCTURE: CPT | Performed by: FAMILY MEDICINE

## 2022-06-14 PROCEDURE — 99900035 HC TECH TIME PER 15 MIN (STAT)

## 2022-06-14 PROCEDURE — 25000242 PHARM REV CODE 250 ALT 637 W/ HCPCS: Performed by: FAMILY MEDICINE

## 2022-06-14 RX ORDER — PREDNISONE 20 MG/1
20 TABLET ORAL DAILY
Qty: 3 TABLET | Refills: 0 | Status: SHIPPED | OUTPATIENT
Start: 2022-06-15 | End: 2022-06-18

## 2022-06-14 RX ORDER — LEVOFLOXACIN 750 MG/1
750 TABLET ORAL DAILY
Qty: 5 TABLET | Refills: 0 | Status: SHIPPED | OUTPATIENT
Start: 2022-06-15 | End: 2022-06-21

## 2022-06-14 RX ORDER — ALBUTEROL SULFATE 90 UG/1
2 AEROSOL, METERED RESPIRATORY (INHALATION) EVERY 6 HOURS PRN
Qty: 18 G | Refills: 0 | Status: SHIPPED | OUTPATIENT
Start: 2022-06-14 | End: 2022-06-24 | Stop reason: SDUPTHER

## 2022-06-14 RX ADMIN — SPIRONOLACTONE 50 MG: 50 TABLET ORAL at 08:06

## 2022-06-14 RX ADMIN — POTASSIUM BICARBONATE 35 MEQ: 391 TABLET, EFFERVESCENT ORAL at 08:06

## 2022-06-14 RX ADMIN — CEFTRIAXONE SODIUM 1 G: 1 INJECTION, POWDER, FOR SOLUTION INTRAMUSCULAR; INTRAVENOUS at 08:06

## 2022-06-14 RX ADMIN — PANTOPRAZOLE SODIUM 40 MG: 40 TABLET, DELAYED RELEASE ORAL at 05:06

## 2022-06-14 RX ADMIN — GUAIFENESIN AND CODEINE PHOSPHATE 5 ML: 100; 10 SOLUTION ORAL at 02:06

## 2022-06-14 RX ADMIN — SACUBITRIL AND VALSARTAN 1 TABLET: 49; 51 TABLET, FILM COATED ORAL at 08:06

## 2022-06-14 RX ADMIN — BUDESONIDE 0.5 MG: 0.5 INHALANT RESPIRATORY (INHALATION) at 08:06

## 2022-06-14 RX ADMIN — FUROSEMIDE 40 MG: 40 TABLET ORAL at 08:06

## 2022-06-14 RX ADMIN — EZETIMIBE 10 MG: 10 TABLET ORAL at 08:06

## 2022-06-14 RX ADMIN — AZITHROMYCIN MONOHYDRATE 250 MG: 250 TABLET ORAL at 08:06

## 2022-06-14 RX ADMIN — ATORVASTATIN CALCIUM 20 MG: 20 TABLET, FILM COATED ORAL at 08:06

## 2022-06-14 RX ADMIN — ESCITALOPRAM OXALATE 10 MG: 10 TABLET ORAL at 08:06

## 2022-06-14 RX ADMIN — CARVEDILOL 6.25 MG: 6.25 TABLET, FILM COATED ORAL at 08:06

## 2022-06-14 RX ADMIN — ASPIRIN 325 MG ORAL TABLET 325 MG: 325 PILL ORAL at 08:06

## 2022-06-14 NOTE — CARE UPDATE
06/14/22 0807   Patient Assessment/Suction   Level of Consciousness (AVPU) alert   Respiratory Effort Unlabored   Expansion/Accessory Muscles/Retractions no use of accessory muscles   All Lung Fields Breath Sounds equal bilaterally;diminished   PRE-TX-O2   O2 Device (Oxygen Therapy) room air   SpO2 95 %   Pulse Oximetry Type Intermittent   $ Pulse Oximetry - Multiple Charge Pulse Oximetry - Multiple   Pulse 83   Resp 18   Aerosol Therapy   $ Aerosol Therapy Charges Aerosol Treatment   Daily Review of Necessity (SVN) completed   Respiratory Treatment Status (SVN) given   Treatment Route (SVN) mask;oxygen   Patient Position (SVN) HOB elevated   Post Treatment Assessment (SVN) breath sounds unchanged   Signs of Intolerance (SVN) none   Respiratory Evaluation   $ Care Plan Tech Time 15 min

## 2022-06-14 NOTE — PLAN OF CARE
Discharge orders and chart reviewed with no further post-acute discharge needs identified at this time.  At this time, patient is cleared for discharge from Case Management standpoint.        06/14/22 1018   Final Note   Assessment Type Final Discharge Note   Anticipated Discharge Disposition Home   Hospital Resources/Appts/Education Provided Appointments scheduled and added to AVS   Post-Acute Status   Post-Acute Authorization Other   Other Status No Post-Acute Service Needs   Discharge Delays None known at this time

## 2022-06-14 NOTE — PLAN OF CARE
Problem: Adult Inpatient Plan of Care  Goal: Plan of Care Review  Outcome: Ongoing, Progressing  Goal: Patient-Specific Goal (Individualized)  Outcome: Ongoing, Progressing  Goal: Absence of Hospital-Acquired Illness or Injury  Outcome: Ongoing, Progressing  Goal: Optimal Comfort and Wellbeing  Outcome: Ongoing, Progressing  Goal: Readiness for Transition of Care  Outcome: Ongoing, Progressing     Problem: Fall Injury Risk  Goal: Absence of Fall and Fall-Related Injury  Outcome: Ongoing, Progressing     Problem: Chest Pain  Goal: Resolution of Chest Pain Symptoms  Outcome: Ongoing, Progressing     Problem: Fluid Imbalance (Pneumonia)  Goal: Fluid Balance  Outcome: Ongoing, Progressing  Intervention: Monitor and Manage Fluid Balance  Flowsheets (Taken 6/14/2022 0410)  Fluid/Electrolyte Management:   fluids provided   oral rehydration therapy initiated     Problem: Infection (Pneumonia)  Goal: Resolution of Infection Signs and Symptoms  Outcome: Ongoing, Progressing  Intervention: Prevent Infection Progression  Flowsheets (Taken 6/14/2022 0410)  Infection Management: aseptic technique maintained     Problem: Respiratory Compromise (Pneumonia)  Goal: Effective Oxygenation and Ventilation  Outcome: Ongoing, Progressing  Intervention: Promote Airway Secretion Clearance  Flowsheets (Taken 6/14/2022 0410)  Breathing Techniques/Airway Clearance: deep/controlled cough encouraged  Cough And Deep Breathing: done independently per patient  Intervention: Optimize Oxygenation and Ventilation  Flowsheets (Taken 6/14/2022 0410)  Head of Bed (HOB) Positioning: HOB elevated

## 2022-06-14 NOTE — RESPIRATORY THERAPY
06/13/22 2034   Patient Assessment/Suction   Level of Consciousness (AVPU) alert   Respiratory Effort Normal;Unlabored   Expansion/Accessory Muscles/Retractions no use of accessory muscles   Rhythm/Pattern, Respiratory unlabored;pattern regular;depth regular   PRE-TX-O2   O2 Device (Oxygen Therapy) room air   SpO2 (!) 92 %   Pulse Oximetry Type Intermittent   $ Pulse Oximetry - Multiple Charge Pulse Oximetry - Multiple   Pulse 83   Resp 18   Aerosol Therapy   $ Aerosol Therapy Charges Aerosol Treatment   Daily Review of Necessity (SVN) completed   Respiratory Treatment Status (SVN) given   Treatment Route (SVN) mask;oxygen   Patient Position (SVN) HOB elevated   Post Treatment Assessment (SVN) breath sounds unchanged   Signs of Intolerance (SVN) none   Breath Sounds Post-Respiratory Treatment   Throughout All Fields Post-Treatment All Fields   Throughout All Fields Post-Treatment no change   Post-treatment Heart Rate (beats/min) 82   Post-treatment Resp Rate (breaths/min) 18   Education   $ Education Bronchodilator;15 min   Respiratory Evaluation   $ Care Plan Tech Time 15 min

## 2022-06-14 NOTE — HOSPITAL COURSE
61-year-old lady with numerous medical problems including chronic systolic CHF who has been suffering with dry cough for last couple months presented to ED with cough and cough related pain found to have mildly elevated troponin and was subsequently transferred to [facility].  Serial EKGs, cardiac enzymes remained negative for acute coronary syndrome.  Stress test ruled out any reversible ischemia.  Her ejection fraction is overall stable from previous echo.  Overall symptomatology is more consistent with pulmonary in origin, CTA chest showed atypical pneumonia.  She responded well after starting Rocephin and azithromycin as well as 1 dose of IV steroid.  Patient has chronic postnasal drip.  She was discharged home in hemodynamically stable condition with prescription of Levaquin, p.r.n. albuterol.  She was advised to follow-up with her PCP, I advised her that if her symptoms do not improve she may discuss with her cardiologist if Entresto is the culprit of chronic dry cough.  I also generated outpatient pulmonary referral.     Instructions provided to follow up with primary care physician as outpatient. Patient verbalized understanding and is aware to contact primary care physician or return to ED if new or worsening symptoms.    Physical exam on the day of discharge:  General: Patient resting comfortably in no acute distress.  Lungs: CTA. Good air entry.  Cor: Regular rate and rhythm. No murmurs. No pedal edema.  Abd: Soft. Nontender. Non-distended.    Vital signs reviewed.  Nursing notes reviewed

## 2022-06-14 NOTE — DISCHARGE SUMMARY
Critical access hospital Medicine  Discharge Summary      Patient Name: Daylin Lynch  MRN: [id number]  Patient Class: IP- Inpatient  Admission Date: 6/12/2022  Hospital Length of Stay: 2 days  Discharge Date and Time:  06/14/2022 5:21 PM  Attending Physician: No att. providers found   Discharging Provider: Stephenie Cleary MD  Primary Care Provider: Jonathon Muñoz NP      HPI:   61-year-old with a past medical history significant for CHF, AICD placed 9/20 hypertension, hyperlipidemia transferred from [facility] for suspected recent NSTEMI.    Symptoms initially started with intractable nonproductive cough 4 days ago.  COVID and flu negative.  Worse lying down.  Associated chest pain with the cough.  Chest pain is nonexertional and no associated symptoms of diaphoresis palpitations.  Patient has been taking guaifenesin with codeine with some improvement of the cough.    Patient reports history of CHF.  At 1 point her EF was 25% for which an AICD was placed.  She reports her most recent EF few months ago was around  50%  Reports that she last had a cardiac catheterization 2020. She has had any stents placed.      At [facility] ED patient was noted have elevated troponin at 0.6.  EKG significant for  new Q-waves in V3, V4, and V5.  Lab work otherwise unrevealing  Chest x-ray without cardiomegaly or evidence of consolidation CHF on my read.  Official radiology read to follow.    Family history:  CAD-father, 2 sisters  Cancer-denies    Social history  Remote tobacco use when teenager.  No current use.      * No surgery found *      Hospital Course:   61-year-old lady with numerous medical problems including chronic systolic CHF who has been suffering with dry cough for last couple months presented to ED with cough and cough related pain found to have mildly elevated troponin and was subsequently transferred to [facility].  Serial EKGs, cardiac enzymes remained negative for acute coronary syndrome.  Stress test ruled  out any reversible ischemia.  Her ejection fraction is overall stable from previous echo.  Overall symptomatology is more consistent with pulmonary in origin, CTA chest showed atypical pneumonia.  She responded well after starting Rocephin and azithromycin as well as 1 dose of IV steroid.  Patient has chronic postnasal drip.  She was discharged home in hemodynamically stable condition with prescription of Levaquin, p.r.n. albuterol.  She was advised to follow-up with her PCP, I advised her that if her symptoms do not improve she may discuss with her cardiologist if Entresto is the culprit of chronic dry cough.  I also generated outpatient pulmonary referral.     Instructions provided to follow up with primary care physician as outpatient. Patient verbalized understanding and is aware to contact primary care physician or return to ED if new or worsening symptoms.    Physical exam on the day of discharge:  General: Patient resting comfortably in no acute distress.  Lungs: CTA. Good air entry.  Cor: Regular rate and rhythm. No murmurs. No pedal edema.  Abd: Soft. Nontender. Non-distended.    Vital signs reviewed.  Nursing notes reviewed       Goals of Care Treatment Preferences:  Code Status: Full Code      Consults:   Consults (From admission, onward)        Status Ordering Provider     Inpatient consult to Cardiology  Once        Provider:  Anam Martinez MD    Completed [facility]            Final Active Diagnoses:    Diagnosis Date Noted POA    PRINCIPAL PROBLEM:  Atypical pneumonia [J18.9] 06/13/2022 Yes    Cough [R05.9] 06/13/2022 Yes    Multiple chronic diseases [R69] 06/12/2022 Yes    Chronic systolic heart failure [I50.22] 09/03/2020 Yes    Hypertension [I10] 09/11/2017 Yes      Problems Resolved During this Admission:    Diagnosis Date Noted Date Resolved POA    Dyspnea on exertion [R06.00] 06/01/2020 06/14/2022 Yes       Discharged Condition: good    Disposition: Home or Self Care    Follow  Up:   Follow-up Information     Jonathon Muñoz NP Follow up on 6/21/2022.    Specialty: Family Medicine  Why: Hospital follow up at 2:40  Contact information:  [address]  [place] [address]  [phone]             Nafisa Diane MD Follow up in 1 month(s).    Specialties: Pulmonary Disease, Sleep Medicine  Contact information:  [address] [facility]  [address]  [place] [address] [phone]             Raul Jaffe MD Follow up in 2 week(s).    Specialty: Cardiology  Contact information:  [address]  [place] [address]  [phone]                       Patient Instructions:      Ambulatory referral/consult to Pulmonology   Standing Status: Future   Referral Priority: Routine Referral Type: Consultation   Referral Reason: Specialty Services Required   Referred to Provider: NAFISA DIANE Requested Specialty: Pulmonary Disease     Diet Cardiac     Notify your health care provider if you experience any of the following:  temperature >100.4     Notify your health care provider if you experience any of the following:  difficulty breathing or increased cough     Activity as tolerated       Significant Diagnostic Studies: Labs: All labs within the past 24 hours have been reviewed    Pending Diagnostic Studies:     None         Medications:  Reconciled Home Medications:      Medication List      START taking these medications    albuterol 90 mcg/actuation inhaler  Commonly known as: VENTOLIN HFA  Inhale 2 puffs into the lungs every 6 (six) hours as needed for Wheezing or Shortness of Breath. Rescue     levoFLOXacin 750 MG tablet  Commonly known as: LEVAQUIN  Take 1 tablet (750 mg total) by mouth once daily. for 5 days  Start taking on: Iris 15, 2022     predniSONE 20 MG tablet  Commonly known as: DELTASONE  Take 1 tablet (20 mg total) by mouth once daily. for 3 days  Start taking on: Iris 15, 2022        CONTINUE taking these medications    aspirin 325 MG tablet  Take 325 mg by mouth once  daily.     carvediloL 6.25 MG tablet  Commonly known as: COREG  Take 6.25 mg by mouth 2 (two) times daily.     cetirizine 10 MG tablet  Commonly known as: ZYRTEC  Take 1 tablet (10 mg total) by mouth once daily.     cholecalciferol (vitamin D3) 1,250 mcg (50,000 unit) capsule  cholecalciferol (vitamin D3) 1,250 mcg (50,000 unit) capsule   Take 1 capsule every week by oral route for 84 days.     ENTRESTO 49-51 mg per tablet  Generic drug: sacubitriL-valsartan  Entresto 49 mg-51 mg tablet   Take 1 tablet twice a day by oral route.     EScitalopram oxalate 10 MG tablet  Commonly known as: LEXAPRO  Take 10 mg by mouth once daily.     ezetimibe 10 mg tablet  Commonly known as: ZETIA  1 tablet once daily.     furosemide 40 MG tablet  Commonly known as: LASIX  1 tablet as needed.     guaiFENesin-codeine 100-10 mg/5 ml  mg/5 mL syrup  Commonly known as: TUSSI-ORGANIDIN NR  Take 5 mLs by mouth nightly as needed for Cough.     melatonin 10 mg Tab  Take 2 tablets by mouth nightly as needed.     pantoprazole 40 MG tablet  Commonly known as: PROTONIX  Take 1 tablet (40 mg total) by mouth once daily.     rosuvastatin 5 MG tablet  Commonly known as: CRESTOR  Take 5 mg by mouth once daily.     spironolactone 50 MG tablet  Commonly known as: ALDACTONE  Take 50 mg by mouth once daily.            Indwelling Lines/Drains at time of discharge:   Lines/Drains/Airways     None                 Time spent on the discharge of patient: 35 minutes         Stephenie Cleary MD  Department of Hospital Medicine  [facility]

## 2022-06-15 ENCOUNTER — TELEPHONE (OUTPATIENT)
Dept: PULMONOLOGY | Facility: CLINIC | Age: 62
End: 2022-06-15
Payer: COMMERCIAL

## 2022-06-15 NOTE — TELEPHONE ENCOUNTER
Patient coming in on 6/24 to see Wendy Pineda NP      ----- Message from Ihsan Cruz sent at 6/15/2022  9:10 AM CDT -----  Regarding: appt  Contact: CANDELARIA OBREIN [[id number]]  Name of Who is Calling: CANDELARIA OBRIEN [[id number]]      What is the request in detail: Would like to speak with staff in regards to a hospital follow up before July. Please advise      Can the clinic reply by [facility]: yes      What Number to Call Back if not in [facility]: [phone]

## 2022-06-21 ENCOUNTER — OFFICE VISIT (OUTPATIENT)
Dept: FAMILY MEDICINE | Facility: CLINIC | Age: 62
End: 2022-06-21
Payer: COMMERCIAL

## 2022-06-21 VITALS
SYSTOLIC BLOOD PRESSURE: 90 MMHG | WEIGHT: 202 LBS | DIASTOLIC BLOOD PRESSURE: 60 MMHG | BODY MASS INDEX: 31.71 KG/M2 | TEMPERATURE: 98 F | OXYGEN SATURATION: 98 % | HEIGHT: 67 IN | HEART RATE: 83 BPM

## 2022-06-21 DIAGNOSIS — Z09 HOSPITAL DISCHARGE FOLLOW-UP: Primary | ICD-10-CM

## 2022-06-21 DIAGNOSIS — J18.9 ATYPICAL PNEUMONIA: ICD-10-CM

## 2022-06-21 PROCEDURE — 3008F PR BODY MASS INDEX (BMI) DOCUMENTED: ICD-10-PCS | Mod: CPTII,S$GLB,, | Performed by: NURSE PRACTITIONER

## 2022-06-21 PROCEDURE — 1160F RVW MEDS BY RX/DR IN RCRD: CPT | Mod: CPTII,S$GLB,, | Performed by: NURSE PRACTITIONER

## 2022-06-21 PROCEDURE — 99214 PR OFFICE/OUTPT VISIT, EST, LEVL IV, 30-39 MIN: ICD-10-PCS | Mod: S$GLB,,, | Performed by: NURSE PRACTITIONER

## 2022-06-21 PROCEDURE — 4010F ACE/ARB THERAPY RXD/TAKEN: CPT | Mod: CPTII,S$GLB,, | Performed by: NURSE PRACTITIONER

## 2022-06-21 PROCEDURE — 1160F PR REVIEW ALL MEDS BY PRESCRIBER/CLIN PHARMACIST DOCUMENTED: ICD-10-PCS | Mod: CPTII,S$GLB,, | Performed by: NURSE PRACTITIONER

## 2022-06-21 PROCEDURE — 4010F PR ACE/ARB THEARPY RXD/TAKEN: ICD-10-PCS | Mod: CPTII,S$GLB,, | Performed by: NURSE PRACTITIONER

## 2022-06-21 PROCEDURE — 1159F PR MEDICATION LIST DOCUMENTED IN MEDICAL RECORD: ICD-10-PCS | Mod: CPTII,S$GLB,, | Performed by: NURSE PRACTITIONER

## 2022-06-21 PROCEDURE — 3008F BODY MASS INDEX DOCD: CPT | Mod: CPTII,S$GLB,, | Performed by: NURSE PRACTITIONER

## 2022-06-21 PROCEDURE — 3074F PR MOST RECENT SYSTOLIC BLOOD PRESSURE < 130 MM HG: ICD-10-PCS | Mod: CPTII,S$GLB,, | Performed by: NURSE PRACTITIONER

## 2022-06-21 PROCEDURE — 1111F DSCHRG MED/CURRENT MED MERGE: CPT | Mod: CPTII,S$GLB,, | Performed by: NURSE PRACTITIONER

## 2022-06-21 PROCEDURE — 3078F PR MOST RECENT DIASTOLIC BLOOD PRESSURE < 80 MM HG: ICD-10-PCS | Mod: CPTII,S$GLB,, | Performed by: NURSE PRACTITIONER

## 2022-06-21 PROCEDURE — 99214 OFFICE O/P EST MOD 30 MIN: CPT | Mod: S$GLB,,, | Performed by: NURSE PRACTITIONER

## 2022-06-21 PROCEDURE — 3074F SYST BP LT 130 MM HG: CPT | Mod: CPTII,S$GLB,, | Performed by: NURSE PRACTITIONER

## 2022-06-21 PROCEDURE — 3078F DIAST BP <80 MM HG: CPT | Mod: CPTII,S$GLB,, | Performed by: NURSE PRACTITIONER

## 2022-06-21 PROCEDURE — 1111F PR DISCHARGE MEDS RECONCILED W/ CURRENT OUTPATIENT MED LIST: ICD-10-PCS | Mod: CPTII,S$GLB,, | Performed by: NURSE PRACTITIONER

## 2022-06-21 PROCEDURE — 1159F MED LIST DOCD IN RCRD: CPT | Mod: CPTII,S$GLB,, | Performed by: NURSE PRACTITIONER

## 2022-06-21 RX ORDER — BUDESONIDE AND FORMOTEROL FUMARATE DIHYDRATE 160; 4.5 UG/1; UG/1
2 AEROSOL RESPIRATORY (INHALATION) EVERY 12 HOURS
Qty: 6 G | Refills: 0 | Status: SHIPPED | OUTPATIENT
Start: 2022-06-21 | End: 2022-06-24 | Stop reason: SDUPTHER

## 2022-06-21 RX ORDER — PROMETHAZINE HYDROCHLORIDE AND DEXTROMETHORPHAN HYDROBROMIDE 6.25; 15 MG/5ML; MG/5ML
5 SYRUP ORAL EVERY 8 HOURS PRN
Qty: 118 ML | Refills: 0 | Status: SHIPPED | OUTPATIENT
Start: 2022-06-21 | End: 2023-01-09

## 2022-06-21 RX ORDER — FLUTICASONE PROPIONATE 50 MCG
2 SPRAY, SUSPENSION (ML) NASAL DAILY
Qty: 9.9 ML | Refills: 0 | Status: SHIPPED | OUTPATIENT
Start: 2022-06-21 | End: 2022-06-24 | Stop reason: SDUPTHER

## 2022-06-21 NOTE — PROGRESS NOTES
SUBJECTIVE:      Patient ID: Daylin Lynch is a 61 y.o. female.    Chief Complaint: Hospital Follow Up (Atypical pneumonia)    Patient is here today as a hospital follow up for atypical pneumonia. Serial EKGs, cardiac enzymes remained negative for acute coronary syndrome.  Stress test ruled out any reversible ischemia.  Her ejection fraction is overall stable from previous echo.  CTA chest showed atypical pneumonia.  She responded well after starting Rocephin and azithromycin as well as IV steroids.  he was discharged home with prescription of Levaquin and p.r.n. albuterol. She ahs an appt with pulmonology Friday. Still has a cough. No fever    Cough  The current episode started more than 1 month ago. The problem has been gradually improving. The problem occurs hourly. The cough is non-productive. Associated symptoms include postnasal drip and wheezing. Pertinent negatives include no chest pain, chills, ear congestion, ear pain, fever, headaches, heartburn, hemoptysis, myalgias, nasal congestion, rash, rhinorrhea, sore throat, shortness of breath, sweats or weight loss. The symptoms are aggravated by lying down. Risk factors for lung disease include animal exposure. She has tried a beta-agonist inhaler, body position changes, oral steroids, prescription cough suppressant and rest for the symptoms. The treatment provided moderate relief. Her past medical history is significant for bronchitis and environmental allergies. There is no history of asthma, bronchiectasis, COPD, emphysema or pneumonia.       Past Surgical History:   Procedure Laterality Date     SECTION, LOW TRANSVERSE      x 3    HYSTERECTOMY      pace maker  2020    SPINE SURGERY  2021    stents both legs       Family History   Problem Relation Age of Onset    Heart disease Mother     Heart failure Father       Social History     Socioeconomic History    Marital status:    Tobacco Use    Smoking status: Former  Smoker     Quit date: 1985     Years since quittin.5    Smokeless tobacco: Never Used   Substance and Sexual Activity    Alcohol use: Yes    Drug use: No    Sexual activity: Yes   Social History Narrative    Live with      Current Outpatient Medications   Medication Sig Dispense Refill    albuterol (VENTOLIN HFA) 90 mcg/actuation inhaler Inhale 2 puffs into the lungs every 6 (six) hours as needed for Wheezing or Shortness of Breath. Rescue 18 g 0    aspirin 325 MG tablet Take 325 mg by mouth once daily.      carvediloL (COREG) 6.25 MG tablet Take 6.25 mg by mouth 2 (two) times daily.       cetirizine (ZYRTEC) 10 MG tablet Take 1 tablet (10 mg total) by mouth once daily.  0    cholecalciferol, vitamin D3, 1,250 mcg (50,000 unit) capsule cholecalciferol (vitamin D3) 1,250 mcg (50,000 unit) capsule   Take 1 capsule every week by oral route for 84 days.      escitalopram oxalate (LEXAPRO) 10 MG tablet Take 10 mg by mouth once daily.      ezetimibe (ZETIA) 10 mg tablet 1 tablet once daily.      furosemide (LASIX) 40 MG tablet 1 tablet as needed.      melatonin 10 mg Tab Take 2 tablets by mouth nightly as needed.      pantoprazole (PROTONIX) 40 MG tablet Take 1 tablet (40 mg total) by mouth once daily. 30 tablet 1    rosuvastatin (CRESTOR) 5 MG tablet Take 5 mg by mouth once daily.      sacubitriL-valsartan (ENTRESTO) 49-51 mg per tablet Entresto 49 mg-51 mg tablet   Take 1 tablet twice a day by oral route.      spironolactone (ALDACTONE) 50 MG tablet Take 50 mg by mouth once daily.      fluticasone propionate (FLONASE) 50 mcg/actuation nasal spray 2 sprays (100 mcg total) by Each Nostril route once daily. 9.9 mL 0    promethazine-dextromethorphan (PROMETHAZINE-DM) 6.25-15 mg/5 mL Syrp Take 5 mLs by mouth every 8 (eight) hours as needed. 118 mL 0    SYMBICORT 160-4.5 mcg/actuation HFAA Inhale 2 puffs into the lungs every 12 (twelve) hours. Controller 6 g 0     No current  "facility-administered medications for this visit.     Review of patient's allergies indicates:  No Known Allergies   Past Medical History:   Diagnosis Date    Allergy     Bilateral leg edema     Hyperlipidemia     Hypertension     Leg pain, bilateral     left leg more severe    Varicose vein      Past Surgical History:   Procedure Laterality Date     SECTION, LOW TRANSVERSE      x 3    HYSTERECTOMY      pace maker  2020    SPINE SURGERY  2021    stents both legs         Review of Systems   Constitutional: Negative for appetite change, chills, diaphoresis, fever, unexpected weight change and weight loss.   HENT: Positive for postnasal drip. Negative for ear discharge, ear pain, hearing loss, rhinorrhea, sore throat, trouble swallowing and voice change.    Eyes: Negative for photophobia and pain.   Respiratory: Positive for cough and wheezing. Negative for hemoptysis, chest tightness, shortness of breath and stridor.    Cardiovascular: Negative for chest pain and palpitations.   Gastrointestinal: Negative for abdominal pain, blood in stool, heartburn and vomiting.   Endocrine: Negative for cold intolerance and heat intolerance.   Genitourinary: Negative for difficulty urinating and flank pain.   Musculoskeletal: Negative for joint swelling, myalgias and neck stiffness.   Skin: Negative for pallor and rash.   Allergic/Immunologic: Positive for environmental allergies.   Neurological: Negative for speech difficulty and headaches.   Hematological: Does not bruise/bleed easily.   Psychiatric/Behavioral: Negative for confusion, dysphoric mood, self-injury, sleep disturbance and suicidal ideas. The patient is not nervous/anxious.       OBJECTIVE:      Vitals:    22 1431   BP: 90/60   Pulse: 83   Temp: 97.9 °F (36.6 °C)   SpO2: 98%   Weight: 91.6 kg (202 lb)   Height: 5' 7" (1.702 m)     Physical Exam  Vitals and nursing note reviewed.   Constitutional:       General: She is not in acute " distress.     Appearance: She is well-developed.   HENT:      Head: Normocephalic and atraumatic.      Right Ear: Tympanic membrane is injected.      Left Ear: Tympanic membrane is injected.      Nose: Mucosal edema and rhinorrhea present.      Mouth/Throat:      Pharynx: Uvula midline.   Eyes:      General: Lids are normal.      Conjunctiva/sclera: Conjunctivae normal.      Pupils: Pupils are equal, round, and reactive to light.      Right eye: Pupil is round and reactive.      Left eye: Pupil is round and reactive.   Neck:      Thyroid: No thyromegaly.      Vascular: No JVD.      Trachea: Trachea normal.   Cardiovascular:      Rate and Rhythm: Normal rate and regular rhythm.      Pulses: Normal pulses.      Heart sounds: Normal heart sounds. No murmur heard.  Pulmonary:      Effort: Pulmonary effort is normal. No tachypnea or respiratory distress.      Breath sounds: Examination of the right-middle field reveals wheezing. Wheezing present. No rhonchi or rales.   Abdominal:      General: Bowel sounds are normal.      Palpations: Abdomen is soft.      Tenderness: There is no abdominal tenderness.   Musculoskeletal:         General: Normal range of motion.      Cervical back: Normal range of motion and neck supple.      Right lower leg: No edema.      Left lower leg: No edema.   Lymphadenopathy:      Cervical: No cervical adenopathy.   Skin:     General: Skin is warm and dry.      Findings: No rash.   Neurological:      Mental Status: She is alert and oriented to person, place, and time.   Psychiatric:         Mood and Affect: Mood normal.         Speech: Speech normal.         Behavior: Behavior normal. Behavior is cooperative.         Thought Content: Thought content normal.       Last visit note, most recent available labs, and health maintenance reviewed    Hospital records reviewed  Imaging reviewed  Labs reviewed  Assessment:       1. Hospital discharge follow-up    2. Atypical pneumonia        Plan:        Hospital discharge follow-up  Hospital records reviewed  Medications reconciled    Atypical pneumonia  -  start   SYMBICORT 160-4.5 mcg/actuation HFAA; Inhale 2 puffs into the lungs every 12 (twelve) hours. Controller  Dispense: 6 g; Refill: 0  -     promethazine-dextromethorphan (PROMETHAZINE-DM) 6.25-15 mg/5 mL Syrp; Take 5 mLs by mouth every 8 (eight) hours as needed.  Dispense: 118 mL; Refill: 0  -     fluticasone propionate (FLONASE) 50 mcg/actuation nasal spray; 2 sprays (100 mcg total) by Each Nostril route once daily.  Dispense: 9.9 mL; Refill: 0    Keep f/u with pulmonology    No follow-ups on file.      6/21/2022 ROBERTA Meraz, FNP

## 2022-06-24 ENCOUNTER — OFFICE VISIT (OUTPATIENT)
Dept: PULMONOLOGY | Facility: CLINIC | Age: 62
End: 2022-06-24
Payer: COMMERCIAL

## 2022-06-24 VITALS
OXYGEN SATURATION: 97 % | HEIGHT: 67 IN | WEIGHT: 204.5 LBS | BODY MASS INDEX: 32.1 KG/M2 | HEART RATE: 88 BPM | SYSTOLIC BLOOD PRESSURE: 101 MMHG | DIASTOLIC BLOOD PRESSURE: 63 MMHG

## 2022-06-24 DIAGNOSIS — J45.40 MODERATE PERSISTENT ASTHMA WITHOUT COMPLICATION: ICD-10-CM

## 2022-06-24 DIAGNOSIS — R05.9 COUGH: Primary | ICD-10-CM

## 2022-06-24 DIAGNOSIS — J30.1 CHRONIC SEASONAL ALLERGIC RHINITIS DUE TO POLLEN: ICD-10-CM

## 2022-06-24 DIAGNOSIS — I50.42 CHRONIC COMBINED SYSTOLIC AND DIASTOLIC HEART FAILURE: ICD-10-CM

## 2022-06-24 DIAGNOSIS — J18.9 ATYPICAL PNEUMONIA: ICD-10-CM

## 2022-06-24 PROCEDURE — 3078F PR MOST RECENT DIASTOLIC BLOOD PRESSURE < 80 MM HG: ICD-10-PCS | Mod: CPTII,S$GLB,, | Performed by: NURSE PRACTITIONER

## 2022-06-24 PROCEDURE — 4010F PR ACE/ARB THEARPY RXD/TAKEN: ICD-10-PCS | Mod: CPTII,S$GLB,, | Performed by: NURSE PRACTITIONER

## 2022-06-24 PROCEDURE — 99205 PR OFFICE/OUTPT VISIT, NEW, LEVL V, 60-74 MIN: ICD-10-PCS | Mod: S$GLB,,, | Performed by: NURSE PRACTITIONER

## 2022-06-24 PROCEDURE — 3008F BODY MASS INDEX DOCD: CPT | Mod: CPTII,S$GLB,, | Performed by: NURSE PRACTITIONER

## 2022-06-24 PROCEDURE — 99999 PR PBB SHADOW E&M-EST. PATIENT-LVL IV: ICD-10-PCS | Mod: PBBFAC,,, | Performed by: NURSE PRACTITIONER

## 2022-06-24 PROCEDURE — 1159F PR MEDICATION LIST DOCUMENTED IN MEDICAL RECORD: ICD-10-PCS | Mod: CPTII,S$GLB,, | Performed by: NURSE PRACTITIONER

## 2022-06-24 PROCEDURE — 3078F DIAST BP <80 MM HG: CPT | Mod: CPTII,S$GLB,, | Performed by: NURSE PRACTITIONER

## 2022-06-24 PROCEDURE — 99205 OFFICE O/P NEW HI 60 MIN: CPT | Mod: S$GLB,,, | Performed by: NURSE PRACTITIONER

## 2022-06-24 PROCEDURE — 1111F PR DISCHARGE MEDS RECONCILED W/ CURRENT OUTPATIENT MED LIST: ICD-10-PCS | Mod: CPTII,S$GLB,, | Performed by: NURSE PRACTITIONER

## 2022-06-24 PROCEDURE — 99999 PR PBB SHADOW E&M-EST. PATIENT-LVL IV: CPT | Mod: PBBFAC,,, | Performed by: NURSE PRACTITIONER

## 2022-06-24 PROCEDURE — 1159F MED LIST DOCD IN RCRD: CPT | Mod: CPTII,S$GLB,, | Performed by: NURSE PRACTITIONER

## 2022-06-24 PROCEDURE — 3008F PR BODY MASS INDEX (BMI) DOCUMENTED: ICD-10-PCS | Mod: CPTII,S$GLB,, | Performed by: NURSE PRACTITIONER

## 2022-06-24 PROCEDURE — 1111F DSCHRG MED/CURRENT MED MERGE: CPT | Mod: CPTII,S$GLB,, | Performed by: NURSE PRACTITIONER

## 2022-06-24 PROCEDURE — 3074F PR MOST RECENT SYSTOLIC BLOOD PRESSURE < 130 MM HG: ICD-10-PCS | Mod: CPTII,S$GLB,, | Performed by: NURSE PRACTITIONER

## 2022-06-24 PROCEDURE — 4010F ACE/ARB THERAPY RXD/TAKEN: CPT | Mod: CPTII,S$GLB,, | Performed by: NURSE PRACTITIONER

## 2022-06-24 PROCEDURE — 3074F SYST BP LT 130 MM HG: CPT | Mod: CPTII,S$GLB,, | Performed by: NURSE PRACTITIONER

## 2022-06-24 RX ORDER — MONTELUKAST SODIUM 10 MG/1
10 TABLET ORAL NIGHTLY
Qty: 90 TABLET | Refills: 0 | Status: SHIPPED | OUTPATIENT
Start: 2022-06-24 | End: 2022-09-19 | Stop reason: SDUPTHER

## 2022-06-24 RX ORDER — PREDNISONE 20 MG/1
TABLET ORAL
Qty: 15 TABLET | Refills: 0 | Status: SHIPPED | OUTPATIENT
Start: 2022-06-24 | End: 2022-12-30 | Stop reason: SDUPTHER

## 2022-06-24 RX ORDER — ALBUTEROL SULFATE 90 UG/1
2 AEROSOL, METERED RESPIRATORY (INHALATION) EVERY 6 HOURS PRN
Qty: 18 G | Refills: 11 | Status: SHIPPED | OUTPATIENT
Start: 2022-06-24 | End: 2023-10-10 | Stop reason: SDUPTHER

## 2022-06-24 RX ORDER — BUDESONIDE AND FORMOTEROL FUMARATE DIHYDRATE 160; 4.5 UG/1; UG/1
2 AEROSOL RESPIRATORY (INHALATION) EVERY 12 HOURS
Qty: 10.2 G | Refills: 11 | Status: SHIPPED | OUTPATIENT
Start: 2022-06-24 | End: 2023-01-17

## 2022-06-24 RX ORDER — FLUTICASONE PROPIONATE 50 MCG
2 SPRAY, SUSPENSION (ML) NASAL DAILY
Qty: 16 G | Refills: 11 | Status: SHIPPED | OUTPATIENT
Start: 2022-06-24 | End: 2023-08-25 | Stop reason: SDUPTHER

## 2022-06-24 RX ORDER — CODEINE PHOSPHATE AND GUAIFENESIN 10; 100 MG/5ML; MG/5ML
5 SOLUTION ORAL EVERY 8 HOURS PRN
Qty: 236 ML | Refills: 0 | Status: SHIPPED | OUTPATIENT
Start: 2022-06-24 | End: 2022-07-04

## 2022-06-24 NOTE — PROGRESS NOTES
6/24/2022    Daylin NAGEL Syed  New Patient Consult    Chief Complaint   Patient presents with    Consult     Hospital f/u - establish care    Cough     Lingering cough - had for some time - no sputum production       HPI:  6/24/22:  Hx: Annual bronchitis in the winter, pleurisy, CHF, AICD placement,   Recent ER visit on 6/12/22 to [facility] for CP, SOB - patient was noted to have elevated troponin at that time, decision made for transfer to [facility] for possible cath. Patient underwent series of testing including echocardiogram revealing EF 40% in the setting of known systolic heart failure. Patient did not undergo angiogram during this hospitalization. CTA obtained revealing no PE, however concern regarding atypical pneumonia due to bilateral ground glass opacities. Patient was treated with IV Rocephin, Azithromycin, and steroid - reported great improvement at that time with medication therapy. Patient was subsequently discharged home, without supplemental oxygen, with a prescription for Levaquin (reports completion), five days worth of steroid PO, and PRN albuterol inhaler.   Was seen per PCP on Tuesday 6/21 and prescribed Symbicort inhaler, Flonase, and Promethazine cough syrup.   Cough: Persistent over the last 6 months - states it's overall improved since hospitalization however not completely resolved. States cough was attributed in the past to congestive heart failure. Was diagnosed with bronchitis per primary care provider in March - given codeine cough syrup at that time, no antibiotic or steroids. Cough persists throughout the day however is noticeably worse at night time, when lying down. Non productive. Associated with coughing fits, dizziness. Associated with chest tightness, wheezing. No relief noted with tessalon pearls, however has tried codeine and promethazine cough syrup separately - states codeine worked better.  Shortness of breath: Gradual onset over the last six months - feels chest  heaviness at rest however experiences exertional dyspnea - states affecting ADLS. Can't take a shower and get dressed without stopping for rest. Improves with rest.   Does endorse sinus congestion, ear congestion, post nasal drip - started Zyrtec daily in April, does not appreciate benefit.   Has started Symbicort - taking two puffs in the morning - has been on for three days now, reports benefit with use.  Has tried Albuterol in the past without noticeable benefit.   Takes Protonix daily.    Per review of EMR discharge summary:  Hospital Course:   61-year-old lady with numerous medical problems including chronic systolic CHF who has been suffering with dry cough for last couple months presented to ED with cough and cough related pain found to have mildly elevated troponin and was subsequently transferred to [facility].  Serial EKGs, cardiac enzymes remained negative for acute coronary syndrome.  Stress test ruled out any reversible ischemia.  Her ejection fraction is overall stable from previous echo.  Overall symptomatology is more consistent with pulmonary in origin, CTA chest showed atypical pneumonia.  She responded well after starting Rocephin and azithromycin as well as 1 dose of IV steroid.  Patient has chronic postnasal drip.  She was discharged home in hemodynamically stable condition with prescription of Levaquin, p.r.n. albuterol.  She was advised to follow-up with her PCP, I advised her that if her symptoms do not improve she may discuss with her cardiologist if Entresto is the culprit of chronic dry cough.  I also generated outpatient pulmonary referral.       Social Hx: Lives with  - 2 animals in the home. Former [employer] employee, . No Asbestosis exposure, Smoking Hx: Former smoker, on and off for approx ten years - quit in 1985  Family Hx: No Lung Cancer, Mother COPD, No Asthma  Medical Hx: Previous pneumonia ; No previous shoulder surgery - Defibrillator placement 2020      The chief  compliant problem is new to me  PFSH:  Past Medical History:   Diagnosis Date    Allergy     Bilateral leg edema     Hyperlipidemia     Hypertension     Leg pain, bilateral     left leg more severe    Varicose vein          Past Surgical History:   Procedure Laterality Date     SECTION, LOW TRANSVERSE      x 3    HYSTERECTOMY      pace maker  2020    SPINE SURGERY  2021    stents both legs       Social History     Tobacco Use    Smoking status: Former Smoker     Quit date: 1985     Years since quittin.5    Smokeless tobacco: Never Used   Substance Use Topics    Alcohol use: Yes    Drug use: No     Family History   Problem Relation Age of Onset    Heart disease Mother     Heart failure Father      Review of patient's allergies indicates:  No Known Allergies  I have reviewed past medical, family, and social history. I have reviewed previous nurse notes.    Performance Status:The patient's activity level is functions out of house.      Review of Systems   Constitutional: Positive for activity change and fatigue. Negative for appetite change, chills, diaphoresis, fever and unexpected weight change.   HENT: Positive for congestion, postnasal drip, rhinorrhea, sinus pressure and sinus pain. Negative for sore throat.    Eyes: Negative for photophobia and visual disturbance.   Respiratory: Positive for cough, shortness of breath and wheezing. Negative for choking and chest tightness.    Cardiovascular: Positive for leg swelling. Negative for chest pain and palpitations.   Gastrointestinal: Negative for abdominal distention, abdominal pain, blood in stool, constipation, diarrhea, nausea and vomiting.   Genitourinary: Negative for difficulty urinating, dysuria, flank pain and hematuria.   Musculoskeletal: Negative for back pain, gait problem, joint swelling and neck pain.   Skin: Negative for rash and wound.   Allergic/Immunologic: Negative for environmental allergies and food  "allergies.   Neurological: Positive for light-headedness. Negative for dizziness, seizures, syncope, weakness, numbness and headaches.   Hematological: Does not bruise/bleed easily.   Psychiatric/Behavioral: Positive for sleep disturbance. Negative for confusion. The patient is not nervous/anxious.          Exam:Comprehensive exam done. /63 (BP Location: Left arm, Patient Position: Sitting, BP Method: Medium (Automatic))   Pulse 88   Ht 5' 7" (1.702 m)   Wt 92.8 kg (204 lb 7.6 oz)   SpO2 97% Comment: on room air at rest  BMI 32.03 kg/m²   Exam included Vitals as listed  Constitutional: She is oriented to person, place, and time. She appears well-developed. No distress.   Nose: Nose normal.   Mouth/Throat: Uvula is midline, oropharynx is clear and moist and mucous membranes are normal. No dental caries. No oropharyngeal exudate, posterior oropharyngeal edema, posterior oropharyngeal erythema or tonsillar abscesses.  Mallapatti (M) score 3  Eyes: Pupils are equal, round, and reactive to light.   Neck: No JVD present. No thyromegaly present.   Cardiovascular: Normal rate, regular rhythm and normal heart sounds. Exam reveals no gallop and no friction rub.   No murmur heard.  Pulmonary/Chest: Effort normal and breath sounds normal. No accessory muscle usage or stridor. No apnea and no tachypnea. No respiratory distress, decreased breath sounds, wheezes, rhonchi, rales, or tenderness. Clear breath sounds, on room air, in no acute distress.  Abdominal: Soft. She exhibits no mass. There is no tenderness. No hepatosplenomegaly, hernias and normoactive bowel sounds  Musculoskeletal: Normal range of motion. exhibits no edema.   Neurological:  alert and oriented to person, place, and time. not disoriented.   Skin: Skin is warm and dry. Capillary refill takes less 2 sec. No cyanosis or erythema. No pallor. Nails show no clubbing.   Psychiatric: normal mood and affect. behavior is normal. Judgment and thought content " normal.       Radiographs (ct chest and cxr) reviewed: view by direct vision     CTA Chest Non Coronary  6/13/2022   IMPRESSION:   1.  No pulmonary embolism identified.  2.  Patchy groundglass lung opacities are noted bilaterally, mostly involving the lung bases. These most likely reflect inspiratory effort or subsegmental atelectasis. Atypical infection could have a similar appearance in the proper clinical setting      X-Ray Chest PA And Lateral   6/13/2022   Impression:   AICD in place otherwise negative chest          Labs reviewed    Lab Results   Component Value Date    WBC 7.87 06/14/2022    RBC 3.88 (L) 06/14/2022    HGB 11.9 (L) 06/14/2022    HCT 36.6 (L) 06/14/2022    MCV 94 06/14/2022    MCH 30.7 06/14/2022    MCHC 32.5 06/14/2022    RDW 13.2 06/14/2022     06/14/2022    MPV 9.9 06/14/2022    GRAN 6.1 06/14/2022    GRAN 77.5 (H) 06/14/2022    LYMPH 1.6 06/14/2022    LYMPH 19.8 06/14/2022    MONO 0.2 (L) 06/14/2022    MONO 2.2 (L) 06/14/2022    EOS 0.0 06/14/2022    BASO 0.01 06/14/2022    EOSINOPHIL 0.0 06/14/2022    BASOPHIL 0.1 06/14/2022       PFT will be done and results to be reviewed  Pulmonary Functions Testing Results:    Transthoracic echo (TTE) complete 6/13/2022:   Summary  · The left ventricle is normal in size with concentric hypertrophy and mildly decreased systolic function.  · The estimated ejection fraction is 40%.  · Grade I left ventricular diastolic dysfunction.  · There is mild left ventricular global hypokinesis.  · Normal right ventricular size with normal right ventricular systolic function.  · Moderate left atrial enlargement.  · Mild tricuspid regurgitation.  · Normal central venous pressure (3 mmHg).  · The estimated PA systolic pressure is 10 mmHg.        Plan:  Clinical impression is resonably certain and repeated evaluation prn +/- follow up will be needed as below.    Daylin was seen today for consult and cough.    Diagnoses and all orders for this visit:    Cough  -      X-Ray Chest PA And Lateral; Future  -     guaiFENesin-codeine 100-10 mg/5 ml (TUSSI-ORGANIDIN NR)  mg/5 mL syrup; Take 5 mLs by mouth every 8 (eight) hours as needed for Cough or Congestion.    Chronic seasonal allergic rhinitis due to pollen  -     montelukast (SINGULAIR) 10 mg tablet; Take 1 tablet (10 mg total) by mouth every evening.  -     fluticasone propionate (FLONASE) 50 mcg/actuation nasal spray; 2 sprays (100 mcg total) by Each Nostril route once daily.    Atypical pneumonia  -     X-Ray Chest PA And Lateral; Future  -     fluticasone propionate (FLONASE) 50 mcg/actuation nasal spray; 2 sprays (100 mcg total) by Each Nostril route once daily.  -     SYMBICORT 160-4.5 mcg/actuation HFAA; Inhale 2 puffs into the lungs every 12 (twelve) hours. Controller    Chronic combined systolic and diastolic heart failure    Moderate persistent asthma without complication  -     predniSONE (DELTASONE) 20 MG tablet; Take one tablet per day for five days only as needed for cough, shortness of breath. Repeat as needed.  -     albuterol (VENTOLIN HFA) 90 mcg/actuation inhaler; Inhale 2 puffs into the lungs every 6 (six) hours as needed for Wheezing or Shortness of Breath. Rescue  -     SYMBICORT 160-4.5 mcg/actuation HFAA; Inhale 2 puffs into the lungs every 12 (twelve) hours. Controller        Follow up in about 6 weeks (around 8/5/2022), or if symptoms worsen or fail to improve.    Discussed with patient above for education the following:      Patient Instructions   Repeat chest xray in 1-2 weeks to evaluate post atypical pneumonia.    Symptoms are somewhat concerning for asthmatic component. Would continue Symbicort however start using 2 puffs twice per day.   Can use Albuterol as needed for shortness of breath, cough.    For sinus complaints - can try Singulair at night time with Zyrtec during the day.   Can send to ENT if interested.    Etiology of cough is unknown at this time - however could be  multifactorial.    Entresto?    GERD - Currently on Protonix    Codeine cough syrup to be taken as needed for cough, caution as may make drowsy. Do not drive after taking.    Prednisone, take only as needed.    Continue current medication regiment. Keep follow up appointment as scheduled. Please call the office if you have any questions or concerns.

## 2022-06-24 NOTE — PATIENT INSTRUCTIONS
Repeat chest xray in 1-2 weeks to evaluate post atypical pneumonia.    Symptoms are somewhat concerning for asthmatic component. Would continue Symbicort however start using 2 puffs twice per day.   Can use Albuterol as needed for shortness of breath, cough.    For sinus complaints - can try Singulair at night time with Zyrtec during the day.   Can send to ENT if interested.    Etiology of cough is unknown at this time - however could be multifactorial.    Entresto?    GERD - Currently on Protonix    Codeine cough syrup to be taken as needed for cough, caution as may make drowsy. Do not drive after taking.    Prednisone, take only as needed.    Continue current medication regiment. Keep follow up appointment as scheduled. Please call the office if you have any questions or concerns.

## 2022-07-20 ENCOUNTER — HOSPITAL ENCOUNTER (OUTPATIENT)
Dept: RADIOLOGY | Facility: HOSPITAL | Age: 62
Discharge: HOME | End: 2022-07-20
Attending: NURSE PRACTITIONER
Payer: COMMERCIAL

## 2022-07-20 DIAGNOSIS — J18.9 ATYPICAL PNEUMONIA: ICD-10-CM

## 2022-07-20 DIAGNOSIS — R05.9 COUGH: ICD-10-CM

## 2022-07-20 PROCEDURE — 71046 X-RAY EXAM CHEST 2 VIEWS: CPT | Mod: TC,FY

## 2022-07-20 PROCEDURE — 71046 XR CHEST PA AND LATERAL: ICD-10-PCS | Mod: 26,,, | Performed by: RADIOLOGY

## 2022-07-20 PROCEDURE — 71046 X-RAY EXAM CHEST 2 VIEWS: CPT | Mod: 26,,, | Performed by: RADIOLOGY

## 2022-08-08 ENCOUNTER — OFFICE VISIT (OUTPATIENT)
Dept: PULMONOLOGY | Facility: CLINIC | Age: 62
End: 2022-08-08
Payer: COMMERCIAL

## 2022-08-08 VITALS
DIASTOLIC BLOOD PRESSURE: 69 MMHG | HEART RATE: 77 BPM | OXYGEN SATURATION: 97 % | SYSTOLIC BLOOD PRESSURE: 99 MMHG | HEIGHT: 67 IN | BODY MASS INDEX: 32.3 KG/M2 | WEIGHT: 205.81 LBS

## 2022-08-08 DIAGNOSIS — J45.40 MODERATE PERSISTENT ASTHMA WITHOUT COMPLICATION: Primary | ICD-10-CM

## 2022-08-08 DIAGNOSIS — Z78.9 NON-SMOKER: ICD-10-CM

## 2022-08-08 DIAGNOSIS — I50.42 CHRONIC COMBINED SYSTOLIC AND DIASTOLIC HEART FAILURE: ICD-10-CM

## 2022-08-08 DIAGNOSIS — J30.1 CHRONIC SEASONAL ALLERGIC RHINITIS DUE TO POLLEN: ICD-10-CM

## 2022-08-08 DIAGNOSIS — R05.9 COUGH: ICD-10-CM

## 2022-08-08 PROCEDURE — 99999 PR PBB SHADOW E&M-EST. PATIENT-LVL IV: ICD-10-PCS | Mod: PBBFAC,,, | Performed by: NURSE PRACTITIONER

## 2022-08-08 PROCEDURE — 3078F DIAST BP <80 MM HG: CPT | Mod: CPTII,S$GLB,, | Performed by: NURSE PRACTITIONER

## 2022-08-08 PROCEDURE — 4010F PR ACE/ARB THEARPY RXD/TAKEN: ICD-10-PCS | Mod: CPTII,S$GLB,, | Performed by: NURSE PRACTITIONER

## 2022-08-08 PROCEDURE — 4010F ACE/ARB THERAPY RXD/TAKEN: CPT | Mod: CPTII,S$GLB,, | Performed by: NURSE PRACTITIONER

## 2022-08-08 PROCEDURE — 3074F PR MOST RECENT SYSTOLIC BLOOD PRESSURE < 130 MM HG: ICD-10-PCS | Mod: CPTII,S$GLB,, | Performed by: NURSE PRACTITIONER

## 2022-08-08 PROCEDURE — 99214 PR OFFICE/OUTPT VISIT, EST, LEVL IV, 30-39 MIN: ICD-10-PCS | Mod: S$GLB,,, | Performed by: NURSE PRACTITIONER

## 2022-08-08 PROCEDURE — 1159F PR MEDICATION LIST DOCUMENTED IN MEDICAL RECORD: ICD-10-PCS | Mod: CPTII,S$GLB,, | Performed by: NURSE PRACTITIONER

## 2022-08-08 PROCEDURE — 3008F PR BODY MASS INDEX (BMI) DOCUMENTED: ICD-10-PCS | Mod: CPTII,S$GLB,, | Performed by: NURSE PRACTITIONER

## 2022-08-08 PROCEDURE — 3074F SYST BP LT 130 MM HG: CPT | Mod: CPTII,S$GLB,, | Performed by: NURSE PRACTITIONER

## 2022-08-08 PROCEDURE — 99999 PR PBB SHADOW E&M-EST. PATIENT-LVL IV: CPT | Mod: PBBFAC,,, | Performed by: NURSE PRACTITIONER

## 2022-08-08 PROCEDURE — 99214 OFFICE O/P EST MOD 30 MIN: CPT | Mod: S$GLB,,, | Performed by: NURSE PRACTITIONER

## 2022-08-08 PROCEDURE — 1159F MED LIST DOCD IN RCRD: CPT | Mod: CPTII,S$GLB,, | Performed by: NURSE PRACTITIONER

## 2022-08-08 PROCEDURE — 3078F PR MOST RECENT DIASTOLIC BLOOD PRESSURE < 80 MM HG: ICD-10-PCS | Mod: CPTII,S$GLB,, | Performed by: NURSE PRACTITIONER

## 2022-08-08 PROCEDURE — 3008F BODY MASS INDEX DOCD: CPT | Mod: CPTII,S$GLB,, | Performed by: NURSE PRACTITIONER

## 2022-08-08 NOTE — PATIENT INSTRUCTIONS
Overall you seem to be doing better.    Would continue Symbicort two puffs twice daily with Albuterol use as needed for shortness of breath, wheezing.    Keep Prednisone on hand to take as needed for shortness of breath, cough, wheezing.    Can consider biologic in the future if symptoms worsen.    Consider PFT if the future if no improvement.    Continue current medication regiment. Keep follow up appointment as scheduled. Please call the office if you have any questions or concerns.

## 2022-08-08 NOTE — PROGRESS NOTES
8/8/2022    Daylin Lynch  In office visit     Chief Complaint   Patient presents with    Shortness of Breath     Walking     6 week  follow up     Patient states that she is not feeling the following:cough,chest pain, and no wheezing.        HPI:  8/8/2022:  Recent trip to [place] - had several issues with shortness of breath, coughing while on trip. Overall doing better since returning home.   Cough: Overall improved - nonproductive, no time correlation identified. Denies wheezing.  Shortness of breath: Persistent with minimal exertion - improves with rest.   Currently using Symbicort - two puffs twice per day with reported benefit. Using Albuterol only as needed, not daily use - states while on trip in [place] required several times per day, however hasn't needed much since returning home.  Hasn't taken Prednisone since prior visit.         6/24/22:  Hx: Annual bronchitis in the winter, pleurisy, CHF, AICD placement,   Recent ER visit on 6/12/22 to [facility] for CP, SOB - patient was noted to have elevated troponin at that time, decision made for transfer to [facility] for possible cath. Patient underwent series of testing including echocardiogram revealing EF 40% in the setting of known systolic heart failure. Patient did not undergo angiogram during this hospitalization. CTA obtained revealing no PE, however concern regarding atypical pneumonia due to bilateral ground glass opacities. Patient was treated with IV Rocephin, Azithromycin, and steroid - reported great improvement at that time with medication therapy. Patient was subsequently discharged home, without supplemental oxygen, with a prescription for Levaquin (reports completion), five days worth of steroid PO, and PRN albuterol inhaler.   Was seen per PCP on Tuesday 6/21 and prescribed Symbicort inhaler, Flonase, and Promethazine cough syrup.   Cough: Persistent over the last 6 months - states it's overall improved since hospitalization however not  completely resolved. States cough was attributed in the past to congestive heart failure. Was diagnosed with bronchitis per primary care provider in March - given codeine cough syrup at that time, no antibiotic or steroids. Cough persists throughout the day however is noticeably worse at night time, when lying down. Non productive. Associated with coughing fits, dizziness. Associated with chest tightness, wheezing. No relief noted with tessalon pearls, however has tried codeine and promethazine cough syrup separately - states codeine worked better.  Shortness of breath: Gradual onset over the last six months - feels chest heaviness at rest however experiences exertional dyspnea - states affecting ADLS. Can't take a shower and get dressed without stopping for rest. Improves with rest.   Does endorse sinus congestion, ear congestion, post nasal drip - started Zyrtec daily in April, does not appreciate benefit.   Has started Symbicort - taking two puffs in the morning - has been on for three days now, reports benefit with use.  Has tried Albuterol in the past without noticeable benefit.   Takes Protonix daily.  Per review of EMR discharge summary:  Hospital Course:   61-year-old lady with numerous medical problems including chronic systolic CHF who has been suffering with dry cough for last couple months presented to ED with cough and cough related pain found to have mildly elevated troponin and was subsequently transferred to [facility].  Serial EKGs, cardiac enzymes remained negative for acute coronary syndrome.  Stress test ruled out any reversible ischemia.  Her ejection fraction is overall stable from previous echo.  Overall symptomatology is more consistent with pulmonary in origin, CTA chest showed atypical pneumonia.  She responded well after starting Rocephin and azithromycin as well as 1 dose of IV steroid.  Patient has chronic postnasal drip.  She was discharged home in hemodynamically stable condition with  prescription of Levaquin, p.r.n. albuterol.  She was advised to follow-up with her PCP, I advised her that if her symptoms do not improve she may discuss with her cardiologist if Entresto is the culprit of chronic dry cough.  I also generated outpatient pulmonary referral.   Patient Instructions   Repeat chest xray in 1-2 weeks to evaluate post atypical pneumonia.  Symptoms are somewhat concerning for asthmatic component. Would continue Symbicort however start using 2 puffs twice per day.   Can use Albuterol as needed for shortness of breath, cough.  For sinus complaints - can try Singulair at night time with Zyrtec during the day.   Can send to ENT if interested.  Etiology of cough is unknown at this time - however could be multifactorial.  Entresto?  GERD - Currently on Protonix  Codeine cough syrup to be taken as needed for cough, caution as may make drowsy. Do not drive after taking.  Prednisone, take only as needed.  Continue current medication regiment. Keep follow up appointment as scheduled. Please call the office if you have any questions or concerns.         Social Hx: Lives with  - 2 animals in the home. Former [employer] employee, . No Asbestosis exposure, Smoking Hx: Former smoker, on and off for approx ten years - quit in   Family Hx: No Lung Cancer, Mother COPD, No Asthma  Medical Hx: Previous pneumonia ; No previous shoulder surgery - Defibrillator placement       The chief compliant problem varies with instability at times.  PFSH:  Past Medical History:   Diagnosis Date    Allergy     Bilateral leg edema     Hyperlipidemia     Hypertension     Leg pain, bilateral     left leg more severe    Varicose vein          Past Surgical History:   Procedure Laterality Date     SECTION, LOW TRANSVERSE      x 3    HYSTERECTOMY      pace maker  2020    SPINE SURGERY  2021    stents both legs       Social History     Tobacco Use    Smoking status: Former  "Smoker     Quit date: 1985     Years since quittin.6    Smokeless tobacco: Never Used   Substance Use Topics    Alcohol use: Yes    Drug use: No     Family History   Problem Relation Age of Onset    Heart disease Mother     Heart failure Father      Review of patient's allergies indicates:  No Known Allergies  I have reviewed past medical, family, and social history. I have reviewed previous nurse notes.    Performance Status:The patient's activity level is functions out of house.      Review of Systems:  a review of eleven systems covering constitutional, Eye, HEENT, Psych, Respiratory, Cardiac, GI, , Musculoskeletal, Endocrine, Dermatologic was negative except for pertinent findings as listed ABOVE and below: pertinent positive as above, rest is good       Exam:Comprehensive exam done. BP 99/69 (BP Location: Left arm, Patient Position: Sitting, BP Method: Medium (Automatic))   Pulse 77   Ht 5' 7" (1.702 m)   Wt 93.3 kg (205 lb 12.8 oz)   SpO2 97% Comment: room air at rest  BMI 32.23 kg/m²   Exam included Vitals as listed  Constitutional: She is oriented to person, place, and time. She appears well-developed. No distress.   Nose: Nose normal.   Mouth/Throat: Uvula is midline, oropharynx is clear and moist and mucous membranes are normal. No dental caries. No oropharyngeal exudate, posterior oropharyngeal edema, posterior oropharyngeal erythema or tonsillar abscesses.  M[place]apatti (M) score 3  Eyes: Pupils are equal, round, and reactive to light.   Neck: No JVD present. No thyromegaly present.   Cardiovascular: Normal rate, regular rhythm and normal heart sounds. Exam reveals no gallop and no friction rub.   No murmur heard.  Pulmonary/Chest: Effort normal and breath sounds normal. No accessory muscle usage or stridor. No apnea and no tachypnea. No respiratory distress, decreased breath sounds, wheezes, rhonchi, rales, or tenderness. Clear breath sounds, on room air, in no acute " distress.  Abdominal: Soft. She exhibits no mass. There is no tenderness. No hepatosplenomegaly, hernias and normoactive bowel sounds  Musculoskeletal: Normal range of motion. exhibits no edema.   Neurological:  alert and oriented to person, place, and time. not disoriented.   Skin: Skin is warm and dry. Capillary refill takes less 2 sec. No cyanosis or erythema. No pallor. Nails show no clubbing.   Psychiatric: normal mood and affect. behavior is normal. Judgment and thought content normal.       Radiographs (ct chest and cxr) reviewed: view by direct vision     CTA Chest Non Coronary  6/13/2022   IMPRESSION:   1.  No pulmonary embolism identified.  2.  Patchy groundglass lung opacities are noted bilaterally, mostly involving the lung bases. These most likely reflect inspiratory effort or subsegmental atelectasis. Atypical infection could have a similar appearance in the proper clinical setting      X-Ray Chest PA And Lateral   6/13/2022   Impression:   AICD in place otherwise negative chest          Labs reviewed    Lab Results   Component Value Date    WBC 7.87 06/14/2022    RBC 3.88 (L) 06/14/2022    HGB 11.9 (L) 06/14/2022    HCT 36.6 (L) 06/14/2022    MCV 94 06/14/2022    MCH 30.7 06/14/2022    MCHC 32.5 06/14/2022    RDW 13.2 06/14/2022     06/14/2022    MPV 9.9 06/14/2022    GRAN 6.1 06/14/2022    GRAN 77.5 (H) 06/14/2022    LYMPH 1.6 06/14/2022    LYMPH 19.8 06/14/2022    MONO 0.2 (L) 06/14/2022    MONO 2.2 (L) 06/14/2022    EOS 0.0 06/14/2022    BASO 0.01 06/14/2022    EOSINOPHIL 0.0 06/14/2022    BASOPHIL 0.1 06/14/2022       PFT will be done and results to be reviewed  Pulmonary Functions Testing Results:    Transthoracic echo (TTE) complete 6/13/2022:   Summary  · The left ventricle is normal in size with concentric hypertrophy and mildly decreased systolic function.  · The estimated ejection fraction is 40%.  · Grade I left ventricular diastolic dysfunction.  · There is mild left ventricular  global hypokinesis.  · Normal right ventricular size with normal right ventricular systolic function.  · Moderate left atrial enlargement.  · Mild tricuspid regurgitation.  · Normal central venous pressure (3 mmHg).  · The estimated PA systolic pressure is 10 mmHg.        Plan:  Clinical impression is resonably certain and repeated evaluation prn +/- follow up will be needed as below.    Daylin was seen today for shortness of breath and 6 week  follow up.    Diagnoses and all orders for this visit:    Moderate persistent asthma without complication    Chronic seasonal allergic rhinitis due to pollen    Cough    Non-smoker    Chronic combined systolic and diastolic heart failure        Follow up in about 3 months (around 11/8/2022), or if symptoms worsen or fail to improve.    Discussed with patient above for education the following:      Patient Instructions   Overall you seem to be doing better.    Would continue Symbicort two puffs twice daily with Albuterol use as needed for shortness of breath, wheezing.    Keep Prednisone on hand to take as needed for shortness of breath, cough, wheezing.    Can consider biologic in the future if symptoms worsen.    Consider PFT if the future if no improvement.    Continue current medication regiment. Keep follow up appointment as scheduled. Please call the office if you have any questions or concerns.

## 2022-09-17 ENCOUNTER — PATIENT MESSAGE (OUTPATIENT)
Dept: PULMONOLOGY | Facility: CLINIC | Age: 62
End: 2022-09-17
Payer: COMMERCIAL

## 2022-09-19 DIAGNOSIS — J30.1 CHRONIC SEASONAL ALLERGIC RHINITIS DUE TO POLLEN: ICD-10-CM

## 2022-09-19 RX ORDER — MONTELUKAST SODIUM 10 MG/1
10 TABLET ORAL NIGHTLY
Qty: 90 TABLET | Refills: 0 | Status: SHIPPED | OUTPATIENT
Start: 2022-09-19 | End: 2022-09-27 | Stop reason: SDUPTHER

## 2022-09-27 DIAGNOSIS — J30.1 CHRONIC SEASONAL ALLERGIC RHINITIS DUE TO POLLEN: ICD-10-CM

## 2022-09-27 RX ORDER — MONTELUKAST SODIUM 10 MG/1
10 TABLET ORAL NIGHTLY
Qty: 90 TABLET | Refills: 0 | Status: SHIPPED | OUTPATIENT
Start: 2022-09-27 | End: 2022-10-27

## 2022-09-27 NOTE — TELEPHONE ENCOUNTER
Medication requested-montelukast       Last RX-09/19/22   Qty-90.0  Refills-0  Last office visit-08/08/22  Next office visit-11/02/22

## 2022-09-29 ENCOUNTER — TELEPHONE (OUTPATIENT)
Dept: FAMILY MEDICINE | Facility: CLINIC | Age: 62
End: 2022-09-29

## 2022-09-29 NOTE — TELEPHONE ENCOUNTER
Pt requesting lab orders to [facility] for upcoming appointment. Pt also requesting u/a. She's having frequency.

## 2022-10-06 NOTE — PROGRESS NOTES
SUBJECTIVE:      Patient ID: Daylin Lynch is a 62 y.o. female.    Chief Complaint: Hypertension and Leg Pain (Lt leg pain )    Mrs Lynch is here today to f/u on HTN and Vitamin def. She is following with Dr Jaffe for cardiomyopathy. She is taking her vitamin supplements as prescribed. Left lower leg with painful varicose vein for the past few weeks. She says she will be seeing Dr Jaffe next month and planned to ask him about it      Hypertension  This is a chronic problem. The current episode started more than 1 year ago. The problem is controlled. Pertinent negatives include no anxiety, blurred vision, chest pain, headaches, malaise/fatigue, neck pain, orthopnea, palpitations, peripheral edema, PND, shortness of breath or sweats. There are no associated agents to hypertension. Risk factors for coronary artery disease include dyslipidemia and obesity. Past treatments include angiotensin blockers. The current treatment provides moderate improvement. Compliance problems include exercise.  There is no history of angina, kidney disease or CAD/MI. There is no history of chronic renal disease, hyperparathyroidism, a hypertension causing med or a thyroid problem.   Hyperlipidemia  This is a chronic problem. The current episode started more than 1 year ago. The problem is controlled. Recent lipid tests were reviewed and are normal. She has no history of chronic renal disease, diabetes, hypothyroidism, liver disease, obesity or nephrotic syndrome. There are no known factors aggravating her hyperlipidemia. Pertinent negatives include no chest pain, myalgias or shortness of breath. Current antihyperlipidemic treatment includes diet change, exercise and ezetimibe. The current treatment provides moderate improvement of lipids. Risk factors for coronary artery disease include dyslipidemia, hypertension, obesity and post-menopausal.     Past Surgical History:   Procedure Laterality Date     SECTION, LOW TRANSVERSE       x 3    HYSTERECTOMY  2012    pace maker  2020    SPINE SURGERY  2021    stents both legs       Family History   Problem Relation Age of Onset    Heart disease Mother     Heart failure Father       Social History     Socioeconomic History    Marital status:    Tobacco Use    Smoking status: Former     Types: Cigarettes     Quit date: 1985     Years since quittin.8    Smokeless tobacco: Never   Substance and Sexual Activity    Alcohol use: Yes    Drug use: No    Sexual activity: Yes   Social History Narrative    Live with      Current Outpatient Medications   Medication Sig Dispense Refill    albuterol (VENTOLIN HFA) 90 mcg/actuation inhaler Inhale 2 puffs into the lungs every 6 (six) hours as needed for Wheezing or Shortness of Breath. Rescue 18 g 11    aspirin 325 MG tablet Take 325 mg by mouth once daily.      carvediloL (COREG) 6.25 MG tablet Take 6.25 mg by mouth 2 (two) times daily.       cetirizine (ZYRTEC) 10 MG tablet Take 1 tablet (10 mg total) by mouth once daily.  0    escitalopram oxalate (LEXAPRO) 10 MG tablet Take 10 mg by mouth once daily.      ezetimibe (ZETIA) 10 mg tablet 1 tablet once daily.      fluticasone propionate (FLONASE) 50 mcg/actuation nasal spray 2 sprays (100 mcg total) by Each Nostril route once daily. 16 g 11    furosemide (LASIX) 40 MG tablet 1 tablet as needed.      melatonin 10 mg Tab Take 2 tablets by mouth nightly as needed.      montelukast (SINGULAIR) 10 mg tablet Take 1 tablet (10 mg total) by mouth every evening. 90 tablet 0    pantoprazole (PROTONIX) 40 MG tablet TAKE 1 TABLET BY MOUTH EVERY DAY 90 tablet 1    rosuvastatin (CRESTOR) 5 MG tablet Take 5 mg by mouth once daily.      sacubitriL-valsartan (ENTRESTO) 49-51 mg per tablet Entresto 49 mg-51 mg tablet   Take 1 tablet twice a day by oral route.      spironolactone (ALDACTONE) 50 MG tablet Take 50 mg by mouth once daily.      SYMBICORT 160-4.5 mcg/actuation HFAA Inhale 2 puffs into  the lungs every 12 (twelve) hours. Controller 10.2 g 11    predniSONE (DELTASONE) 20 MG tablet Take one tablet per day for five days only as needed for cough, shortness of breath. Repeat as needed. (Patient not taking: Reported on 10/10/2022) 15 tablet 0    promethazine-dextromethorphan (PROMETHAZINE-DM) 6.25-15 mg/5 mL Syrp Take 5 mLs by mouth every 8 (eight) hours as needed. (Patient not taking: Reported on 10/10/2022) 118 mL 0     No current facility-administered medications for this visit.     Review of patient's allergies indicates:  No Known Allergies   Past Medical History:   Diagnosis Date    Allergy     Asthma     Bilateral leg edema     Hyperlipidemia     Hypertension     Leg pain, bilateral     left leg more severe    Varicose vein      Past Surgical History:   Procedure Laterality Date     SECTION, LOW TRANSVERSE      x 3    HYSTERECTOMY      pace maker  2020    SPINE SURGERY  2021    stents both legs         Review of Systems   Constitutional:  Negative for activity change, appetite change, chills, diaphoresis, malaise/fatigue and unexpected weight change.   HENT:  Negative for ear discharge, hearing loss, rhinorrhea, trouble swallowing and voice change.    Eyes:  Negative for blurred vision, photophobia, pain, discharge and visual disturbance.   Respiratory:  Negative for chest tightness, shortness of breath, wheezing and stridor.    Cardiovascular:  Negative for chest pain, palpitations, orthopnea and PND.   Gastrointestinal:  Negative for abdominal pain, blood in stool, constipation, diarrhea and vomiting.   Endocrine: Negative for cold intolerance, heat intolerance, polydipsia and polyuria.   Genitourinary:  Negative for difficulty urinating, dysuria, flank pain, hematuria and menstrual problem.   Musculoskeletal:  Negative for arthralgias, joint swelling, myalgias, neck pain and neck stiffness.   Skin:  Negative for pallor.   Neurological:  Negative for speech difficulty,  "weakness and headaches.   Hematological:  Does not bruise/bleed easily.   Psychiatric/Behavioral:  Negative for confusion, dysphoric mood, self-injury, sleep disturbance and suicidal ideas. The patient is not nervous/anxious.     OBJECTIVE:      Vitals:    10/10/22 1040   BP: 100/80   Pulse: 84   Temp: 97.9 °F (36.6 °C)   SpO2: 97%   Weight: 90.7 kg (200 lb)   Height: 5' 7" (1.702 m)     Physical Exam  Vitals and nursing note reviewed.   Constitutional:       General: She is not in acute distress.     Appearance: She is well-developed.   HENT:      Head: Normocephalic and atraumatic.      Right Ear: Tympanic membrane normal.      Left Ear: Tympanic membrane normal.      Nose: Nose normal.      Mouth/Throat:      Pharynx: Uvula midline.   Eyes:      General: Lids are normal.      Conjunctiva/sclera: Conjunctivae normal.      Pupils: Pupils are equal, round, and reactive to light.      Right eye: Pupil is round and reactive.      Left eye: Pupil is round and reactive.   Neck:      Thyroid: No thyromegaly.      Vascular: No JVD.      Trachea: Trachea normal.   Cardiovascular:      Rate and Rhythm: Normal rate and regular rhythm.      Pulses: Normal pulses.      Heart sounds: Normal heart sounds.   Pulmonary:      Effort: Pulmonary effort is normal. No tachypnea or respiratory distress.      Breath sounds: Normal breath sounds. No wheezing, rhonchi or rales.   Abdominal:      General: Bowel sounds are normal.      Palpations: Abdomen is soft.      Tenderness: There is no abdominal tenderness.   Musculoskeletal:         General: Normal range of motion.      Cervical back: Normal range of motion and neck supple.      Right lower leg: Edema present.      Left lower leg: Edema present.   Lymphadenopathy:      Cervical: No cervical adenopathy.   Skin:     General: Skin is warm and dry.      Findings: No rash.      Comments: Left lower leg with palpable tender varicose vein. No erythema, no warmth, neg homans    Neurological: "      Mental Status: She is alert and oriented to person, place, and time.   Psychiatric:         Mood and Affect: Mood normal.         Speech: Speech normal.         Behavior: Behavior normal. Behavior is cooperative.         Thought Content: Thought content normal.         Judgment: Judgment normal.      No visits with results within 3 Month(s) from this visit.   Latest known visit with results is:   Admission on 06/12/2022, Discharged on 06/14/2022   Component Date Value Ref Range Status    Troponin I 06/13/2022 <0.030  <=0.040 ng/mL Final    BSA 06/13/2022 2.09  m2 Final    TDI SEPTAL 06/13/2022 0.11  m/s Final    LV LATERAL E/E' RATIO 06/13/2022 7.63  m/s Final    LV SEPTAL E/E' RATIO 06/13/2022 5.55  m/s Final    TDI LATERAL 06/13/2022 0.08  m/s Final    LVIDd 06/13/2022 5.56  3.5 - 6.0 cm Final    LVIDs 06/13/2022 4.11 (A)  2.1 - 4.0 cm Final    FS 06/13/2022 26  28 - 44 % Final    LA size 06/13/2022 4.53  cm Final    RVDD 06/13/2022 332.00  cm Final    TAPSE 06/13/2022 181.00  cm Final    AV mean gradient 06/13/2022 4  mmHg Final    AV valve area 06/13/2022 2.34  cm2 Final    AV index (prosthetic) 06/13/2022 0.71   Final    E/A ratio 06/13/2022 0.87   Final    Mean e' 06/13/2022 0.10  m/s Final    E wave deceleration time 06/13/2022 194.76  msec Final    LVOT diameter 06/13/2022 2.05  cm Final    LVOT area 06/13/2022 3.3  cm2 Final    LVOT peak timothy 06/13/2022 90.44  m/s Final    LVOT peak VTI 06/13/2022 17.75  cm Final    Ao VTI 06/13/2022 25.01  cm Final    LVOT stroke volume 06/13/2022 58.56  cm3 Final    E/E' ratio 06/13/2022 6.42  m/s Final    MV Peak E Timothy 06/13/2022 0.61  m/s Final    TR Max Timothy 06/13/2022 1.34  m/s Final    MV Peak A Timothy 06/13/2022 0.70  m/s Final    LV Systolic Volume 06/13/2022 69.39  mL Final    LV Systolic Volume Index 06/13/2022 34.2  mL/m2 Final    LV Diastolic Volume 06/13/2022 172.26  mL Final    LV Diastolic Volume Index 06/13/2022 84.86  mL/m2 Final    Triscuspid Valve  Regurgitation Pea* 06/13/2022 7  mmHg Final    Right Atrial Pressure (from IVC) 06/13/2022 3  mmHg Final    EF 06/13/2022 40  % Final    TV rest pulmonary artery pressure 06/13/2022 10  mmHg Final    Sodium 06/13/2022 138  136 - 145 mmol/L Final    Potassium 06/13/2022 3.9  3.5 - 5.1 mmol/L Final    Chloride 06/13/2022 104  95 - 110 mmol/L Final    CO2 06/13/2022 26  23 - 29 mmol/L Final    Glucose 06/13/2022 95  70 - 110 mg/dL Final    BUN 06/13/2022 21 (H)  6 - 20 mg/dL Final    Creatinine 06/13/2022 0.8  0.5 - 1.4 mg/dL Final    Calcium 06/13/2022 8.5 (L)  8.7 - 10.5 mg/dL Final    Anion Gap 06/13/2022 8  8 - 16 mmol/L Final    eGFR if African American 06/13/2022 >60.0  >60 mL/min/1.73 m^2 Final    eGFR if non African American 06/13/2022 >60.0  >60 mL/min/1.73 m^2 Final    Comment: Calculation used to obtain the estimated glomerular filtration  rate (eGFR) is the CKD-EPI equation.       Magnesium 06/13/2022 2.0  1.6 - 2.6 mg/dL Final    Troponin I 06/13/2022 <0.030  <=0.040 ng/mL Final    WBC 06/13/2022 7.04  3.90 - 12.70 K/uL Final    RBC 06/13/2022 3.63 (L)  4.00 - 5.40 M/uL Final    Hemoglobin 06/13/2022 10.8 (L)  12.0 - 16.0 g/dL Final    Hematocrit 06/13/2022 33.8 (L)  37.0 - 48.5 % Final    MCV 06/13/2022 93  82 - 98 fL Final    MCH 06/13/2022 29.8  27.0 - 31.0 pg Final    MCHC 06/13/2022 32.0  32.0 - 36.0 g/dL Final    RDW 06/13/2022 13.4  11.5 - 14.5 % Final    Platelets 06/13/2022 296  150 - 450 K/uL Final    MPV 06/13/2022 9.9  9.2 - 12.9 fL Final    Immature Granulocytes 06/13/2022 0.3  0.0 - 0.5 % Final    Gran # (ANC) 06/13/2022 3.6  1.8 - 7.7 K/uL Final    Immature Grans (Abs) 06/13/2022 0.02  0.00 - 0.04 K/uL Final    Comment: Mild elevation in immature granulocytes is non specific and   can be seen in a variety of conditions including stress response,   acute inflammation, trauma and pregnancy. Correlation with other   laboratory and clinical findings is essential.      Lymph # 06/13/2022 2.2  1.0  - 4.8 K/uL Final    Mono # 06/13/2022 0.7  0.3 - 1.0 K/uL Final    Eos # 06/13/2022 0.5  0.0 - 0.5 K/uL Final    Baso # 06/13/2022 0.03  0.00 - 0.20 K/uL Final    nRBC 06/13/2022 0  0 /100 WBC Final    Gran % 06/13/2022 51.3  38.0 - 73.0 % Final    Lymph % 06/13/2022 31.7  18.0 - 48.0 % Final    Mono % 06/13/2022 9.9  4.0 - 15.0 % Final    Eosinophil % 06/13/2022 6.4  0.0 - 8.0 % Final    Basophil % 06/13/2022 0.4  0.0 - 1.9 % Final    Differential Method 06/13/2022 Automated   Final    CPK 06/13/2022 147  20 - 180 U/L Final    CPK MB 06/13/2022 0.9  0.1 - 6.5 ng/mL Final    Sodium 06/14/2022 135 (L)  136 - 145 mmol/L Final    Potassium 06/14/2022 3.6  3.5 - 5.1 mmol/L Final    Chloride 06/14/2022 103  95 - 110 mmol/L Final    CO2 06/14/2022 21 (L)  23 - 29 mmol/L Final    Glucose 06/14/2022 238 (H)  70 - 110 mg/dL Final    BUN 06/14/2022 22 (H)  6 - 20 mg/dL Final    Creatinine 06/14/2022 0.7  0.5 - 1.4 mg/dL Final    Calcium 06/14/2022 8.7  8.7 - 10.5 mg/dL Final    Anion Gap 06/14/2022 11  8 - 16 mmol/L Final    eGFR if African American 06/14/2022 >60.0  >60 mL/min/1.73 m^2 Final    eGFR if non African American 06/14/2022 >60.0  >60 mL/min/1.73 m^2 Final    Comment: Calculation used to obtain the estimated glomerular filtration  rate (eGFR) is the CKD-EPI equation.       Magnesium 06/14/2022 2.1  1.6 - 2.6 mg/dL Final    WBC 06/14/2022 7.87  3.90 - 12.70 K/uL Final    RBC 06/14/2022 3.88 (L)  4.00 - 5.40 M/uL Final    Hemoglobin 06/14/2022 11.9 (L)  12.0 - 16.0 g/dL Final    Hematocrit 06/14/2022 36.6 (L)  37.0 - 48.5 % Final    MCV 06/14/2022 94  82 - 98 fL Final    MCH 06/14/2022 30.7  27.0 - 31.0 pg Final    MCHC 06/14/2022 32.5  32.0 - 36.0 g/dL Final    RDW 06/14/2022 13.2  11.5 - 14.5 % Final    Platelets 06/14/2022 331  150 - 450 K/uL Final    MPV 06/14/2022 9.9  9.2 - 12.9 fL Final    Immature Granulocytes 06/14/2022 0.4  0.0 - 0.5 % Final    Gran # (ANC) 06/14/2022 6.1  1.8 - 7.7 K/uL Final    Immature  Grans (Abs) 06/14/2022 0.03  0.00 - 0.04 K/uL Final    Comment: Mild elevation in immature granulocytes is non specific and   can be seen in a variety of conditions including stress response,   acute inflammation, trauma and pregnancy. Correlation with other   laboratory and clinical findings is essential.      Lymph # 06/14/2022 1.6  1.0 - 4.8 K/uL Final    Mono # 06/14/2022 0.2 (L)  0.3 - 1.0 K/uL Final    Eos # 06/14/2022 0.0  0.0 - 0.5 K/uL Final    Baso # 06/14/2022 0.01  0.00 - 0.20 K/uL Final    nRBC 06/14/2022 0  0 /100 WBC Final    Gran % 06/14/2022 77.5 (H)  38.0 - 73.0 % Final    Lymph % 06/14/2022 19.8  18.0 - 48.0 % Final    Mono % 06/14/2022 2.2 (L)  4.0 - 15.0 % Final    Eosinophil % 06/14/2022 0.0  0.0 - 8.0 % Final    Basophil % 06/14/2022 0.1  0.0 - 1.9 % Final    Differential Method 06/14/2022 Automated   Final   ]  Assessment:       1. Essential (primary) hypertension    2. Vitamin B 12 deficiency    3. Vitamin D deficiency    4. Mixed hyperlipidemia    5. Left leg pain    6. Encounter for screening mammogram for breast cancer    7. Flu vaccine need        Plan:       Essential (primary) hypertension  Stable on current meds    Vitamin B 12 deficiency  Cont otc supplements qod as currently taking    Vitamin D deficiency  Cont otc supplement    Mixed hyperlipidemia  Stable on current meds    Left leg pain  -     US Lower Extremity Veins Left; Future; Expected date: 10/10/2022    Encounter for screening mammogram for breast cancer  -     Mammo Digital Screening Bilat w/ Zeyad; Future; Expected date: 10/10/2022    Flu vaccine need  -     Influenza - Quadrivalent (PF)      Follow up in about 6 months (around 4/10/2023) for wellness.      10/10/2022 Jonathon Muñoz, ROBERTA, FNP

## 2022-10-10 ENCOUNTER — OFFICE VISIT (OUTPATIENT)
Dept: FAMILY MEDICINE | Facility: CLINIC | Age: 62
End: 2022-10-10
Payer: COMMERCIAL

## 2022-10-10 VITALS
DIASTOLIC BLOOD PRESSURE: 80 MMHG | BODY MASS INDEX: 31.39 KG/M2 | SYSTOLIC BLOOD PRESSURE: 100 MMHG | WEIGHT: 200 LBS | TEMPERATURE: 98 F | HEART RATE: 84 BPM | OXYGEN SATURATION: 97 % | HEIGHT: 67 IN

## 2022-10-10 DIAGNOSIS — I10 ESSENTIAL (PRIMARY) HYPERTENSION: Primary | ICD-10-CM

## 2022-10-10 DIAGNOSIS — E53.8 VITAMIN B 12 DEFICIENCY: ICD-10-CM

## 2022-10-10 DIAGNOSIS — Z23 FLU VACCINE NEED: ICD-10-CM

## 2022-10-10 DIAGNOSIS — E78.2 MIXED HYPERLIPIDEMIA: ICD-10-CM

## 2022-10-10 DIAGNOSIS — E55.9 VITAMIN D DEFICIENCY: ICD-10-CM

## 2022-10-10 DIAGNOSIS — Z12.31 ENCOUNTER FOR SCREENING MAMMOGRAM FOR BREAST CANCER: ICD-10-CM

## 2022-10-10 DIAGNOSIS — M79.605 LEFT LEG PAIN: ICD-10-CM

## 2022-10-10 PROCEDURE — 3074F PR MOST RECENT SYSTOLIC BLOOD PRESSURE < 130 MM HG: ICD-10-PCS | Mod: CPTII,S$GLB,, | Performed by: NURSE PRACTITIONER

## 2022-10-10 PROCEDURE — 1160F PR REVIEW ALL MEDS BY PRESCRIBER/CLIN PHARMACIST DOCUMENTED: ICD-10-PCS | Mod: CPTII,S$GLB,, | Performed by: NURSE PRACTITIONER

## 2022-10-10 PROCEDURE — 3079F DIAST BP 80-89 MM HG: CPT | Mod: CPTII,S$GLB,, | Performed by: NURSE PRACTITIONER

## 2022-10-10 PROCEDURE — 90686 FLU VACCINE (QUAD) GREATER THAN OR EQUAL TO 3YO PRESERVATIVE FREE IM: ICD-10-PCS | Mod: S$GLB,,, | Performed by: NURSE PRACTITIONER

## 2022-10-10 PROCEDURE — 1159F MED LIST DOCD IN RCRD: CPT | Mod: CPTII,S$GLB,, | Performed by: NURSE PRACTITIONER

## 2022-10-10 PROCEDURE — 1160F RVW MEDS BY RX/DR IN RCRD: CPT | Mod: CPTII,S$GLB,, | Performed by: NURSE PRACTITIONER

## 2022-10-10 PROCEDURE — 1159F PR MEDICATION LIST DOCUMENTED IN MEDICAL RECORD: ICD-10-PCS | Mod: CPTII,S$GLB,, | Performed by: NURSE PRACTITIONER

## 2022-10-10 PROCEDURE — 99214 PR OFFICE/OUTPT VISIT, EST, LEVL IV, 30-39 MIN: ICD-10-PCS | Mod: 25,S$GLB,, | Performed by: NURSE PRACTITIONER

## 2022-10-10 PROCEDURE — 3008F BODY MASS INDEX DOCD: CPT | Mod: CPTII,S$GLB,, | Performed by: NURSE PRACTITIONER

## 2022-10-10 PROCEDURE — 3008F PR BODY MASS INDEX (BMI) DOCUMENTED: ICD-10-PCS | Mod: CPTII,S$GLB,, | Performed by: NURSE PRACTITIONER

## 2022-10-10 PROCEDURE — 99214 OFFICE O/P EST MOD 30 MIN: CPT | Mod: 25,S$GLB,, | Performed by: NURSE PRACTITIONER

## 2022-10-10 PROCEDURE — 4010F PR ACE/ARB THEARPY RXD/TAKEN: ICD-10-PCS | Mod: CPTII,S$GLB,, | Performed by: NURSE PRACTITIONER

## 2022-10-10 PROCEDURE — 90471 FLU VACCINE (QUAD) GREATER THAN OR EQUAL TO 3YO PRESERVATIVE FREE IM: ICD-10-PCS | Mod: S$GLB,,, | Performed by: NURSE PRACTITIONER

## 2022-10-10 PROCEDURE — 3079F PR MOST RECENT DIASTOLIC BLOOD PRESSURE 80-89 MM HG: ICD-10-PCS | Mod: CPTII,S$GLB,, | Performed by: NURSE PRACTITIONER

## 2022-10-10 PROCEDURE — 90686 IIV4 VACC NO PRSV 0.5 ML IM: CPT | Mod: S$GLB,,, | Performed by: NURSE PRACTITIONER

## 2022-10-10 PROCEDURE — 4010F ACE/ARB THERAPY RXD/TAKEN: CPT | Mod: CPTII,S$GLB,, | Performed by: NURSE PRACTITIONER

## 2022-10-10 PROCEDURE — 3074F SYST BP LT 130 MM HG: CPT | Mod: CPTII,S$GLB,, | Performed by: NURSE PRACTITIONER

## 2022-10-10 PROCEDURE — 90471 IMMUNIZATION ADMIN: CPT | Mod: S$GLB,,, | Performed by: NURSE PRACTITIONER

## 2022-10-21 ENCOUNTER — TELEPHONE (OUTPATIENT)
Dept: FAMILY MEDICINE | Facility: CLINIC | Age: 62
End: 2022-10-21

## 2022-10-21 ENCOUNTER — HOSPITAL ENCOUNTER (OUTPATIENT)
Dept: RADIOLOGY | Facility: HOSPITAL | Age: 62
Discharge: HOME | End: 2022-10-21
Attending: NURSE PRACTITIONER
Payer: COMMERCIAL

## 2022-10-21 DIAGNOSIS — Z12.31 ENCOUNTER FOR SCREENING MAMMOGRAM FOR BREAST CANCER: ICD-10-CM

## 2022-10-21 DIAGNOSIS — M79.605 LEFT LEG PAIN: ICD-10-CM

## 2022-10-21 PROCEDURE — 77063 BREAST TOMOSYNTHESIS BI: CPT | Mod: TC,PO

## 2022-10-21 PROCEDURE — 77067 SCR MAMMO BI INCL CAD: CPT | Mod: TC,PO

## 2022-10-21 PROCEDURE — 93971 EXTREMITY STUDY: CPT | Mod: TC,PO,LT

## 2022-10-21 NOTE — TELEPHONE ENCOUNTER
----- Message from Jonathon Muñoz NP sent at 10/21/2022 10:48 AM CDT -----  mammo neg, repeat annually

## 2022-10-21 NOTE — TELEPHONE ENCOUNTER
----- Message from Jonathon Muñoz NP sent at 10/21/2022 10:48 AM CDT -----   No evidence of DVT in the left lower extremity.

## 2022-11-08 ENCOUNTER — OFFICE VISIT (OUTPATIENT)
Dept: PULMONOLOGY | Facility: CLINIC | Age: 62
End: 2022-11-08
Payer: COMMERCIAL

## 2022-11-08 ENCOUNTER — LAB VISIT (OUTPATIENT)
Dept: LAB | Facility: HOSPITAL | Age: 62
End: 2022-11-08
Attending: NURSE PRACTITIONER
Payer: COMMERCIAL

## 2022-11-08 VITALS
HEIGHT: 67 IN | SYSTOLIC BLOOD PRESSURE: 110 MMHG | OXYGEN SATURATION: 96 % | WEIGHT: 202.38 LBS | HEART RATE: 79 BPM | DIASTOLIC BLOOD PRESSURE: 58 MMHG | BODY MASS INDEX: 31.76 KG/M2

## 2022-11-08 DIAGNOSIS — J82.83 EOSINOPHILIC ASTHMA: ICD-10-CM

## 2022-11-08 DIAGNOSIS — J45.51 SEVERE PERSISTENT ASTHMA WITH ACUTE EXACERBATION: Primary | ICD-10-CM

## 2022-11-08 DIAGNOSIS — Z78.9 NON-SMOKER: ICD-10-CM

## 2022-11-08 DIAGNOSIS — J30.1 CHRONIC SEASONAL ALLERGIC RHINITIS DUE TO POLLEN: ICD-10-CM

## 2022-11-08 DIAGNOSIS — R05.9 COUGH, UNSPECIFIED TYPE: ICD-10-CM

## 2022-11-08 DIAGNOSIS — J45.51 SEVERE PERSISTENT ASTHMA WITH ACUTE EXACERBATION: ICD-10-CM

## 2022-11-08 LAB
BASOPHILS # BLD AUTO: 0.03 K/UL (ref 0–0.2)
BASOPHILS NFR BLD: 0.3 % (ref 0–1.9)
DIFFERENTIAL METHOD: ABNORMAL
EOSINOPHIL # BLD AUTO: 0 K/UL (ref 0–0.5)
EOSINOPHIL NFR BLD: 0 % (ref 0–8)
ERYTHROCYTE [DISTWIDTH] IN BLOOD BY AUTOMATED COUNT: 13.4 % (ref 11.5–14.5)
HCT VFR BLD AUTO: 37.5 % (ref 37–48.5)
HGB BLD-MCNC: 11.9 G/DL (ref 12–16)
IMM GRANULOCYTES # BLD AUTO: 0.07 K/UL (ref 0–0.04)
IMM GRANULOCYTES NFR BLD AUTO: 0.6 % (ref 0–0.5)
LYMPHOCYTES # BLD AUTO: 1.8 K/UL (ref 1–4.8)
LYMPHOCYTES NFR BLD: 16.7 % (ref 18–48)
MCH RBC QN AUTO: 30.2 PG (ref 27–31)
MCHC RBC AUTO-ENTMCNC: 31.7 G/DL (ref 32–36)
MCV RBC AUTO: 95 FL (ref 82–98)
MONOCYTES # BLD AUTO: 0.2 K/UL (ref 0.3–1)
MONOCYTES NFR BLD: 2.2 % (ref 4–15)
NEUTROPHILS # BLD AUTO: 8.8 K/UL (ref 1.8–7.7)
NEUTROPHILS NFR BLD: 80.2 % (ref 38–73)
NRBC BLD-RTO: 0 /100 WBC
PLATELET # BLD AUTO: 402 K/UL (ref 150–450)
PMV BLD AUTO: 10.1 FL (ref 9.2–12.9)
RBC # BLD AUTO: 3.94 M/UL (ref 4–5.4)
WBC # BLD AUTO: 10.93 K/UL (ref 3.9–12.7)

## 2022-11-08 PROCEDURE — 4010F ACE/ARB THERAPY RXD/TAKEN: CPT | Mod: CPTII,S$GLB,, | Performed by: NURSE PRACTITIONER

## 2022-11-08 PROCEDURE — 4010F PR ACE/ARB THEARPY RXD/TAKEN: ICD-10-PCS | Mod: CPTII,S$GLB,, | Performed by: NURSE PRACTITIONER

## 2022-11-08 PROCEDURE — 3074F SYST BP LT 130 MM HG: CPT | Mod: CPTII,S$GLB,, | Performed by: NURSE PRACTITIONER

## 2022-11-08 PROCEDURE — 3078F PR MOST RECENT DIASTOLIC BLOOD PRESSURE < 80 MM HG: ICD-10-PCS | Mod: CPTII,S$GLB,, | Performed by: NURSE PRACTITIONER

## 2022-11-08 PROCEDURE — 99999 PR PBB SHADOW E&M-EST. PATIENT-LVL IV: ICD-10-PCS | Mod: PBBFAC,,, | Performed by: NURSE PRACTITIONER

## 2022-11-08 PROCEDURE — 85025 COMPLETE CBC W/AUTO DIFF WBC: CPT | Performed by: NURSE PRACTITIONER

## 2022-11-08 PROCEDURE — 36415 COLL VENOUS BLD VENIPUNCTURE: CPT | Performed by: NURSE PRACTITIONER

## 2022-11-08 PROCEDURE — 3074F PR MOST RECENT SYSTOLIC BLOOD PRESSURE < 130 MM HG: ICD-10-PCS | Mod: CPTII,S$GLB,, | Performed by: NURSE PRACTITIONER

## 2022-11-08 PROCEDURE — 99214 PR OFFICE/OUTPT VISIT, EST, LEVL IV, 30-39 MIN: ICD-10-PCS | Mod: S$GLB,,, | Performed by: NURSE PRACTITIONER

## 2022-11-08 PROCEDURE — 3008F PR BODY MASS INDEX (BMI) DOCUMENTED: ICD-10-PCS | Mod: CPTII,S$GLB,, | Performed by: NURSE PRACTITIONER

## 2022-11-08 PROCEDURE — 3008F BODY MASS INDEX DOCD: CPT | Mod: CPTII,S$GLB,, | Performed by: NURSE PRACTITIONER

## 2022-11-08 PROCEDURE — 99999 PR PBB SHADOW E&M-EST. PATIENT-LVL IV: CPT | Mod: PBBFAC,,, | Performed by: NURSE PRACTITIONER

## 2022-11-08 PROCEDURE — 1159F PR MEDICATION LIST DOCUMENTED IN MEDICAL RECORD: ICD-10-PCS | Mod: CPTII,S$GLB,, | Performed by: NURSE PRACTITIONER

## 2022-11-08 PROCEDURE — 99214 OFFICE O/P EST MOD 30 MIN: CPT | Mod: S$GLB,,, | Performed by: NURSE PRACTITIONER

## 2022-11-08 PROCEDURE — 1159F MED LIST DOCD IN RCRD: CPT | Mod: CPTII,S$GLB,, | Performed by: NURSE PRACTITIONER

## 2022-11-08 PROCEDURE — 3078F DIAST BP <80 MM HG: CPT | Mod: CPTII,S$GLB,, | Performed by: NURSE PRACTITIONER

## 2022-11-08 RX ORDER — CODEINE PHOSPHATE AND GUAIFENESIN 10; 100 MG/5ML; MG/5ML
5 SOLUTION ORAL EVERY 8 HOURS PRN
Qty: 237 ML | Refills: 0 | Status: SHIPPED | OUTPATIENT
Start: 2022-11-08 | End: 2022-11-18

## 2022-11-08 RX ORDER — PREDNISONE 20 MG/1
TABLET ORAL
Qty: 18 TABLET | Refills: 0 | Status: SHIPPED | OUTPATIENT
Start: 2022-11-08 | End: 2022-12-30

## 2022-11-08 RX ORDER — MEPOLIZUMAB 100 MG/ML
100 INJECTION, SOLUTION SUBCUTANEOUS
Qty: 1 ML | Refills: 11 | Status: SHIPPED | OUTPATIENT
Start: 2022-11-08 | End: 2022-11-15 | Stop reason: SDUPTHER

## 2022-11-08 RX ORDER — GUAIFENESIN AND CODEINE PHOSPHATE 10; 100 MG/5ML; MG/5ML
5 LIQUID ORAL NIGHTLY
COMMUNITY
Start: 2022-07-20 | End: 2023-04-12

## 2022-11-08 NOTE — PATIENT INSTRUCTIONS
Asthma - severe persistent, uncontrolled with 2 steroid courses in the past six months. Eosinophil count 500 in June 2022.     Can check CBC now, then initiate Nucala in clinic.    Nucala sample given in clinic  LOT number [id number]  Exp May 2025    Continue Symbicort twice per day. Albuterol as needed for shortness of breath, wheezing, cough.    Continue current medication regiment. Keep follow up appointment as scheduled. Please call the office if you have any questions or concerns.

## 2022-11-08 NOTE — PROGRESS NOTES
Gave injection into left abdomen Fgesml351ag/1ml  subcutaneous. Tolerated well with no adverse reactions. Site no redness , swelling or irritation. LOT# [id number]  Expiration: May 2025

## 2022-11-08 NOTE — PROGRESS NOTES
11/8/2022    Daylin Lynch  In office visit     Chief Complaint   Patient presents with    Cough     Pt states that she has been having a dry cough     Shortness of Breath     Due to the cough     Fatigue     Pt states that she is always tired.        HPI:  11/8/2022:  Recent trip to [place] to visit family - states while on trip had an asthma flare. Started taking Prednisone yesterday, on day 2.   Reports use of Prednisone two times over the last six months alone - had to take regiment upon discharge from hospital in June 2022. Reports great benefit with steroid use.   Using Symbicort two puffs twice daily with benefit. Using Albuterol only as needed - states over the last week she has required Albuterol more - using approximately 3x per day.  On Singulair nightly with benefit.   Reports chest tightness.   Shortness of breath: Persistent over the last week or so - associated with wheezing, worse at night time. Associated with nocturnal arousals nightly. States can't perform ADLS without stopping for rest.  Cough: Persistent, worse at night time, non productive. Relieved somewhat with cough syrup.        8/8/2022:  Recent trip to [place] - had several issues with shortness of breath, coughing while on trip. Overall doing better since returning home.   Cough: Overall improved - nonproductive, no time correlation identified. Denies wheezing.  Shortness of breath: Persistent with minimal exertion - improves with rest.   Currently using Symbicort - two puffs twice per day with reported benefit. Using Albuterol only as needed, not daily use - states while on trip in [place] required several times per day, however hasn't needed much since returning home.  Hasn't taken Prednisone since prior visit.   Patient Instructions   Overall you seem to be doing better.  Would continue Symbicort two puffs twice daily with Albuterol use as needed for shortness of breath, wheezing.  Keep Prednisone on hand to take as needed for shortness of  breath, cough, wheezing.  Can consider biologic in the future if symptoms worsen.  Consider PFT if the future if no improvement.  Continue current medication regiment. Keep follow up appointment as scheduled. Please call the office if you have any questions or concerns.         6/24/22:  Hx: Annual bronchitis in the winter, pleurisy, CHF, AICD placement,   Recent ER visit on 6/12/22 to [facility] for CP, SOB - patient was noted to have elevated troponin at that time, decision made for transfer to [facility] for possible cath. Patient underwent series of testing including echocardiogram revealing EF 40% in the setting of known systolic heart failure. Patient did not undergo angiogram during this hospitalization. CTA obtained revealing no PE, however concern regarding atypical pneumonia due to bilateral ground glass opacities. Patient was treated with IV Rocephin, Azithromycin, and steroid - reported great improvement at that time with medication therapy. Patient was subsequently discharged home, without supplemental oxygen, with a prescription for Levaquin (reports completion), five days worth of steroid PO, and PRN albuterol inhaler.   Was seen per PCP on Tuesday 6/21 and prescribed Symbicort inhaler, Flonase, and Promethazine cough syrup.   Cough: Persistent over the last 6 months - states it's overall improved since hospitalization however not completely resolved. States cough was attributed in the past to congestive heart failure. Was diagnosed with bronchitis per primary care provider in March - given codeine cough syrup at that time, no antibiotic or steroids. Cough persists throughout the day however is noticeably worse at night time, when lying down. Non productive. Associated with coughing fits, dizziness. Associated with chest tightness, wheezing. No relief noted with tessalon pearls, however has tried codeine and promethazine cough syrup separately - states codeine worked better.  Shortness of breath:  Gradual onset over the last six months - feels chest heaviness at rest however experiences exertional dyspnea - states affecting ADLS. Can't take a shower and get dressed without stopping for rest. Improves with rest.   Does endorse sinus congestion, ear congestion, post nasal drip - started Zyrtec daily in April, does not appreciate benefit.   Has started Symbicort - taking two puffs in the morning - has been on for three days now, reports benefit with use.  Has tried Albuterol in the past without noticeable benefit.   Takes Protonix daily.  Per review of EMR discharge summary:  Hospital Course:   61-year-old lady with numerous medical problems including chronic systolic CHF who has been suffering with dry cough for last couple months presented to ED with cough and cough related pain found to have mildly elevated troponin and was subsequently transferred to [facility].  Serial EKGs, cardiac enzymes remained negative for acute coronary syndrome.  Stress test ruled out any reversible ischemia.  Her ejection fraction is overall stable from previous echo.  Overall symptomatology is more consistent with pulmonary in origin, CTA chest showed atypical pneumonia.  She responded well after starting Rocephin and azithromycin as well as 1 dose of IV steroid.  Patient has chronic postnasal drip.  She was discharged home in hemodynamically stable condition with prescription of Levaquin, p.r.n. albuterol.  She was advised to follow-up with her PCP, I advised her that if her symptoms do not improve she may discuss with her cardiologist if Entresto is the culprit of chronic dry cough.  I also generated outpatient pulmonary referral.   Patient Instructions   Repeat chest xray in 1-2 weeks to evaluate post atypical pneumonia.  Symptoms are somewhat concerning for asthmatic component. Would continue Symbicort however start using 2 puffs twice per day.   Can use Albuterol as needed for shortness of breath, cough.  For sinus complaints - can  try Singulair at night time with Zyrtec during the day.   Can send to ENT if interested.  Etiology of cough is unknown at this time - however could be multifactorial.  Entresto?  GERD - Currently on Protonix  Codeine cough syrup to be taken as needed for cough, caution as may make drowsy. Do not drive after taking.  Prednisone, take only as needed.  Continue current medication regiment. Keep follow up appointment as scheduled. Please call the office if you have any questions or concerns.         Social Hx: Lives with  - 2 animals in the home. Former [employer] employee, . No Asbestosis exposure, Smoking Hx: Former smoker, on and off for approx ten years - quit in   Family Hx: No Lung Cancer, Mother COPD, No Asthma  Medical Hx: Previous pneumonia ; No previous shoulder surgery - Defibrillator placement       The chief compliant problem varies with instability at times.  PFSH:  Past Medical History:   Diagnosis Date    Allergy     Asthma     Bilateral leg edema     Hyperlipidemia     Hypertension     Leg pain, bilateral     left leg more severe    Varicose vein          Past Surgical History:   Procedure Laterality Date     SECTION, LOW TRANSVERSE      x 3    HYSTERECTOMY      pace maker  2020    SPINE SURGERY  2021    stents both legs       Social History     Tobacco Use    Smoking status: Former     Types: Cigarettes     Quit date: 1985     Years since quittin.9    Smokeless tobacco: Never   Substance Use Topics    Alcohol use: Yes    Drug use: No     Family History   Problem Relation Age of Onset    Heart disease Mother     Heart failure Father      Review of patient's allergies indicates:  No Known Allergies  I have reviewed past medical, family, and social history. I have reviewed previous nurse notes.    Performance Status:The patient's activity level is functions out of house.      Review of Systems:  a review of eleven systems covering constitutional, Eye,  "HEENT, Psych, Respiratory, Cardiac, GI, , Musculoskeletal, Endocrine, Dermatologic was negative except for pertinent findings as listed ABOVE and below: pertinent positive as above, rest is good       Exam:Comprehensive exam done. BP (!) 110/58 (BP Location: Left arm, Patient Position: Sitting, BP Method: Medium (Automatic))   Pulse 79   Ht 5' 7" (1.702 m)   Wt 91.8 kg (202 lb 6.1 oz)   SpO2 96% Comment: room air at rest  BMI 31.70 kg/m²   Exam included Vitals as listed  Constitutional: She is oriented to person, place, and time. She appears well-developed. No distress.   Nose: Nose normal.   Mouth/Throat: Uvula is midline, oropharynx is clear and moist and mucous membranes are normal. No dental caries. No oropharyngeal exudate, posterior oropharyngeal edema, posterior oropharyngeal erythema or tonsillar abscesses.  Mallapatti (M) score 3  Eyes: Pupils are equal, round, and reactive to light.   Neck: No JVD present. No thyromegaly present.   Cardiovascular: Normal rate, regular rhythm and normal heart sounds. Exam reveals no gallop and no friction rub.   No murmur heard.  Pulmonary/Chest: Effort normal and breath sounds normal. No accessory muscle usage or stridor. No apnea and no tachypnea. No respiratory distress, decreased breath sounds, wheezes, rhonchi, rales, or tenderness. Clear breath sounds, on room air, in no acute distress.  Abdominal: Soft. She exhibits no mass. There is no tenderness. No hepatosplenomegaly, hernias and normoactive bowel sounds  Musculoskeletal: Normal range of motion. exhibits no edema.   Neurological:  alert and oriented to person, place, and time. not disoriented.   Skin: Skin is warm and dry. Capillary refill takes less 2 sec. No cyanosis or erythema. No pallor. Nails show no clubbing.   Psychiatric: normal mood and affect. behavior is normal. Judgment and thought content normal.       Radiographs (ct chest and cxr) reviewed: view by direct vision     Chest Xray " 7/20/2022  FINDINGS:  An AICD is noted in place on the left.  The cardiac size and contours within normal limits.  No intrapulmonary mass or infiltrate is seen.  No pneumothorax or pleural effusion is noted.   Impression:   AICD noted in place otherwise negative chest    CTA Chest Non Coronary  6/13/2022   IMPRESSION:   1.  No pulmonary embolism identified.  2.  Patchy groundglass lung opacities are noted bilaterally, mostly involving the lung bases. These most likely reflect inspiratory effort or subsegmental atelectasis. Atypical infection could have a similar appearance in the proper clinical setting      X-Ray Chest PA And Lateral   6/13/2022   Impression:   AICD in place otherwise negative chest          Labs reviewed    Lab Results   Component Value Date    WBC 7.87 06/14/2022    RBC 3.88 (L) 06/14/2022    HGB 11.9 (L) 06/14/2022    HCT 36.6 (L) 06/14/2022    MCV 94 06/14/2022    MCH 30.7 06/14/2022    MCHC 32.5 06/14/2022    RDW 13.2 06/14/2022     06/14/2022    MPV 9.9 06/14/2022    GRAN 6.1 06/14/2022    GRAN 77.5 (H) 06/14/2022    LYMPH 1.6 06/14/2022    LYMPH 19.8 06/14/2022    MONO 0.2 (L) 06/14/2022    MONO 2.2 (L) 06/14/2022    EOS 0.0 06/14/2022    BASO 0.01 06/14/2022    EOSINOPHIL 0.0 06/14/2022    BASOPHIL 0.1 06/14/2022       PFT will be done and results to be reviewed  Pulmonary Functions Testing Results:    Transthoracic echo (TTE) complete 6/13/2022:   Summary  The left ventricle is normal in size with concentric hypertrophy and mildly decreased systolic function.  The estimated ejection fraction is 40%.  Grade I left ventricular diastolic dysfunction.  There is mild left ventricular global hypokinesis.  Normal right ventricular size with normal right ventricular systolic function.  Moderate left atrial enlargement.  Mild tricuspid regurgitation.  Normal central venous pressure (3 mmHg).  The estimated PA systolic pressure is 10 mmHg.        Plan:  Clinical impression is resonably  certain and repeated evaluation prn +/- follow up will be needed as below.    Daylin was seen today for cough, shortness of breath and fatigue.    Diagnoses and all orders for this visit:    Severe persistent asthma with acute exacerbation  -     guaiFENesin-codeine 100-10 mg/5 ml (GUAIATUSSIN AC)  mg/5 mL syrup; Take 5 mLs by mouth every 8 (eight) hours as needed for Cough or Congestion.  -     CBC auto differential; Future  -     predniSONE (DELTASONE) 20 MG tablet; Take three pills per day for three days. Two pills per day for three days. One pill per day for three days.    Chronic seasonal allergic rhinitis due to pollen    Eosinophilic asthma    Cough, unspecified type  -     guaiFENesin-codeine 100-10 mg/5 ml (GUAIATUSSIN AC)  mg/5 mL syrup; Take 5 mLs by mouth every 8 (eight) hours as needed for Cough or Congestion.    Non-smoker        Follow up in about 8 weeks (around 1/3/2023), or if symptoms worsen or fail to improve.    Discussed with patient above for education the following:      Patient Instructions   Asthma - severe persistent, uncontrolled with 2 steroid courses in the past six months. Eosinophil count 500 in June 2022.     Can check CBC now, then initiate Nucala in clinic.    Nucala sample given in clinic  LOT number [id number]  Exp May 2025    Continue Symbicort twice per day. Albuterol as needed for shortness of breath, wheezing, cough.    Continue current medication regiment. Keep follow up appointment as scheduled. Please call the office if you have any questions or concerns.

## 2022-11-10 ENCOUNTER — SPECIALTY PHARMACY (OUTPATIENT)
Dept: PHARMACY | Facility: CLINIC | Age: 62
End: 2022-11-10

## 2022-11-10 ENCOUNTER — TELEPHONE (OUTPATIENT)
Dept: PULMONOLOGY | Facility: CLINIC | Age: 62
End: 2022-11-10
Payer: COMMERCIAL

## 2022-11-10 NOTE — TELEPHONE ENCOUNTER
Spoke with Kike (pharmacist at [facility]). They are resubmitting PA.     ----- Message from Aurelio Donnelly sent at 11/9/2022  2:58 PM CST -----  Type: Needs Medical Advice  Who Called:  Mirtha/ [facility]     Best Call Back Number: [phone]  Additional Information: Caller states that she would like a callback regarding the PA of the patient's medication:    Nucala 100 MG

## 2022-11-14 ENCOUNTER — PATIENT MESSAGE (OUTPATIENT)
Dept: PULMONOLOGY | Facility: CLINIC | Age: 62
End: 2022-11-14
Payer: COMMERCIAL

## 2022-11-14 NOTE — TELEPHONE ENCOUNTER
Call to assess PA submitted. Rep states that PA was not marked urgent, she states that she will update and expedite this PA.

## 2022-11-15 DIAGNOSIS — J82.83 EOSINOPHILIC ASTHMA: ICD-10-CM

## 2022-11-15 DIAGNOSIS — J45.51 SEVERE PERSISTENT ASTHMA WITH ACUTE EXACERBATION: ICD-10-CM

## 2022-11-15 RX ORDER — MEPOLIZUMAB 100 MG/ML
100 INJECTION, SOLUTION SUBCUTANEOUS
Qty: 1 ML | Refills: 11 | Status: SHIPPED | OUTPATIENT
Start: 2022-11-15 | End: 2023-08-30 | Stop reason: SDUPTHER

## 2022-11-15 NOTE — TELEPHONE ENCOUNTER
Matias BANKS denied due to blood eosinophil count not greater than 150 cells/uL in the last 6 weeks. Patient has had 3 courses of oral Prednisone since June 2022, which lowers the eosinophil count. Patient's last eosinophil count in June prior to Prednisone was 500 cells/uL. Will proceed with jenny MASON to inform patient and request signed AOR form.

## 2022-11-15 NOTE — TELEPHONE ENCOUNTER
Called plan to check on Nucala PA. Rep states the PA is missing information. They need to see the patient's blood eosinophil count within the last 6 weeks. Faxed labs and proof of recent Prednisone use.

## 2022-11-17 NOTE — TELEPHONE ENCOUNTER
For Nucala PAP,  requires a PA denial and 1st level appeal denial to be considered. LVM to discuss we will need to proceed with appealing regardless and request AOR form.

## 2022-11-17 NOTE — TELEPHONE ENCOUNTER
Patient return call - informed she is currently being assisted for manufacture assistance for Nucala dealing but not sure if this is being handle by [employer]. Patient state she should have an an update if potentially approved for PAP in the upcoming week and let [facility] know.Informed patient of nucala PA denial and patient will update [facility] if PAP is not approved for AOR form and appeal needed.

## 2022-11-28 ENCOUNTER — TELEPHONE (OUTPATIENT)
Dept: PULMONOLOGY | Facility: CLINIC | Age: 62
End: 2022-11-28
Payer: COMMERCIAL

## 2022-11-28 ENCOUNTER — PATIENT MESSAGE (OUTPATIENT)
Dept: PULMONOLOGY | Facility: CLINIC | Age: 62
End: 2022-11-28
Payer: COMMERCIAL

## 2022-12-02 NOTE — TELEPHONE ENCOUNTER
Call to patient to assess PAP status, which has likely stalled unless she was able to provide AOR form or have done appeal. No answer, LVM.

## 2022-12-05 ENCOUNTER — PATIENT MESSAGE (OUTPATIENT)
Dept: PULMONOLOGY | Facility: CLINIC | Age: 62
End: 2022-12-05
Payer: COMMERCIAL

## 2022-12-12 NOTE — TELEPHONE ENCOUNTER
AOR form emailed to [email or web address].     LVM to inform of the above and to f/u with the patient

## 2022-12-12 NOTE — TELEPHONE ENCOUNTER
Incoming call for patient returning a call to Kike SEGUNDO PharmD. She requested we send the AOR form to [email or web address]. Routing to assigned team.

## 2022-12-15 ENCOUNTER — CLINICAL SUPPORT (OUTPATIENT)
Dept: PULMONOLOGY | Facility: CLINIC | Age: 62
End: 2022-12-15
Payer: COMMERCIAL

## 2022-12-15 DIAGNOSIS — J45.51 SEVERE PERSISTENT ASTHMA WITH ACUTE EXACERBATION: Primary | ICD-10-CM

## 2022-12-15 PROCEDURE — 99999 PR PBB SHADOW E&M-EST. PATIENT-LVL I: ICD-10-PCS | Mod: PBBFAC,,,

## 2022-12-15 PROCEDURE — 99999 PR PBB SHADOW E&M-EST. PATIENT-LVL I: CPT | Mod: PBBFAC,,,

## 2022-12-15 NOTE — PROGRESS NOTES
Administered nucala sample  SQ.   NDC- 5019-6362-43  LOT- May 2025  Exp- [id number]  Patient tolerated well. No bleeding at insertion site noted. Pain scale 0/10 with injection. 2 patient identifiers used (name, ), aseptic technique maintained.

## 2022-12-23 NOTE — TELEPHONE ENCOUNTER
Appeal for Nucala APPROVED from 12/23/2022 to 12/22/2023. Preferred pharmacy is [facility] ([phone]), will forward Rx and notify patient. No answer, LVM.

## 2022-12-30 DIAGNOSIS — J45.40 MODERATE PERSISTENT ASTHMA WITHOUT COMPLICATION: ICD-10-CM

## 2022-12-30 RX ORDER — PREDNISONE 20 MG/1
TABLET ORAL
Qty: 15 TABLET | Refills: 0 | Status: SHIPPED | OUTPATIENT
Start: 2022-12-30 | End: 2023-09-25

## 2022-12-30 NOTE — TELEPHONE ENCOUNTER
Medication requesting - Prednisone 20mg tab.   Last Rx-06/24/2022 quanity -15 refill-0   Last office visit -11/08/2022  Next Office Visit-01/09/2023

## 2023-01-09 ENCOUNTER — OFFICE VISIT (OUTPATIENT)
Dept: PULMONOLOGY | Facility: CLINIC | Age: 63
End: 2023-01-09
Payer: COMMERCIAL

## 2023-01-09 VITALS
HEART RATE: 83 BPM | DIASTOLIC BLOOD PRESSURE: 59 MMHG | BODY MASS INDEX: 31.54 KG/M2 | SYSTOLIC BLOOD PRESSURE: 95 MMHG | WEIGHT: 200.94 LBS | OXYGEN SATURATION: 98 % | HEIGHT: 67 IN

## 2023-01-09 DIAGNOSIS — J82.83 EOSINOPHILIC ASTHMA: ICD-10-CM

## 2023-01-09 DIAGNOSIS — R05.9 COUGH, UNSPECIFIED TYPE: ICD-10-CM

## 2023-01-09 DIAGNOSIS — J45.50 SEVERE PERSISTENT ASTHMA WITHOUT COMPLICATION: Primary | ICD-10-CM

## 2023-01-09 DIAGNOSIS — J30.1 CHRONIC SEASONAL ALLERGIC RHINITIS DUE TO POLLEN: ICD-10-CM

## 2023-01-09 DIAGNOSIS — Z78.9 NON-SMOKER: ICD-10-CM

## 2023-01-09 DIAGNOSIS — I50.42 CHRONIC COMBINED SYSTOLIC AND DIASTOLIC HEART FAILURE: ICD-10-CM

## 2023-01-09 PROCEDURE — 99999 PR PBB SHADOW E&M-EST. PATIENT-LVL IV: CPT | Mod: PBBFAC,,, | Performed by: NURSE PRACTITIONER

## 2023-01-09 PROCEDURE — 99999 PR PBB SHADOW E&M-EST. PATIENT-LVL IV: ICD-10-PCS | Mod: PBBFAC,,, | Performed by: NURSE PRACTITIONER

## 2023-01-09 PROCEDURE — 3074F SYST BP LT 130 MM HG: CPT | Mod: CPTII,S$GLB,, | Performed by: NURSE PRACTITIONER

## 2023-01-09 PROCEDURE — 3008F BODY MASS INDEX DOCD: CPT | Mod: CPTII,S$GLB,, | Performed by: NURSE PRACTITIONER

## 2023-01-09 PROCEDURE — 3078F DIAST BP <80 MM HG: CPT | Mod: CPTII,S$GLB,, | Performed by: NURSE PRACTITIONER

## 2023-01-09 PROCEDURE — 1159F MED LIST DOCD IN RCRD: CPT | Mod: CPTII,S$GLB,, | Performed by: NURSE PRACTITIONER

## 2023-01-09 PROCEDURE — 99214 PR OFFICE/OUTPT VISIT, EST, LEVL IV, 30-39 MIN: ICD-10-PCS | Mod: S$GLB,,, | Performed by: NURSE PRACTITIONER

## 2023-01-09 PROCEDURE — 3078F PR MOST RECENT DIASTOLIC BLOOD PRESSURE < 80 MM HG: ICD-10-PCS | Mod: CPTII,S$GLB,, | Performed by: NURSE PRACTITIONER

## 2023-01-09 PROCEDURE — 99214 OFFICE O/P EST MOD 30 MIN: CPT | Mod: S$GLB,,, | Performed by: NURSE PRACTITIONER

## 2023-01-09 PROCEDURE — 1159F PR MEDICATION LIST DOCUMENTED IN MEDICAL RECORD: ICD-10-PCS | Mod: CPTII,S$GLB,, | Performed by: NURSE PRACTITIONER

## 2023-01-09 PROCEDURE — 3074F PR MOST RECENT SYSTOLIC BLOOD PRESSURE < 130 MM HG: ICD-10-PCS | Mod: CPTII,S$GLB,, | Performed by: NURSE PRACTITIONER

## 2023-01-09 PROCEDURE — 3008F PR BODY MASS INDEX (BMI) DOCUMENTED: ICD-10-PCS | Mod: CPTII,S$GLB,, | Performed by: NURSE PRACTITIONER

## 2023-01-09 RX ORDER — ALBUTEROL SULFATE 1.25 MG/3ML
1.25 SOLUTION RESPIRATORY (INHALATION) EVERY 6 HOURS PRN
Qty: 75 ML | Refills: 0 | Status: SHIPPED | OUTPATIENT
Start: 2023-01-09 | End: 2024-01-08 | Stop reason: SDUPTHER

## 2023-01-09 RX ORDER — AZITHROMYCIN 500 MG/1
500 TABLET, FILM COATED ORAL DAILY
Qty: 3 TABLET | Refills: 1 | Status: SHIPPED | OUTPATIENT
Start: 2023-01-09 | End: 2023-09-25

## 2023-01-09 RX ORDER — BUDESONIDE 0.5 MG/2ML
0.5 INHALANT ORAL DAILY PRN
Qty: 120 ML | Refills: 5 | Status: SHIPPED | OUTPATIENT
Start: 2023-01-09 | End: 2024-01-08 | Stop reason: SDUPTHER

## 2023-01-09 NOTE — PROGRESS NOTES
1/9/2023    Daylin Lynch  In office visit     Chief Complaint   Patient presents with    Cough     Pt states she is still coughing up clear mucus. Pt states that cough makes her hoarse.     Shortness of Breath     Pt states with exertion.        HPI:  1/9/2023:  Recent trip to [place] - states got sick during Travel. Completed Prednisone regiment for 5 days with benefit.  Still on Nucala - recently approved per insurance - states next dose in due 1/15.  Started weight watchers again, looking forward to losing weight.  Using Symbicort two puffs twice daily with benefit. Using Albuterol only as needed - states over the last week she has required Albuterol more - using approximately 3x per day.  Recently received letter stating disability was approved. Looking to get on Medicare soon.      11/8/2022:  Recent trip to [place] to visit family - states while on trip had an asthma flare. Started taking Prednisone yesterday, on day 2.   Reports use of Prednisone two times over the last six months alone - had to take regiment upon discharge from hospital in June 2022. Reports great benefit with steroid use.   Using Symbicort two puffs twice daily with benefit. Using Albuterol only as needed - states over the last week she has required Albuterol more - using approximately 3x per day.  On Singulair nightly with benefit.   Reports chest tightness.   Shortness of breath: Persistent over the last week or so - associated with wheezing, worse at night time. Associated with nocturnal arousals nightly. States can't perform ADLS without stopping for rest.  Cough: Persistent, worse at night time, non productive. Relieved somewhat with cough syrup.  Patient Instructions   Asthma - severe persistent, uncontrolled with 2 steroid courses in the past six months. Eosinophil count 500 in June 2022.   Can check CBC now, then initiate Nucala in clinic.  Nucala sample given in clinic  LOT number [id number]  Exp May 2025  Continue Symbicort twice per day.  Albuterol as needed for shortness of breath, wheezing, cough.  Continue current medication regiment. Keep follow up appointment as scheduled. Please call the office if you have any questions or concerns.         8/8/2022:  Recent trip to [place] - had several issues with shortness of breath, coughing while on trip. Overall doing better since returning home.   Cough: Overall improved - nonproductive, no time correlation identified. Denies wheezing.  Shortness of breath: Persistent with minimal exertion - improves with rest.   Currently using Symbicort - two puffs twice per day with reported benefit. Using Albuterol only as needed, not daily use - states while on trip in [place] required several times per day, however hasn't needed much since returning home.  Hasn't taken Prednisone since prior visit.   Patient Instructions   Overall you seem to be doing better.  Would continue Symbicort two puffs twice daily with Albuterol use as needed for shortness of breath, wheezing.  Keep Prednisone on hand to take as needed for shortness of breath, cough, wheezing.  Can consider biologic in the future if symptoms worsen.  Consider PFT if the future if no improvement.  Continue current medication regiment. Keep follow up appointment as scheduled. Please call the office if you have any questions or concerns.         6/24/22:  Hx: Annual bronchitis in the winter, pleurisy, CHF, AICD placement,   Recent ER visit on 6/12/22 to [facility] for CP, SOB - patient was noted to have elevated troponin at that time, decision made for transfer to [facility] for possible cath. Patient underwent series of testing including echocardiogram revealing EF 40% in the setting of known systolic heart failure. Patient did not undergo angiogram during this hospitalization. CTA obtained revealing no PE, however concern regarding atypical pneumonia due to bilateral ground glass opacities. Patient was treated with IV Rocephin, Azithromycin, and steroid -  reported great improvement at that time with medication therapy. Patient was subsequently discharged home, without supplemental oxygen, with a prescription for Levaquin (reports completion), five days worth of steroid PO, and PRN albuterol inhaler.   Was seen per PCP on Tuesday 6/21 and prescribed Symbicort inhaler, Flonase, and Promethazine cough syrup.   Cough: Persistent over the last 6 months - states it's overall improved since hospitalization however not completely resolved. States cough was attributed in the past to congestive heart failure. Was diagnosed with bronchitis per primary care provider in March - given codeine cough syrup at that time, no antibiotic or steroids. Cough persists throughout the day however is noticeably worse at night time, when lying down. Non productive. Associated with coughing fits, dizziness. Associated with chest tightness, wheezing. No relief noted with tessalon pearls, however has tried codeine and promethazine cough syrup separately - states codeine worked better.  Shortness of breath: Gradual onset over the last six months - feels chest heaviness at rest however experiences exertional dyspnea - states affecting ADLS. Can't take a shower and get dressed without stopping for rest. Improves with rest.   Does endorse sinus congestion, ear congestion, post nasal drip - started Zyrtec daily in April, does not appreciate benefit.   Has started Symbicort - taking two puffs in the morning - has been on for three days now, reports benefit with use.  Has tried Albuterol in the past without noticeable benefit.   Takes Protonix daily.  Per review of EMR discharge summary:  Hospital Course:   61-year-old lady with numerous medical problems including chronic systolic CHF who has been suffering with dry cough for last couple months presented to ED with cough and cough related pain found to have mildly elevated troponin and was subsequently transferred to [facility].  Serial EKGs, cardiac enzymes  remained negative for acute coronary syndrome.  Stress test ruled out any reversible ischemia.  Her ejection fraction is overall stable from previous echo.  Overall symptomatology is more consistent with pulmonary in origin, CTA chest showed atypical pneumonia.  She responded well after starting Rocephin and azithromycin as well as 1 dose of IV steroid.  Patient has chronic postnasal drip.  She was discharged home in hemodynamically stable condition with prescription of Levaquin, p.r.n. albuterol.  She was advised to follow-up with her PCP, I advised her that if her symptoms do not improve she may discuss with her cardiologist if Entresto is the culprit of chronic dry cough.  I also generated outpatient pulmonary referral.   Patient Instructions   Repeat chest xray in 1-2 weeks to evaluate post atypical pneumonia.  Symptoms are somewhat concerning for asthmatic component. Would continue Symbicort however start using 2 puffs twice per day.   Can use Albuterol as needed for shortness of breath, cough.  For sinus complaints - can try Singulair at night time with Zyrtec during the day.   Can send to ENT if interested.  Etiology of cough is unknown at this time - however could be multifactorial.  Entresto?  GERD - Currently on Protonix  Codeine cough syrup to be taken as needed for cough, caution as may make drowsy. Do not drive after taking.  Prednisone, take only as needed.  Continue current medication regiment. Keep follow up appointment as scheduled. Please call the office if you have any questions or concerns.         Social Hx: Lives with  - 2 animals in the home. Former [employer] employee, . No Asbestosis exposure, Smoking Hx: Former smoker, on and off for approx ten years - quit in 1985  Family Hx: No Lung Cancer, Mother COPD, No Asthma  Medical Hx: Previous pneumonia ; No previous shoulder surgery - Defibrillator placement 2020      The chief compliant problem varies with instability at  "times.  PFSH:  Past Medical History:   Diagnosis Date    Allergy     Asthma     Bilateral leg edema     Hyperlipidemia     Hypertension     Leg pain, bilateral     left leg more severe    Varicose vein          Past Surgical History:   Procedure Laterality Date     SECTION, LOW TRANSVERSE      x 3    HYSTERECTOMY      pace maker  2020    SPINE SURGERY  2021    stents both legs       Social History     Tobacco Use    Smoking status: Former     Types: Cigarettes     Quit date: 1985     Years since quittin.0    Smokeless tobacco: Never   Substance Use Topics    Alcohol use: Yes    Drug use: No     Family History   Problem Relation Age of Onset    Heart disease Mother     Heart failure Father      Review of patient's allergies indicates:  No Known Allergies  I have reviewed past medical, family, and social history. I have reviewed previous nurse notes.    Performance Status:The patient's activity level is functions out of house.      Review of Systems:  a review of eleven systems covering constitutional, Eye, HEENT, Psych, Respiratory, Cardiac, GI, , Musculoskeletal, Endocrine, Dermatologic was negative except for pertinent findings as listed ABOVE and below: pertinent positive as above, rest is good       Exam:Comprehensive exam done. BP (!) 95/59 (BP Location: Left arm, Patient Position: Sitting, BP Method: Medium (Automatic))   Pulse 83   Ht 5' 7" (1.702 m)   Wt 91.2 kg (200 lb 15.2 oz)   SpO2 98% Comment: room air at rest  BMI 31.47 kg/m²   Exam included Vitals as listed  Constitutional: She is oriented to person, place, and time. She appears well-developed. No distress.   Nose: Nose normal.   Mouth/Throat: Uvula is midline, oropharynx is clear and moist and mucous membranes are normal. No dental caries. No oropharyngeal exudate, posterior oropharyngeal edema, posterior oropharyngeal erythema or tonsillar abscesses.  Mallapatti (M) score 3  Eyes: Pupils are equal, round, and " reactive to light.   Neck: No JVD present. No thyromegaly present.   Cardiovascular: Normal rate, regular rhythm and normal heart sounds. Exam reveals no gallop and no friction rub.   No murmur heard.  Pulmonary/Chest: Effort normal and breath sounds normal. No accessory muscle usage or stridor. No apnea and no tachypnea. No respiratory distress, decreased breath sounds, wheezes, rhonchi, rales, or tenderness. Clear breath sounds, on room air, in no acute distress.  Abdominal: Soft. She exhibits no mass. There is no tenderness. No hepatosplenomegaly, hernias and normoactive bowel sounds  Musculoskeletal: Normal range of motion. exhibits no edema.   Neurological:  alert and oriented to person, place, and time. not disoriented.   Skin: Skin is warm and dry. Capillary refill takes less 2 sec. No cyanosis or erythema. No pallor. Nails show no clubbing.   Psychiatric: normal mood and affect. behavior is normal. Judgment and thought content normal.       Radiographs (ct chest and cxr) reviewed: view by direct vision     Chest Xray 7/20/2022  FINDINGS:  An AICD is noted in place on the left.  The cardiac size and contours within normal limits.  No intrapulmonary mass or infiltrate is seen.  No pneumothorax or pleural effusion is noted.   Impression:   AICD noted in place otherwise negative chest    CTA Chest Non Coronary  6/13/2022   IMPRESSION:   1.  No pulmonary embolism identified.  2.  Patchy groundglass lung opacities are noted bilaterally, mostly involving the lung bases. These most likely reflect inspiratory effort or subsegmental atelectasis. Atypical infection could have a similar appearance in the proper clinical setting      X-Ray Chest PA And Lateral   6/13/2022   Impression:   AICD in place otherwise negative chest          Labs reviewed    Lab Results   Component Value Date    WBC 10.93 11/08/2022    RBC 3.94 (L) 11/08/2022    HGB 11.9 (L) 11/08/2022    HCT 37.5 11/08/2022    MCV 95 11/08/2022    MCH 30.2  11/08/2022    MCHC 31.7 (L) 11/08/2022    RDW 13.4 11/08/2022     11/08/2022    MPV 10.1 11/08/2022    GRAN 8.8 (H) 11/08/2022    GRAN 80.2 (H) 11/08/2022    LYMPH 1.8 11/08/2022    LYMPH 16.7 (L) 11/08/2022    MONO 0.2 (L) 11/08/2022    MONO 2.2 (L) 11/08/2022    EOS 0.0 11/08/2022    BASO 0.03 11/08/2022    EOSINOPHIL 0.0 11/08/2022    BASOPHIL 0.3 11/08/2022       PFT will be done and results to be reviewed  Pulmonary Functions Testing Results:    Transthoracic echo (TTE) complete 6/13/2022:   Summary  The left ventricle is normal in size with concentric hypertrophy and mildly decreased systolic function.  The estimated ejection fraction is 40%.  Grade I left ventricular diastolic dysfunction.  There is mild left ventricular global hypokinesis.  Normal right ventricular size with normal right ventricular systolic function.  Moderate left atrial enlargement.  Mild tricuspid regurgitation.  Normal central venous pressure (3 mmHg).  The estimated PA systolic pressure is 10 mmHg.        Plan:  Clinical impression is resonably certain and repeated evaluation prn +/- follow up will be needed as below.    Daylin was seen today for cough and shortness of breath.    Diagnoses and all orders for this visit:    Severe persistent asthma without complication  -     budesonide (PULMICORT) 0.5 mg/2 mL nebulizer solution; Take 2 mLs (0.5 mg total) by nebulization daily as needed (Shortness of breath, wheezing, cough). Controller  -     NEBULIZER FOR HOME USE  -     albuterol (ACCUNEB) 1.25 mg/3 mL Nebu; Take 3 mLs (1.25 mg total) by nebulization every 6 (six) hours as needed. Rescue  -     azithromycin (ZITHROMAX) 500 MG tablet; Take 1 tablet (500 mg total) by mouth once daily.    Eosinophilic asthma    Chronic seasonal allergic rhinitis due to pollen    Cough, unspecified type    Non-smoker    Chronic combined systolic and diastolic heart failure            Follow up in about 3 months (around 4/9/2023), or if symptoms  worsen or fail to improve.    Discussed with patient above for education the following:      Patient Instructions   Continue current asthma regiment including Symbicort twice per day and Albuterol as needed.    Nebulizer machine ordered with Albuterol and Budesonide - to be taken only as needed.    Keep Prednisone on hand to be taken only as needed, use sparingly.    Will give sample Trelegy today - this is one puff once per day. This inhaler contains an inhaled steroid component. Rinse mouth after each use due to risk for thrush development. If mouth or tongue develops white sores please contact the clinic and I will order a prescription mouth wash.   Stop Symbicort while using Trelegy.    Continue current medication regiment. Keep follow up appointment as scheduled. Please call the office if you have any questions or concerns.

## 2023-01-09 NOTE — PATIENT INSTRUCTIONS
Continue current asthma regiment including Symbicort twice per day and Albuterol as needed.    Nebulizer machine ordered with Albuterol and Budesonide - to be taken only as needed.    Keep Prednisone on hand to be taken only as needed, use sparingly.    Will give sample Trelegy today - this is one puff once per day. This inhaler contains an inhaled steroid component. Rinse mouth after each use due to risk for thrush development. If mouth or tongue develops white sores please contact the clinic and I will order a prescription mouth wash.   Stop Symbicort while using Trelegy.    Continue current medication regiment. Keep follow up appointment as scheduled. Please call the office if you have any questions or concerns.

## 2023-01-16 ENCOUNTER — PATIENT MESSAGE (OUTPATIENT)
Dept: PULMONOLOGY | Facility: CLINIC | Age: 63
End: 2023-01-16

## 2023-01-17 DIAGNOSIS — J45.50 SEVERE PERSISTENT ASTHMA WITHOUT COMPLICATION: Primary | ICD-10-CM

## 2023-01-17 RX ORDER — FLUTICASONE FUROATE, UMECLIDINIUM BROMIDE AND VILANTEROL TRIFENATATE 200; 62.5; 25 UG/1; UG/1; UG/1
1 POWDER RESPIRATORY (INHALATION) DAILY
Qty: 60 EACH | Refills: 11 | Status: SHIPPED | OUTPATIENT
Start: 2023-01-17 | End: 2023-10-10 | Stop reason: SDUPTHER

## 2023-02-17 RX ORDER — GUAIFENESIN AND CODEINE PHOSPHATE 10; 100 MG/5ML; MG/5ML
5 LIQUID ORAL NIGHTLY
Qty: 5 ML | Refills: 0 | OUTPATIENT
Start: 2023-02-17

## 2023-02-17 NOTE — TELEPHONE ENCOUNTER
Medication requesting - Guaiatussin AC liquid   Last Rx-11/08/2022 quanity -1 refill-0   Last office visit -01/09/2023  Next Office Visit-04/10/2023

## 2023-02-23 NOTE — TELEPHONE ENCOUNTER
Medication requesting - Guaiatussin AC liquid   Last Rx-01/08/2023 quanity -1 refill-0   Last office visit -01/09/2023  Next Office Visit-04/11/2023

## 2023-02-24 NOTE — TELEPHONE ENCOUNTER
Medication requesting - Montelukast SOD 10mg tab.   Last Rx-12/09/2022 quanity -90 refill-0   Last office visit -01/09/2023  Next Office Visit-04/10/2023

## 2023-02-27 RX ORDER — GUAIFENESIN AND CODEINE PHOSPHATE 10; 100 MG/5ML; MG/5ML
5 LIQUID ORAL NIGHTLY
Qty: 5 ML | Refills: 0 | OUTPATIENT
Start: 2023-02-27

## 2023-03-09 RX ORDER — MONTELUKAST SODIUM 10 MG/1
10 TABLET ORAL DAILY
COMMUNITY
Start: 2022-12-09 | End: 2023-04-10 | Stop reason: SDUPTHER

## 2023-03-09 RX ORDER — MONTELUKAST SODIUM 10 MG/1
10 TABLET ORAL DAILY
OUTPATIENT
Start: 2023-03-09

## 2023-04-10 ENCOUNTER — OFFICE VISIT (OUTPATIENT)
Dept: PULMONOLOGY | Facility: CLINIC | Age: 63
End: 2023-04-10
Payer: COMMERCIAL

## 2023-04-10 VITALS
SYSTOLIC BLOOD PRESSURE: 124 MMHG | OXYGEN SATURATION: 98 % | HEART RATE: 85 BPM | WEIGHT: 203.38 LBS | HEIGHT: 67 IN | DIASTOLIC BLOOD PRESSURE: 76 MMHG | BODY MASS INDEX: 31.92 KG/M2

## 2023-04-10 DIAGNOSIS — J45.50 SEVERE PERSISTENT ASTHMA WITHOUT COMPLICATION: ICD-10-CM

## 2023-04-10 DIAGNOSIS — J82.83 EOSINOPHILIC ASTHMA: ICD-10-CM

## 2023-04-10 DIAGNOSIS — Z78.9 NON-SMOKER: ICD-10-CM

## 2023-04-10 DIAGNOSIS — R05.9 COUGH, UNSPECIFIED TYPE: ICD-10-CM

## 2023-04-10 DIAGNOSIS — J30.1 CHRONIC SEASONAL ALLERGIC RHINITIS DUE TO POLLEN: Primary | ICD-10-CM

## 2023-04-10 PROCEDURE — 3008F PR BODY MASS INDEX (BMI) DOCUMENTED: ICD-10-PCS | Mod: CPTII,S$GLB,, | Performed by: NURSE PRACTITIONER

## 2023-04-10 PROCEDURE — 99214 PR OFFICE/OUTPT VISIT, EST, LEVL IV, 30-39 MIN: ICD-10-PCS | Mod: S$GLB,,, | Performed by: NURSE PRACTITIONER

## 2023-04-10 PROCEDURE — 1159F PR MEDICATION LIST DOCUMENTED IN MEDICAL RECORD: ICD-10-PCS | Mod: CPTII,S$GLB,, | Performed by: NURSE PRACTITIONER

## 2023-04-10 PROCEDURE — 1159F MED LIST DOCD IN RCRD: CPT | Mod: CPTII,S$GLB,, | Performed by: NURSE PRACTITIONER

## 2023-04-10 PROCEDURE — 3078F DIAST BP <80 MM HG: CPT | Mod: CPTII,S$GLB,, | Performed by: NURSE PRACTITIONER

## 2023-04-10 PROCEDURE — 3078F PR MOST RECENT DIASTOLIC BLOOD PRESSURE < 80 MM HG: ICD-10-PCS | Mod: CPTII,S$GLB,, | Performed by: NURSE PRACTITIONER

## 2023-04-10 PROCEDURE — 99999 PR PBB SHADOW E&M-EST. PATIENT-LVL IV: ICD-10-PCS | Mod: PBBFAC,,, | Performed by: NURSE PRACTITIONER

## 2023-04-10 PROCEDURE — 99214 OFFICE O/P EST MOD 30 MIN: CPT | Mod: S$GLB,,, | Performed by: NURSE PRACTITIONER

## 2023-04-10 PROCEDURE — 3008F BODY MASS INDEX DOCD: CPT | Mod: CPTII,S$GLB,, | Performed by: NURSE PRACTITIONER

## 2023-04-10 PROCEDURE — 99999 PR PBB SHADOW E&M-EST. PATIENT-LVL IV: CPT | Mod: PBBFAC,,, | Performed by: NURSE PRACTITIONER

## 2023-04-10 PROCEDURE — 3074F PR MOST RECENT SYSTOLIC BLOOD PRESSURE < 130 MM HG: ICD-10-PCS | Mod: CPTII,S$GLB,, | Performed by: NURSE PRACTITIONER

## 2023-04-10 PROCEDURE — 3074F SYST BP LT 130 MM HG: CPT | Mod: CPTII,S$GLB,, | Performed by: NURSE PRACTITIONER

## 2023-04-10 RX ORDER — MONTELUKAST SODIUM 10 MG/1
10 TABLET ORAL DAILY
Qty: 90 TABLET | Refills: 3 | Status: SHIPPED | OUTPATIENT
Start: 2023-04-10 | End: 2024-03-27 | Stop reason: SDUPTHER

## 2023-04-10 NOTE — PROGRESS NOTES
4/10/2023    Daylin Lynch  In office visit     Chief Complaint   Patient presents with    Follow-up     Cough inhaler needed lately.       HPI:  04/10/2023:  Overall doing well!   States currently using Trelegy once per day with great benefit in comparison to Symbicort.  Using Albuterol only as needed, not requiring daily.  Took a trip to [place] last month - after day 3 of trip got ill - took Azithromycin 3 day regiment and Prednisone 5 day regiment at that time with great benefit of symptoms.  Still on Nucala - states tolerating well and is noticing great benefit with use.        1/9/2023:  Recent trip to [place] - states got sick during Travel. Completed Prednisone regiment for 5 days with benefit.  Still on Nucala - recently approved per insurance - states next dose in due 1/15.  Started weight watchers again, looking forward to losing weight.  Using Symbicort two puffs twice daily with benefit. Using Albuterol only as needed - states over the last week she has required Albuterol more - using approximately 3x per day.  Recently received letter stating disability was approved. Looking to get on Medicare soon.  Patient Instructions   Continue current asthma regiment including Symbicort twice per day and Albuterol as needed.  Nebulizer machine ordered with Albuterol and Budesonide - to be taken only as needed.  Keep Prednisone on hand to be taken only as needed, use sparingly.  Will give sample Trelegy today - this is one puff once per day. This inhaler contains an inhaled steroid component. Rinse mouth after each use due to risk for thrush development. If mouth or tongue develops white sores please contact the clinic and I will order a prescription mouth wash.   Stop Symbicort while using Trelegy.  Continue current medication regiment. Keep follow up appointment as scheduled. Please call the office if you have any questions or concerns.             11/8/2022:  Recent trip to [place] to visit family - [facility] while  on trip had an asthma flare. Started taking Prednisone yesterday, on day 2.   Reports use of Prednisone two times over the last six months alone - had to take regiment upon discharge from hospital in June 2022. Reports great benefit with steroid use.   Using Symbicort two puffs twice daily with benefit. Using Albuterol only as needed - states over the last week she has required Albuterol more - using approximately 3x per day.  On Singulair nightly with benefit.   Reports chest tightness.   Shortness of breath: Persistent over the last week or so - associated with wheezing, worse at night time. Associated with nocturnal arousals nightly. States can't perform ADLS without stopping for rest.  Cough: Persistent, worse at night time, non productive. Relieved somewhat with cough syrup.  Patient Instructions   Asthma - severe persistent, uncontrolled with 2 steroid courses in the past six months. Eosinophil count 500 in June 2022.   Can check CBC now, then initiate Nucala in clinic.  Nucala sample given in clinic  LOT number [id number]  Exp May 2025  Continue Symbicort twice per day. Albuterol as needed for shortness of breath, wheezing, cough.  Continue current medication regiment. Keep follow up appointment as scheduled. Please call the office if you have any questions or concerns.         8/8/2022:  Recent trip to [place] - had several issues with shortness of breath, coughing while on trip. Overall doing better since returning home.   Cough: Overall improved - nonproductive, no time correlation identified. Denies wheezing.  Shortness of breath: Persistent with minimal exertion - improves with rest.   Currently using Symbicort - two puffs twice per day with reported benefit. Using Albuterol only as needed, not daily use - states while on trip in [place] required several times per day, however hasn't needed much since returning home.  Hasn't taken Prednisone since prior visit.   Patient Instructions   Overall you seem to be doing  better.  Would continue Symbicort two puffs twice daily with Albuterol use as needed for shortness of breath, wheezing.  Keep Prednisone on hand to take as needed for shortness of breath, cough, wheezing.  Can consider biologic in the future if symptoms worsen.  Consider PFT if the future if no improvement.  Continue current medication regiment. Keep follow up appointment as scheduled. Please call the office if you have any questions or concerns.         6/24/22:  Hx: Annual bronchitis in the winter, pleurisy, CHF, AICD placement,   Recent ER visit on 6/12/22 to [facility] for CP, SOB - patient was noted to have elevated troponin at that time, decision made for transfer to [facility] for possible cath. Patient underwent series of testing including echocardiogram revealing EF 40% in the setting of known systolic heart failure. Patient did not undergo angiogram during this hospitalization. CTA obtained revealing no PE, however concern regarding atypical pneumonia due to bilateral ground glass opacities. Patient was treated with IV Rocephin, Azithromycin, and steroid - reported great improvement at that time with medication therapy. Patient was subsequently discharged home, without supplemental oxygen, with a prescription for Levaquin (reports completion), five days worth of steroid PO, and PRN albuterol inhaler.   Was seen per PCP on Tuesday 6/21 and prescribed Symbicort inhaler, Flonase, and Promethazine cough syrup.   Cough: Persistent over the last 6 months - states it's overall improved since hospitalization however not completely resolved. States cough was attributed in the past to congestive heart failure. Was diagnosed with bronchitis per primary care provider in March - given codeine cough syrup at that time, no antibiotic or steroids. Cough persists throughout the day however is noticeably worse at night time, when lying down. Non productive. Associated with coughing fits, dizziness. Associated with chest  tightness, wheezing. No relief noted with tessalon pearls, however has tried codeine and promethazine cough syrup separately - states codeine worked better.  Shortness of breath: Gradual onset over the last six months - feels chest heaviness at rest however experiences exertional dyspnea - states affecting ADLS. Can't take a shower and get dressed without stopping for rest. Improves with rest.   Does endorse sinus congestion, ear congestion, post nasal drip - started Zyrtec daily in April, does not appreciate benefit.   Has started Symbicort - taking two puffs in the morning - has been on for three days now, reports benefit with use.  Has tried Albuterol in the past without noticeable benefit.   Takes Protonix daily.  Per review of EMR discharge summary:  Hospital Course:   61-year-old lady with numerous medical problems including chronic systolic CHF who has been suffering with dry cough for last couple months presented to ED with cough and cough related pain found to have mildly elevated troponin and was subsequently transferred to [facility].  Serial EKGs, cardiac enzymes remained negative for acute coronary syndrome.  Stress test ruled out any reversible ischemia.  Her ejection fraction is overall stable from previous echo.  Overall symptomatology is more consistent with pulmonary in origin, CTA chest showed atypical pneumonia.  She responded well after starting Rocephin and azithromycin as well as 1 dose of IV steroid.  Patient has chronic postnasal drip.  She was discharged home in hemodynamically stable condition with prescription of Levaquin, p.r.n. albuterol.  She was advised to follow-up with her PCP, I advised her that if her symptoms do not improve she may discuss with her cardiologist if Entresto is the culprit of chronic dry cough.  I also generated outpatient pulmonary referral.   Patient Instructions   Repeat chest xray in 1-2 weeks to evaluate post atypical pneumonia.  Symptoms are somewhat concerning for  asthmatic component. Would continue Symbicort however start using 2 puffs twice per day.   Can use Albuterol as needed for shortness of breath, cough.  For sinus complaints - can try Singulair at night time with Zyrtec during the day.   Can send to ENT if interested.  Etiology of cough is unknown at this time - however could be multifactorial.  Entresto?  GERD - Currently on Protonix  Codeine cough syrup to be taken as needed for cough, caution as may make drowsy. Do not drive after taking.  Prednisone, take only as needed.  Continue current medication regiment. Keep follow up appointment as scheduled. Please call the office if you have any questions or concerns.         Social Hx: Lives with  - 2 animals in the home. Former [employer] employee, . No Asbestosis exposure, Smoking Hx: Former smoker, on and off for approx ten years - quit in   Family Hx: No Lung Cancer, Mother COPD, No Asthma  Medical Hx: Previous pneumonia ; No previous shoulder surgery - Defibrillator placement       The chief compliant problem varies with instability at times.  PFSH:  Past Medical History:   Diagnosis Date    Allergy     Asthma     Bilateral leg edema     Hyperlipidemia     Hypertension     Leg pain, bilateral     left leg more severe    Varicose vein          Past Surgical History:   Procedure Laterality Date     SECTION, LOW TRANSVERSE      x 3    HYSTERECTOMY      pace maker  2020    SPINE SURGERY  2021    stents both legs       Social History     Tobacco Use    Smoking status: Former     Types: Cigarettes     Quit date: 1985     Years since quittin.3    Smokeless tobacco: Never   Substance Use Topics    Alcohol use: Yes    Drug use: No     Family History   Problem Relation Age of Onset    Heart disease Mother     Heart failure Father      Review of patient's allergies indicates:  No Known Allergies  I have reviewed past medical, family, and social history. I have reviewed  "previous nurse notes.    Performance Status:The patient's activity level is functions out of house.      Review of Systems:  a review of eleven systems covering constitutional, Eye, HEENT, Psych, Respiratory, Cardiac, GI, , Musculoskeletal, Endocrine, Dermatologic was negative except for pertinent findings as listed ABOVE and below: pertinent positive as above, rest is good       Exam:Comprehensive exam done. /76 (BP Location: Right arm, Patient Position: Sitting, BP Method: Medium (Automatic))   Pulse 85   Ht 5' 7" (1.702 m)   Wt 92.3 kg (203 lb 6 oz)   SpO2 98% Comment: On room air at rest.  BMI 31.85 kg/m²   Exam included Vitals as listed  Constitutional: She is oriented to person, place, and time. She appears well-developed. No distress.   Nose: Nose normal.   Mouth/Throat: Uvula is midline, oropharynx is clear and moist and mucous membranes are normal. No dental caries. No oropharyngeal exudate, posterior oropharyngeal edema, posterior oropharyngeal erythema or tonsillar abscesses.  Mallapatti (M) score 3  Eyes: Pupils are equal, round, and reactive to light.   Neck: No JVD present. No thyromegaly present.   Cardiovascular: Normal rate, regular rhythm and normal heart sounds. Exam reveals no gallop and no friction rub.   No murmur heard.  Pulmonary/Chest: Effort normal and breath sounds normal. No accessory muscle usage or stridor. No apnea and no tachypnea. No respiratory distress, decreased breath sounds, wheezes, rhonchi, rales, or tenderness. Clear breath sounds, on room air, in no acute distress.  Abdominal: Soft. She exhibits no mass. There is no tenderness. No hepatosplenomegaly, hernias and normoactive bowel sounds  Musculoskeletal: Normal range of motion. exhibits no edema.   Neurological:  alert and oriented to person, place, and time. not disoriented.   Skin: Skin is warm and dry. Capillary refill takes less 2 sec. No cyanosis or erythema. No pallor. Nails show no clubbing. "   Psychiatric: normal mood and affect. behavior is normal. Judgment and thought content normal.       Radiographs (ct chest and cxr) reviewed: view by direct vision     Chest Xray 7/20/2022  FINDINGS:  An AICD is noted in place on the left.  The cardiac size and contours within normal limits.  No intrapulmonary mass or infiltrate is seen.  No pneumothorax or pleural effusion is noted.   Impression:   AICD noted in place otherwise negative chest    CTA Chest Non Coronary  6/13/2022   IMPRESSION:   1.  No pulmonary embolism identified.  2.  Patchy groundglass lung opacities are noted bilaterally, mostly involving the lung bases. These most likely reflect inspiratory effort or subsegmental atelectasis. Atypical infection could have a similar appearance in the proper clinical setting      X-Ray Chest PA And Lateral   6/13/2022   Impression:   AICD in place otherwise negative chest          Labs reviewed    Lab Results   Component Value Date    WBC 10.93 11/08/2022    RBC 3.94 (L) 11/08/2022    HGB 11.9 (L) 11/08/2022    HCT 37.5 11/08/2022    MCV 95 11/08/2022    MCH 30.2 11/08/2022    MCHC 31.7 (L) 11/08/2022    RDW 13.4 11/08/2022     11/08/2022    MPV 10.1 11/08/2022    GRAN 8.8 (H) 11/08/2022    GRAN 80.2 (H) 11/08/2022    LYMPH 1.8 11/08/2022    LYMPH 16.7 (L) 11/08/2022    MONO 0.2 (L) 11/08/2022    MONO 2.2 (L) 11/08/2022    EOS 0.0 11/08/2022    BASO 0.03 11/08/2022    EOSINOPHIL 0.0 11/08/2022    BASOPHIL 0.3 11/08/2022       PFT will be done and results to be reviewed  Pulmonary Functions Testing Results:    Transthoracic echo (TTE) complete 6/13/2022:   Summary  The left ventricle is normal in size with concentric hypertrophy and mildly decreased systolic function.  The estimated ejection fraction is 40%.  Grade I left ventricular diastolic dysfunction.  There is mild left ventricular global hypokinesis.  Normal right ventricular size with normal right ventricular systolic function.  Moderate left  atrial enlargement.  Mild tricuspid regurgitation.  Normal central venous pressure (3 mmHg).  The estimated PA systolic pressure is 10 mmHg.        Plan:  Clinical impression is resonably certain and repeated evaluation prn +/- follow up will be needed as below.    Daylin was seen today for follow-up.    Diagnoses and all orders for this visit:    Chronic seasonal allergic rhinitis due to pollen  -     montelukast (SINGULAIR) 10 mg tablet; Take 1 tablet (10 mg total) by mouth once daily.    Severe persistent asthma without complication    Eosinophilic asthma    Cough, unspecified type    Non-smoker              Follow up in about 6 months (around 10/10/2023), or if symptoms worsen or fail to improve.    Discussed with patient above for education the following:      Patient Instructions   Continue current asthma regiment including Trelegy once per day and Albuterol as needed for shortness of breath, wheezing, cough.    Continue Nucala monthly.    Keep Prednisone and Azithromycin on hand for as needed symptoms - take sparingly, take as prescribed.    Continue current medication regiment. Keep follow up appointment as scheduled. Please call the office if you have any questions or concerns.

## 2023-04-10 NOTE — PATIENT INSTRUCTIONS
Continue current asthma regiment including Trelegy once per day and Albuterol as needed for shortness of breath, wheezing, cough.    Continue Nucala monthly.    Keep Prednisone and Azithromycin on hand for as needed symptoms - take sparingly, take as prescribed.    Continue current medication regiment. Keep follow up appointment as scheduled. Please call the office if you have any questions or concerns.

## 2023-04-11 NOTE — PROGRESS NOTES
SUBJECTIVE:      Patient ID: Daylin Lynch is a 62 y.o. female.    Chief Complaint: Hypertension    Mrs Lynch is here today to f/u on HTN and Vitamin def. She is following with Dr Jaffe for cardiomyopathy. She is taking her medication as prescribed. She is feeling tired and due for routine labs, due for dexa    Hypertension  This is a chronic problem. The current episode started more than 1 year ago. The problem is controlled. Pertinent negatives include no anxiety, blurred vision, chest pain, headaches, malaise/fatigue, neck pain, orthopnea, palpitations, peripheral edema, PND, shortness of breath or sweats. There are no associated agents to hypertension. Risk factors for coronary artery disease include dyslipidemia and obesity. Past treatments include angiotensin blockers. The current treatment provides moderate improvement. Compliance problems include exercise.  There is no history of angina, kidney disease or CAD/MI. There is no history of chronic renal disease, hyperparathyroidism, a hypertension causing med or a thyroid problem.   Hyperlipidemia  This is a chronic problem. The current episode started more than 1 year ago. The problem is controlled. Recent lipid tests were reviewed and are normal. She has no history of chronic renal disease, diabetes, hypothyroidism, liver disease, obesity or nephrotic syndrome. There are no known factors aggravating her hyperlipidemia. Pertinent negatives include no chest pain, myalgias or shortness of breath. Current antihyperlipidemic treatment includes diet change, exercise, ezetimibe and statins. The current treatment provides moderate improvement of lipids. Risk factors for coronary artery disease include dyslipidemia, hypertension, obesity and post-menopausal.     Past Surgical History:   Procedure Laterality Date     SECTION, LOW TRANSVERSE      x 3    EYE SURGERY  2023    lt retinal surgery    HYSTERECTOMY      pace maker  2020    SPINE SURGERY   2021    stents both legs       Family History   Problem Relation Age of Onset    Heart disease Mother     Heart failure Father       Social History     Socioeconomic History    Marital status:    Tobacco Use    Smoking status: Former     Types: Cigarettes     Quit date: 1985     Years since quittin.3     Passive exposure: Past    Smokeless tobacco: Never   Substance and Sexual Activity    Alcohol use: Yes    Drug use: No    Sexual activity: Yes   Social History Narrative    Live with      Current Outpatient Medications   Medication Sig Dispense Refill    albuterol (ACCUNEB) 1.25 mg/3 mL Nebu Take 3 mLs (1.25 mg total) by nebulization every 6 (six) hours as needed. Rescue 75 mL 0    albuterol (VENTOLIN HFA) 90 mcg/actuation inhaler Inhale 2 puffs into the lungs every 6 (six) hours as needed for Wheezing or Shortness of Breath. Rescue 18 g 11    aspirin 325 MG tablet Take 325 mg by mouth once daily.      budesonide (PULMICORT) 0.5 mg/2 mL nebulizer solution Take 2 mLs (0.5 mg total) by nebulization daily as needed (Shortness of breath, wheezing, cough). Controller 120 mL 5    carvediloL (COREG) 6.25 MG tablet Take 6.25 mg by mouth 2 (two) times daily.       cetirizine (ZYRTEC) 10 MG tablet Take 1 tablet (10 mg total) by mouth once daily.  0    escitalopram oxalate (LEXAPRO) 10 MG tablet Take 10 mg by mouth once daily.      ezetimibe (ZETIA) 10 mg tablet 1 tablet once daily.      fluticasone propionate (FLONASE) 50 mcg/actuation nasal spray 2 sprays (100 mcg total) by Each Nostril route once daily. 16 g 11    fluticasone-umeclidin-vilanter (TRELEGY ELLIPTA) 200-62.5-25 mcg inhaler Inhale 1 puff into the lungs once daily. 60 each 11    furosemide (LASIX) 40 MG tablet 1 tablet as needed.      melatonin 10 mg Tab Take 2 tablets by mouth nightly as needed.      mepolizumab 100 mg/mL AtIn Inject 1 mL (100 mg total) into the skin every 28 days. 1 mL 11    montelukast (SINGULAIR) 10 mg tablet Take  1 tablet (10 mg total) by mouth once daily. 90 tablet 3    rosuvastatin (CRESTOR) 5 MG tablet Take 5 mg by mouth once daily.      sacubitriL-valsartan (ENTRESTO) 49-51 mg per tablet Entresto 49 mg-51 mg tablet   Take 1 tablet twice a day by oral route.      spironolactone (ALDACTONE) 50 MG tablet Take 50 mg by mouth once daily.      azithromycin (ZITHROMAX) 500 MG tablet Take 1 tablet (500 mg total) by mouth once daily. (Patient not taking: Reported on 2023) 3 tablet 1    prednisoLONE acetate (PRED FORTE) 1 % DrpS prednisolone acetate 1 % eye drops,suspension   INSTILL 1 DROP INTO LEFT EYE 4 TIMES A DAY      predniSONE (DELTASONE) 20 MG tablet Take one tablet per day for five days only as needed for cough, shortness of breath. Repeat as needed. (Patient not taking: Reported on 2023) 15 tablet 0    tobramycin sulfate 0.3% (TOBREX) 0.3 % ophthalmic solution Place 1 drop into the left eye 4 (four) times daily.       No current facility-administered medications for this visit.     Review of patient's allergies indicates:  No Known Allergies   Past Medical History:   Diagnosis Date    Allergy     Asthma     Bilateral leg edema     Hyperlipidemia     Hypertension     Leg pain, bilateral     left leg more severe    Varicose vein      Past Surgical History:   Procedure Laterality Date     SECTION, LOW TRANSVERSE      x 3    EYE SURGERY  2023    lt retinal surgery    HYSTERECTOMY      pace maker  2020    SPINE SURGERY  2021    stents both legs         Review of Systems   Constitutional:  Positive for fatigue. Negative for activity change, appetite change, chills, diaphoresis, malaise/fatigue and unexpected weight change.   HENT:  Negative for ear discharge, hearing loss, rhinorrhea, trouble swallowing and voice change.    Eyes:  Negative for blurred vision, photophobia, pain, discharge and visual disturbance.   Respiratory:  Negative for chest tightness, shortness of breath, wheezing and  "stridor.    Cardiovascular:  Negative for chest pain, palpitations, orthopnea, leg swelling and PND.   Gastrointestinal:  Negative for abdominal pain, blood in stool, constipation, diarrhea and vomiting.   Endocrine: Negative for cold intolerance, heat intolerance, polydipsia and polyuria.   Genitourinary:  Negative for difficulty urinating, dysuria, flank pain, hematuria and menstrual problem.   Musculoskeletal:  Negative for arthralgias, joint swelling, myalgias, neck pain and neck stiffness.   Skin:  Negative for pallor.   Neurological:  Negative for dizziness, speech difficulty, weakness and headaches.   Hematological:  Does not bruise/bleed easily.   Psychiatric/Behavioral:  Negative for confusion, dysphoric mood, self-injury, sleep disturbance and suicidal ideas. The patient is not nervous/anxious.     OBJECTIVE:      Vitals:    04/12/23 1021   BP: 130/84   Pulse: 68   Temp: 97.7 °F (36.5 °C)   SpO2: 98%   Weight: 93 kg (205 lb)   Height: 5' 7" (1.702 m)     Physical Exam  Vitals and nursing note reviewed.   Constitutional:       General: She is not in acute distress.     Appearance: She is well-developed.   HENT:      Head: Normocephalic and atraumatic.      Right Ear: Tympanic membrane normal.      Left Ear: Tympanic membrane normal.      Nose: Nose normal.      Mouth/Throat:      Pharynx: Uvula midline.   Eyes:      General: Lids are normal.      Conjunctiva/sclera: Conjunctivae normal.      Pupils: Pupils are equal, round, and reactive to light.      Right eye: Pupil is round and reactive.      Left eye: Pupil is round and reactive.   Neck:      Thyroid: No thyromegaly.      Vascular: No JVD.      Trachea: Trachea normal.   Cardiovascular:      Rate and Rhythm: Normal rate and regular rhythm.      Pulses: Normal pulses.      Heart sounds: Normal heart sounds. No murmur heard.  Pulmonary:      Effort: Pulmonary effort is normal. No tachypnea or respiratory distress.      Breath sounds: No wheezing, rhonchi " or rales.   Abdominal:      General: Bowel sounds are normal.      Palpations: Abdomen is soft.      Tenderness: There is no abdominal tenderness.   Musculoskeletal:         General: Normal range of motion.      Cervical back: Normal range of motion and neck supple.      Right lower leg: No edema.      Left lower leg: No edema.   Lymphadenopathy:      Cervical: No cervical adenopathy.   Skin:     General: Skin is warm and dry.      Findings: No rash.   Neurological:      Mental Status: She is alert and oriented to person, place, and time.   Psychiatric:         Mood and Affect: Mood normal.         Speech: Speech normal.         Behavior: Behavior normal. Behavior is cooperative.         Thought Content: Thought content normal.         Judgment: Judgment normal.    Last visit note, most recent available labs, and health maintenance reviewed    Assessment:       1. Essential (primary) hypertension    2. Vitamin B 12 deficiency    3. Vitamin D deficiency    4. Mixed hyperlipidemia    5. Elevated glucose    6. Fatigue, unspecified type    7. Screening for osteoporosis    8. Post-menopausal        Plan:       Essential (primary) hypertension  -     Comprehensive Metabolic Panel; Future; Expected date: 04/26/2023  -     CBC Auto Differential; Future; Expected date: 04/26/2023  -     Lipid Panel; Future; Expected date: 04/26/2023  -     TSH; Future; Expected date: 05/24/2023  -     Urinalysis, Reflex to Urine Culture Urine, Clean Catch; Future; Expected date: 04/12/2023    Vitamin B 12 deficiency  -     Vitamin B12; Future; Expected date: 04/12/2023    Vitamin D deficiency  -     Vitamin D; Future; Expected date: 04/26/2023    Mixed hyperlipidemia  -     Comprehensive Metabolic Panel; Future; Expected date: 04/26/2023  -     Lipid Panel; Future; Expected date: 04/26/2023  -     TSH; Future; Expected date: 05/24/2023    Elevated glucose  -     Hemoglobin A1C; Future; Expected date: 04/26/2023    Fatigue, unspecified  type  -     Comprehensive Metabolic Panel; Future; Expected date: 04/26/2023  -     CBC Auto Differential; Future; Expected date: 04/26/2023  -     Lipid Panel; Future; Expected date: 04/26/2023  -     TSH; Future; Expected date: 05/24/2023  -     Urinalysis, Reflex to Urine Culture Urine, Clean Catch; Future; Expected date: 04/12/2023    Screening for osteoporosis  -     DXA Bone Density Axial Skeleton 1 or more sites; Future; Expected date: 04/12/2023    Post-menopausal  -     DXA Bone Density Axial Skeleton 1 or more sites; Future; Expected date: 04/12/2023        Follow up in about 6 months (around 10/12/2023) for htn.      4/12/2023 ROBERTA Meraz, FNP

## 2023-04-12 ENCOUNTER — OFFICE VISIT (OUTPATIENT)
Dept: FAMILY MEDICINE | Facility: CLINIC | Age: 63
End: 2023-04-12
Payer: COMMERCIAL

## 2023-04-12 VITALS
TEMPERATURE: 98 F | WEIGHT: 205 LBS | SYSTOLIC BLOOD PRESSURE: 130 MMHG | DIASTOLIC BLOOD PRESSURE: 84 MMHG | OXYGEN SATURATION: 98 % | HEART RATE: 68 BPM | BODY MASS INDEX: 32.18 KG/M2 | HEIGHT: 67 IN

## 2023-04-12 DIAGNOSIS — R53.83 FATIGUE, UNSPECIFIED TYPE: ICD-10-CM

## 2023-04-12 DIAGNOSIS — I10 ESSENTIAL (PRIMARY) HYPERTENSION: Primary | ICD-10-CM

## 2023-04-12 DIAGNOSIS — Z13.820 SCREENING FOR OSTEOPOROSIS: ICD-10-CM

## 2023-04-12 DIAGNOSIS — R73.09 ELEVATED GLUCOSE: ICD-10-CM

## 2023-04-12 DIAGNOSIS — E53.8 VITAMIN B 12 DEFICIENCY: ICD-10-CM

## 2023-04-12 DIAGNOSIS — E78.2 MIXED HYPERLIPIDEMIA: ICD-10-CM

## 2023-04-12 DIAGNOSIS — E55.9 VITAMIN D DEFICIENCY: ICD-10-CM

## 2023-04-12 DIAGNOSIS — Z78.0 POST-MENOPAUSAL: ICD-10-CM

## 2023-04-12 PROCEDURE — 3008F PR BODY MASS INDEX (BMI) DOCUMENTED: ICD-10-PCS | Mod: CPTII,S$GLB,, | Performed by: NURSE PRACTITIONER

## 2023-04-12 PROCEDURE — 3075F SYST BP GE 130 - 139MM HG: CPT | Mod: CPTII,S$GLB,, | Performed by: NURSE PRACTITIONER

## 2023-04-12 PROCEDURE — 1159F PR MEDICATION LIST DOCUMENTED IN MEDICAL RECORD: ICD-10-PCS | Mod: CPTII,S$GLB,, | Performed by: NURSE PRACTITIONER

## 2023-04-12 PROCEDURE — 99214 OFFICE O/P EST MOD 30 MIN: CPT | Mod: S$GLB,,, | Performed by: NURSE PRACTITIONER

## 2023-04-12 PROCEDURE — 3079F DIAST BP 80-89 MM HG: CPT | Mod: CPTII,S$GLB,, | Performed by: NURSE PRACTITIONER

## 2023-04-12 PROCEDURE — 3075F PR MOST RECENT SYSTOLIC BLOOD PRESS GE 130-139MM HG: ICD-10-PCS | Mod: CPTII,S$GLB,, | Performed by: NURSE PRACTITIONER

## 2023-04-12 PROCEDURE — 1160F PR REVIEW ALL MEDS BY PRESCRIBER/CLIN PHARMACIST DOCUMENTED: ICD-10-PCS | Mod: CPTII,S$GLB,, | Performed by: NURSE PRACTITIONER

## 2023-04-12 PROCEDURE — 1159F MED LIST DOCD IN RCRD: CPT | Mod: CPTII,S$GLB,, | Performed by: NURSE PRACTITIONER

## 2023-04-12 PROCEDURE — 1160F RVW MEDS BY RX/DR IN RCRD: CPT | Mod: CPTII,S$GLB,, | Performed by: NURSE PRACTITIONER

## 2023-04-12 PROCEDURE — 3008F BODY MASS INDEX DOCD: CPT | Mod: CPTII,S$GLB,, | Performed by: NURSE PRACTITIONER

## 2023-04-12 PROCEDURE — 3079F PR MOST RECENT DIASTOLIC BLOOD PRESSURE 80-89 MM HG: ICD-10-PCS | Mod: CPTII,S$GLB,, | Performed by: NURSE PRACTITIONER

## 2023-04-12 PROCEDURE — 99214 PR OFFICE/OUTPT VISIT, EST, LEVL IV, 30-39 MIN: ICD-10-PCS | Mod: S$GLB,,, | Performed by: NURSE PRACTITIONER

## 2023-04-12 RX ORDER — TOBRAMYCIN 3 MG/ML
1 SOLUTION/ DROPS OPHTHALMIC 4 TIMES DAILY
COMMUNITY
Start: 2023-02-16 | End: 2023-09-25

## 2023-04-12 RX ORDER — PREDNISOLONE ACETATE 10 MG/ML
SUSPENSION/ DROPS OPHTHALMIC
COMMUNITY
End: 2023-09-25

## 2023-04-13 ENCOUNTER — PATIENT MESSAGE (OUTPATIENT)
Dept: FAMILY MEDICINE | Facility: CLINIC | Age: 63
End: 2023-04-13

## 2023-04-14 ENCOUNTER — TELEPHONE (OUTPATIENT)
Dept: FAMILY MEDICINE | Facility: CLINIC | Age: 63
End: 2023-04-14

## 2023-04-14 ENCOUNTER — PATIENT MESSAGE (OUTPATIENT)
Dept: FAMILY MEDICINE | Facility: CLINIC | Age: 63
End: 2023-04-14

## 2023-04-14 LAB
25(OH)D3+25(OH)D2 SERPL-MCNC: 52.4 NG/ML (ref 30–100)
ALBUMIN SERPL-MCNC: 3.9 G/DL (ref 3.8–4.8)
ALBUMIN/GLOB SERPL: 1.7 {RATIO} (ref 1.2–2.2)
ALP SERPL-CCNC: 62 IU/L (ref 44–121)
ALT SERPL-CCNC: 9 IU/L (ref 0–32)
APPEARANCE UR: CLEAR
AST SERPL-CCNC: 19 IU/L (ref 0–40)
BACTERIA #/AREA URNS HPF: ABNORMAL /[HPF]
BASOPHILS # BLD AUTO: 0 X10E3/UL (ref 0–0.2)
BASOPHILS NFR BLD AUTO: 1 %
BILIRUB SERPL-MCNC: 0.4 MG/DL (ref 0–1.2)
BILIRUB UR QL STRIP: NEGATIVE
BUN SERPL-MCNC: 15 MG/DL (ref 8–27)
BUN/CREAT SERPL: 23 (ref 12–28)
CALCIUM SERPL-MCNC: 9.1 MG/DL (ref 8.7–10.3)
CASTS URNS QL MICRO: ABNORMAL /LPF
CHLORIDE SERPL-SCNC: 106 MMOL/L (ref 96–106)
CHOLEST SERPL-MCNC: 205 MG/DL (ref 100–199)
CO2 SERPL-SCNC: 24 MMOL/L (ref 20–29)
COLOR UR: YELLOW
CREAT SERPL-MCNC: 0.65 MG/DL (ref 0.57–1)
EOSINOPHIL # BLD AUTO: 0 X10E3/UL (ref 0–0.4)
EOSINOPHIL NFR BLD AUTO: 1 %
EPI CELLS #/AREA URNS HPF: ABNORMAL /HPF (ref 0–10)
ERYTHROCYTE [DISTWIDTH] IN BLOOD BY AUTOMATED COUNT: 13 % (ref 11.7–15.4)
EST. GFR  (NO RACE VARIABLE): 100 ML/MIN/1.73
GLOBULIN SER CALC-MCNC: 2.3 G/DL (ref 1.5–4.5)
GLUCOSE SERPL-MCNC: 103 MG/DL (ref 70–99)
GLUCOSE UR QL STRIP: NEGATIVE
HBA1C MFR BLD: 5.9 % (ref 4.8–5.6)
HCT VFR BLD AUTO: 38 % (ref 34–46.6)
HDLC SERPL-MCNC: 73 MG/DL
HGB BLD-MCNC: 12.4 G/DL (ref 11.1–15.9)
HGB UR QL STRIP: ABNORMAL
IMM GRANULOCYTES # BLD AUTO: 0 X10E3/UL (ref 0–0.1)
IMM GRANULOCYTES NFR BLD AUTO: 0 %
KETONES UR QL STRIP: NEGATIVE
LDLC SERPL CALC-MCNC: 121 MG/DL (ref 0–99)
LEUKOCYTE ESTERASE UR QL STRIP: NEGATIVE
LYMPHOCYTES # BLD AUTO: 2.5 X10E3/UL (ref 0.7–3.1)
LYMPHOCYTES NFR BLD AUTO: 41 %
MCH RBC QN AUTO: 30.2 PG (ref 26.6–33)
MCHC RBC AUTO-ENTMCNC: 32.6 G/DL (ref 31.5–35.7)
MCV RBC AUTO: 93 FL (ref 79–97)
MICRO URNS: ABNORMAL
MONOCYTES # BLD AUTO: 0.4 X10E3/UL (ref 0.1–0.9)
MONOCYTES NFR BLD AUTO: 7 %
NEUTROPHILS # BLD AUTO: 3.1 X10E3/UL (ref 1.4–7)
NEUTROPHILS NFR BLD AUTO: 50 %
NITRITE UR QL STRIP: NEGATIVE
PH UR STRIP: 6.5 [PH] (ref 5–7.5)
PLATELET # BLD AUTO: 388 X10E3/UL (ref 150–450)
POTASSIUM SERPL-SCNC: 4.3 MMOL/L (ref 3.5–5.2)
PROT SERPL-MCNC: 6.2 G/DL (ref 6–8.5)
PROT UR QL STRIP: NEGATIVE
RBC # BLD AUTO: 4.11 X10E6/UL (ref 3.77–5.28)
RBC #/AREA URNS HPF: ABNORMAL /HPF (ref 0–2)
SODIUM SERPL-SCNC: 142 MMOL/L (ref 134–144)
SP GR UR STRIP: 1.02 (ref 1–1.03)
TRIGL SERPL-MCNC: 62 MG/DL (ref 0–149)
TSH SERPL DL<=0.005 MIU/L-ACNC: 1.43 UIU/ML (ref 0.45–4.5)
URINALYSIS REFLEX: ABNORMAL
UROBILINOGEN UR STRIP-MCNC: 0.2 MG/DL (ref 0.2–1)
VIT B12 SERPL-MCNC: 1423 PG/ML (ref 232–1245)
VLDLC SERPL CALC-MCNC: 11 MG/DL (ref 5–40)
WBC # BLD AUTO: 6.1 X10E3/UL (ref 3.4–10.8)
WBC #/AREA URNS HPF: ABNORMAL /HPF (ref 0–5)

## 2023-04-14 NOTE — TELEPHONE ENCOUNTER
----- Message from Jonathon Muñoz NP sent at 4/14/2023  7:24 AM CDT -----  Labs look good overall. A1c is 5.9 which is in the prediabetes range. Decrease simple sugars in the diet. Follow up with Dr Carlson for hematuria. Fax results to Dr Jaffe

## 2023-04-19 ENCOUNTER — HOSPITAL ENCOUNTER (OUTPATIENT)
Dept: RADIOLOGY | Facility: HOSPITAL | Age: 63
Discharge: HOME | End: 2023-04-19
Attending: NURSE PRACTITIONER
Payer: MEDICARE

## 2023-04-19 DIAGNOSIS — Z13.820 SCREENING FOR OSTEOPOROSIS: ICD-10-CM

## 2023-04-19 DIAGNOSIS — Z78.0 POST-MENOPAUSAL: ICD-10-CM

## 2023-04-19 PROCEDURE — 77080 DXA BONE DENSITY AXIAL: CPT | Mod: TC,PO

## 2023-04-20 ENCOUNTER — PATIENT MESSAGE (OUTPATIENT)
Dept: FAMILY MEDICINE | Facility: CLINIC | Age: 63
End: 2023-04-20

## 2023-08-17 ENCOUNTER — PATIENT MESSAGE (OUTPATIENT)
Dept: FAMILY MEDICINE | Facility: CLINIC | Age: 63
End: 2023-08-17

## 2023-08-25 DIAGNOSIS — J18.9 ATYPICAL PNEUMONIA: ICD-10-CM

## 2023-08-25 DIAGNOSIS — J30.1 CHRONIC SEASONAL ALLERGIC RHINITIS DUE TO POLLEN: ICD-10-CM

## 2023-08-25 RX ORDER — FLUTICASONE PROPIONATE 50 MCG
2 SPRAY, SUSPENSION (ML) NASAL DAILY
Qty: 16 G | Refills: 11 | Status: SHIPPED | OUTPATIENT
Start: 2023-08-25

## 2023-08-30 DIAGNOSIS — J82.83 EOSINOPHILIC ASTHMA: ICD-10-CM

## 2023-08-30 DIAGNOSIS — J45.51 SEVERE PERSISTENT ASTHMA WITH ACUTE EXACERBATION: ICD-10-CM

## 2023-08-31 RX ORDER — MEPOLIZUMAB 100 MG/ML
100 INJECTION, SOLUTION SUBCUTANEOUS
Qty: 1 ML | Refills: 11 | Status: ACTIVE | OUTPATIENT
Start: 2023-08-31 | End: 2023-11-21 | Stop reason: SDUPTHER

## 2023-09-07 ENCOUNTER — TELEPHONE (OUTPATIENT)
Dept: PULMONOLOGY | Facility: CLINIC | Age: 63
End: 2023-09-07
Payer: MEDICARE

## 2023-09-07 NOTE — TELEPHONE ENCOUNTER
----- Message from Carolee Albright sent at 9/7/2023  9:00 AM CDT -----  Type:  Pharmacy Calling to Clarify an RX    Name of Caller:  Lizz    Pharmacy Name:    [facility] [address] [address]  Phone: [phone] Fax: [phone]    Prescription Name:  mepolizumab 100 mg/mL AtIn    What do they need to clarify?:  m request to answer questions - Code:[id number]    Best Call Back Number:  [phone] ext [phone]    Additional Information:    Please call back to advise. Thanks!

## 2023-09-07 NOTE — TELEPHONE ENCOUNTER
Spoke with Lizz at [facility].  They need PA for her secondary insurance.  She is faxing the form to our office for completion.

## 2023-09-19 ENCOUNTER — PATIENT MESSAGE (OUTPATIENT)
Dept: FAMILY MEDICINE | Facility: CLINIC | Age: 63
End: 2023-09-19

## 2023-09-19 DIAGNOSIS — M25.562 ACUTE PAIN OF LEFT KNEE: Primary | ICD-10-CM

## 2023-09-20 DIAGNOSIS — M25.562 LEFT KNEE PAIN, UNSPECIFIED CHRONICITY: Primary | ICD-10-CM

## 2023-09-25 ENCOUNTER — OFFICE VISIT (OUTPATIENT)
Dept: ORTHOPEDICS | Facility: CLINIC | Age: 63
End: 2023-09-25
Payer: MEDICARE

## 2023-09-25 ENCOUNTER — HOSPITAL ENCOUNTER (OUTPATIENT)
Dept: RADIOLOGY | Facility: HOSPITAL | Age: 63
Discharge: HOME | End: 2023-09-25
Attending: ORTHOPAEDIC SURGERY
Payer: COMMERCIAL

## 2023-09-25 VITALS — HEIGHT: 67 IN | BODY MASS INDEX: 32.18 KG/M2 | RESPIRATION RATE: 18 BRPM | WEIGHT: 205 LBS

## 2023-09-25 DIAGNOSIS — M25.562 LEFT KNEE PAIN, UNSPECIFIED CHRONICITY: ICD-10-CM

## 2023-09-25 DIAGNOSIS — S80.02XA CONTUSION OF LEFT KNEE, INITIAL ENCOUNTER: Primary | ICD-10-CM

## 2023-09-25 DIAGNOSIS — M25.562 ACUTE PAIN OF LEFT KNEE: ICD-10-CM

## 2023-09-25 PROCEDURE — 73562 X-RAY EXAM OF KNEE 3: CPT | Mod: 26,RT,, | Performed by: RADIOLOGY

## 2023-09-25 PROCEDURE — 99999 PR PBB SHADOW E&M-EST. PATIENT-LVL III: ICD-10-PCS | Mod: PBBFAC,,, | Performed by: ORTHOPAEDIC SURGERY

## 2023-09-25 PROCEDURE — 1160F RVW MEDS BY RX/DR IN RCRD: CPT | Mod: CPTII,S$GLB,, | Performed by: ORTHOPAEDIC SURGERY

## 2023-09-25 PROCEDURE — 73564 X-RAY EXAM KNEE 4 OR MORE: CPT | Mod: 26,LT,, | Performed by: RADIOLOGY

## 2023-09-25 PROCEDURE — 4010F ACE/ARB THERAPY RXD/TAKEN: CPT | Mod: CPTII,S$GLB,, | Performed by: ORTHOPAEDIC SURGERY

## 2023-09-25 PROCEDURE — 1159F PR MEDICATION LIST DOCUMENTED IN MEDICAL RECORD: ICD-10-PCS | Mod: CPTII,S$GLB,, | Performed by: ORTHOPAEDIC SURGERY

## 2023-09-25 PROCEDURE — 3044F HG A1C LEVEL LT 7.0%: CPT | Mod: CPTII,S$GLB,, | Performed by: ORTHOPAEDIC SURGERY

## 2023-09-25 PROCEDURE — 99999 PR PBB SHADOW E&M-EST. PATIENT-LVL III: CPT | Mod: PBBFAC,,, | Performed by: ORTHOPAEDIC SURGERY

## 2023-09-25 PROCEDURE — 99204 OFFICE O/P NEW MOD 45 MIN: CPT | Mod: S$GLB,,, | Performed by: ORTHOPAEDIC SURGERY

## 2023-09-25 PROCEDURE — 73562 XR KNEE ORTHO LEFT WITH FLEXION: ICD-10-PCS | Mod: 26,RT,, | Performed by: RADIOLOGY

## 2023-09-25 PROCEDURE — 1159F MED LIST DOCD IN RCRD: CPT | Mod: CPTII,S$GLB,, | Performed by: ORTHOPAEDIC SURGERY

## 2023-09-25 PROCEDURE — 3008F PR BODY MASS INDEX (BMI) DOCUMENTED: ICD-10-PCS | Mod: CPTII,S$GLB,, | Performed by: ORTHOPAEDIC SURGERY

## 2023-09-25 PROCEDURE — 73564 XR KNEE ORTHO LEFT WITH FLEXION: ICD-10-PCS | Mod: 26,LT,, | Performed by: RADIOLOGY

## 2023-09-25 PROCEDURE — 3044F PR MOST RECENT HEMOGLOBIN A1C LEVEL <7.0%: ICD-10-PCS | Mod: CPTII,S$GLB,, | Performed by: ORTHOPAEDIC SURGERY

## 2023-09-25 PROCEDURE — 4010F PR ACE/ARB THEARPY RXD/TAKEN: ICD-10-PCS | Mod: CPTII,S$GLB,, | Performed by: ORTHOPAEDIC SURGERY

## 2023-09-25 PROCEDURE — 73564 X-RAY EXAM KNEE 4 OR MORE: CPT | Mod: TC,PO,LT

## 2023-09-25 PROCEDURE — 1160F PR REVIEW ALL MEDS BY PRESCRIBER/CLIN PHARMACIST DOCUMENTED: ICD-10-PCS | Mod: CPTII,S$GLB,, | Performed by: ORTHOPAEDIC SURGERY

## 2023-09-25 PROCEDURE — 3008F BODY MASS INDEX DOCD: CPT | Mod: CPTII,S$GLB,, | Performed by: ORTHOPAEDIC SURGERY

## 2023-09-25 PROCEDURE — 99204 PR OFFICE/OUTPT VISIT, NEW, LEVL IV, 45-59 MIN: ICD-10-PCS | Mod: S$GLB,,, | Performed by: ORTHOPAEDIC SURGERY

## 2023-09-25 NOTE — PROGRESS NOTES
Past Medical History:   Diagnosis Date    Allergy     Asthma     Bilateral leg edema     Hyperlipidemia     Hypertension     Leg pain, bilateral     left leg more severe    Varicose vein        Past Surgical History:   Procedure Laterality Date     SECTION, LOW TRANSVERSE      x 3    EYE SURGERY  2023    lt retinal surgery    HYSTERECTOMY      pace maker  2020    SPINE SURGERY  2021    stents both legs         Current Outpatient Medications   Medication Sig    albuterol (ACCUNEB) 1.25 mg/3 mL Nebu Take 3 mLs (1.25 mg total) by nebulization every 6 (six) hours as needed. Rescue    albuterol (VENTOLIN HFA) 90 mcg/actuation inhaler Inhale 2 puffs into the lungs every 6 (six) hours as needed for Wheezing or Shortness of Breath. Rescue    aspirin 325 MG tablet Take 325 mg by mouth once daily.    budesonide (PULMICORT) 0.5 mg/2 mL nebulizer solution Take 2 mLs (0.5 mg total) by nebulization daily as needed (Shortness of breath, wheezing, cough). Controller    carvediloL (COREG) 6.25 MG tablet Take 6.25 mg by mouth 2 (two) times daily.     cetirizine (ZYRTEC) 10 MG tablet Take 1 tablet (10 mg total) by mouth once daily.    escitalopram oxalate (LEXAPRO) 10 MG tablet Take 10 mg by mouth once daily.    ezetimibe (ZETIA) 10 mg tablet 1 tablet once daily.    fluticasone propionate (FLONASE) 50 mcg/actuation nasal spray 2 sprays (100 mcg total) by Each Nostril route once daily.    fluticasone-umeclidin-vilanter (TRELEGY ELLIPTA) 200-62.5-25 mcg inhaler Inhale 1 puff into the lungs once daily.    furosemide (LASIX) 40 MG tablet 1 tablet as needed.    melatonin 10 mg Tab Take 2 tablets by mouth nightly as needed.    mepolizumab 100 mg/mL AtIn Inject 1 mL (100 mg total) into the skin every 28 days.    montelukast (SINGULAIR) 10 mg tablet Take 1 tablet (10 mg total) by mouth once daily.    rosuvastatin (CRESTOR) 5 MG tablet Take 5 mg by mouth once daily.    sacubitriL-valsartan (ENTRESTO) 49-51 mg per tablet  Entresto 49 mg-51 mg tablet   Take 1 tablet twice a day by oral route.    spironolactone (ALDACTONE) 50 MG tablet Take 50 mg by mouth once daily.     No current facility-administered medications for this visit.       Review of patient's allergies indicates:  No Known Allergies    Family History   Problem Relation Age of Onset    Heart disease Mother     Heart failure Father        Social History     Socioeconomic History    Marital status:    Tobacco Use    Smoking status: Former     Current packs/day: 0.00     Types: Cigarettes     Quit date: 1985     Years since quittin.8     Passive exposure: Past    Smokeless tobacco: Never   Substance and Sexual Activity    Alcohol use: Yes    Drug use: No    Sexual activity: Yes   Social History Narrative    Live with      Social Determinants of Health     Stress: No Stress Concern Present (2019)    [employer] - Occupational Stress Questionnaire     Feeling of Stress : Not at all       Chief Complaint: No chief complaint on file.      History of present illness:  63-year-old female seen for left knee pain.  Pain started after she fell getting into the tub about a month ago.  Had a lot of bruising and swelling over the medial knee.  The bruising has resolved but still has a little swelling and residual knot in the area of the bruise.  Knee aches.  Not much pain.  Gets occasional popping and cracking of her kneecap.  Hurts to get down from a squatting position still hurts to kneel.  Pain is a 4/10.      Review of Systems:    Constitution: Negative for chills, fever, and sweats.  Negative for unexplained weight loss.    HENT:  Negative for headaches and blurry vision.    Cardiovascular:Negative for chest pain or irregular heart beat. Negative for hypertension.    Respiratory:  Negative for cough and shortness of breath.    Gastrointestinal: Negative for abdominal pain, heartburn, melena, nausea, and  vomitting.    Genitourinary:  Negative bladder incontinence and dysuria.    Musculoskeletal:  See HPI    Neurological: Negative for numbness.    Psychiatric/Behavioral: Negative for depression.  The patient is not nervous/anxious.      Endocrine: Negative for polyuria    Hematologic/Lymphatic: Negative for bleeding problem.  Does not bruise/bleed easily.    Skin: Negative for poor would healing and rash      Physical Examination:    Vital Signs:  There were no vitals filed for this visit.    There is no height or weight on file to calculate BMI.    This a well-developed, well nourished patient in no acute distress.  They are alert and oriented and cooperative to examination.  Pt. walks without an antalgic gait.      Examination of the left knee shows no rashes or erythema. There is still a small residual hematoma along the medial knee that is palpable. Patient has full range of motion from 0-130°. Patient is nontender to palpation over lateral joint line and nontender to palpation over the medial joint line. Patient has a - Lachman exam, - anterior drawer exam, and - posterior drawer exam. - Apley exam. Knee is stable to varus and valgus stress. 5 out of 5 motor strength. Palpable distal pulses. Intact light touch sensation. Negative Patellofemoral crepitus      X-rays:  X-rays left knee are ordered and reviewed which show mild medial narrowing bilaterally     Assessment::  Left knee contusion with hematoma    Plan:  I reviewed the findings with her today.  Recommended some moist heat to help break up the hematoma.  Encouraged anti-inflammatories.  Watch out for GI symptoms.  Follow-up if not improved in another 4-6 weeks.    All previous pertinent notes including ER visits, physical therapy visits, other orthopedic visits as well as other care for the same musculoskeletal problem were reviewed.  All pertinent lab values and previous imaging was reviewed pertinent to the current visit.    This note was created using  M Modal voice recognition software that occasionally misinterpreted phrases or words.    Consult note is delivered via Epic messaging service.

## 2023-10-06 ENCOUNTER — OFFICE VISIT (OUTPATIENT)
Dept: FAMILY MEDICINE | Facility: CLINIC | Age: 63
End: 2023-10-06
Payer: COMMERCIAL

## 2023-10-06 VITALS
OXYGEN SATURATION: 96 % | BODY MASS INDEX: 32.18 KG/M2 | HEART RATE: 83 BPM | TEMPERATURE: 98 F | HEIGHT: 67 IN | SYSTOLIC BLOOD PRESSURE: 90 MMHG | DIASTOLIC BLOOD PRESSURE: 70 MMHG | WEIGHT: 205 LBS

## 2023-10-06 DIAGNOSIS — R73.09 ELEVATED GLUCOSE: ICD-10-CM

## 2023-10-06 DIAGNOSIS — I10 ESSENTIAL (PRIMARY) HYPERTENSION: ICD-10-CM

## 2023-10-06 DIAGNOSIS — E78.2 MIXED HYPERLIPIDEMIA: ICD-10-CM

## 2023-10-06 DIAGNOSIS — Z01.818 PRE-OP EXAM: Primary | ICD-10-CM

## 2023-10-06 PROCEDURE — 4010F PR ACE/ARB THEARPY RXD/TAKEN: ICD-10-PCS | Mod: CPTII,S$GLB,, | Performed by: NURSE PRACTITIONER

## 2023-10-06 PROCEDURE — 3078F PR MOST RECENT DIASTOLIC BLOOD PRESSURE < 80 MM HG: ICD-10-PCS | Mod: CPTII,S$GLB,, | Performed by: NURSE PRACTITIONER

## 2023-10-06 PROCEDURE — 4010F ACE/ARB THERAPY RXD/TAKEN: CPT | Mod: CPTII,S$GLB,, | Performed by: NURSE PRACTITIONER

## 2023-10-06 PROCEDURE — 3008F BODY MASS INDEX DOCD: CPT | Mod: CPTII,S$GLB,, | Performed by: NURSE PRACTITIONER

## 2023-10-06 PROCEDURE — 1160F RVW MEDS BY RX/DR IN RCRD: CPT | Mod: CPTII,S$GLB,, | Performed by: NURSE PRACTITIONER

## 2023-10-06 PROCEDURE — 3008F PR BODY MASS INDEX (BMI) DOCUMENTED: ICD-10-PCS | Mod: CPTII,S$GLB,, | Performed by: NURSE PRACTITIONER

## 2023-10-06 PROCEDURE — 3044F PR MOST RECENT HEMOGLOBIN A1C LEVEL <7.0%: ICD-10-PCS | Mod: CPTII,S$GLB,, | Performed by: NURSE PRACTITIONER

## 2023-10-06 PROCEDURE — 3074F PR MOST RECENT SYSTOLIC BLOOD PRESSURE < 130 MM HG: ICD-10-PCS | Mod: CPTII,S$GLB,, | Performed by: NURSE PRACTITIONER

## 2023-10-06 PROCEDURE — 1160F PR REVIEW ALL MEDS BY PRESCRIBER/CLIN PHARMACIST DOCUMENTED: ICD-10-PCS | Mod: CPTII,S$GLB,, | Performed by: NURSE PRACTITIONER

## 2023-10-06 PROCEDURE — 1159F MED LIST DOCD IN RCRD: CPT | Mod: CPTII,S$GLB,, | Performed by: NURSE PRACTITIONER

## 2023-10-06 PROCEDURE — 3078F DIAST BP <80 MM HG: CPT | Mod: CPTII,S$GLB,, | Performed by: NURSE PRACTITIONER

## 2023-10-06 PROCEDURE — 1159F PR MEDICATION LIST DOCUMENTED IN MEDICAL RECORD: ICD-10-PCS | Mod: CPTII,S$GLB,, | Performed by: NURSE PRACTITIONER

## 2023-10-06 PROCEDURE — 3044F HG A1C LEVEL LT 7.0%: CPT | Mod: CPTII,S$GLB,, | Performed by: NURSE PRACTITIONER

## 2023-10-06 PROCEDURE — 3074F SYST BP LT 130 MM HG: CPT | Mod: CPTII,S$GLB,, | Performed by: NURSE PRACTITIONER

## 2023-10-06 PROCEDURE — 99214 OFFICE O/P EST MOD 30 MIN: CPT | Mod: S$GLB,,, | Performed by: NURSE PRACTITIONER

## 2023-10-06 PROCEDURE — 99214 PR OFFICE/OUTPT VISIT, EST, LEVL IV, 30-39 MIN: ICD-10-PCS | Mod: S$GLB,,, | Performed by: NURSE PRACTITIONER

## 2023-10-06 NOTE — PROGRESS NOTES
SUBJECTIVE:      Patient ID: Daylin Lynch is a 63 y.o. female.    Chief Complaint: Pre-op Exam (Cataract surgery with Dr. Bazzi on 10/30/2023)    Patient is here today for a pre op clearance for cataract surgery with Dr Bazzi on Oct 30th. She is doing well. She has been active with house work, no chest pain or sob. She is able to climb stairs without chest pain or sob. Following with Wendy Heath for pulmonology, has appt next week. Following with Dr Jaffe for cardiology.        Past Surgical History:   Procedure Laterality Date     SECTION, LOW TRANSVERSE      x 3    EYE SURGERY  2023    lt retinal surgery    HYSTERECTOMY      pace maker  2020    SPINE SURGERY  2021    stents both legs       Family History   Problem Relation Age of Onset    Heart disease Mother     Heart failure Father       Social History     Socioeconomic History    Marital status:    Tobacco Use    Smoking status: Former     Current packs/day: 0.00     Types: Cigarettes     Quit date: 1985     Years since quittin.8     Passive exposure: Past    Smokeless tobacco: Never   Substance and Sexual Activity    Alcohol use: Yes    Drug use: No    Sexual activity: Yes   Social History Narrative    Live with      Social Determinants of Health     Stress: No Stress Concern Present (2019)    [employer] - Occupational Stress Questionnaire     Feeling of Stress : Not at all     Current Outpatient Medications   Medication Sig Dispense Refill    albuterol (ACCUNEB) 1.25 mg/3 mL Nebu Take 3 mLs (1.25 mg total) by nebulization every 6 (six) hours as needed. Rescue 75 mL 0    albuterol (VENTOLIN HFA) 90 mcg/actuation inhaler Inhale 2 puffs into the lungs every 6 (six) hours as needed for Wheezing or Shortness of Breath. Rescue 18 g 11    aspirin 325 MG tablet Take 325 mg by mouth once daily.      budesonide (PULMICORT) 0.5 mg/2 mL nebulizer solution Take 2 mLs (0.5 mg total)  by nebulization daily as needed (Shortness of breath, wheezing, cough). Controller 120 mL 5    carvediloL (COREG) 6.25 MG tablet Take 6.25 mg by mouth 2 (two) times daily.       cetirizine (ZYRTEC) 10 MG tablet Take 1 tablet (10 mg total) by mouth once daily.  0    escitalopram oxalate (LEXAPRO) 10 MG tablet Take 10 mg by mouth once daily.      fluticasone propionate (FLONASE) 50 mcg/actuation nasal spray 2 sprays (100 mcg total) by Each Nostril route once daily. 16 g 11    fluticasone-umeclidin-vilanter (TRELEGY ELLIPTA) 200-62.5-25 mcg inhaler Inhale 1 puff into the lungs once daily. 60 each 11    furosemide (LASIX) 40 MG tablet 1 tablet as needed.      melatonin 10 mg Tab Take 2 tablets by mouth nightly as needed.      mepolizumab 100 mg/mL AtIn Inject 1 mL (100 mg total) into the skin every 28 days. 1 mL 11    montelukast (SINGULAIR) 10 mg tablet Take 1 tablet (10 mg total) by mouth once daily. 90 tablet 3    sacubitriL-valsartan (ENTRESTO) 49-51 mg per tablet Entresto 49 mg-51 mg tablet   Take 1 tablet twice a day by oral route.      spironolactone (ALDACTONE) 50 MG tablet Take 50 mg by mouth once daily.      ezetimibe (ZETIA) 10 mg tablet 1 tablet once daily.      rosuvastatin (CRESTOR) 5 MG tablet Take 5 mg by mouth once daily.       No current facility-administered medications for this visit.     Review of patient's allergies indicates:  No Known Allergies   Past Medical History:   Diagnosis Date    Allergy     Asthma     Bilateral leg edema     Hyperlipidemia     Hypertension     Leg pain, bilateral     left leg more severe    Varicose vein      Past Surgical History:   Procedure Laterality Date     SECTION, LOW TRANSVERSE      x 3    EYE SURGERY  2023    lt retinal surgery    HYSTERECTOMY      pace maker  2020    SPINE SURGERY  2021    stents both legs         Review of Systems   Constitutional:  Negative for appetite change, chills, diaphoresis and unexpected weight change.   HENT:  " Negative for ear discharge, hearing loss, trouble swallowing and voice change.    Eyes:  Negative for photophobia and pain.   Respiratory:  Negative for chest tightness, shortness of breath and stridor.    Cardiovascular:  Negative for chest pain and palpitations.   Gastrointestinal:  Negative for abdominal pain, blood in stool and vomiting.   Endocrine: Negative for cold intolerance and heat intolerance.   Genitourinary:  Negative for difficulty urinating and flank pain.   Musculoskeletal:  Negative for joint swelling and neck stiffness.   Skin:  Negative for pallor.   Neurological:  Negative for dizziness, speech difficulty, weakness, light-headedness and headaches.   Hematological:  Does not bruise/bleed easily.   Psychiatric/Behavioral:  Negative for confusion, dysphoric mood, self-injury, sleep disturbance and suicidal ideas. The patient is not nervous/anxious.       OBJECTIVE:      Vitals:    10/06/23 0945   BP: 90/70   Pulse: 83   Temp: 98.1 °F (36.7 °C)   SpO2: 96%   Weight: 93 kg (205 lb)   Height: 5' 7" (1.702 m)     Physical Exam  Vitals and nursing note reviewed.   Constitutional:       General: She is not in acute distress.     Appearance: She is well-developed.   HENT:      Head: Normocephalic and atraumatic.      Right Ear: Tympanic membrane normal.      Left Ear: Tympanic membrane normal.      Nose: Nose normal.      Mouth/Throat:      Pharynx: Uvula midline.   Eyes:      General: Lids are normal.      Conjunctiva/sclera: Conjunctivae normal.      Pupils: Pupils are equal, round, and reactive to light.      Right eye: Pupil is round and reactive.      Left eye: Pupil is round and reactive.   Neck:      Thyroid: No thyromegaly.      Vascular: No JVD.      Trachea: Trachea normal.   Cardiovascular:      Rate and Rhythm: Normal rate and regular rhythm.      Pulses: Normal pulses.      Heart sounds: Normal heart sounds. No murmur heard.  Pulmonary:      Effort: Pulmonary effort is normal. No tachypnea " or respiratory distress.      Breath sounds: Normal breath sounds.   Abdominal:      General: Bowel sounds are normal.      Palpations: Abdomen is soft.      Tenderness: There is no abdominal tenderness.   Musculoskeletal:         General: Normal range of motion.      Cervical back: Normal range of motion and neck supple.      Right lower leg: No edema.      Left lower leg: No edema.   Lymphadenopathy:      Cervical: No cervical adenopathy.   Skin:     General: Skin is warm and dry.      Findings: No rash.   Neurological:      Mental Status: She is alert and oriented to person, place, and time.   Psychiatric:         Mood and Affect: Mood normal.         Speech: Speech normal.         Behavior: Behavior normal. Behavior is cooperative.         Thought Content: Thought content normal.         Judgment: Judgment normal.        Last visit note, most recent available labs, and health maintenance reviewed     Assessment:       1. Pre-op exam    2. Elevated glucose    3. Essential (primary) hypertension    4. Mixed hyperlipidemia        Plan:       Pre-op exam  -     CBC Auto Differential; Future; Expected date: 10/20/2023  -     Comprehensive Metabolic Panel; Future; Expected date: 10/20/2023  -     Urinalysis, Reflex to Urine Culture Urine, Clean Catch; Future; Expected date: 10/06/2023  Clearance pending labs    Elevated glucose  -     Hemoglobin A1C; Future; Expected date: 10/20/2023    Essential (primary) hypertension  -     CBC Auto Differential; Future; Expected date: 10/20/2023  -     Comprehensive Metabolic Panel; Future; Expected date: 10/20/2023  -     Urinalysis, Reflex to Urine Culture Urine, Clean Catch; Future; Expected date: 10/06/2023    Mixed hyperlipidemia  Stable        Follow up for has f/u.      10/6/2023 Jonathon Muñoz, ROBERTA, FNP

## 2023-10-09 ENCOUNTER — TELEPHONE (OUTPATIENT)
Dept: FAMILY MEDICINE | Facility: CLINIC | Age: 63
End: 2023-10-09

## 2023-10-09 NOTE — TELEPHONE ENCOUNTER
----- Message from Jonathon Muñoz NP sent at 10/8/2023  2:19 PM CDT -----  Looks like she has a uti, it has reflexed to a urine culture. Will send abx once I receive the culture. All other labs ok

## 2023-10-09 NOTE — TELEPHONE ENCOUNTER
Pt notified of lab results.  She said she gave you a form to fill out.  She is leaving town on Wednesday so she wants to pick it up tomorrow.

## 2023-10-10 ENCOUNTER — TELEPHONE (OUTPATIENT)
Dept: FAMILY MEDICINE | Facility: CLINIC | Age: 63
End: 2023-10-10

## 2023-10-10 ENCOUNTER — OFFICE VISIT (OUTPATIENT)
Dept: PULMONOLOGY | Facility: CLINIC | Age: 63
End: 2023-10-10
Payer: COMMERCIAL

## 2023-10-10 VITALS
DIASTOLIC BLOOD PRESSURE: 63 MMHG | HEIGHT: 67 IN | OXYGEN SATURATION: 97 % | HEART RATE: 87 BPM | SYSTOLIC BLOOD PRESSURE: 80 MMHG | WEIGHT: 202.63 LBS | BODY MASS INDEX: 31.8 KG/M2

## 2023-10-10 DIAGNOSIS — J82.83 EOSINOPHILIC ASTHMA: ICD-10-CM

## 2023-10-10 DIAGNOSIS — Z78.9 NON-SMOKER: ICD-10-CM

## 2023-10-10 DIAGNOSIS — N30.00 ACUTE CYSTITIS WITHOUT HEMATURIA: Primary | ICD-10-CM

## 2023-10-10 DIAGNOSIS — J45.50 SEVERE PERSISTENT ASTHMA WITHOUT COMPLICATION: Primary | ICD-10-CM

## 2023-10-10 DIAGNOSIS — J30.1 CHRONIC SEASONAL ALLERGIC RHINITIS DUE TO POLLEN: ICD-10-CM

## 2023-10-10 DIAGNOSIS — J45.40 MODERATE PERSISTENT ASTHMA WITHOUT COMPLICATION: ICD-10-CM

## 2023-10-10 DIAGNOSIS — R05.9 COUGH, UNSPECIFIED TYPE: ICD-10-CM

## 2023-10-10 DIAGNOSIS — I50.42 CHRONIC COMBINED SYSTOLIC AND DIASTOLIC HEART FAILURE: ICD-10-CM

## 2023-10-10 LAB
ALBUMIN SERPL-MCNC: 4.3 G/DL (ref 3.9–4.9)
ALBUMIN/GLOB SERPL: 1.7 {RATIO} (ref 1.2–2.2)
ALP SERPL-CCNC: 69 IU/L (ref 44–121)
ALT SERPL-CCNC: 6 IU/L (ref 0–32)
APPEARANCE UR: CLEAR
AST SERPL-CCNC: 15 IU/L (ref 0–40)
BACTERIA #/AREA URNS HPF: ABNORMAL /[HPF]
BACTERIA UR CULT: ABNORMAL
BACTERIA UR CULT: ABNORMAL
BASOPHILS # BLD AUTO: 0.1 X10E3/UL (ref 0–0.2)
BASOPHILS NFR BLD AUTO: 1 %
BILIRUB SERPL-MCNC: 0.5 MG/DL (ref 0–1.2)
BILIRUB UR QL STRIP: NEGATIVE
BUN SERPL-MCNC: 19 MG/DL (ref 8–27)
BUN/CREAT SERPL: 23 (ref 12–28)
CALCIUM SERPL-MCNC: 9.5 MG/DL (ref 8.7–10.3)
CASTS URNS QL MICRO: ABNORMAL /LPF
CHLORIDE SERPL-SCNC: 101 MMOL/L (ref 96–106)
CO2 SERPL-SCNC: 26 MMOL/L (ref 20–29)
COLOR UR: YELLOW
CREAT SERPL-MCNC: 0.81 MG/DL (ref 0.57–1)
EOSINOPHIL # BLD AUTO: 0.1 X10E3/UL (ref 0–0.4)
EOSINOPHIL NFR BLD AUTO: 1 %
EPI CELLS #/AREA URNS HPF: ABNORMAL /HPF (ref 0–10)
ERYTHROCYTE [DISTWIDTH] IN BLOOD BY AUTOMATED COUNT: 13.1 % (ref 11.7–15.4)
EST. GFR  (NO RACE VARIABLE): 82 ML/MIN/1.73
GLOBULIN SER CALC-MCNC: 2.5 G/DL (ref 1.5–4.5)
GLUCOSE SERPL-MCNC: 89 MG/DL (ref 70–99)
GLUCOSE UR QL STRIP: NEGATIVE
HBA1C MFR BLD: 5.7 % (ref 4.8–5.6)
HCT VFR BLD AUTO: 41.7 % (ref 34–46.6)
HGB BLD-MCNC: 13.3 G/DL (ref 11.1–15.9)
HGB UR QL STRIP: ABNORMAL
IMM GRANULOCYTES # BLD AUTO: 0.1 X10E3/UL (ref 0–0.1)
IMM GRANULOCYTES NFR BLD AUTO: 1 %
KETONES UR QL STRIP: NEGATIVE
LEUKOCYTE ESTERASE UR QL STRIP: ABNORMAL
LYMPHOCYTES # BLD AUTO: 2.9 X10E3/UL (ref 0.7–3.1)
LYMPHOCYTES NFR BLD AUTO: 36 %
MCH RBC QN AUTO: 30.4 PG (ref 26.6–33)
MCHC RBC AUTO-ENTMCNC: 31.9 G/DL (ref 31.5–35.7)
MCV RBC AUTO: 95 FL (ref 79–97)
MICRO URNS: ABNORMAL
MONOCYTES # BLD AUTO: 0.7 X10E3/UL (ref 0.1–0.9)
MONOCYTES NFR BLD AUTO: 8 %
NEUTROPHILS # BLD AUTO: 4.5 X10E3/UL (ref 1.4–7)
NEUTROPHILS NFR BLD AUTO: 53 %
NITRITE UR QL STRIP: POSITIVE
OTHER ANTIBIOTIC SUSC ISLT: ABNORMAL
PH UR STRIP: 5.5 [PH] (ref 5–7.5)
PLATELET # BLD AUTO: 403 X10E3/UL (ref 150–450)
POTASSIUM SERPL-SCNC: 4.4 MMOL/L (ref 3.5–5.2)
PROT SERPL-MCNC: 6.8 G/DL (ref 6–8.5)
PROT UR QL STRIP: ABNORMAL
RBC # BLD AUTO: 4.38 X10E6/UL (ref 3.77–5.28)
RBC #/AREA URNS HPF: ABNORMAL /HPF (ref 0–2)
SODIUM SERPL-SCNC: 141 MMOL/L (ref 134–144)
SP GR UR STRIP: 1.02 (ref 1–1.03)
URINALYSIS REFLEX: ABNORMAL
UROBILINOGEN UR STRIP-MCNC: 0.2 MG/DL (ref 0.2–1)
WBC # BLD AUTO: 8.3 X10E3/UL (ref 3.4–10.8)
WBC #/AREA URNS HPF: ABNORMAL /HPF (ref 0–5)

## 2023-10-10 PROCEDURE — 99999 PR PBB SHADOW E&M-EST. PATIENT-LVL III: CPT | Mod: PBBFAC,,, | Performed by: NURSE PRACTITIONER

## 2023-10-10 PROCEDURE — 4010F ACE/ARB THERAPY RXD/TAKEN: CPT | Mod: CPTII,S$GLB,, | Performed by: NURSE PRACTITIONER

## 2023-10-10 PROCEDURE — 1159F PR MEDICATION LIST DOCUMENTED IN MEDICAL RECORD: ICD-10-PCS | Mod: CPTII,S$GLB,, | Performed by: NURSE PRACTITIONER

## 2023-10-10 PROCEDURE — 3074F PR MOST RECENT SYSTOLIC BLOOD PRESSURE < 130 MM HG: ICD-10-PCS | Mod: CPTII,S$GLB,, | Performed by: NURSE PRACTITIONER

## 2023-10-10 PROCEDURE — 3008F PR BODY MASS INDEX (BMI) DOCUMENTED: ICD-10-PCS | Mod: CPTII,S$GLB,, | Performed by: NURSE PRACTITIONER

## 2023-10-10 PROCEDURE — 99214 OFFICE O/P EST MOD 30 MIN: CPT | Mod: S$GLB,,, | Performed by: NURSE PRACTITIONER

## 2023-10-10 PROCEDURE — 3074F SYST BP LT 130 MM HG: CPT | Mod: CPTII,S$GLB,, | Performed by: NURSE PRACTITIONER

## 2023-10-10 PROCEDURE — 3044F PR MOST RECENT HEMOGLOBIN A1C LEVEL <7.0%: ICD-10-PCS | Mod: CPTII,S$GLB,, | Performed by: NURSE PRACTITIONER

## 2023-10-10 PROCEDURE — 3044F HG A1C LEVEL LT 7.0%: CPT | Mod: CPTII,S$GLB,, | Performed by: NURSE PRACTITIONER

## 2023-10-10 PROCEDURE — 1159F MED LIST DOCD IN RCRD: CPT | Mod: CPTII,S$GLB,, | Performed by: NURSE PRACTITIONER

## 2023-10-10 PROCEDURE — 3078F DIAST BP <80 MM HG: CPT | Mod: CPTII,S$GLB,, | Performed by: NURSE PRACTITIONER

## 2023-10-10 PROCEDURE — 4010F PR ACE/ARB THEARPY RXD/TAKEN: ICD-10-PCS | Mod: CPTII,S$GLB,, | Performed by: NURSE PRACTITIONER

## 2023-10-10 PROCEDURE — 3078F PR MOST RECENT DIASTOLIC BLOOD PRESSURE < 80 MM HG: ICD-10-PCS | Mod: CPTII,S$GLB,, | Performed by: NURSE PRACTITIONER

## 2023-10-10 PROCEDURE — 3008F BODY MASS INDEX DOCD: CPT | Mod: CPTII,S$GLB,, | Performed by: NURSE PRACTITIONER

## 2023-10-10 PROCEDURE — 99214 PR OFFICE/OUTPT VISIT, EST, LEVL IV, 30-39 MIN: ICD-10-PCS | Mod: S$GLB,,, | Performed by: NURSE PRACTITIONER

## 2023-10-10 PROCEDURE — 99999 PR PBB SHADOW E&M-EST. PATIENT-LVL III: ICD-10-PCS | Mod: PBBFAC,,, | Performed by: NURSE PRACTITIONER

## 2023-10-10 RX ORDER — CEFDINIR 300 MG/1
300 CAPSULE ORAL 2 TIMES DAILY
Qty: 20 CAPSULE | Refills: 0 | Status: SHIPPED | OUTPATIENT
Start: 2023-10-10 | End: 2023-10-20

## 2023-10-10 RX ORDER — PREDNISONE 20 MG/1
TABLET ORAL
Qty: 15 TABLET | Refills: 0 | Status: SHIPPED | OUTPATIENT
Start: 2023-10-10 | End: 2023-11-07 | Stop reason: SDUPTHER

## 2023-10-10 RX ORDER — ALBUTEROL SULFATE 90 UG/1
2 AEROSOL, METERED RESPIRATORY (INHALATION) EVERY 6 HOURS PRN
Qty: 18 G | Refills: 11 | Status: SHIPPED | OUTPATIENT
Start: 2023-10-10

## 2023-10-10 RX ORDER — AZITHROMYCIN 500 MG/1
500 TABLET, FILM COATED ORAL DAILY
Qty: 3 TABLET | Refills: 1 | Status: SHIPPED | OUTPATIENT
Start: 2023-10-10

## 2023-10-10 RX ORDER — FLUTICASONE FUROATE, UMECLIDINIUM BROMIDE AND VILANTEROL TRIFENATATE 200; 62.5; 25 UG/1; UG/1; UG/1
1 POWDER RESPIRATORY (INHALATION) DAILY
Qty: 180 EACH | Refills: 3 | Status: SHIPPED | OUTPATIENT
Start: 2023-10-10 | End: 2024-01-08 | Stop reason: SDUPTHER

## 2023-10-10 NOTE — PATIENT INSTRUCTIONS
Continue current asthma regiment including Trelegy once per day and Albuterol as needed for shortness of breath, wheezing, cough.    Continue Nucala monthly. Will reach out to [facility] and see about getting assistance through the .     Will send prescription for Trelegy to [facility]. You must call and enroll in their inhaler program. Call the number on the handout I gave you in office.     Chest Xray if no improvement of symptoms.     Keep Prednisone and Azithromycin on hand for as needed symptoms - take sparingly, take as prescribed.    Continue current medication regiment. Keep follow up appointment as scheduled. Please call the office if you have any questions or concerns.

## 2023-10-10 NOTE — PROGRESS NOTES
10/10/2023    Daylin Lynch  In office visit     Chief Complaint   Patient presents with    6m F/U       HPI:  10/10/2023:  Approximately three weeks ago visited a nursery - ever since developed symptoms of asthma flare. Required Azithromycin and Prednisone 20 mg x 5 days. States with intervention, her symptoms have improved however she is still experiencing a lingering cough. Cough is productive with clear mucous. Mild wheezing and chest tightness reported.   States currently using Trelegy once per day. Using Albuterol PRN - approx 4 times per day as of late.  Still on Nucala - next dose is due October 20 - states she recently got a bill for $1000 for Nucala - will investigate.       04/10/2023:  Overall doing well!   States currently using Trelegy once per day with great benefit in comparison to Symbicort.  Using Albuterol only as needed, not requiring daily.  Took a trip to [place] last month - after day 3 of trip got ill - took Azithromycin 3 day regiment and Prednisone 5 day regiment at that time with great benefit of symptoms.  Still on Nucala - states tolerating well and is noticing great benefit with use.  Patient Instructions   Continue current asthma regiment including Trelegy once per day and Albuterol as needed for shortness of breath, wheezing, cough.  Continue Nucala monthly.  Keep Prednisone and Azithromycin on hand for as needed symptoms - take sparingly, take as prescribed.  Continue current medication regiment. Keep follow up appointment as scheduled. Please call the office if you have any questions or concerns.         1/9/2023:  Recent trip to [place] - states got sick during Travel. Completed Prednisone regiment for 5 days with benefit.  Still on Nucala - recently approved per insurance - states next dose in due 1/15.  Started weight watchers again, looking forward to losing weight.  Using Symbicort two puffs twice daily with benefit. Using Albuterol only as needed - states over the last week  she has required Albuterol more - using approximately 3x per day.  Recently received letter stating disability was approved. Looking to get on Medicare soon.  Patient Instructions   Continue current asthma regiment including Symbicort twice per day and Albuterol as needed.  Nebulizer machine ordered with Albuterol and Budesonide - to be taken only as needed.  Keep Prednisone on hand to be taken only as needed, use sparingly.  Will give sample Trelegy today - this is one puff once per day. This inhaler contains an inhaled steroid component. Rinse mouth after each use due to risk for thrush development. If mouth or tongue develops white sores please contact the clinic and I will order a prescription mouth wash.   Stop Symbicort while using Trelegy.  Continue current medication regiment. Keep follow up appointment as scheduled. Please call the office if you have any questions or concerns.           11/8/2022:  Recent trip to [place] to visit family - states while on trip had an asthma flare. Started taking Prednisone yesterday, on day 2.   Reports use of Prednisone two times over the last six months alone - had to take regiment upon discharge from hospital in June 2022. Reports great benefit with steroid use.   Using Symbicort two puffs twice daily with benefit. Using Albuterol only as needed - states over the last week she has required Albuterol more - using approximately 3x per day.  On Singulair nightly with benefit.   Reports chest tightness.   Shortness of breath: Persistent over the last week or so - associated with wheezing, worse at night time. Associated with nocturnal arousals nightly. States can't perform ADLS without stopping for rest.  Cough: Persistent, worse at night time, non productive. Relieved somewhat with cough syrup.  Patient Instructions   Asthma - severe persistent, uncontrolled with 2 steroid courses in the past six months. Eosinophil count 500 in June 2022.   Can check CBC now, then initiate  Nucala in clinic.  Nucala sample given in clinic  LOT number [id number]  Exp May 2025  Continue Symbicort twice per day. Albuterol as needed for shortness of breath, wheezing, cough.  Continue current medication regiment. Keep follow up appointment as scheduled. Please call the office if you have any questions or concerns.         8/8/2022:  Recent trip to [place] - had several issues with shortness of breath, coughing while on trip. Overall doing better since returning home.   Cough: Overall improved - nonproductive, no time correlation identified. Denies wheezing.  Shortness of breath: Persistent with minimal exertion - improves with rest.   Currently using Symbicort - two puffs twice per day with reported benefit. Using Albuterol only as needed, not daily use - states while on trip in [place] required several times per day, however hasn't needed much since returning home.  Hasn't taken Prednisone since prior visit.   Patient Instructions   Overall you seem to be doing better.  Would continue Symbicort two puffs twice daily with Albuterol use as needed for shortness of breath, wheezing.  Keep Prednisone on hand to take as needed for shortness of breath, cough, wheezing.  Can consider biologic in the future if symptoms worsen.  Consider PFT if the future if no improvement.  Continue current medication regiment. Keep follow up appointment as scheduled. Please call the office if you have any questions or concerns.         6/24/22:  Hx: Annual bronchitis in the winter, pleurisy, CHF, AICD placement,   Recent ER visit on 6/12/22 to [facility] for CP, SOB - patient was noted to have elevated troponin at that time, decision made for transfer to [facility] for possible cath. Patient underwent series of testing including echocardiogram revealing EF 40% in the setting of known systolic heart failure. Patient did not undergo angiogram during this hospitalization. CTA obtained revealing no PE, however concern regarding atypical  pneumonia due to bilateral ground glass opacities. Patient was treated with IV Rocephin, Azithromycin, and steroid - reported great improvement at that time with medication therapy. Patient was subsequently discharged home, without supplemental oxygen, with a prescription for Levaquin (reports completion), five days worth of steroid PO, and PRN albuterol inhaler.   Was seen per PCP on Tuesday 6/21 and prescribed Symbicort inhaler, Flonase, and Promethazine cough syrup.   Cough: Persistent over the last 6 months - states it's overall improved since hospitalization however not completely resolved. States cough was attributed in the past to congestive heart failure. Was diagnosed with bronchitis per primary care provider in March - given codeine cough syrup at that time, no antibiotic or steroids. Cough persists throughout the day however is noticeably worse at night time, when lying down. Non productive. Associated with coughing fits, dizziness. Associated with chest tightness, wheezing. No relief noted with tessalon pearls, however has tried codeine and promethazine cough syrup separately - states codeine worked better.  Shortness of breath: Gradual onset over the last six months - feels chest heaviness at rest however experiences exertional dyspnea - states affecting ADLS. Can't take a shower and get dressed without stopping for rest. Improves with rest.   Does endorse sinus congestion, ear congestion, post nasal drip - started Zyrtec daily in April, does not appreciate benefit.   Has started Symbicort - taking two puffs in the morning - has been on for three days now, reports benefit with use.  Has tried Albuterol in the past without noticeable benefit.   Takes Protonix daily.  Per review of EMR discharge summary:  Hospital Course:   61-year-old lady with numerous medical problems including chronic systolic CHF who has been suffering with dry cough for last couple months presented to ED with cough and cough related  pain found to have mildly elevated troponin and was subsequently transferred to [facility].  Serial EKGs, cardiac enzymes remained negative for acute coronary syndrome.  Stress test ruled out any reversible ischemia.  Her ejection fraction is overall stable from previous echo.  Overall symptomatology is more consistent with pulmonary in origin, CTA chest showed atypical pneumonia.  She responded well after starting Rocephin and azithromycin as well as 1 dose of IV steroid.  Patient has chronic postnasal drip.  She was discharged home in hemodynamically stable condition with prescription of Levaquin, p.r.n. albuterol.  She was advised to follow-up with her PCP, I advised her that if her symptoms do not improve she may discuss with her cardiologist if Entresto is the culprit of chronic dry cough.  I also generated outpatient pulmonary referral.   Patient Instructions   Repeat chest xray in 1-2 weeks to evaluate post atypical pneumonia.  Symptoms are somewhat concerning for asthmatic component. Would continue Symbicort however start using 2 puffs twice per day.   Can use Albuterol as needed for shortness of breath, cough.  For sinus complaints - can try Singulair at night time with Zyrtec during the day.   Can send to ENT if interested.  Etiology of cough is unknown at this time - however could be multifactorial.  Entresto?  GERD - Currently on Protonix  Codeine cough syrup to be taken as needed for cough, caution as may make drowsy. Do not drive after taking.  Prednisone, take only as needed.  Continue current medication regiment. Keep follow up appointment as scheduled. Please call the office if you have any questions or concerns.         Social Hx: Lives with  - 2 animals in the home. Former [employer] employee, . No Asbestosis exposure, Smoking Hx: Former smoker, on and off for approx ten years - quit in 1985  Family Hx: No Lung Cancer, Mother COPD, No Asthma  Medical Hx: Previous pneumonia ; No previous  "shoulder surgery - Defibrillator placement       The chief compliant problem varies with instability at times.  PFSH:  Past Medical History:   Diagnosis Date    Allergy     Asthma     Bilateral leg edema     Hyperlipidemia     Hypertension     Leg pain, bilateral     left leg more severe    Varicose vein          Past Surgical History:   Procedure Laterality Date     SECTION, LOW TRANSVERSE      x 3    EYE SURGERY  2023    lt retinal surgery    HYSTERECTOMY      pace maker  2020    SPINE SURGERY  2021    stents both legs       Social History     Tobacco Use    Smoking status: Former     Current packs/day: 0.00     Types: Cigarettes     Quit date: 1985     Years since quittin.8     Passive exposure: Past    Smokeless tobacco: Never   Substance Use Topics    Alcohol use: Yes    Drug use: No     Family History   Problem Relation Age of Onset    Heart disease Mother     Heart failure Father      Review of patient's allergies indicates:  No Known Allergies  I have reviewed past medical, family, and social history. I have reviewed previous nurse notes.    Performance Status:The patient's activity level is functions out of house.      Review of Systems:  a review of eleven systems covering constitutional, Eye, HEENT, Psych, Respiratory, Cardiac, GI, , Musculoskeletal, Endocrine, Dermatologic was negative except for pertinent findings as listed ABOVE and below: pertinent positive as above, rest is good       Exam:Comprehensive exam done. BP (!) 80/63 (BP Location: Right arm, Patient Position: Sitting, BP Method: Large (Automatic))   Pulse 87   Ht 5' 7" (1.702 m)   Wt 91.9 kg (202 lb 9.6 oz)   SpO2 97% Comment: on room air at rest  BMI 31.73 kg/m²   Exam included Vitals as listed  Constitutional: She is oriented to person, place, and time. She appears well-developed. No distress.   Nose: Nose normal.   Mouth/Throat: Uvula is midline, oropharynx is clear and moist and mucous " membranes are normal. No dental caries. No oropharyngeal exudate, posterior oropharyngeal edema, posterior oropharyngeal erythema or tonsillar abscesses.  Mallapatti (M) score 3  Eyes: Pupils are equal, round, and reactive to light.   Neck: No JVD present. No thyromegaly present.   Cardiovascular: Normal rate, regular rhythm and normal heart sounds. Exam reveals no gallop and no friction rub.   No murmur heard.  Pulmonary/Chest: Effort normal and breath sounds normal. No accessory muscle usage or stridor. No apnea and no tachypnea. No respiratory distress, decreased breath sounds, wheezes, rhonchi, rales, or tenderness. Clear breath sounds, on room air, in no acute distress.  Abdominal: Soft. She exhibits no mass. There is no tenderness. No hepatosplenomegaly, hernias and normoactive bowel sounds  Musculoskeletal: Normal range of motion. exhibits no edema.   Neurological:  alert and oriented to person, place, and time. not disoriented.   Skin: Skin is warm and dry. Capillary refill takes less 2 sec. No cyanosis or erythema. No pallor. Nails show no clubbing.   Psychiatric: normal mood and affect. behavior is normal. Judgment and thought content normal.       Radiographs (ct chest and cxr) reviewed: view by direct vision     Chest Xray 7/20/2022  FINDINGS:  An AICD is noted in place on the left.  The cardiac size and contours within normal limits.  No intrapulmonary mass or infiltrate is seen.  No pneumothorax or pleural effusion is noted.   Impression:   AICD noted in place otherwise negative chest    CTA Chest Non Coronary  6/13/2022   IMPRESSION:   1.  No pulmonary embolism identified.  2.  Patchy groundglass lung opacities are noted bilaterally, mostly involving the lung bases. These most likely reflect inspiratory effort or subsegmental atelectasis. Atypical infection could have a similar appearance in the proper clinical setting      X-Ray Chest PA And Lateral   6/13/2022   Impression:   AICD in place otherwise  negative chest      Patient's labs were reviewed including CBC and CMP    Lab Results   Component Value Date    WBC 8.3 10/06/2023    RBC 4.38 10/06/2023    HGB 13.3 10/06/2023    HCT 41.7 10/06/2023    MCV 95 10/06/2023    MCH 30.4 10/06/2023    MCHC 31.9 10/06/2023    RDW 13.1 10/06/2023     10/06/2023    MPV 10.1 11/08/2022    GRAN 8.8 (H) 11/08/2022    GRAN 80.2 (H) 11/08/2022    LYMPH 2.9 10/06/2023    MONO 8 10/06/2023    MONO 0.7 10/06/2023    EOS 0.1 10/06/2023    BASO 0.1 10/06/2023    EOSINOPHIL 1 10/06/2023    BASOPHIL 1 10/06/2023   CMP  Sodium   Date Value Ref Range Status   10/06/2023 141 134 - 144 mmol/L Final   01/23/2017 139 134 - 144 mmol/L      Potassium   Date Value Ref Range Status   10/06/2023 4.4 3.5 - 5.2 mmol/L Final     Chloride   Date Value Ref Range Status   10/06/2023 101 96 - 106 mmol/L Final   01/23/2017 101 98 - 110 mmol/L      CO2   Date Value Ref Range Status   10/06/2023 26 20 - 29 mmol/L Final     Glucose   Date Value Ref Range Status   10/06/2023 89 70 - 99 mg/dL Final   01/23/2017 85 70 - 99 mg/dL      BUN   Date Value Ref Range Status   10/06/2023 19 8 - 27 mg/dL Final     Creatinine   Date Value Ref Range Status   10/06/2023 0.81 0.57 - 1.00 mg/dL Final   01/23/2017 0.58 (L) 0.60 - 1.40 mg/dL      Calcium   Date Value Ref Range Status   10/06/2023 9.5 8.7 - 10.3 mg/dL Final     Total Protein   Date Value Ref Range Status   06/12/2022 6.8 6.0 - 8.4 g/dL Final     Albumin   Date Value Ref Range Status   10/06/2023 4.3 3.9 - 4.9 g/dL Final   06/12/2022 3.4 (L) 3.5 - 5.2 g/dL Final   01/23/2017 4.1 3.1 - 4.7 g/dL      Total Bilirubin   Date Value Ref Range Status   10/06/2023 0.5 0.0 - 1.2 mg/dL Final     Alkaline Phosphatase   Date Value Ref Range Status   06/12/2022 61 55 - 135 U/L Final     AST   Date Value Ref Range Status   10/06/2023 15 0 - 40 IU/L Final     ALT   Date Value Ref Range Status   10/06/2023 6 0 - 32 IU/L Final     Anion Gap   Date Value Ref Range Status    06/14/2022 11 8 - 16 mmol/L Final     eGFR   Date Value Ref Range Status   10/06/2023 82 >59 mL/min/1.73 Final         PFT will be done and results to be reviewed  Pulmonary Functions Testing Results:    Transthoracic echo (TTE) complete 6/13/2022:   Summary  The left ventricle is normal in size with concentric hypertrophy and mildly decreased systolic function.  The estimated ejection fraction is 40%.  Grade I left ventricular diastolic dysfunction.  There is mild left ventricular global hypokinesis.  Normal right ventricular size with normal right ventricular systolic function.  Moderate left atrial enlargement.  Mild tricuspid regurgitation.  Normal central venous pressure (3 mmHg).  The estimated PA systolic pressure is 10 mmHg.        Plan:  Clinical impression is resonably certain and repeated evaluation prn +/- follow up will be needed as below.    Daylin was seen today for 6m f/u.    Diagnoses and all orders for this visit:    Severe persistent asthma without complication  -     fluticasone-umeclidin-vilanter (TRELEGY ELLIPTA) 200-62.5-25 mcg inhaler; Inhale 1 puff into the lungs once daily.  -     X-Ray Chest PA And Lateral; Future    Moderate persistent asthma without complication  -     albuterol (VENTOLIN HFA) 90 mcg/actuation inhaler; Inhale 2 puffs into the lungs every 6 (six) hours as needed for Wheezing or Shortness of Breath. Rescue    Chronic seasonal allergic rhinitis due to pollen    Eosinophilic asthma    Cough, unspecified type    Non-smoker    Chronic combined systolic and diastolic heart failure    Other orders  -     predniSONE (DELTASONE) 20 MG tablet; Take one pill per day for five days as needed for shortness of breath, wheezing, cough. Can repeat as needed.  -     azithromycin (ZITHROMAX) 500 MG tablet; Take 1 tablet (500 mg total) by mouth once daily.          Follow up in about 6 months (around 4/10/2024), or if symptoms worsen or fail to improve.    Discussed with patient above for  education the following:      Patient Instructions   Continue current asthma regiment including Trelegy once per day and Albuterol as needed for shortness of breath, wheezing, cough.    Continue Nucala monthly. Will reach out to [facility] and see about getting assistance through the .     Will send prescription for Trelegy to [facility]. You must call and enroll in their inhaler program. Call the number on the handout I gave you in office.     Chest Xray if no improvement of symptoms.     Keep Prednisone and Azithromycin on hand for as needed symptoms - take sparingly, take as prescribed.    Continue current medication regiment. Keep follow up appointment as scheduled. Please call the office if you have any questions or concerns.

## 2023-11-07 DIAGNOSIS — J45.50 SEVERE PERSISTENT ASTHMA WITHOUT COMPLICATION: Primary | ICD-10-CM

## 2023-11-08 RX ORDER — PREDNISONE 20 MG/1
TABLET ORAL
Qty: 15 TABLET | Refills: 0 | Status: SHIPPED | OUTPATIENT
Start: 2023-11-08 | End: 2024-01-08 | Stop reason: SDUPTHER

## 2023-11-15 DIAGNOSIS — Z12.31 ENCOUNTER FOR SCREENING MAMMOGRAM FOR MALIGNANT NEOPLASM OF BREAST: Primary | ICD-10-CM

## 2023-11-16 ENCOUNTER — TELEPHONE (OUTPATIENT)
Dept: FAMILY MEDICINE | Facility: CLINIC | Age: 63
End: 2023-11-16

## 2023-11-16 ENCOUNTER — HOSPITAL ENCOUNTER (OUTPATIENT)
Dept: RADIOLOGY | Facility: HOSPITAL | Age: 63
Discharge: HOME | End: 2023-11-16
Attending: NURSE PRACTITIONER
Payer: COMMERCIAL

## 2023-11-16 VITALS — WEIGHT: 202.63 LBS | BODY MASS INDEX: 31.8 KG/M2 | HEIGHT: 67 IN

## 2023-11-16 DIAGNOSIS — Z12.31 ENCOUNTER FOR SCREENING MAMMOGRAM FOR MALIGNANT NEOPLASM OF BREAST: ICD-10-CM

## 2023-11-16 PROCEDURE — 77067 SCR MAMMO BI INCL CAD: CPT | Mod: TC,PO

## 2023-11-16 NOTE — TELEPHONE ENCOUNTER
----- Message from Jonathon Muñoz NP sent at 11/16/2023  9:30 AM CST -----  mammo neg, repeat annually

## 2023-11-21 DIAGNOSIS — J82.83 EOSINOPHILIC ASTHMA: ICD-10-CM

## 2023-11-21 DIAGNOSIS — J45.51 SEVERE PERSISTENT ASTHMA WITH ACUTE EXACERBATION: ICD-10-CM

## 2023-11-22 RX ORDER — MEPOLIZUMAB 100 MG/ML
100 INJECTION, SOLUTION SUBCUTANEOUS
Qty: 1 ML | Refills: 11 | Status: ACTIVE | OUTPATIENT
Start: 2023-11-22

## 2023-11-29 ENCOUNTER — PATIENT MESSAGE (OUTPATIENT)
Dept: PULMONOLOGY | Facility: CLINIC | Age: 63
End: 2023-11-29
Payer: MEDICARE

## 2023-12-05 ENCOUNTER — PATIENT MESSAGE (OUTPATIENT)
Dept: FAMILY MEDICINE | Facility: CLINIC | Age: 63
End: 2023-12-05

## 2024-01-08 ENCOUNTER — HOSPITAL ENCOUNTER (OUTPATIENT)
Dept: RADIOLOGY | Facility: HOSPITAL | Age: 64
Discharge: HOME | End: 2024-01-08
Attending: NURSE PRACTITIONER
Payer: MEDICARE

## 2024-01-08 ENCOUNTER — OFFICE VISIT (OUTPATIENT)
Dept: PULMONOLOGY | Facility: CLINIC | Age: 64
End: 2024-01-08
Payer: MEDICARE

## 2024-01-08 VITALS
HEIGHT: 67 IN | WEIGHT: 206.25 LBS | OXYGEN SATURATION: 96 % | HEART RATE: 86 BPM | DIASTOLIC BLOOD PRESSURE: 82 MMHG | SYSTOLIC BLOOD PRESSURE: 130 MMHG | BODY MASS INDEX: 32.37 KG/M2

## 2024-01-08 DIAGNOSIS — J45.51 SEVERE PERSISTENT ASTHMA WITH ACUTE EXACERBATION: ICD-10-CM

## 2024-01-08 DIAGNOSIS — J30.1 CHRONIC SEASONAL ALLERGIC RHINITIS DUE TO POLLEN: ICD-10-CM

## 2024-01-08 DIAGNOSIS — R05.9 COUGH, UNSPECIFIED TYPE: Primary | ICD-10-CM

## 2024-01-08 DIAGNOSIS — I50.42 CHRONIC COMBINED SYSTOLIC AND DIASTOLIC HEART FAILURE: ICD-10-CM

## 2024-01-08 DIAGNOSIS — J45.50 SEVERE PERSISTENT ASTHMA WITHOUT COMPLICATION: ICD-10-CM

## 2024-01-08 DIAGNOSIS — J82.83 EOSINOPHILIC ASTHMA: ICD-10-CM

## 2024-01-08 DIAGNOSIS — Z78.9 NON-SMOKER: ICD-10-CM

## 2024-01-08 PROCEDURE — 3079F DIAST BP 80-89 MM HG: CPT | Mod: CPTII,S$GLB,, | Performed by: NURSE PRACTITIONER

## 2024-01-08 PROCEDURE — 71046 X-RAY EXAM CHEST 2 VIEWS: CPT | Mod: TC

## 2024-01-08 PROCEDURE — 99215 OFFICE O/P EST HI 40 MIN: CPT | Mod: S$GLB,,, | Performed by: NURSE PRACTITIONER

## 2024-01-08 PROCEDURE — 3008F BODY MASS INDEX DOCD: CPT | Mod: CPTII,S$GLB,, | Performed by: NURSE PRACTITIONER

## 2024-01-08 PROCEDURE — 3075F SYST BP GE 130 - 139MM HG: CPT | Mod: CPTII,S$GLB,, | Performed by: NURSE PRACTITIONER

## 2024-01-08 PROCEDURE — 71046 X-RAY EXAM CHEST 2 VIEWS: CPT | Mod: 26,,, | Performed by: RADIOLOGY

## 2024-01-08 PROCEDURE — 99999 PR PBB SHADOW E&M-EST. PATIENT-LVL IV: CPT | Mod: PBBFAC,,, | Performed by: NURSE PRACTITIONER

## 2024-01-08 PROCEDURE — 1159F MED LIST DOCD IN RCRD: CPT | Mod: CPTII,S$GLB,, | Performed by: NURSE PRACTITIONER

## 2024-01-08 RX ORDER — BUDESONIDE 0.5 MG/2ML
0.5 INHALANT ORAL DAILY PRN
Qty: 120 ML | Refills: 5 | Status: SHIPPED | OUTPATIENT
Start: 2024-01-08 | End: 2025-01-07

## 2024-01-08 RX ORDER — CODEINE PHOSPHATE AND GUAIFENESIN 10; 100 MG/5ML; MG/5ML
5 SOLUTION ORAL NIGHTLY PRN
Qty: 237 ML | Refills: 0 | Status: SHIPPED | OUTPATIENT
Start: 2024-01-08 | End: 2024-01-18

## 2024-01-08 RX ORDER — PREDNISONE 20 MG/1
TABLET ORAL
Qty: 15 TABLET | Refills: 0 | Status: SHIPPED | OUTPATIENT
Start: 2024-01-08

## 2024-01-08 RX ORDER — PREDNISONE 20 MG/1
TABLET ORAL
Qty: 18 TABLET | Refills: 0 | Status: SHIPPED | OUTPATIENT
Start: 2024-01-08

## 2024-01-08 RX ORDER — FLUTICASONE FUROATE, UMECLIDINIUM BROMIDE AND VILANTEROL TRIFENATATE 200; 62.5; 25 UG/1; UG/1; UG/1
1 POWDER RESPIRATORY (INHALATION) DAILY
Qty: 180 EACH | Refills: 3 | Status: SHIPPED | OUTPATIENT
Start: 2024-01-08

## 2024-01-08 RX ORDER — ALBUTEROL SULFATE 1.25 MG/3ML
1.25 SOLUTION RESPIRATORY (INHALATION) EVERY 6 HOURS PRN
Qty: 75 ML | Refills: 11 | Status: SHIPPED | OUTPATIENT
Start: 2024-01-08 | End: 2025-01-07

## 2024-01-08 RX ORDER — AZITHROMYCIN 500 MG/1
500 TABLET, FILM COATED ORAL DAILY
Qty: 3 TABLET | Refills: 0 | Status: SHIPPED | OUTPATIENT
Start: 2024-01-08

## 2024-01-08 NOTE — PROGRESS NOTES
1/8/2024    Daylin Lynch  In office visit     Chief Complaint   Patient presents with    Cough       HPI:  01/08/2024:  Spent majority of the holidays in [place] visit in family.  Developed illness while there, completed 5 day regimen of prednisone at that time with reported benefit.  States since returning home she has now developed illness again.  Recently completed another 5 day regimen of prednisone and 3 day regimen azithromycin with minimal improvement.  Cough:  Persistent since onset, nonproductive.  Associated with nocturnal arousals, difficulty falling asleep.  Associated with wheezing and chest tightness.  Patient endorses generalized low energy.  Patient denies fever, GI complaints.  Currently taking Trelegy once per day and albuterol as needed.  Currently requiring albuterol approximately up to 8 times per day.  Not currently use nebulizer treatment.  Is on Nucala monthly, however has not received Nucala injection since November due to insurance/pharmacy.        10/10/2023:  Approximately three weeks ago visited a nursery - ever since developed symptoms of asthma flare. Required Azithromycin and Prednisone 20 mg x 5 days. States with intervention, her symptoms have improved however she is still experiencing a lingering cough. Cough is productive with clear mucous. Mild wheezing and chest tightness reported.   States currently using Trelegy once per day. Using Albuterol PRN - approx 4 times per day as of late.  Still on Nucala - next dose is due October 20 - states she recently got a bill for $1000 for Nucala - will investigate.   Patient Instructions   Continue current asthma regiment including Trelegy once per day and Albuterol as needed for shortness of breath, wheezing, cough.  Continue Nucala monthly. Will reach out to [facility] and see about getting assistance through the .   Will send prescription for Trelegy to [facility]. You must call and enroll in their inhaler program. Call the number on  the handout I gave you in office.   Chest Xray if no improvement of symptoms.   Keep Prednisone and Azithromycin on hand for as needed symptoms - take sparingly, take as prescribed.  Continue current medication regiment. Keep follow up appointment as scheduled. Please call the office if you have any questions or concerns.       04/10/2023:  Overall doing well!   States currently using Trelegy once per day with great benefit in comparison to Symbicort.  Using Albuterol only as needed, not requiring daily.  Took a trip to [place] last month - after day 3 of trip got ill - took Azithromycin 3 day regiment and Prednisone 5 day regiment at that time with great benefit of symptoms.  Still on Nucala - states tolerating well and is noticing great benefit with use.  Patient Instructions   Continue current asthma regiment including Trelegy once per day and Albuterol as needed for shortness of breath, wheezing, cough.  Continue Nucala monthly.  Keep Prednisone and Azithromycin on hand for as needed symptoms - take sparingly, take as prescribed.  Continue current medication regiment. Keep follow up appointment as scheduled. Please call the office if you have any questions or concerns.         1/9/2023:  Recent trip to [place] - states got sick during Travel. Completed Prednisone regiment for 5 days with benefit.  Still on Nucala - recently approved per insurance - states next dose in due 1/15.  Started weight watchers again, looking forward to losing weight.  Using Symbicort two puffs twice daily with benefit. Using Albuterol only as needed - states over the last week she has required Albuterol more - using approximately 3x per day.  Recently received letter stating disability was approved. Looking to get on Medicare soon.  Patient Instructions   Continue current asthma regiment including Symbicort twice per day and Albuterol as needed.  Nebulizer machine ordered with Albuterol and Budesonide - to be taken only as needed.  Keep  Prednisone on hand to be taken only as needed, use sparingly.  Will give sample Trelegy today - this is one puff once per day. This inhaler contains an inhaled steroid component. Rinse mouth after each use due to risk for thrush development. If mouth or tongue develops white sores please contact the clinic and I will order a prescription mouth wash.   Stop Symbicort while using Trelegy.  Continue current medication regiment. Keep follow up appointment as scheduled. Please call the office if you have any questions or concerns.           11/8/2022:  Recent trip to [place] to visit family - states while on trip had an asthma flare. Started taking Prednisone yesterday, on day 2.   Reports use of Prednisone two times over the last six months alone - had to take regiment upon discharge from hospital in June 2022. Reports great benefit with steroid use.   Using Symbicort two puffs twice daily with benefit. Using Albuterol only as needed - states over the last week she has required Albuterol more - using approximately 3x per day.  On Singulair nightly with benefit.   Reports chest tightness.   Shortness of breath: Persistent over the last week or so - associated with wheezing, worse at night time. Associated with nocturnal arousals nightly. States can't perform ADLS without stopping for rest.  Cough: Persistent, worse at night time, non productive. Relieved somewhat with cough syrup.  Patient Instructions   Asthma - severe persistent, uncontrolled with 2 steroid courses in the past six months. Eosinophil count 500 in June 2022.   Can check CBC now, then initiate Nucala in clinic.  Nucala sample given in clinic  LOT number [id number]  Exp May 2025  Continue Symbicort twice per day. Albuterol as needed for shortness of breath, wheezing, cough.  Continue current medication regiment. Keep follow up appointment as scheduled. Please call the office if you have any questions or concerns.         8/8/2022:  Recent trip to [place] - had  several issues with shortness of breath, coughing while on trip. Overall doing better since returning home.   Cough: Overall improved - nonproductive, no time correlation identified. Denies wheezing.  Shortness of breath: Persistent with minimal exertion - improves with rest.   Currently using Symbicort - two puffs twice per day with reported benefit. Using Albuterol only as needed, not daily use - states while on trip in [place] required several times per day, however hasn't needed much since returning home.  Hasn't taken Prednisone since prior visit.   Patient Instructions   Overall you seem to be doing better.  Would continue Symbicort two puffs twice daily with Albuterol use as needed for shortness of breath, wheezing.  Keep Prednisone on hand to take as needed for shortness of breath, cough, wheezing.  Can consider biologic in the future if symptoms worsen.  Consider PFT if the future if no improvement.  Continue current medication regiment. Keep follow up appointment as scheduled. Please call the office if you have any questions or concerns.         6/24/22:  Hx: Annual bronchitis in the winter, pleurisy, CHF, AICD placement,   Recent ER visit on 6/12/22 to [facility] for CP, SOB - patient was noted to have elevated troponin at that time, decision made for transfer to [facility] for possible cath. Patient underwent series of testing including echocardiogram revealing EF 40% in the setting of known systolic heart failure. Patient did not undergo angiogram during this hospitalization. CTA obtained revealing no PE, however concern regarding atypical pneumonia due to bilateral ground glass opacities. Patient was treated with IV Rocephin, Azithromycin, and steroid - reported great improvement at that time with medication therapy. Patient was subsequently discharged home, without supplemental oxygen, with a prescription for Levaquin (reports completion), five days worth of steroid PO, and PRN albuterol inhaler.   Was  seen per PCP on Tuesday 6/21 and prescribed Symbicort inhaler, Flonase, and Promethazine cough syrup.   Cough: Persistent over the last 6 months - states it's overall improved since hospitalization however not completely resolved. States cough was attributed in the past to congestive heart failure. Was diagnosed with bronchitis per primary care provider in March - given codeine cough syrup at that time, no antibiotic or steroids. Cough persists throughout the day however is noticeably worse at night time, when lying down. Non productive. Associated with coughing fits, dizziness. Associated with chest tightness, wheezing. No relief noted with tessalon pearls, however has tried codeine and promethazine cough syrup separately - states codeine worked better.  Shortness of breath: Gradual onset over the last six months - feels chest heaviness at rest however experiences exertional dyspnea - states affecting ADLS. Can't take a shower and get dressed without stopping for rest. Improves with rest.   Does endorse sinus congestion, ear congestion, post nasal drip - started Zyrtec daily in April, does not appreciate benefit.   Has started Symbicort - taking two puffs in the morning - has been on for three days now, reports benefit with use.  Has tried Albuterol in the past without noticeable benefit.   Takes Protonix daily.  Per review of EMR discharge summary:  Hospital Course:   61-year-old lady with numerous medical problems including chronic systolic CHF who has been suffering with dry cough for last couple months presented to ED with cough and cough related pain found to have mildly elevated troponin and was subsequently transferred to [facility].  Serial EKGs, cardiac enzymes remained negative for acute coronary syndrome.  Stress test ruled out any reversible ischemia.  Her ejection fraction is overall stable from previous echo.  Overall symptomatology is more consistent with pulmonary in origin, CTA chest showed atypical  pneumonia.  She responded well after starting Rocephin and azithromycin as well as 1 dose of IV steroid.  Patient has chronic postnasal drip.  She was discharged home in hemodynamically stable condition with prescription of Levaquin, p.r.n. albuterol.  She was advised to follow-up with her PCP, I advised her that if her symptoms do not improve she may discuss with her cardiologist if Entresto is the culprit of chronic dry cough.  I also generated outpatient pulmonary referral.   Patient Instructions   Repeat chest xray in 1-2 weeks to evaluate post atypical pneumonia.  Symptoms are somewhat concerning for asthmatic component. Would continue Symbicort however start using 2 puffs twice per day.   Can use Albuterol as needed for shortness of breath, cough.  For sinus complaints - can try Singulair at night time with Zyrtec during the day.   Can send to ENT if interested.  Etiology of cough is unknown at this time - however could be multifactorial.  Entresto?  GERD - Currently on Protonix  Codeine cough syrup to be taken as needed for cough, caution as may make drowsy. Do not drive after taking.  Prednisone, take only as needed.  Continue current medication regiment. Keep follow up appointment as scheduled. Please call the office if you have any questions or concerns.         Social Hx: Lives with  - 2 animals in the home. Former [employer] employee, . No Asbestosis exposure, Smoking Hx: Former smoker, on and off for approx ten years - quit in 1985  Family Hx: No Lung Cancer, Mother COPD, No Asthma  Medical Hx: Previous pneumonia ; No previous shoulder surgery - Defibrillator placement 2020      The chief compliant problem varies with instability at times.  PFSH:  Past Medical History:   Diagnosis Date    Allergy     Asthma     Bilateral leg edema     Hyperlipidemia     Hypertension     Leg pain, bilateral     left leg more severe    Varicose vein          Past Surgical History:   Procedure Laterality Date  "    SECTION, LOW TRANSVERSE      x 3    EYE SURGERY  2023    lt retinal surgery    HYSTERECTOMY      pace maker  2020    SPINE SURGERY  2021    stents both legs       Social History     Tobacco Use    Smoking status: Former     Current packs/day: 0.00     Types: Cigarettes     Quit date: 1985     Years since quittin.0     Passive exposure: Past    Smokeless tobacco: Never   Substance Use Topics    Alcohol use: Yes    Drug use: No     Family History   Problem Relation Age of Onset    Heart disease Mother     Heart failure Father      Review of patient's allergies indicates:  No Known Allergies  I have reviewed past medical, family, and social history. I have reviewed previous nurse notes.    Performance Status:The patient's activity level is functions out of house.      Review of Systems:  a review of eleven systems covering constitutional, Eye, HEENT, Psych, Respiratory, Cardiac, GI, , Musculoskeletal, Endocrine, Dermatologic was negative except for pertinent findings as listed ABOVE and below: pertinent positive as above, rest is good       Exam:Comprehensive exam done. /82 (BP Location: Left arm, Patient Position: Sitting, BP Method: Medium (Automatic))   Pulse 86   Ht 5' 7" (1.702 m)   Wt 93.6 kg (206 lb 3.9 oz)   SpO2 96%   BMI 32.30 kg/m²   Exam included Vitals as listed  Constitutional: She is oriented to person, place, and time. She appears well-developed. No distress.   Nose: Nose normal.   Mouth/Throat: Uvula is midline, oropharynx is clear and moist and mucous membranes are normal. No dental caries. No oropharyngeal exudate, posterior oropharyngeal edema, posterior oropharyngeal erythema or tonsillar abscesses.  Mallapatti (M) score 3  Eyes: Pupils are equal, round, and reactive to light.   Neck: No JVD present. No thyromegaly present.   Cardiovascular: Normal rate, regular rhythm and normal heart sounds. Exam reveals no gallop and no friction rub.   No " murmur heard.  Pulmonary/Chest: Effort normal and breath sounds normal. No accessory muscle usage or stridor. No apnea and no tachypnea. No respiratory distress, decreased breath sounds, wheezes, rhonchi, rales, or tenderness. Clear breath sounds with minimal wheezing to upper lung fields, on room air, in no acute distress.  Abdominal: Soft. She exhibits no mass. There is no tenderness. No hepatosplenomegaly, hernias and normoactive bowel sounds  Musculoskeletal: Normal range of motion. exhibits no edema.   Neurological:  alert and oriented to person, place, and time. not disoriented.   Skin: Skin is warm and dry. Capillary refill takes less 2 sec. No cyanosis or erythema. No pallor. Nails show no clubbing.   Psychiatric: normal mood and affect. behavior is normal. Judgment and thought content normal.       Radiographs (ct chest and cxr) reviewed: view by direct vision     Chest Xray 7/20/2022  FINDINGS:  An AICD is noted in place on the left.  The cardiac size and contours within normal limits.  No intrapulmonary mass or infiltrate is seen.  No pneumothorax or pleural effusion is noted.   Impression:   AICD noted in place otherwise negative chest    CTA Chest Non Coronary  6/13/2022   IMPRESSION:   1.  No pulmonary embolism identified.  2.  Patchy groundglass lung opacities are noted bilaterally, mostly involving the lung bases. These most likely reflect inspiratory effort or subsegmental atelectasis. Atypical infection could have a similar appearance in the proper clinical setting      X-Ray Chest PA And Lateral   6/13/2022   Impression:   AICD in place otherwise negative chest      Patient's labs were reviewed including CBC and CMP    Lab Results   Component Value Date    WBC 8.3 10/06/2023    RBC 4.38 10/06/2023    HGB 13.3 10/06/2023    HCT 41.7 10/06/2023    MCV 95 10/06/2023    MCH 30.4 10/06/2023    MCHC 31.9 10/06/2023    RDW 13.1 10/06/2023     10/06/2023    MPV 10.1 11/08/2022    GRAN 8.8 (H)  11/08/2022    GRAN 80.2 (H) 11/08/2022    LYMPH 2.9 10/06/2023    MONO 8 10/06/2023    MONO 0.7 10/06/2023    EOS 0.1 10/06/2023    BASO 0.1 10/06/2023    EOSINOPHIL 1 10/06/2023    BASOPHIL 1 10/06/2023   CMP  Sodium   Date Value Ref Range Status   10/06/2023 141 134 - 144 mmol/L Final   01/23/2017 139 134 - 144 mmol/L      Potassium   Date Value Ref Range Status   10/06/2023 4.4 3.5 - 5.2 mmol/L Final     Chloride   Date Value Ref Range Status   10/06/2023 101 96 - 106 mmol/L Final   01/23/2017 101 98 - 110 mmol/L      CO2   Date Value Ref Range Status   10/06/2023 26 20 - 29 mmol/L Final     Glucose   Date Value Ref Range Status   10/06/2023 89 70 - 99 mg/dL Final   01/23/2017 85 70 - 99 mg/dL      BUN   Date Value Ref Range Status   10/06/2023 19 8 - 27 mg/dL Final     Creatinine   Date Value Ref Range Status   10/06/2023 0.81 0.57 - 1.00 mg/dL Final   01/23/2017 0.58 (L) 0.60 - 1.40 mg/dL      Calcium   Date Value Ref Range Status   10/06/2023 9.5 8.7 - 10.3 mg/dL Final     Total Protein   Date Value Ref Range Status   06/12/2022 6.8 6.0 - 8.4 g/dL Final     Albumin   Date Value Ref Range Status   10/06/2023 4.3 3.9 - 4.9 g/dL Final   06/12/2022 3.4 (L) 3.5 - 5.2 g/dL Final   01/23/2017 4.1 3.1 - 4.7 g/dL      Total Bilirubin   Date Value Ref Range Status   10/06/2023 0.5 0.0 - 1.2 mg/dL Final     Alkaline Phosphatase   Date Value Ref Range Status   06/12/2022 61 55 - 135 U/L Final     AST   Date Value Ref Range Status   10/06/2023 15 0 - 40 IU/L Final     ALT   Date Value Ref Range Status   10/06/2023 6 0 - 32 IU/L Final     Anion Gap   Date Value Ref Range Status   06/14/2022 11 8 - 16 mmol/L Final     eGFR   Date Value Ref Range Status   10/06/2023 82 >59 mL/min/1.73 Final         PFT will be done and results to be reviewed  Pulmonary Functions Testing Results:    Transthoracic echo (TTE) complete 6/13/2022:   Summary  The left ventricle is normal in size with concentric hypertrophy and mildly decreased  systolic function.  The estimated ejection fraction is 40%.  Grade I left ventricular diastolic dysfunction.  There is mild left ventricular global hypokinesis.  Normal right ventricular size with normal right ventricular systolic function.  Moderate left atrial enlargement.  Mild tricuspid regurgitation.  Normal central venous pressure (3 mmHg).  The estimated PA systolic pressure is 10 mmHg.        Plan:  Clinical impression is resonably certain and repeated evaluation prn +/- follow up will be needed as below.    Daylin was seen today for cough.    Diagnoses and all orders for this visit:    Cough, unspecified type  -     guaiFENesin-codeine 100-10 mg/5 ml (GUAIATUSSIN AC)  mg/5 mL syrup; Take 5 mLs by mouth nightly as needed for Cough.    Severe persistent asthma with acute exacerbation  -     predniSONE (DELTASONE) 20 MG tablet; Take 3 pills per day for 3 days followed by 2 pills per day for 3 days followed by 1 pill per day for 3 days.  -     predniSONE (DELTASONE) 20 MG tablet; Take one pill per day for five days as needed for shortness of breath, wheezing, cough. Can repeat as needed.  -     azithromycin (ZITHROMAX) 500 MG tablet; Take 1 tablet (500 mg total) by mouth once daily.  -     fluticasone-umeclidin-vilanter (TRELEGY ELLIPTA) 200-62.5-25 mcg inhaler; Inhale 1 puff into the lungs once daily.  -     albuterol (ACCUNEB) 1.25 mg/3 mL Nebu; Take 3 mLs (1.25 mg total) by nebulization every 6 (six) hours as needed (Cough). Rescue  -     budesonide (PULMICORT) 0.5 mg/2 mL nebulizer solution; Take 2 mLs (0.5 mg total) by nebulization daily as needed (Shortness of breath, wheezing, cough). Controller  -     guaiFENesin-codeine 100-10 mg/5 ml (GUAIATUSSIN AC)  mg/5 mL syrup; Take 5 mLs by mouth nightly as needed for Cough.  -     X-Ray Chest PA And Lateral; Future    Chronic seasonal allergic rhinitis due to pollen    Eosinophilic asthma    Non-smoker    Chronic combined systolic and diastolic heart  failure    Severe persistent asthma without complication            Follow up in about 4 weeks (around 2/5/2024), or if symptoms worsen or fail to improve.    Discussed with patient above for education the following:      Patient Instructions   Concern for acute asthma exacerbation.    Take prednisone as prescribed.  This is the regimen that is instructed to be taken 3 pills for 3 days followed by 2 pills for 3 days followed by 1 pill for 3 days.    I have sent another prescription for prednisone with instructions to take 1 pill per day for 5 days.  Do not take this regimen at this time, this is for your on hand need.    Azithromycin ordered.  I would not take this at this time.  This is only to be taken as needed for green-yellow mucus production.    Chest x-ray now to ensure no developing pneumonia.    We will restart Nucala injection today.  Sample given in office today.    LOT [id number]  EXPIRATION MAY 2026    Codeine cough syrup prescribed - to be taken only as needed for cough. This will make you drowsy, do not drive or operative heavy machinery after use. Do not take with other sedating medications. Do not mix with alcohol. Utilize fall precautions with use.     Continue Trelegy once per day.  Albuterol as needed.  I have refilled your nebulized medicines as well.    Continue current prescription medication regiment. Keep follow up appointment as scheduled. Please call the office if you have any questions or concerns.

## 2024-03-27 ENCOUNTER — TELEPHONE (OUTPATIENT)
Dept: FAMILY MEDICINE | Facility: CLINIC | Age: 64
End: 2024-03-27
Payer: MEDICARE

## 2024-03-27 DIAGNOSIS — I10 ESSENTIAL (PRIMARY) HYPERTENSION: ICD-10-CM

## 2024-03-27 DIAGNOSIS — R73.09 ELEVATED GLUCOSE: Primary | ICD-10-CM

## 2024-03-27 DIAGNOSIS — J45.51 SEVERE PERSISTENT ASTHMA WITH ACUTE EXACERBATION: ICD-10-CM

## 2024-03-27 DIAGNOSIS — E78.2 MIXED HYPERLIPIDEMIA: ICD-10-CM

## 2024-03-27 DIAGNOSIS — J30.1 CHRONIC SEASONAL ALLERGIC RHINITIS DUE TO POLLEN: ICD-10-CM

## 2024-03-28 RX ORDER — MONTELUKAST SODIUM 10 MG/1
10 TABLET ORAL DAILY
Qty: 90 TABLET | Refills: 3 | Status: SHIPPED | OUTPATIENT
Start: 2024-03-28

## 2024-03-28 RX ORDER — PREDNISONE 20 MG/1
TABLET ORAL
Qty: 15 TABLET | Refills: 0 | Status: SHIPPED | OUTPATIENT
Start: 2024-03-28 | End: 2024-04-09

## 2024-04-04 NOTE — PROGRESS NOTES
SUBJECTIVE:      Patient ID: Daylin Lynch is a 63 y.o. female.    Chief Complaint: Hypertension    Mrs Lynch is here today to f/u on HTN and Vitamin def. She is following with Dr Jaffe for cardiomyopathy. Following with pulmonology for asthma. She stopped the crestor due to muscle aches. She is taking the zetia    Hypertension  This is a chronic problem. The current episode started more than 1 year ago. The problem is unchanged. The problem is controlled. Pertinent negatives include no anxiety, blurred vision, chest pain, headaches, malaise/fatigue, neck pain, orthopnea, palpitations, peripheral edema, PND, shortness of breath or sweats. Agents associated with hypertension include steroids. Risk factors for coronary artery disease include dyslipidemia and obesity. Past treatments include angiotensin blockers. The current treatment provides moderate improvement. Compliance problems include exercise.  There is no history of angina, kidney disease or CAD/MI. There is no history of chronic renal disease, hyperparathyroidism, a hypertension causing med or a thyroid problem.   Hyperlipidemia  This is a chronic problem. The current episode started more than 1 year ago. The problem is controlled. Recent lipid tests were reviewed and are normal. She has no history of chronic renal disease, diabetes, hypothyroidism, liver disease, obesity or nephrotic syndrome. There are no known factors aggravating her hyperlipidemia. Pertinent negatives include no chest pain, myalgias or shortness of breath. Current antihyperlipidemic treatment includes diet change, exercise and ezetimibe. The current treatment provides moderate improvement of lipids. Risk factors for coronary artery disease include dyslipidemia, hypertension, obesity and post-menopausal.       Past Surgical History:   Procedure Laterality Date     SECTION, LOW TRANSVERSE      x 3    EYE SURGERY  2023    lt retinal surgery    HYSTERECTOMY      pace maker   2020    SPINE SURGERY  2021    stents both legs       Family History   Problem Relation Age of Onset    Heart disease Mother     Heart failure Father       Social History     Socioeconomic History    Marital status:    Tobacco Use    Smoking status: Former     Current packs/day: 0.00     Types: Cigarettes     Quit date: 1985     Years since quittin.3     Passive exposure: Past    Smokeless tobacco: Never   Substance and Sexual Activity    Alcohol use: Yes    Drug use: No    Sexual activity: Yes   Social History Narrative    Live with      Social Determinants of Health     Stress: No Stress Concern Present (2019)    [facility] - Occupational Stress Questionnaire     Feeling of Stress : Not at all     Current Outpatient Medications   Medication Sig Dispense Refill    albuterol (ACCUNEB) 1.25 mg/3 mL Nebu Take 3 mLs (1.25 mg total) by nebulization every 6 (six) hours as needed (Cough). Rescue 75 mL 11    albuterol (VENTOLIN HFA) 90 mcg/actuation inhaler Inhale 2 puffs into the lungs every 6 (six) hours as needed for Wheezing or Shortness of Breath. Rescue 18 g 11    aspirin 325 MG tablet Take 325 mg by mouth once daily.      budesonide (PULMICORT) 0.5 mg/2 mL nebulizer solution Take 2 mLs (0.5 mg total) by nebulization daily as needed (Shortness of breath, wheezing, cough). Controller 120 mL 5    carvediloL (COREG) 6.25 MG tablet Take 6.25 mg by mouth 2 (two) times daily.       cetirizine (ZYRTEC) 10 MG tablet Take 1 tablet (10 mg total) by mouth once daily.  0    escitalopram oxalate (LEXAPRO) 10 MG tablet Take 10 mg by mouth once daily.      ezetimibe (ZETIA) 10 mg tablet 1 tablet once daily.      fluticasone propionate (FLONASE) 50 mcg/actuation nasal spray 2 sprays (100 mcg total) by Each Nostril route once daily. 16 g 11    fluticasone-umeclidin-vilanter (TRELEGY ELLIPTA) 200-62.5-25 mcg inhaler Inhale 1 puff into the lungs once daily. 180 each  3    melatonin 10 mg Tab Take 2 tablets by mouth nightly as needed.      montelukast (SINGULAIR) 10 mg tablet Take 1 tablet (10 mg total) by mouth once daily. 90 tablet 3    predniSONE (DELTASONE) 20 MG tablet Take one pill per day for five days as needed for shortness of breath, wheezing, cough. Can repeat as needed. 15 tablet 0    rosuvastatin (CRESTOR) 5 MG tablet Take 5 mg by mouth once daily.      sacubitriL-valsartan (ENTRESTO) 49-51 mg per tablet Entresto 49 mg-51 mg tablet   Take 1 tablet twice a day by oral route.      spironolactone (ALDACTONE) 50 MG tablet Take 50 mg by mouth once daily.      coenzyme Q10 (CO Q-10) 100 mg capsule Take 1 capsule (100 mg total) by mouth once daily. 30 capsule 11     No current facility-administered medications for this visit.     Review of patient's allergies indicates:  No Known Allergies   Past Medical History:   Diagnosis Date    Allergy     Asthma     Bilateral leg edema     Hyperlipidemia     Hypertension     Leg pain, bilateral     left leg more severe    Varicose vein      Past Surgical History:   Procedure Laterality Date     SECTION, LOW TRANSVERSE      x 3    EYE SURGERY  2023    lt retinal surgery    HYSTERECTOMY      pace maker  2020    SPINE SURGERY  2021    stents both legs         Review of Systems   Constitutional:  Positive for fatigue. Negative for appetite change, chills, diaphoresis, malaise/fatigue and unexpected weight change.   HENT:  Negative for ear discharge, hearing loss, trouble swallowing and voice change.    Eyes:  Negative for blurred vision, photophobia and pain.   Respiratory:  Negative for chest tightness, shortness of breath and stridor.    Cardiovascular:  Negative for chest pain, palpitations, orthopnea, leg swelling and PND.   Gastrointestinal:  Negative for abdominal pain, blood in stool and vomiting.   Endocrine: Negative for cold intolerance and heat intolerance.   Genitourinary:  Negative for difficulty  "urinating and flank pain.   Musculoskeletal:  Negative for joint swelling, myalgias, neck pain and neck stiffness.   Skin:  Negative for pallor.   Neurological:  Negative for dizziness, speech difficulty, weakness and headaches.   Hematological:  Does not bruise/bleed easily.   Psychiatric/Behavioral:  Negative for confusion, dysphoric mood, self-injury, sleep disturbance and suicidal ideas. The patient is not nervous/anxious.       OBJECTIVE:      Vitals:    04/05/24 1040   BP: (P) 100/70   Pulse: 91   SpO2: 97%   Weight: 94.8 kg (209 lb)   Height: 5' 7" (1.702 m)     Physical Exam  Vitals and nursing note reviewed.   Constitutional:       General: She is not in acute distress.     Appearance: She is well-developed.   HENT:      Head: Normocephalic and atraumatic.      Right Ear: Tympanic membrane normal.      Left Ear: Tympanic membrane normal.      Nose: Nose normal.      Mouth/Throat:      Pharynx: Uvula midline.   Eyes:      General: Lids are normal.      Conjunctiva/sclera: Conjunctivae normal.      Pupils: Pupils are equal, round, and reactive to light.      Right eye: Pupil is round and reactive.      Left eye: Pupil is round and reactive.   Neck:      Thyroid: No thyromegaly.      Vascular: No JVD.      Trachea: Trachea normal.   Cardiovascular:      Rate and Rhythm: Normal rate and regular rhythm.      Pulses: Normal pulses.      Heart sounds: Normal heart sounds.   Pulmonary:      Effort: Pulmonary effort is normal. No tachypnea or respiratory distress.      Breath sounds: Normal breath sounds. No wheezing.   Abdominal:      General: Bowel sounds are normal.      Palpations: Abdomen is soft.      Tenderness: There is no abdominal tenderness.   Musculoskeletal:         General: Normal range of motion.      Cervical back: Normal range of motion and neck supple.      Right lower leg: No edema.      Left lower leg: No edema.   Lymphadenopathy:      Cervical: No cervical adenopathy.   Skin:     General: Skin " is warm and dry.      Findings: No rash.   Neurological:      Mental Status: She is alert and oriented to person, place, and time.   Psychiatric:         Mood and Affect: Mood normal.         Speech: Speech normal.         Behavior: Behavior normal. Behavior is cooperative.         Thought Content: Thought content normal.         Judgment: Judgment normal.          Telephone on 03/27/2024   Component Date Value Ref Range Status    Glucose 04/04/2024 95  70 - 99 mg/dL Final    BUN 04/04/2024 21  8 - 27 mg/dL Final    Creatinine 04/04/2024 0.65  0.57 - 1.00 mg/dL Final    eGFR 04/04/2024 99  >59 mL/min/1.73 Final    BUN/Creatinine Ratio 04/04/2024 32 (H)  12 - 28 Final    Sodium 04/04/2024 141  134 - 144 mmol/L Final    Potassium 04/04/2024 4.4  3.5 - 5.2 mmol/L Final    Chloride 04/04/2024 103  96 - 106 mmol/L Final    CO2 04/04/2024 23  20 - 29 mmol/L Final    Calcium 04/04/2024 9.3  8.7 - 10.3 mg/dL Final    Protein, Total 04/04/2024 6.5  6.0 - 8.5 g/dL Final    Albumin 04/04/2024 4.1  3.9 - 4.9 g/dL Final    Globulin, Total 04/04/2024 2.4  1.5 - 4.5 g/dL Final    Albumin/Globulin Ratio 04/04/2024 1.7  1.2 - 2.2 Final    Total Bilirubin 04/04/2024 0.5  0.0 - 1.2 mg/dL Final    Alkaline Phosphatase 04/04/2024 67  44 - 121 IU/L Final    AST 04/04/2024 18  0 - 40 IU/L Final    ALT 04/04/2024 8  0 - 32 IU/L Final    Cholesterol 04/04/2024 243 (H)  100 - 199 mg/dL Final    Triglycerides 04/04/2024 79  0 - 149 mg/dL Final    HDL 04/04/2024 67  >39 mg/dL Final    VLDL Cholesterol Bud 04/04/2024 13  5 - 40 mg/dL Final    LDL Calculated 04/04/2024 163 (H)  0 - 99 mg/dL Final    TSH 04/04/2024 1.270  0.450 - 4.500 uIU/mL Final    Hemoglobin A1c 04/04/2024 5.9 (H)  4.8 - 5.6 % Final    Comment:          Prediabetes: 5.7 - 6.4           Diabetes: >6.4           Glycemic control for adults with diabetes: <7.0      Specific Gravity, UA 04/04/2024 1.022  1.005 - 1.030 Final    pH, UA 04/04/2024 5.5  5.0 - 7.5 Final    Color, UA  04/04/2024 Yellow  Yellow Final    Clarity, UA 04/04/2024 Clear  Clear Final    Leukocytes, UA 04/04/2024 Negative  Negative Final    Protein, UA 04/04/2024 Trace  Negative/Trace Final    Glucose, UA 04/04/2024 Negative  Negative Final    Ketones, UA 04/04/2024 Negative  Negative Final    Occult Blood UA 04/04/2024 Trace (A)  Negative Final    Bilirubin, UA 04/04/2024 Negative  Negative Final    Urobilinogen, UA 04/04/2024 0.2  0.2 - 1.0 mg/dL Final    Nitrite, UA 04/04/2024 Negative  Negative Final    Microscopic Examination 04/04/2024 See below:   Final    Microscopic was indicated and was performed.    Urinalysis Reflex 04/04/2024 Comment   Final    This specimen will not reflex to a Urine Culture.    WBC, UA 04/04/2024 None seen  0 - 5 /hpf Final    RBC, UA 04/04/2024 0-2  0 - 2 /hpf Final    Epithelial Cells (non renal) 04/04/2024 0-10  0 - 10 /hpf Final    Casts 04/04/2024 None seen  None seen /lpf Final    Bacteria, UA 04/04/2024 None seen  None seen/Few Final   ]    Last visit note, most recent available labs, and health maintenance reviewed    Assessment:       1. Mixed hyperlipidemia    2. Myalgia    3. Essential (primary) hypertension    4. Vitamin D deficiency    5. Prediabetes        Plan:       Mixed hyperlipidemia  -     coenzyme Q10 (CO Q-10) 100 mg capsule; Take 1 capsule (100 mg total) by mouth once daily.  Dispense: 30 capsule; Refill: 11  Restart crestor  Cont zetia    Myalgia  -     coenzyme Q10 (CO Q-10) 100 mg capsule; Take 1 capsule (100 mg total) by mouth once daily.  Dispense: 30 capsule; Refill: 11    Essential (primary) hypertension  Stable on current meds    Vitamin D deficiency  Cont current supplement    Prediabetes  Stable  Discussed low sugar/carb diet      Follow up in about 6 months (around 10/5/2024) for pre dm.      4/5/2024 ROBERTA Meraz, FNP

## 2024-04-05 ENCOUNTER — OFFICE VISIT (OUTPATIENT)
Dept: FAMILY MEDICINE | Facility: CLINIC | Age: 64
End: 2024-04-05
Payer: MEDICARE

## 2024-04-05 VITALS — BODY MASS INDEX: 32.8 KG/M2 | HEIGHT: 67 IN | HEART RATE: 91 BPM | WEIGHT: 209 LBS | OXYGEN SATURATION: 97 %

## 2024-04-05 DIAGNOSIS — M79.10 MYALGIA: ICD-10-CM

## 2024-04-05 DIAGNOSIS — R73.03 PREDIABETES: ICD-10-CM

## 2024-04-05 DIAGNOSIS — E55.9 VITAMIN D DEFICIENCY: ICD-10-CM

## 2024-04-05 DIAGNOSIS — E78.2 MIXED HYPERLIPIDEMIA: Primary | ICD-10-CM

## 2024-04-05 DIAGNOSIS — I10 ESSENTIAL (PRIMARY) HYPERTENSION: ICD-10-CM

## 2024-04-05 LAB
ALBUMIN SERPL-MCNC: 4.1 G/DL (ref 3.9–4.9)
ALBUMIN/GLOB SERPL: 1.7 {RATIO} (ref 1.2–2.2)
ALP SERPL-CCNC: 67 IU/L (ref 44–121)
ALT SERPL-CCNC: 8 IU/L (ref 0–32)
APPEARANCE UR: CLEAR
AST SERPL-CCNC: 18 IU/L (ref 0–40)
BACTERIA #/AREA URNS HPF: NORMAL /[HPF]
BILIRUB SERPL-MCNC: 0.5 MG/DL (ref 0–1.2)
BILIRUB UR QL STRIP: NEGATIVE
BUN SERPL-MCNC: 21 MG/DL (ref 8–27)
BUN/CREAT SERPL: 32 (ref 12–28)
CALCIUM SERPL-MCNC: 9.3 MG/DL (ref 8.7–10.3)
CASTS URNS QL MICRO: NORMAL /LPF
CHLORIDE SERPL-SCNC: 103 MMOL/L (ref 96–106)
CHOLEST SERPL-MCNC: 243 MG/DL (ref 100–199)
CO2 SERPL-SCNC: 23 MMOL/L (ref 20–29)
COLOR UR: YELLOW
CREAT SERPL-MCNC: 0.65 MG/DL (ref 0.57–1)
EPI CELLS #/AREA URNS HPF: NORMAL /HPF (ref 0–10)
EST. GFR  (NO RACE VARIABLE): 99 ML/MIN/1.73
GLOBULIN SER CALC-MCNC: 2.4 G/DL (ref 1.5–4.5)
GLUCOSE SERPL-MCNC: 95 MG/DL (ref 70–99)
GLUCOSE UR QL STRIP: NEGATIVE
HBA1C MFR BLD: 5.9 % (ref 4.8–5.6)
HDLC SERPL-MCNC: 67 MG/DL
HGB UR QL STRIP: ABNORMAL
KETONES UR QL STRIP: NEGATIVE
LDLC SERPL CALC-MCNC: 163 MG/DL (ref 0–99)
LEUKOCYTE ESTERASE UR QL STRIP: NEGATIVE
MICRO URNS: ABNORMAL
NITRITE UR QL STRIP: NEGATIVE
PH UR STRIP: 5.5 [PH] (ref 5–7.5)
POTASSIUM SERPL-SCNC: 4.4 MMOL/L (ref 3.5–5.2)
PROT SERPL-MCNC: 6.5 G/DL (ref 6–8.5)
PROT UR QL STRIP: ABNORMAL
RBC #/AREA URNS HPF: NORMAL /HPF (ref 0–2)
SODIUM SERPL-SCNC: 141 MMOL/L (ref 134–144)
SP GR UR STRIP: 1.02 (ref 1–1.03)
TRIGL SERPL-MCNC: 79 MG/DL (ref 0–149)
TSH SERPL DL<=0.005 MIU/L-ACNC: 1.27 UIU/ML (ref 0.45–4.5)
URINALYSIS REFLEX: ABNORMAL
UROBILINOGEN UR STRIP-MCNC: 0.2 MG/DL (ref 0.2–1)
VLDLC SERPL CALC-MCNC: 13 MG/DL (ref 5–40)
WBC #/AREA URNS HPF: NORMAL /HPF (ref 0–5)

## 2024-04-05 PROCEDURE — 4010F ACE/ARB THERAPY RXD/TAKEN: CPT | Mod: CPTII,S$GLB,, | Performed by: NURSE PRACTITIONER

## 2024-04-05 PROCEDURE — 3008F BODY MASS INDEX DOCD: CPT | Mod: CPTII,S$GLB,, | Performed by: NURSE PRACTITIONER

## 2024-04-05 PROCEDURE — 99214 OFFICE O/P EST MOD 30 MIN: CPT | Mod: S$GLB,,, | Performed by: NURSE PRACTITIONER

## 2024-04-05 PROCEDURE — 3044F HG A1C LEVEL LT 7.0%: CPT | Mod: CPTII,S$GLB,, | Performed by: NURSE PRACTITIONER

## 2024-04-05 PROCEDURE — 1160F RVW MEDS BY RX/DR IN RCRD: CPT | Mod: CPTII,S$GLB,, | Performed by: NURSE PRACTITIONER

## 2024-04-05 PROCEDURE — 1159F MED LIST DOCD IN RCRD: CPT | Mod: CPTII,S$GLB,, | Performed by: NURSE PRACTITIONER

## 2024-04-05 RX ORDER — EPINEPHRINE 0.22MG
100 AEROSOL WITH ADAPTER (ML) INHALATION DAILY
Qty: 30 CAPSULE | Refills: 11 | Status: SHIPPED | OUTPATIENT
Start: 2024-04-05 | End: 2025-04-05

## 2024-04-09 ENCOUNTER — OFFICE VISIT (OUTPATIENT)
Dept: PULMONOLOGY | Facility: CLINIC | Age: 64
End: 2024-04-09
Payer: MEDICARE

## 2024-04-09 VITALS
HEIGHT: 67 IN | OXYGEN SATURATION: 96 % | SYSTOLIC BLOOD PRESSURE: 104 MMHG | HEART RATE: 89 BPM | BODY MASS INDEX: 32.67 KG/M2 | WEIGHT: 208.13 LBS | DIASTOLIC BLOOD PRESSURE: 70 MMHG

## 2024-04-09 DIAGNOSIS — Z78.9 NON-SMOKER: ICD-10-CM

## 2024-04-09 DIAGNOSIS — J45.51 SEVERE PERSISTENT ASTHMA WITH ACUTE EXACERBATION: Primary | ICD-10-CM

## 2024-04-09 DIAGNOSIS — R05.9 COUGH, UNSPECIFIED TYPE: ICD-10-CM

## 2024-04-09 DIAGNOSIS — J30.1 CHRONIC SEASONAL ALLERGIC RHINITIS DUE TO POLLEN: ICD-10-CM

## 2024-04-09 DIAGNOSIS — J82.83 EOSINOPHILIC ASTHMA: ICD-10-CM

## 2024-04-09 PROCEDURE — 3074F SYST BP LT 130 MM HG: CPT | Mod: CPTII,S$GLB,, | Performed by: NURSE PRACTITIONER

## 2024-04-09 PROCEDURE — 1159F MED LIST DOCD IN RCRD: CPT | Mod: CPTII,S$GLB,, | Performed by: NURSE PRACTITIONER

## 2024-04-09 PROCEDURE — 3008F BODY MASS INDEX DOCD: CPT | Mod: CPTII,S$GLB,, | Performed by: NURSE PRACTITIONER

## 2024-04-09 PROCEDURE — 3078F DIAST BP <80 MM HG: CPT | Mod: CPTII,S$GLB,, | Performed by: NURSE PRACTITIONER

## 2024-04-09 PROCEDURE — 4010F ACE/ARB THERAPY RXD/TAKEN: CPT | Mod: CPTII,S$GLB,, | Performed by: NURSE PRACTITIONER

## 2024-04-09 PROCEDURE — 99214 OFFICE O/P EST MOD 30 MIN: CPT | Mod: S$GLB,,, | Performed by: NURSE PRACTITIONER

## 2024-04-09 PROCEDURE — 99999 PR PBB SHADOW E&M-EST. PATIENT-LVL IV: CPT | Mod: PBBFAC,,, | Performed by: NURSE PRACTITIONER

## 2024-04-09 PROCEDURE — 3044F HG A1C LEVEL LT 7.0%: CPT | Mod: CPTII,S$GLB,, | Performed by: NURSE PRACTITIONER

## 2024-04-09 RX ORDER — CODEINE PHOSPHATE AND GUAIFENESIN 10; 100 MG/5ML; MG/5ML
5 SOLUTION ORAL EVERY 8 HOURS PRN
Qty: 237 ML | Refills: 0 | Status: SHIPPED | OUTPATIENT
Start: 2024-04-09

## 2024-04-09 RX ORDER — AZITHROMYCIN 500 MG/1
500 TABLET, FILM COATED ORAL DAILY
Qty: 3 TABLET | Refills: 0 | Status: SHIPPED | OUTPATIENT
Start: 2024-04-09 | End: 2024-04-12

## 2024-04-09 RX ORDER — PREDNISONE 20 MG/1
TABLET ORAL
Qty: 15 TABLET | Refills: 0 | Status: SHIPPED | OUTPATIENT
Start: 2024-04-09

## 2024-04-09 RX ORDER — CODEINE PHOSPHATE AND GUAIFENESIN 10; 100 MG/5ML; MG/5ML
5 SOLUTION ORAL EVERY 6 HOURS PRN
COMMUNITY
End: 2024-04-09 | Stop reason: SDUPTHER

## 2024-04-09 NOTE — PROGRESS NOTES
4/9/2024    Daylin Lynch  In office visit     Chief Complaint   Patient presents with    6m f/u       HPI:  04/09/2024:  Took one Nucala injection in January - states can't afford the medication and did not notice great benefit with use anyway.  Using Trelegy once per day and Albuterol PRN - [facility] Albuterol use is approx 2x per day currently.  Last month developed illness that lasted two weeks while in [place] - states she lost her voice during that time and suffered with chest tightness.   Completed prednisone 5 day regimen and Azithromycin 3 day regimen at that time with reported benefit.   Using codeine cough syrup only as needed for persistent cough - reports great benefit with use. Cough is associated with nocturnal arousals, difficulty staying asleep. Initially cough was productive however not any longer.        01/08/2024:  Spent majority of the holidays in [place] visit in family.  Developed illness while there, completed 5 day regimen of prednisone at that time with reported benefit.  States since returning home she has now developed illness again.  Recently completed another 5 day regimen of prednisone and 3 day regimen azithromycin with minimal improvement.  Cough:  Persistent since onset, nonproductive.  Associated with nocturnal arousals, difficulty falling asleep.  Associated with wheezing and chest tightness.  Patient endorses generalized low energy.  Patient denies fever, GI complaints.  Currently taking Trelegy once per day and albuterol as needed.  Currently requiring albuterol approximately up to 8 times per day.  Not currently use nebulizer treatment.  Is on Nucala monthly, however has not received Nucala injection since November due to insurance/pharmacy.  Patient Instructions   Concern for acute asthma exacerbation.  Take prednisone as prescribed.  This is the regimen that is instructed to be taken 3 pills for 3 days followed by 2 pills for 3 days followed by 1 pill for 3 days.  I have sent  another prescription for prednisone with instructions to take 1 pill per day for 5 days.  Do not take this regimen at this time, this is for your on hand need.  Azithromycin ordered.  I would not take this at this time.  This is only to be taken as needed for green-yellow mucus production.  Chest x-ray now to ensure no developing pneumonia.  We will restart Nucala injection today.  Sample given in office today.    LOT [id number]  EXPIRATION MAY 2026  Codeine cough syrup prescribed - to be taken only as needed for cough. This will make you drowsy, do not drive or operative heavy machinery after use. Do not take with other sedating medications. Do not mix with alcohol. Utilize fall precautions with use.   Continue Trelegy once per day.  Albuterol as needed.  I have refilled your nebulized medicines as well.  Continue current prescription medication regiment. Keep follow up appointment as scheduled. Please call the office if you have any questions or concerns.             10/10/2023:  Approximately three weeks ago visited a nursery - ever since developed symptoms of asthma flare. Required Azithromycin and Prednisone 20 mg x 5 days. States with intervention, her symptoms have improved however she is still experiencing a lingering cough. Cough is productive with clear mucous. Mild wheezing and chest tightness reported.   States currently using Trelegy once per day. Using Albuterol PRN - approx 4 times per day as of late.  Still on Nucala - next dose is due October 20 - states she recently got a bill for $1000 for Nucala - will investigate.   Patient Instructions   Continue current asthma regiment including Trelegy once per day and Albuterol as needed for shortness of breath, wheezing, cough.  Continue Nucala monthly. Will reach out to Rep and see about getting assistance through the .   Will send prescription for Trelegy to [facility]. You must call and enroll in their inhaler program. Call the number on the  handout I gave you in office.   Chest Xray if no improvement of symptoms.   Keep Prednisone and Azithromycin on hand for as needed symptoms - take sparingly, take as prescribed.  Continue current medication regiment. Keep follow up appointment as scheduled. Please call the office if you have any questions or concerns.       04/10/2023:  Overall doing well!   States currently using Trelegy once per day with great benefit in comparison to Symbicort.  Using Albuterol only as needed, not requiring daily.  Took a trip to [place] last month - after day 3 of trip got ill - took Azithromycin 3 day regiment and Prednisone 5 day regiment at that time with great benefit of symptoms.  Still on Nucala - states tolerating well and is noticing great benefit with use.  Patient Instructions   Continue current asthma regiment including Trelegy once per day and Albuterol as needed for shortness of breath, wheezing, cough.  Continue Nucala monthly.  Keep Prednisone and Azithromycin on hand for as needed symptoms - take sparingly, take as prescribed.  Continue current medication regiment. Keep follow up appointment as scheduled. Please call the office if you have any questions or concerns.         1/9/2023:  Recent trip to [place] - states got sick during Travel. Completed Prednisone regiment for 5 days with benefit.  Still on Nucala - recently approved per insurance - states next dose in due 1/15.  Started weight watchers again, looking forward to losing weight.  Using Symbicort two puffs twice daily with benefit. Using Albuterol only as needed - states over the last week she has required Albuterol more - using approximately 3x per day.  Recently received letter stating disability was approved. Looking to get on Medicare soon.  Patient Instructions   Continue current asthma regiment including Symbicort twice per day and Albuterol as needed.  Nebulizer machine ordered with Albuterol and Budesonide - to be taken only as needed.  Keep  Prednisone on hand to be taken only as needed, use sparingly.  Will give sample Trelegy today - this is one puff once per day. This inhaler contains an inhaled steroid component. Rinse mouth after each use due to risk for thrush development. If mouth or tongue develops white sores please contact the clinic and I will order a prescription mouth wash.   Stop Symbicort while using Trelegy.  Continue current medication regiment. Keep follow up appointment as scheduled. Please call the office if you have any questions or concerns.           11/8/2022:  Recent trip to [place] to visit family - states while on trip had an asthma flare. Started taking Prednisone yesterday, on day 2.   Reports use of Prednisone two times over the last six months alone - had to take regiment upon discharge from hospital in June 2022. Reports great benefit with steroid use.   Using Symbicort two puffs twice daily with benefit. Using Albuterol only as needed - states over the last week she has required Albuterol more - using approximately 3x per day.  On Singulair nightly with benefit.   Reports chest tightness.   Shortness of breath: Persistent over the last week or so - associated with wheezing, worse at night time. Associated with nocturnal arousals nightly. States can't perform ADLS without stopping for rest.  Cough: Persistent, worse at night time, non productive. Relieved somewhat with cough syrup.  Patient Instructions   Asthma - severe persistent, uncontrolled with 2 steroid courses in the past six months. Eosinophil count 500 in June 2022.   Can check CBC now, then initiate Nucala in clinic.  Nucala sample given in clinic  LOT number [id number]  Exp May 2025  Continue Symbicort twice per day. Albuterol as needed for shortness of breath, wheezing, cough.  Continue current medication regiment. Keep follow up appointment as scheduled. Please call the office if you have any questions or concerns.         8/8/2022:  Recent trip to [place] - had  several issues with shortness of breath, coughing while on trip. Overall doing better since returning home.   Cough: Overall improved - nonproductive, no time correlation identified. Denies wheezing.  Shortness of breath: Persistent with minimal exertion - improves with rest.   Currently using Symbicort - two puffs twice per day with reported benefit. Using Albuterol only as needed, not daily use - states while on trip in [place] required several times per day, however hasn't needed much since returning home.  Hasn't taken Prednisone since prior visit.   Patient Instructions   Overall you seem to be doing better.  Would continue Symbicort two puffs twice daily with Albuterol use as needed for shortness of breath, wheezing.  Keep Prednisone on hand to take as needed for shortness of breath, cough, wheezing.  Can consider biologic in the future if symptoms worsen.  Consider PFT if the future if no improvement.  Continue current medication regiment. Keep follow up appointment as scheduled. Please call the office if you have any questions or concerns.         6/24/22:  Hx: Annual bronchitis in the winter, pleurisy, CHF, AICD placement,   Recent ER visit on 6/12/22 to [facility] for CP, SOB - patient was noted to have elevated troponin at that time, decision made for transfer to [facility] for possible cath. Patient underwent series of testing including echocardiogram revealing EF 40% in the setting of known systolic heart failure. Patient did not undergo angiogram during this hospitalization. CTA obtained revealing no PE, however concern regarding atypical pneumonia due to bilateral ground glass opacities. Patient was treated with IV Rocephin, Azithromycin, and steroid - reported great improvement at that time with medication therapy. Patient was subsequently discharged home, without supplemental oxygen, with a prescription for Levaquin (reports completion), five days worth of steroid PO, and PRN albuterol inhaler.   Was  seen per PCP on Tuesday 6/21 and prescribed Symbicort inhaler, Flonase, and Promethazine cough syrup.   Cough: Persistent over the last 6 months - states it's overall improved since hospitalization however not completely resolved. States cough was attributed in the past to congestive heart failure. Was diagnosed with bronchitis per primary care provider in March - given codeine cough syrup at that time, no antibiotic or steroids. Cough persists throughout the day however is noticeably worse at night time, when lying down. Non productive. Associated with coughing fits, dizziness. Associated with chest tightness, wheezing. No relief noted with tessalon pearls, however has tried codeine and promethazine cough syrup separately - states codeine worked better.  Shortness of breath: Gradual onset over the last six months - feels chest heaviness at rest however experiences exertional dyspnea - states affecting ADLS. Can't take a shower and get dressed without stopping for rest. Improves with rest.   Does endorse sinus congestion, ear congestion, post nasal drip - started Zyrtec daily in April, does not appreciate benefit.   Has started Symbicort - taking two puffs in the morning - has been on for three days now, reports benefit with use.  Has tried Albuterol in the past without noticeable benefit.   Takes Protonix daily.  Per review of EMR discharge summary:  Hospital Course:   61-year-old lady with numerous medical problems including chronic systolic CHF who has been suffering with dry cough for last couple months presented to ED with cough and cough related pain found to have mildly elevated troponin and was subsequently transferred to [facility].  Serial EKGs, cardiac enzymes remained negative for acute coronary syndrome.  Stress test ruled out any reversible ischemia.  Her ejection fraction is overall stable from previous echo.  Overall symptomatology is more consistent with pulmonary in origin, CTA chest showed atypical  pneumonia.  She responded well after starting Rocephin and azithromycin as well as 1 dose of IV steroid.  Patient has chronic postnasal drip.  She was discharged home in hemodynamically stable condition with prescription of Levaquin, p.r.n. albuterol.  She was advised to follow-up with her PCP, I advised her that if her symptoms do not improve she may discuss with her cardiologist if Entresto is the culprit of chronic dry cough.  I also generated outpatient pulmonary referral.   Patient Instructions   Repeat chest xray in 1-2 weeks to evaluate post atypical pneumonia.  Symptoms are somewhat concerning for asthmatic component. Would continue Symbicort however start using 2 puffs twice per day.   Can use Albuterol as needed for shortness of breath, cough.  For sinus complaints - can try Singulair at night time with Zyrtec during the day.   Can send to ENT if interested.  Etiology of cough is unknown at this time - however could be multifactorial.  Entresto?  GERD - Currently on Protonix  Codeine cough syrup to be taken as needed for cough, caution as may make drowsy. Do not drive after taking.  Prednisone, take only as needed.  Continue current medication regiment. Keep follow up appointment as scheduled. Please call the office if you have any questions or concerns.         Social Hx: Lives with  - 2 animals in the home. Former [employer] employee, . No Asbestosis exposure, Smoking Hx: Former smoker, on and off for approx ten years - quit in 1985  Family Hx: No Lung Cancer, Mother COPD, No Asthma  Medical Hx: Previous pneumonia ; No previous shoulder surgery - Defibrillator placement 2020        The chief compliant problem varies with instability at times.  PFSH:  Past Medical History:   Diagnosis Date    Allergy     Asthma     Bilateral leg edema     Hyperlipidemia     Hypertension     Leg pain, bilateral     left leg more severe    Varicose vein          Past Surgical History:   Procedure Laterality  "Date     SECTION, LOW TRANSVERSE      x 3    EYE SURGERY  2023    lt retinal surgery    HYSTERECTOMY  2012    pace maker  2020    SPINE SURGERY  2021    stents both legs       Social History     Tobacco Use    Smoking status: Former     Current packs/day: 0.00     Types: Cigarettes     Quit date: 1985     Years since quittin.3     Passive exposure: Past    Smokeless tobacco: Never   Substance Use Topics    Alcohol use: Yes    Drug use: No     Family History   Problem Relation Age of Onset    Heart disease Mother     Heart failure Father      Review of patient's allergies indicates:  No Known Allergies  I have reviewed past medical, family, and social history. I have reviewed previous nurse notes.    Performance Status:The patient's activity level is functions out of house.      Review of Systems:  a review of eleven systems covering constitutional, Eye, HEENT, Psych, Respiratory, Cardiac, GI, , Musculoskeletal, Endocrine, Dermatologic was negative except for pertinent findings as listed ABOVE and below: pertinent positive as above, rest is good       Exam:Comprehensive exam done. /70 (BP Location: Left arm, Patient Position: Sitting, BP Method: Medium (Automatic))   Pulse 89   Ht 5' 7" (1.702 m)   Wt 94.4 kg (208 lb 1.8 oz)   SpO2 96%   BMI 32.60 kg/m²   Exam included Vitals as listed  Constitutional: She is oriented to person, place, and time. She appears well-developed. No distress.   Nose: Nose normal.   Mouth/Throat: Uvula is midline, oropharynx is clear and moist and mucous membranes are normal. No dental caries. No oropharyngeal exudate, posterior oropharyngeal edema, posterior oropharyngeal erythema or tonsillar abscesses.  Mallapatti (M) score 3  Eyes: Pupils are equal, round, and reactive to light.   Neck: No JVD present. No thyromegaly present.   Cardiovascular: Normal rate, regular rhythm and normal heart sounds. Exam reveals no gallop and no friction rub.   No " murmur heard.  Pulmonary/Chest: Effort normal and breath sounds normal. No accessory muscle usage or stridor. No apnea and no tachypnea. No respiratory distress, decreased breath sounds, wheezes, rhonchi, rales, or tenderness. Clear breath sounds throughout, on room air, in no acute distress.  Abdominal: Soft. She exhibits no mass. There is no tenderness. No hepatosplenomegaly, hernias and normoactive bowel sounds  Musculoskeletal: Normal range of motion. exhibits no edema.   Neurological:  alert and oriented to person, place, and time. not disoriented.   Skin: Skin is warm and dry. Capillary refill takes less 2 sec. No cyanosis or erythema. No pallor. Nails show no clubbing.   Psychiatric: normal mood and affect. behavior is normal. Judgment and thought content normal.       Radiographs (ct chest and cxr) reviewed: view by direct vision     Chest Xray 7/20/2022  FINDINGS:  An AICD is noted in place on the left.  The cardiac size and contours within normal limits.  No intrapulmonary mass or infiltrate is seen.  No pneumothorax or pleural effusion is noted.   Impression:   AICD noted in place otherwise negative chest    CTA Chest Non Coronary  6/13/2022   IMPRESSION:   1.  No pulmonary embolism identified.  2.  Patchy groundglass lung opacities are noted bilaterally, mostly involving the lung bases. These most likely reflect inspiratory effort or subsegmental atelectasis. Atypical infection could have a similar appearance in the proper clinical setting      X-Ray Chest PA And Lateral   6/13/2022   Impression:   AICD in place otherwise negative chest      Patient's labs were reviewed including CBC and CMP    Lab Results   Component Value Date    WBC 8.3 10/06/2023    RBC 4.38 10/06/2023    HGB 13.3 10/06/2023    HCT 41.7 10/06/2023    MCV 95 10/06/2023    MCH 30.4 10/06/2023    MCHC 31.9 10/06/2023    RDW 13.1 10/06/2023     10/06/2023    MPV 10.1 11/08/2022    GRAN 8.8 (H) 11/08/2022    GRAN 80.2 (H)  11/08/2022    LYMPH 2.9 10/06/2023    MONO 8 10/06/2023    MONO 0.7 10/06/2023    EOS 0.1 10/06/2023    BASO 0.1 10/06/2023    EOSINOPHIL 1 10/06/2023    BASOPHIL 1 10/06/2023   CMP  Sodium   Date Value Ref Range Status   04/04/2024 141 134 - 144 mmol/L Final   01/23/2017 139 134 - 144 mmol/L      Potassium   Date Value Ref Range Status   04/04/2024 4.4 3.5 - 5.2 mmol/L Final     Chloride   Date Value Ref Range Status   04/04/2024 103 96 - 106 mmol/L Final   01/23/2017 101 98 - 110 mmol/L      CO2   Date Value Ref Range Status   04/04/2024 23 20 - 29 mmol/L Final     Glucose   Date Value Ref Range Status   04/04/2024 95 70 - 99 mg/dL Final   01/23/2017 85 70 - 99 mg/dL      BUN   Date Value Ref Range Status   04/04/2024 21 8 - 27 mg/dL Final     Creatinine   Date Value Ref Range Status   04/04/2024 0.65 0.57 - 1.00 mg/dL Final   01/23/2017 0.58 (L) 0.60 - 1.40 mg/dL      Calcium   Date Value Ref Range Status   04/04/2024 9.3 8.7 - 10.3 mg/dL Final     Total Protein   Date Value Ref Range Status   06/12/2022 6.8 6.0 - 8.4 g/dL Final     Albumin   Date Value Ref Range Status   04/04/2024 4.1 3.9 - 4.9 g/dL Final   06/12/2022 3.4 (L) 3.5 - 5.2 g/dL Final   01/23/2017 4.1 3.1 - 4.7 g/dL      Total Bilirubin   Date Value Ref Range Status   04/04/2024 0.5 0.0 - 1.2 mg/dL Final     Alkaline Phosphatase   Date Value Ref Range Status   06/12/2022 61 55 - 135 U/L Final     AST   Date Value Ref Range Status   04/04/2024 18 0 - 40 IU/L Final     ALT   Date Value Ref Range Status   04/04/2024 8 0 - 32 IU/L Final     Anion Gap   Date Value Ref Range Status   06/14/2022 11 8 - 16 mmol/L Final     eGFR   Date Value Ref Range Status   04/04/2024 99 >59 mL/min/1.73 Final         PFT will be done and results to be reviewed  Pulmonary Functions Testing Results:    Transthoracic echo (TTE) complete 6/13/2022:   Summary  The left ventricle is normal in size with concentric hypertrophy and mildly decreased systolic function.  The  estimated ejection fraction is 40%.  Grade I left ventricular diastolic dysfunction.  There is mild left ventricular global hypokinesis.  Normal right ventricular size with normal right ventricular systolic function.  Moderate left atrial enlargement.  Mild tricuspid regurgitation.  Normal central venous pressure (3 mmHg).  The estimated PA systolic pressure is 10 mmHg.        Plan:  Clinical impression is resonably certain and repeated evaluation prn +/- follow up will be needed as below.    Daylin was seen today for 6m f/u.    Diagnoses and all orders for this visit:    Severe persistent asthma with acute exacerbation  -     predniSONE (DELTASONE) 20 MG tablet; Take one pill per day for three days. Can repeat as needed for shortness of breath, wheezing, cough.  -     azithromycin (ZITHROMAX) 500 MG tablet; Take 1 tablet (500 mg total) by mouth once daily. for 3 days    Cough, unspecified type  -     guaiFENesin-codeine 100-10 mg/5 ml (TUSSI-ORGANIDIN NR)  mg/5 mL syrup; Take 5 mLs by mouth every 8 (eight) hours as needed for Cough.    Chronic seasonal allergic rhinitis due to pollen    Eosinophilic asthma    Non-smoker              Follow up in about 6 months (around 10/9/2024), or if symptoms worsen or fail to improve.    Discussed with patient above for education the following:      Patient Instructions   Continue Trelegy once per day.  Albuterol as needed.     Prednisone - Keep on Hand and take only as needed, use sparingly.    Azithromycin - Keep on hand, take only as needed.    Codeine cough syrup prescribed - to be taken only as needed for cough. This will make you drowsy, do not drive or operative heavy machinery after use. Do not take with other sedating medications. Do not mix with alcohol. Utilize fall precautions with use.     Continue current prescription medication regiment. Keep follow up appointment as scheduled. Please call the office if you have any questions or concerns.

## 2024-04-09 NOTE — PATIENT INSTRUCTIONS
Continue Trelegy once per day.  Albuterol as needed.     Prednisone - Keep on Hand and take only as needed, use sparingly.    Azithromycin - Keep on hand, take only as needed.    Codeine cough syrup prescribed - to be taken only as needed for cough. This will make you drowsy, do not drive or operative heavy machinery after use. Do not take with other sedating medications. Do not mix with alcohol. Utilize fall precautions with use.     Continue current prescription medication regiment. Keep follow up appointment as scheduled. Please call the office if you have any questions or concerns.

## 2024-05-28 DIAGNOSIS — Z00.00 ENCOUNTER FOR MEDICARE ANNUAL WELLNESS EXAM: ICD-10-CM

## 2024-07-10 LAB
A1C: 5.8
HDLC SERPL-MCNC: 64 MG/DL
LDLC SERPL CALC-MCNC: 131 MG/DL
LIPIDS, TOTAL CHOLESTEROL: 207
TRIGL SERPL-MCNC: 66 MG/DL

## 2024-07-11 ENCOUNTER — OFFICE VISIT (OUTPATIENT)
Dept: FAMILY MEDICINE | Facility: CLINIC | Age: 64
End: 2024-07-11
Payer: MEDICARE

## 2024-07-11 VITALS
TEMPERATURE: 98 F | HEART RATE: 69 BPM | HEIGHT: 67 IN | DIASTOLIC BLOOD PRESSURE: 64 MMHG | BODY MASS INDEX: 32.63 KG/M2 | OXYGEN SATURATION: 97 % | WEIGHT: 207.88 LBS | SYSTOLIC BLOOD PRESSURE: 118 MMHG

## 2024-07-11 DIAGNOSIS — I49.5 SINUS NODE DYSFUNCTION: ICD-10-CM

## 2024-07-11 DIAGNOSIS — I70.0 AORTIC ATHEROSCLEROSIS: ICD-10-CM

## 2024-07-11 DIAGNOSIS — Z00.00 ENCOUNTER FOR PREVENTIVE HEALTH EXAMINATION: ICD-10-CM

## 2024-07-11 DIAGNOSIS — R26.9 ABNORMALITY OF GAIT AND MOBILITY: Primary | ICD-10-CM

## 2024-07-11 DIAGNOSIS — Z95.810 AUTOMATIC IMPLANTABLE CARDIAC DEFIBRILLATOR IN SITU: ICD-10-CM

## 2024-07-11 DIAGNOSIS — Z00.00 ENCOUNTER FOR MEDICARE ANNUAL WELLNESS EXAM: ICD-10-CM

## 2024-07-11 PROCEDURE — 99999 PR PBB SHADOW E&M-EST. PATIENT-LVL V: CPT | Mod: PBBFAC,HCNC,, | Performed by: NURSE PRACTITIONER

## 2024-07-11 RX ORDER — VALSARTAN 40 MG/1
40 TABLET ORAL 2 TIMES DAILY
COMMUNITY

## 2024-07-11 NOTE — PATIENT INSTRUCTIONS
Counseling and Referral of Other Preventative  (Italic type indicates deductible and co-insurance are waived)    Patient Name: Daylin Lynch  Today's Date: 7/11/2024    Health Maintenance       Date Due Completion Date    Pneumococcal Vaccines (Age 0-64) (1 of 2 - PCV) Never done ---    HIV Screening Never done ---    Shingles Vaccine (1 of 2) Never done ---    RSV Vaccine (Age 60+ and Pregnant patients) (1 - 1-dose 60+ series) Never done ---    COVID-19 Vaccine (4 - 2023-24 season) 09/01/2023 12/29/2021    Influenza Vaccine (1) 09/01/2024 1/19/2024    Mammogram 11/16/2024 11/16/2023    Override on 2/15/2018: Done    Override on 3/14/2016: Done    Hemoglobin A1c (Prediabetes) 04/04/2025 4/4/2024    Colorectal Cancer Screening 12/27/2026 12/27/2021    Override on 12/17/2012: Done    Lipid Panel 04/04/2029 4/4/2024    TETANUS VACCINE 05/05/2031 5/5/2021        No orders of the defined types were placed in this encounter.    The following information is provided to all patients.  This information is to help you find resources for any of the problems found today that may be affecting your health:                  Living healthy guide: www.[facility].[place].gov      Understanding Diabetes: www.diabetes.org      Eating healthy: www.cdc.gov/healthyweight      CDC home safety checklist: www.cdc.gov/steadi/patient.html      Agency on Aging: www.goea.[place].[facility]      Alcoholics anonymous (AA): www.aa.org      Physical Activity: www.kiara.nih.gov/yo9bmua      Tobacco use: www.quitwithusla.org

## 2024-07-11 NOTE — PROGRESS NOTES
"  Daylin Lynch presented for a  Medicare AWV and comprehensive Health Risk Assessment today. The following components were reviewed and updated:    Medical history  Family History  Social history  Allergies and Current Medications  Health Risk Assessment  Health Maintenance  Care Team         ** See Completed Assessments for Annual Wellness Visit within the encounter summary.**         The following assessments were completed:  Living Situation  CAGE  Depression Screening  Timed Get Up and Go  Whisper Test  Cognitive Function Screening  Nutrition Screening  ADL Screening  PAQ Screening    Clock in media   Opioid documentation:      Patient does not have a current opioid prescription.        Vitals:    07/11/24 1257   BP: 118/64   Pulse: 69   Temp: 97.9 °F (36.6 °C)   TempSrc: Oral   SpO2: 97%   Weight: 94.3 kg (207 lb 14.3 oz)   Height: 5' 7" (1.702 m)     Body mass index is 32.56 kg/m².  Physical Exam  Constitutional:       Appearance: She is well-developed.   HENT:      Head: Normocephalic and atraumatic.      Nose: Nose normal.   Eyes:      General: Lids are normal.      Conjunctiva/sclera: Conjunctivae normal.   Cardiovascular:      Rate and Rhythm: Normal rate.   Pulmonary:      Effort: Pulmonary effort is normal.   Neurological:      Mental Status: She is alert and oriented to person, place, and time.   Psychiatric:         Speech: Speech normal.         Behavior: Behavior normal.               Diagnoses and health risks identified today and associated recommendations/orders:    1. Encounter for Medicare annual wellness exam  Discussed health maintenance guidelines appropriate for age.      - Ambulatory Referral/Consult to Enhanced Annual Wellness Visit (eAWV)    2. Encounter for preventive health examination      3. Aortic atherosclerosis  Stable, continue to monitor  On statin  Followed by pcp     4. Sinus node dysfunction  Stable, continue to monitor  AICD in place   Followed by cardiology   5. Automatic " implantable cardiac defibrillator in situ  Stable, continue monitoring per cardiology     6. Abnormality of gait and mobility  Stable, continue to monitor       Provided Daylin with a 5-10 year written screening schedule and personal prevention plan. Recommendations were developed using the USPSTF age appropriate recommendations. Education, counseling, and referrals were provided as needed. After Visit Summary printed and given to patient which includes a list of additional screenings\tests needed.    Follow up for One year for Annual Wellness Visit.    Betty Stack NP      I offered to discuss advanced care planning, including how to pick a person who would make decisions for you if you were unable to make them for yourself, called a health care power of , and what kind of decisions you might make such as use of life sustaining treatments such as ventilators and tube feeding when faced with a life limiting illness recorded on a living will that they will need to know. (How you want to be cared for as you near the end of your natural life)     X Patient is interested in learning more about how to make advanced directives.  I provided them paperwork and offered to discuss this with them.

## 2024-08-07 DIAGNOSIS — J45.40 MODERATE PERSISTENT ASTHMA WITHOUT COMPLICATION: ICD-10-CM

## 2024-08-07 DIAGNOSIS — J18.9 ATYPICAL PNEUMONIA: ICD-10-CM

## 2024-08-07 DIAGNOSIS — J30.1 CHRONIC SEASONAL ALLERGIC RHINITIS DUE TO POLLEN: ICD-10-CM

## 2024-08-07 RX ORDER — ALBUTEROL SULFATE 90 UG/1
INHALANT RESPIRATORY (INHALATION)
Qty: 8.5 G | Refills: 11 | Status: SHIPPED | OUTPATIENT
Start: 2024-08-07 | End: 2024-08-08 | Stop reason: SDUPTHER

## 2024-08-07 RX ORDER — FLUTICASONE PROPIONATE 50 MCG
SPRAY, SUSPENSION (ML) NASAL
Qty: 16 G | Refills: 6 | Status: SHIPPED | OUTPATIENT
Start: 2024-08-07 | End: 2024-08-08 | Stop reason: SDUPTHER

## 2024-08-08 DIAGNOSIS — J18.9 ATYPICAL PNEUMONIA: ICD-10-CM

## 2024-08-08 DIAGNOSIS — J30.1 CHRONIC SEASONAL ALLERGIC RHINITIS DUE TO POLLEN: ICD-10-CM

## 2024-08-08 DIAGNOSIS — J45.40 MODERATE PERSISTENT ASTHMA WITHOUT COMPLICATION: ICD-10-CM

## 2024-08-08 RX ORDER — FLUTICASONE PROPIONATE 50 MCG
2 SPRAY, SUSPENSION (ML) NASAL DAILY
Qty: 16 G | Refills: 6 | Status: SHIPPED | OUTPATIENT
Start: 2024-08-08

## 2024-08-08 RX ORDER — ALBUTEROL SULFATE 90 UG/1
2 INHALANT RESPIRATORY (INHALATION) EVERY 6 HOURS PRN
Qty: 8.5 G | Refills: 11 | Status: SHIPPED | OUTPATIENT
Start: 2024-08-08

## 2024-09-06 ENCOUNTER — TELEPHONE (OUTPATIENT)
Dept: PULMONOLOGY | Facility: CLINIC | Age: 64
End: 2024-09-06
Payer: MEDICARE

## 2024-09-19 ENCOUNTER — HOSPITAL ENCOUNTER (OUTPATIENT)
Dept: RADIOLOGY | Facility: HOSPITAL | Age: 64
Discharge: HOME | End: 2024-09-19
Attending: ORTHOPAEDIC SURGERY
Payer: MEDICARE

## 2024-09-19 ENCOUNTER — OFFICE VISIT (OUTPATIENT)
Dept: ORTHOPEDICS | Facility: CLINIC | Age: 64
End: 2024-09-19
Payer: MEDICARE

## 2024-09-19 VITALS — HEIGHT: 67 IN | BODY MASS INDEX: 32.18 KG/M2 | WEIGHT: 205 LBS

## 2024-09-19 DIAGNOSIS — M75.51 SUBACROMIAL BURSITIS OF RIGHT SHOULDER JOINT: Primary | ICD-10-CM

## 2024-09-19 PROCEDURE — 99999 PR PBB SHADOW E&M-EST. PATIENT-LVL IV: CPT | Mod: PBBFAC,HCNC,, | Performed by: ORTHOPAEDIC SURGERY

## 2024-09-19 PROCEDURE — 73030 X-RAY EXAM OF SHOULDER: CPT | Mod: TC,HCNC,PN,RT

## 2024-09-19 PROCEDURE — 73030 X-RAY EXAM OF SHOULDER: CPT | Mod: 26,HCNC,RT, | Performed by: RADIOLOGY

## 2024-09-19 RX ORDER — TRIAMCINOLONE ACETONIDE 40 MG/ML
40 INJECTION, SUSPENSION INTRA-ARTICULAR; INTRAMUSCULAR
Status: DISCONTINUED | OUTPATIENT
Start: 2024-09-19 | End: 2024-09-19 | Stop reason: HOSPADM

## 2024-09-19 RX ADMIN — TRIAMCINOLONE ACETONIDE 40 MG: 40 INJECTION, SUSPENSION INTRA-ARTICULAR; INTRAMUSCULAR at 07:09

## 2024-09-19 NOTE — PROCEDURES
Large Joint Aspiration/Injection: R subacromial bursa    Date/Time: 9/19/2024 7:45 AM    Performed by: Ramon Jones MD  Authorized by: Ramon Jones MD    Consent Done?:  Yes (Verbal)  Indications:  Pain  Site marked: the procedure site was marked    Timeout: prior to procedure the correct patient, procedure, and site was verified    Prep: patient was prepped and draped in usual sterile fashion      Local anesthesia used?: Yes    Local anesthetic:  Lidocaine 1% without epinephrine    Details:  Needle Size:  25 G  Ultrasonic Guidance for needle placement?: No    Location:  Shoulder  Site:  R subacromial bursa  Medications:  40 mg triamcinolone acetonide 40 mg/mL  Patient tolerance:  Patient tolerated the procedure well with no immediate complications

## 2024-09-19 NOTE — PROGRESS NOTES
[facility]    Subjective:     Chief Complaint:   Chief Complaint   Patient presents with    Right Shoulder - Pain     Right shoulder pian x 1 month, pulling on deepak in yard, has crepitus in shoulder, has okay ROM, at times needs to assist woith opposite arm, painful ROM, able to reach back       Past Medical History:   Diagnosis Date    Allergy     Asthma     Bilateral leg edema     Hyperlipidemia     Hypertension     Leg pain, bilateral     left leg more severe    Varicose vein        Past Surgical History:   Procedure Laterality Date     SECTION, LOW TRANSVERSE      x 3    EYE SURGERY  2023    lt retinal surgery    HYSTERECTOMY      pace maker  2020    SPINE SURGERY  2021    stents both legs         Current Outpatient Medications   Medication Sig    albuterol (ACCUNEB) 1.25 mg/3 mL Nebu Take 3 mLs (1.25 mg total) by nebulization every 6 (six) hours as needed (Cough). Rescue    albuterol (PROVENTIL/VENTOLIN HFA) 90 mcg/actuation inhaler Inhale 2 puffs into the lungs every 6 (six) hours as needed for Wheezing. use as directed    aspirin 325 MG tablet Take 325 mg by mouth once daily.    budesonide (PULMICORT) 0.5 mg/2 mL nebulizer solution Take 2 mLs (0.5 mg total) by nebulization daily as needed (Shortness of breath, wheezing, cough). Controller    carvediloL (COREG) 6.25 MG tablet Take 6.25 mg by mouth 2 (two) times daily.     cetirizine (ZYRTEC) 10 MG tablet Take 1 tablet (10 mg total) by mouth once daily.    coenzyme Q10 (CO Q-10) 100 mg capsule Take 1 capsule (100 mg total) by mouth once daily.    escitalopram oxalate (LEXAPRO) 10 MG tablet Take 10 mg by mouth once daily.    ezetimibe (ZETIA) 10 mg tablet 1 tablet once daily.    fluticasone propionate (FLONASE) 50 mcg/actuation nasal spray 2 sprays (100 mcg total) by Each Nostril route once daily.    fluticasone-umeclidin-vilanter (TRELEGY ELLIPTA) 200-62.5-25 mcg inhaler Inhale 1 puff into the lungs once daily.     guaiFENesin-codeine 100-10 mg/5 ml (TUSSI-ORGANIDIN NR)  mg/5 mL syrup Take 5 mLs by mouth every 8 (eight) hours as needed for Cough.    melatonin 10 mg Tab Take 2 tablets by mouth nightly as needed.    montelukast (SINGULAIR) 10 mg tablet Take 1 tablet (10 mg total) by mouth once daily.    predniSONE (DELTASONE) 20 MG tablet Take one pill per day for three days. Can repeat as needed for shortness of breath, wheezing, cough.    rosuvastatin (CRESTOR) 5 MG tablet Take 5 mg by mouth once daily.    spironolactone (ALDACTONE) 50 MG tablet Take 50 mg by mouth once daily.    valsartan (DIOVAN) 40 MG tablet Take 40 mg by mouth 2 (two) times daily.     No current facility-administered medications for this visit.       Review of patient's allergies indicates:  No Known Allergies    Family History   Problem Relation Name Age of Onset    Heart disease Mother      Heart failure Father      Migraines Sister 1/2     Cancer Sister 1/2         lung then pancreatic       Social History     Socioeconomic History    Marital status:    Tobacco Use    Smoking status: Former     Current packs/day: 0.00     Types: Cigarettes     Quit date: 1985     Years since quittin.7     Passive exposure: Past    Smokeless tobacco: Never   Substance and Sexual Activity    Alcohol use: Yes     Comment: occ    Drug use: No    Sexual activity: Yes   Social History Narrative    Live with      Social Determinants of Health     Financial Resource Strain: Low Risk  (2024)    Overall Financial Resource Strain (CARDIA)     Difficulty of Paying Living Expenses: Not hard at all   Food Insecurity: No Food Insecurity (2024)    Hunger Vital Sign     Worried About Running Out of Food in the Last Year: Never true     Ran Out of Food in the Last Year: Never true   Transportation Needs: No Transportation Needs (2024)    PRAPARE - Transportation     Lack of Transportation (Medical): No     Lack of Transportation  (Non-Medical): No   Physical Activity: Insufficiently Active (7/11/2024)    Exercise Vital Sign     Days of Exercise per Week: 2 days     Minutes of Exercise per Session: 60 min   Stress: No Stress Concern Present (7/11/2024)    [employer] [place] [employer] - Occupational Stress Questionnaire     Feeling of Stress : Not at all   Housing Stability: Low Risk  (7/11/2024)    Housing Stability Vital Sign     Unable to Pay for Housing in the Last Year: No     Homeless in the Last Year: No       History of present illness:  Patient comes in today for her right shoulder.  She has had right shoulder pain for about 2 months.  Denies any prior trauma.  She can not sleep she is having difficulty with overhead activity      Review of Systems:    Constitution: Negative for chills, fever, and sweats.  Negative for unexplained weight loss.    HENT:  Negative for headaches and blurry vision.    Cardiovascular:Negative for chest pain or irregular heart beat. Negative for hypertension.    Respiratory:  Negative for cough and shortness of breath.    Gastrointestinal: Negative for abdominal pain, heartburn, melena, nausea, and vomitting.    Genitourinary:  Negative bladder incontinence and dysuria.    Musculoskeletal:  See HPI for details.     Neurological: Negative for numbness.    Psychiatric/Behavioral: Negative for depression.  The patient is not nervous/anxious.      Endocrine: Negative for polyuria    Hematologic/Lymphatic: Negative for bleeding problem.  Does not bruise/bleed easily.    Skin: Negative for poor would healing and rash    Objective:      Physical Examination:    Vital Signs:  There were no vitals filed for this visit.    Body mass index is 32.11 kg/m².    This a well-developed, well nourished patient in no acute distress.  They are alert and oriented and cooperative to examination.        Patient appears to be stated age.  She has got full range of motion of the right shoulder.  Positive impingement.   Positive crossover her rotator cuff is significantly weaker on the right than the left Spurling sign is negative.  Full range of motion the contralateral shoulder without difficulty  Pertinent New Results:    XRAY Report / Interpretation:   Reviewed    Assessment/Plan:      Right shoulder rotator cuff tear, subacromial impingement.  I injected the right subacromial space with steroid and local she has significant improvement in her symptoms.  I will start her on physical therapy and check her back in 6-8 weeks      This note was created using Dragon voice recognition software that occasionally misinterpreted phrases or words.

## 2024-10-07 ENCOUNTER — TELEPHONE (OUTPATIENT)
Dept: FAMILY MEDICINE | Facility: CLINIC | Age: 64
End: 2024-10-07
Payer: MEDICARE

## 2024-10-09 ENCOUNTER — PATIENT OUTREACH (OUTPATIENT)
Dept: ADMINISTRATIVE | Facility: HOSPITAL | Age: 64
End: 2024-10-09
Payer: MEDICARE

## 2024-10-09 NOTE — PROGRESS NOTES
Population Health Chart Review & Patient Outreach Details      Additional [facility] Health Notes:               Updates Requested / Reviewed:      Updated Care Coordination Note and Immunizations Reconciliation Completed or Queried: [facility] Topics Overdue:      [facility] Score: 0     Patient is not due for any topics at this time.    Influenza Vaccine  Pneumonia Vaccine  Shingles/Zoster Vaccine  RSV Vaccine                  Health Maintenance Topic(s) Outreach Outcomes & Actions Taken:    Lab(s) - Outreach Outcomes & Actions Taken  : External Records Uploaded & Care Team Updated if Applicable

## 2024-10-10 ENCOUNTER — PATIENT MESSAGE (OUTPATIENT)
Dept: FAMILY MEDICINE | Facility: CLINIC | Age: 64
End: 2024-10-10
Payer: MEDICARE

## 2024-10-14 ENCOUNTER — OFFICE VISIT (OUTPATIENT)
Dept: PULMONOLOGY | Facility: CLINIC | Age: 64
End: 2024-10-14
Payer: MEDICARE

## 2024-10-14 VITALS
WEIGHT: 205.5 LBS | DIASTOLIC BLOOD PRESSURE: 72 MMHG | HEART RATE: 93 BPM | HEIGHT: 67 IN | OXYGEN SATURATION: 97 % | BODY MASS INDEX: 32.25 KG/M2 | SYSTOLIC BLOOD PRESSURE: 101 MMHG

## 2024-10-14 DIAGNOSIS — R05.9 COUGH, UNSPECIFIED TYPE: ICD-10-CM

## 2024-10-14 DIAGNOSIS — J45.50 SEVERE PERSISTENT ASTHMA WITHOUT COMPLICATION: Primary | ICD-10-CM

## 2024-10-14 DIAGNOSIS — J82.83 EOSINOPHILIC ASTHMA: ICD-10-CM

## 2024-10-14 DIAGNOSIS — J30.1 CHRONIC SEASONAL ALLERGIC RHINITIS DUE TO POLLEN: ICD-10-CM

## 2024-10-14 DIAGNOSIS — I50.42 CHRONIC COMBINED SYSTOLIC AND DIASTOLIC HEART FAILURE: ICD-10-CM

## 2024-10-14 PROCEDURE — 3044F HG A1C LEVEL LT 7.0%: CPT | Mod: HCNC,CPTII,S$GLB, | Performed by: NURSE PRACTITIONER

## 2024-10-14 PROCEDURE — 3008F BODY MASS INDEX DOCD: CPT | Mod: HCNC,CPTII,S$GLB, | Performed by: NURSE PRACTITIONER

## 2024-10-14 PROCEDURE — 1159F MED LIST DOCD IN RCRD: CPT | Mod: HCNC,CPTII,S$GLB, | Performed by: NURSE PRACTITIONER

## 2024-10-14 PROCEDURE — 99214 OFFICE O/P EST MOD 30 MIN: CPT | Mod: HCNC,S$GLB,, | Performed by: NURSE PRACTITIONER

## 2024-10-14 PROCEDURE — 4010F ACE/ARB THERAPY RXD/TAKEN: CPT | Mod: HCNC,CPTII,S$GLB, | Performed by: NURSE PRACTITIONER

## 2024-10-14 PROCEDURE — 99999 PR PBB SHADOW E&M-EST. PATIENT-LVL III: CPT | Mod: PBBFAC,HCNC,, | Performed by: NURSE PRACTITIONER

## 2024-10-14 PROCEDURE — 3078F DIAST BP <80 MM HG: CPT | Mod: HCNC,CPTII,S$GLB, | Performed by: NURSE PRACTITIONER

## 2024-10-14 PROCEDURE — 3074F SYST BP LT 130 MM HG: CPT | Mod: HCNC,CPTII,S$GLB, | Performed by: NURSE PRACTITIONER

## 2024-10-14 RX ORDER — FLUTICASONE FUROATE, UMECLIDINIUM BROMIDE AND VILANTEROL TRIFENATATE 200; 62.5; 25 UG/1; UG/1; UG/1
1 POWDER RESPIRATORY (INHALATION) DAILY
Qty: 180 EACH | Refills: 3 | Status: SHIPPED | OUTPATIENT
Start: 2024-10-14

## 2024-10-14 RX ORDER — CODEINE PHOSPHATE AND GUAIFENESIN 10; 100 MG/5ML; MG/5ML
5 SOLUTION ORAL EVERY 8 HOURS PRN
Qty: 237 ML | Refills: 0 | Status: SHIPPED | OUTPATIENT
Start: 2024-10-14

## 2024-10-14 RX ORDER — AZITHROMYCIN 500 MG/1
500 TABLET, FILM COATED ORAL DAILY
Qty: 3 TABLET | Refills: 1 | Status: SHIPPED | OUTPATIENT
Start: 2024-10-14

## 2024-10-14 RX ORDER — ALBUTEROL SULFATE 90 UG/1
2 INHALANT RESPIRATORY (INHALATION) EVERY 6 HOURS PRN
Qty: 18 G | Refills: 11 | Status: SHIPPED | OUTPATIENT
Start: 2024-10-14

## 2024-10-14 NOTE — PATIENT INSTRUCTIONS
Continue Trelegy one puff once per day.  This inhaler contains an inhaled steroid component. Rinse mouth after each use due to risk for thrush development. If mouth or tongue develops white sores please contact the clinic and I will order a prescription mouth wash.     Continue Albuterol inhaler as needed for shortness of breath, wheezing, cough.     Prednisone - Keep on Hand and take only as needed, use sparingly.    Azithromycin - Keep on hand, take only as needed for yellow or green mucous production.    Codeine cough syrup prescribed - to be taken only as needed for cough. This will make you drowsy, do not drive or operative heavy machinery after use. Do not take with other sedating medications. Do not mix with alcohol. Utilize fall precautions with use.     Continue current prescription medication regiment. Keep follow up appointment as scheduled. Please call the office if you have any questions or concerns.

## 2024-10-14 NOTE — PROGRESS NOTES
10/14/2024    Daylin Lynch  In office visit     Chief Complaint   Patient presents with    Asthma    Follow-up       HPI:  10/14/2024:  Using Trelegy once per day and Albuterol PRN - states Albuterol use is down currently - states not even using every other day.  States three weeks ago got illness - started Prednisone regimen and Codeine cough syrup at that time with great reported benefit. Took one pill per day for five days of Prednisone. Denies recent use of abx therapy. Feels as if sickness is related to weather changes at times. Using codeine cough syrup only as needed for intermittent cough - reports great benefit with use. Cough is associated with nocturnal arousals, difficulty staying asleep. Initially cough was productive however not any longer.        04/09/2024:  Took one Nucala injection in January - states can't afford the medication and did not notice great benefit with use anyway.  Using Trelegy once per day and Albuterol PRN - states Albuterol use is approx 2x per day currently.  Last month developed illness that lasted two weeks while in [place] - states she lost her voice during that time and suffered with chest tightness.   Completed prednisone 5 day regimen and Azithromycin 3 day regimen at that time with reported benefit.   Using codeine cough syrup only as needed for persistent cough - reports great benefit with use. Cough is associated with nocturnal arousals, difficulty staying asleep. Initially cough was productive however not any longer.  Patient Instructions   Continue Trelegy once per day.  Albuterol as needed.   Prednisone - Keep on Hand and take only as needed, use sparingly.  Azithromycin - Keep on hand, take only as needed.  Codeine cough syrup prescribed - to be taken only as needed for cough. This will make you drowsy, do not drive or operative heavy machinery after use. Do not take with other sedating medications. Do not mix with alcohol. Utilize fall precautions with use.    Continue current prescription medication regiment. Keep follow up appointment as scheduled. Please call the office if you have any questions or concerns.           01/08/2024:  Spent majority of the holidays in [place] visit in family.  Developed illness while there, completed 5 day regimen of prednisone at that time with reported benefit.  States since returning home she has now developed illness again.  Recently completed another 5 day regimen of prednisone and 3 day regimen azithromycin with minimal improvement.  Cough:  Persistent since onset, nonproductive.  Associated with nocturnal arousals, difficulty falling asleep.  Associated with wheezing and chest tightness.  Patient endorses generalized low energy.  Patient denies fever, GI complaints.  Currently taking Trelegy once per day and albuterol as needed.  Currently requiring albuterol approximately up to 8 times per day.  Not currently use nebulizer treatment.  Is on Nucala monthly, however has not received Nucala injection since November due to insurance/pharmacy.  Patient Instructions   Concern for acute asthma exacerbation.  Take prednisone as prescribed.  This is the regimen that is instructed to be taken 3 pills for 3 days followed by 2 pills for 3 days followed by 1 pill for 3 days.  I have sent another prescription for prednisone with instructions to take 1 pill per day for 5 days.  Do not take this regimen at this time, this is for your on hand need.  Azithromycin ordered.  I would not take this at this time.  This is only to be taken as needed for green-yellow mucus production.  Chest x-ray now to ensure no developing pneumonia.  We will restart Nucala injection today.  Sample given in office today.    LOT [id number]  EXPIRATION MAY 2026  Codeine cough syrup prescribed - to be taken only as needed for cough. This will make you drowsy, do not drive or operative heavy machinery after use. Do not take with other sedating medications. Do not mix with alcohol.  Utilize fall precautions with use.   Continue Trelegy once per day.  Albuterol as needed.  I have refilled your nebulized medicines as well.  Continue current prescription medication regiment. Keep follow up appointment as scheduled. Please call the office if you have any questions or concerns.             10/10/2023:  Approximately three weeks ago visited a nursery - ever since developed symptoms of asthma flare. Required Azithromycin and Prednisone 20 mg x 5 days. States with intervention, her symptoms have improved however she is still experiencing a lingering cough. Cough is productive with clear mucous. Mild wheezing and chest tightness reported.   States currently using Trelegy once per day. Using Albuterol PRN - approx 4 times per day as of late.  Still on Nucala - next dose is due October 20 - states she recently got a bill for $1000 for Nucala - will investigate.   Patient Instructions   Continue current asthma regiment including Trelegy once per day and Albuterol as needed for shortness of breath, wheezing, cough.  Continue Nucala monthly. Will reach out to Rep and see about getting assistance through the .   Will send prescription for Trelegy to [facility]. You must call and enroll in their inhaler program. Call the number on the handout I gave you in office.   Chest Xray if no improvement of symptoms.   Keep Prednisone and Azithromycin on hand for as needed symptoms - take sparingly, take as prescribed.  Continue current medication regiment. Keep follow up appointment as scheduled. Please call the office if you have any questions or concerns.       04/10/2023:  Overall doing well!   States currently using Trelegy once per day with great benefit in comparison to Symbicort.  Using Albuterol only as needed, not requiring daily.  Took a trip to [place] last month - after day 3 of trip got ill - took Azithromycin 3 day regiment and Prednisone 5 day regiment at that time with great benefit of  symptoms.  Still on Nucala - states tolerating well and is noticing great benefit with use.  Patient Instructions   Continue current asthma regiment including Trelegy once per day and Albuterol as needed for shortness of breath, wheezing, cough.  Continue Nucala monthly.  Keep Prednisone and Azithromycin on hand for as needed symptoms - take sparingly, take as prescribed.  Continue current medication regiment. Keep follow up appointment as scheduled. Please call the office if you have any questions or concerns.         1/9/2023:  Recent trip to [place] - states got sick during Travel. Completed Prednisone regiment for 5 days with benefit.  Still on Nucala - recently approved per insurance - states next dose in due 1/15.  Started weight watchers again, looking forward to losing weight.  Using Symbicort two puffs twice daily with benefit. Using Albuterol only as needed - states over the last week she has required Albuterol more - using approximately 3x per day.  Recently received letter stating disability was approved. Looking to get on Medicare soon.  Patient Instructions   Continue current asthma regiment including Symbicort twice per day and Albuterol as needed.  Nebulizer machine ordered with Albuterol and Budesonide - to be taken only as needed.  Keep Prednisone on hand to be taken only as needed, use sparingly.  Will give sample Trelegy today - this is one puff once per day. This inhaler contains an inhaled steroid component. Rinse mouth after each use due to risk for thrush development. If mouth or tongue develops white sores please contact the clinic and I will order a prescription mouth wash.   Stop Symbicort while using Trelegy.  Continue current medication regiment. Keep follow up appointment as scheduled. Please call the office if you have any questions or concerns.           11/8/2022:  Recent trip to [place] to visit family - [facility] while on trip had an asthma flare. Started taking Prednisone yesterday, on  day 2.   Reports use of Prednisone two times over the last six months alone - had to take regiment upon discharge from hospital in June 2022. Reports great benefit with steroid use.   Using Symbicort two puffs twice daily with benefit. Using Albuterol only as needed - states over the last week she has required Albuterol more - using approximately 3x per day.  On Singulair nightly with benefit.   Reports chest tightness.   Shortness of breath: Persistent over the last week or so - associated with wheezing, worse at night time. Associated with nocturnal arousals nightly. States can't perform ADLS without stopping for rest.  Cough: Persistent, worse at night time, non productive. Relieved somewhat with cough syrup.  Patient Instructions   Asthma - severe persistent, uncontrolled with 2 steroid courses in the past six months. Eosinophil count 500 in June 2022.   Can check CBC now, then initiate Nucala in clinic.  Nucala sample given in clinic  LOT number [id number]  Exp May 2025  Continue Symbicort twice per day. Albuterol as needed for shortness of breath, wheezing, cough.  Continue current medication regiment. Keep follow up appointment as scheduled. Please call the office if you have any questions or concerns.         8/8/2022:  Recent trip to [place] - had several issues with shortness of breath, coughing while on trip. Overall doing better since returning home.   Cough: Overall improved - nonproductive, no time correlation identified. Denies wheezing.  Shortness of breath: Persistent with minimal exertion - improves with rest.   Currently using Symbicort - two puffs twice per day with reported benefit. Using Albuterol only as needed, not daily use - states while on trip in [place] required several times per day, however hasn't needed much since returning home.  Hasn't taken Prednisone since prior visit.   Patient Instructions   Overall you seem to be doing better.  Would continue Symbicort two puffs twice daily with  Albuterol use as needed for shortness of breath, wheezing.  Keep Prednisone on hand to take as needed for shortness of breath, cough, wheezing.  Can consider biologic in the future if symptoms worsen.  Consider PFT if the future if no improvement.  Continue current medication regiment. Keep follow up appointment as scheduled. Please call the office if you have any questions or concerns.         6/24/22:  Hx: Annual bronchitis in the winter, pleurisy, CHF, AICD placement,   Recent ER visit on 6/12/22 to [facility] for CP, SOB - patient was noted to have elevated troponin at that time, decision made for transfer to [facility] for possible cath. Patient underwent series of testing including echocardiogram revealing EF 40% in the setting of known systolic heart failure. Patient did not undergo angiogram during this hospitalization. CTA obtained revealing no PE, however concern regarding atypical pneumonia due to bilateral ground glass opacities. Patient was treated with IV Rocephin, Azithromycin, and steroid - reported great improvement at that time with medication therapy. Patient was subsequently discharged home, without supplemental oxygen, with a prescription for Levaquin (reports completion), five days worth of steroid PO, and PRN albuterol inhaler.   Was seen per PCP on Tuesday 6/21 and prescribed Symbicort inhaler, Flonase, and Promethazine cough syrup.   Cough: Persistent over the last 6 months - states it's overall improved since hospitalization however not completely resolved. States cough was attributed in the past to congestive heart failure. Was diagnosed with bronchitis per primary care provider in March - given codeine cough syrup at that time, no antibiotic or steroids. Cough persists throughout the day however is noticeably worse at night time, when lying down. Non productive. Associated with coughing fits, dizziness. Associated with chest tightness, wheezing. No relief noted with tessalon pearls,  however has tried codeine and promethazine cough syrup separately - states codeine worked better.  Shortness of breath: Gradual onset over the last six months - feels chest heaviness at rest however experiences exertional dyspnea - states affecting ADLS. Can't take a shower and get dressed without stopping for rest. Improves with rest.   Does endorse sinus congestion, ear congestion, post nasal drip - started Zyrtec daily in April, does not appreciate benefit.   Has started Symbicort - taking two puffs in the morning - has been on for three days now, reports benefit with use.  Has tried Albuterol in the past without noticeable benefit.   Takes Protonix daily.  Per review of EMR discharge summary:  Hospital Course:   61-year-old lady with numerous medical problems including chronic systolic CHF who has been suffering with dry cough for last couple months presented to ED with cough and cough related pain found to have mildly elevated troponin and was subsequently transferred to [facility].  Serial EKGs, cardiac enzymes remained negative for acute coronary syndrome.  Stress test ruled out any reversible ischemia.  Her ejection fraction is overall stable from previous echo.  Overall symptomatology is more consistent with pulmonary in origin, CTA chest showed atypical pneumonia.  She responded well after starting Rocephin and azithromycin as well as 1 dose of IV steroid.  Patient has chronic postnasal drip.  She was discharged home in hemodynamically stable condition with prescription of Levaquin, p.r.n. albuterol.  She was advised to follow-up with her PCP, I advised her that if her symptoms do not improve she may discuss with her cardiologist if Entresto is the culprit of chronic dry cough.  I also generated outpatient pulmonary referral.   Patient Instructions   Repeat chest xray in 1-2 weeks to evaluate post atypical pneumonia.  Symptoms are somewhat concerning for asthmatic component. Would continue Symbicort however start  using 2 puffs twice per day.   Can use Albuterol as needed for shortness of breath, cough.  For sinus complaints - can try Singulair at night time with Zyrtec during the day.   Can send to ENT if interested.  Etiology of cough is unknown at this time - however could be multifactorial.  Entresto?  GERD - Currently on Protonix  Codeine cough syrup to be taken as needed for cough, caution as may make drowsy. Do not drive after taking.  Prednisone, take only as needed.  Continue current medication regiment. Keep follow up appointment as scheduled. Please call the office if you have any questions or concerns.         Social Hx: Lives with  - 2 animals in the home. Former [employer] employee, . No Asbestosis exposure, Smoking Hx: Former smoker, on and off for approx ten years - quit in   Family Hx: No Lung Cancer, Mother COPD, No Asthma  Medical Hx: Previous pneumonia ; No previous shoulder surgery - Defibrillator placement         The chief compliant problem varies with instability at times.  PFSH:  Past Medical History:   Diagnosis Date    Allergy     Asthma     Bilateral leg edema     Hyperlipidemia     Hypertension     Leg pain, bilateral     left leg more severe    Varicose vein          Past Surgical History:   Procedure Laterality Date     SECTION, LOW TRANSVERSE      x 3    EYE SURGERY  2023    lt retinal surgery    HYSTERECTOMY  2012    pace maker  2020    SPINE SURGERY  2021    stents both legs       Social History     Tobacco Use    Smoking status: Former     Current packs/day: 0.00     Types: Cigarettes     Quit date: 1985     Years since quittin.8     Passive exposure: Past    Smokeless tobacco: Never   Substance Use Topics    Alcohol use: Yes     Comment: occ    Drug use: No     Family History   Problem Relation Name Age of Onset    Heart disease Mother      Heart failure Father      Migraines Sister 1/2     Cancer Sister 1/2         lung then pancreatic  "    Review of patient's allergies indicates:  No Known Allergies  I have reviewed past medical, family, and social history. I have reviewed previous nurse notes.    Performance Status:The patient's activity level is functions out of house.      Review of Systems:  a review of eleven systems covering constitutional, Eye, HEENT, Psych, Respiratory, Cardiac, GI, , Musculoskeletal, Endocrine, Dermatologic was negative except for pertinent findings as listed ABOVE and below: pertinent positive as above, rest is good       Exam:Comprehensive exam done. /72   Pulse 93   Ht 5' 7" (1.702 m)   Wt 93.2 kg (205 lb 7.5 oz)   SpO2 97% Comment: on room air at rest  BMI 32.18 kg/m²   Exam included Vitals as listed  Constitutional: She is oriented to person, place, and time. She appears well-developed. No distress.   Nose: Nose normal.   Mouth/Throat: Uvula is midline, oropharynx is clear and moist and mucous membranes are normal. No dental caries. No oropharyngeal exudate, posterior oropharyngeal edema, posterior oropharyngeal erythema or tonsillar abscesses.  Mallapatti (M) score 3  Eyes: Pupils are equal, round, and reactive to light.   Neck: No JVD present. No thyromegaly present.   Cardiovascular: Normal rate, regular rhythm and normal heart sounds. Exam reveals no gallop and no friction rub.   No murmur heard.  Pulmonary/Chest: Effort normal and breath sounds normal. No accessory muscle usage or stridor. No apnea and no tachypnea. No respiratory distress, decreased breath sounds, wheezes, rhonchi, rales, or tenderness. Clear breath sounds throughout, on room air, in no acute distress.  Abdominal: Soft. She exhibits no mass. There is no tenderness. No hepatosplenomegaly, hernias and normoactive bowel sounds  Musculoskeletal: Normal range of motion. exhibits no edema.   Neurological:  alert and oriented to person, place, and time. not disoriented.   Skin: Skin is warm and dry. Capillary refill takes less 2 sec. No " cyanosis or erythema. No pallor. Nails show no clubbing.   Psychiatric: normal mood and affect. behavior is normal. Judgment and thought content normal.         Radiographs (ct chest and cxr) reviewed: view by direct vision     Chest Xray 7/20/2022  FINDINGS:  An AICD is noted in place on the left.  The cardiac size and contours within normal limits.  No intrapulmonary mass or infiltrate is seen.  No pneumothorax or pleural effusion is noted.   Impression:   AICD noted in place otherwise negative chest    CTA Chest Non Coronary  6/13/2022   IMPRESSION:   1.  No pulmonary embolism identified.  2.  Patchy groundglass lung opacities are noted bilaterally, mostly involving the lung bases. These most likely reflect inspiratory effort or subsegmental atelectasis. Atypical infection could have a similar appearance in the proper clinical setting      X-Ray Chest PA And Lateral   6/13/2022   Impression:   AICD in place otherwise negative chest      Patient's labs were reviewed including CBC and CMP    Lab Results   Component Value Date    WBC 8.3 10/06/2023    RBC 4.38 10/06/2023    HGB 13.3 10/06/2023    HCT 41.7 10/06/2023    MCV 95 10/06/2023    MCH 30.4 10/06/2023    MCHC 31.9 10/06/2023    RDW 13.1 10/06/2023     10/06/2023    MPV 10.1 11/08/2022    GRAN 8.8 (H) 11/08/2022    GRAN 80.2 (H) 11/08/2022    LYMPH 2.9 10/06/2023    MONO 8 10/06/2023    MONO 0.7 10/06/2023    EOS 0.1 10/06/2023    BASO 0.1 10/06/2023    EOSINOPHIL 1 10/06/2023    BASOPHIL 1 10/06/2023   CMP  Sodium   Date Value Ref Range Status   04/04/2024 141 134 - 144 mmol/L Final   01/23/2017 139 134 - 144 mmol/L      Potassium   Date Value Ref Range Status   04/04/2024 4.4 3.5 - 5.2 mmol/L Final     Chloride   Date Value Ref Range Status   04/04/2024 103 96 - 106 mmol/L Final   01/23/2017 101 98 - 110 mmol/L      CO2   Date Value Ref Range Status   04/04/2024 23 20 - 29 mmol/L Final     Glucose   Date Value Ref Range Status   04/04/2024 95 70 - 99  mg/dL Final   01/23/2017 85 70 - 99 mg/dL      BUN   Date Value Ref Range Status   04/04/2024 21 8 - 27 mg/dL Final     Creatinine   Date Value Ref Range Status   04/04/2024 0.65 0.57 - 1.00 mg/dL Final   01/23/2017 0.58 (L) 0.60 - 1.40 mg/dL      Calcium   Date Value Ref Range Status   04/04/2024 9.3 8.7 - 10.3 mg/dL Final     Total Protein   Date Value Ref Range Status   06/12/2022 6.8 6.0 - 8.4 g/dL Final     Albumin   Date Value Ref Range Status   04/04/2024 4.1 3.9 - 4.9 g/dL Final   06/12/2022 3.4 (L) 3.5 - 5.2 g/dL Final   01/23/2017 4.1 3.1 - 4.7 g/dL      Total Bilirubin   Date Value Ref Range Status   04/04/2024 0.5 0.0 - 1.2 mg/dL Final     Alkaline Phosphatase   Date Value Ref Range Status   06/12/2022 61 55 - 135 U/L Final     AST   Date Value Ref Range Status   04/04/2024 18 0 - 40 IU/L Final     ALT   Date Value Ref Range Status   04/04/2024 8 0 - 32 IU/L Final     Anion Gap   Date Value Ref Range Status   06/14/2022 11 8 - 16 mmol/L Final     eGFR   Date Value Ref Range Status   04/04/2024 99 >59 mL/min/1.73 Final         PFT will be done and results to be reviewed  Pulmonary Functions Testing Results:    Transthoracic echo (TTE) complete 6/13/2022:   Summary  The left ventricle is normal in size with concentric hypertrophy and mildly decreased systolic function.  The estimated ejection fraction is 40%.  Grade I left ventricular diastolic dysfunction.  There is mild left ventricular global hypokinesis.  Normal right ventricular size with normal right ventricular systolic function.  Moderate left atrial enlargement.  Mild tricuspid regurgitation.  Normal central venous pressure (3 mmHg).  The estimated PA systolic pressure is 10 mmHg.        Plan:  Clinical impression is resonably certain and repeated evaluation prn +/- follow up will be needed as below.    Daylin was seen today for asthma and follow-up.    Diagnoses and all orders for this visit:    Severe persistent asthma without complication  -      fluticasone-umeclidin-vilanter (TRELEGY ELLIPTA) 200-62.5-25 mcg inhaler; Inhale 1 puff into the lungs once daily.  -     albuterol (PROVENTIL/VENTOLIN HFA) 90 mcg/actuation inhaler; Inhale 2 puffs into the lungs every 6 (six) hours as needed for Wheezing. use as directed  -     azithromycin (ZITHROMAX) 500 MG tablet; Take 1 tablet (500 mg total) by mouth once daily.    Cough, unspecified type  -     guaiFENesin-codeine 100-10 mg/5 ml (TUSSI-ORGANIDIN NR)  mg/5 mL syrup; Take 5 mLs by mouth every 8 (eight) hours as needed for Cough.    Eosinophilic asthma    Chronic combined systolic and diastolic heart failure    Chronic seasonal allergic rhinitis due to pollen        Follow up in about 6 months (around 4/14/2025), or if symptoms worsen or fail to improve.    Discussed with patient above for education the following:      Patient Instructions   Continue Trelegy one puff once per day.  This inhaler contains an inhaled steroid component. Rinse mouth after each use due to risk for thrush development. If mouth or tongue develops white sores please contact the clinic and I will order a prescription mouth wash.     Continue Albuterol inhaler as needed for shortness of breath, wheezing, cough.     Prednisone - Keep on Hand and take only as needed, use sparingly.    Azithromycin - Keep on hand, take only as needed for yellow or green mucous production.    Codeine cough syrup prescribed - to be taken only as needed for cough. This will make you drowsy, do not drive or operative heavy machinery after use. Do not take with other sedating medications. Do not mix with alcohol. Utilize fall precautions with use.     Continue current prescription medication regiment. Keep follow up appointment as scheduled. Please call the office if you have any questions or concerns.

## 2024-10-16 ENCOUNTER — OFFICE VISIT (OUTPATIENT)
Dept: FAMILY MEDICINE | Facility: CLINIC | Age: 64
End: 2024-10-16
Payer: MEDICARE

## 2024-10-16 VITALS
SYSTOLIC BLOOD PRESSURE: 100 MMHG | DIASTOLIC BLOOD PRESSURE: 62 MMHG | HEART RATE: 89 BPM | OXYGEN SATURATION: 97 % | HEIGHT: 67 IN | WEIGHT: 206 LBS | BODY MASS INDEX: 32.33 KG/M2

## 2024-10-16 DIAGNOSIS — E55.9 VITAMIN D DEFICIENCY: ICD-10-CM

## 2024-10-16 DIAGNOSIS — Z12.31 SCREENING MAMMOGRAM FOR BREAST CANCER: ICD-10-CM

## 2024-10-16 DIAGNOSIS — E78.2 MIXED HYPERLIPIDEMIA: ICD-10-CM

## 2024-10-16 DIAGNOSIS — K64.9 HEMORRHOIDS, UNSPECIFIED HEMORRHOID TYPE: ICD-10-CM

## 2024-10-16 DIAGNOSIS — I10 ESSENTIAL (PRIMARY) HYPERTENSION: Primary | ICD-10-CM

## 2024-10-16 DIAGNOSIS — R73.03 PREDIABETES: ICD-10-CM

## 2024-10-16 PROCEDURE — 3008F BODY MASS INDEX DOCD: CPT | Mod: CPTII,S$GLB,, | Performed by: NURSE PRACTITIONER

## 2024-10-16 PROCEDURE — 3078F DIAST BP <80 MM HG: CPT | Mod: CPTII,S$GLB,, | Performed by: NURSE PRACTITIONER

## 2024-10-16 PROCEDURE — 3044F HG A1C LEVEL LT 7.0%: CPT | Mod: CPTII,S$GLB,, | Performed by: NURSE PRACTITIONER

## 2024-10-16 PROCEDURE — 1160F RVW MEDS BY RX/DR IN RCRD: CPT | Mod: CPTII,S$GLB,, | Performed by: NURSE PRACTITIONER

## 2024-10-16 PROCEDURE — 3074F SYST BP LT 130 MM HG: CPT | Mod: CPTII,S$GLB,, | Performed by: NURSE PRACTITIONER

## 2024-10-16 PROCEDURE — 4010F ACE/ARB THERAPY RXD/TAKEN: CPT | Mod: CPTII,S$GLB,, | Performed by: NURSE PRACTITIONER

## 2024-10-16 PROCEDURE — 1159F MED LIST DOCD IN RCRD: CPT | Mod: CPTII,S$GLB,, | Performed by: NURSE PRACTITIONER

## 2024-10-16 PROCEDURE — 99214 OFFICE O/P EST MOD 30 MIN: CPT | Mod: S$GLB,,, | Performed by: NURSE PRACTITIONER

## 2024-10-16 RX ORDER — HYDROCORTISONE ACETATE 25 MG/1
25 SUPPOSITORY RECTAL 2 TIMES DAILY
Qty: 20 SUPPOSITORY | Refills: 0 | Status: SHIPPED | OUTPATIENT
Start: 2024-10-16 | End: 2024-10-26

## 2024-10-16 NOTE — PROGRESS NOTES
SUBJECTIVE:      Patient ID: Daylin Lynch is a 64 y.o. female.    Chief Complaint: Diabetes    Mrs Lynch is here today to f/u on HTN, prediabetes and Vitamin def. She is following with Dr Jaffe for cardiomyopathy. Following with pulmonology for asthma. Complaining of constipation with a hemorrhoid. She has started a natural supplement for constipation which is helping    Hypertension  This is a chronic problem. The current episode started more than 1 year ago. The problem is unchanged. The problem is controlled. Pertinent negatives include no anxiety, blurred vision, chest pain, headaches, malaise/fatigue, neck pain, orthopnea, palpitations, peripheral edema, PND, shortness of breath or sweats. Agents associated with hypertension include steroids. Risk factors for coronary artery disease include dyslipidemia and obesity. Past treatments include angiotensin blockers. The current treatment provides moderate improvement. Compliance problems include exercise.  There is no history of angina, kidney disease or CAD/MI. There is no history of chronic renal disease, hyperparathyroidism, a hypertension causing med or a thyroid problem.   Hyperlipidemia  This is a chronic problem. The current episode started more than 1 year ago. The problem is controlled. Recent lipid tests were reviewed and are normal. She has no history of chronic renal disease, diabetes, hypothyroidism, liver disease, obesity or nephrotic syndrome. There are no known factors aggravating her hyperlipidemia. Pertinent negatives include no chest pain, myalgias or shortness of breath. Current antihyperlipidemic treatment includes diet change, exercise and ezetimibe. The current treatment provides moderate improvement of lipids. Risk factors for coronary artery disease include dyslipidemia, hypertension, obesity and post-menopausal.       Past Surgical History:   Procedure Laterality Date     SECTION, LOW TRANSVERSE      x 3    EYE SURGERY  2023     lt retinal surgery    HYSTERECTOMY  2012    pace maker  2020    SPINE SURGERY  2021    stents both legs       Family History   Problem Relation Name Age of Onset    Heart disease Mother      Heart failure Father      Migraines Sister 1/2     Cancer Sister 1/2         lung then pancreatic      Social History     Socioeconomic History    Marital status:    Tobacco Use    Smoking status: Former     Current packs/day: 0.00     Types: Cigarettes     Quit date: 1985     Years since quittin.8     Passive exposure: Past    Smokeless tobacco: Never   Substance and Sexual Activity    Alcohol use: Yes     Comment: occ    Drug use: No    Sexual activity: Yes   Social History Narrative    Live with      Social Drivers of Health     Financial Resource Strain: Low Risk  (2024)    Overall Financial Resource Strain (CARDIA)     Difficulty of Paying Living Expenses: Not hard at all   Food Insecurity: No Food Insecurity (2024)    Hunger Vital Sign     Worried About Running Out of Food in the Last Year: Never true     Ran Out of Food in the Last Year: Never true   Transportation Needs: No Transportation Needs (2024)    PRAPARE - Transportation     Lack of Transportation (Medical): No     Lack of Transportation (Non-Medical): No   Physical Activity: Insufficiently Active (2024)    Exercise Vital Sign     Days of Exercise per Week: 2 days     Minutes of Exercise per Session: 60 min   Stress: No Stress Concern Present (2024)    [employer] [place] [employer] - Occupational Stress Questionnaire     Feeling of Stress : Not at all   Housing Stability: Low Risk  (2024)    Housing Stability Vital Sign     Unable to Pay for Housing in the Last Year: No     Homeless in the Last Year: No     Current Outpatient Medications   Medication Sig Dispense Refill    albuterol (ACCUNEB) 1.25 mg/3 mL Nebu Take 3 mLs (1.25 mg total) by nebulization every 6 (six) hours as needed  (Cough). Rescue 75 mL 11    albuterol (PROVENTIL/VENTOLIN HFA) 90 mcg/actuation inhaler Inhale 2 puffs into the lungs every 6 (six) hours as needed for Wheezing. use as directed 18 g 11    aspirin 325 MG tablet Take 325 mg by mouth once daily.      budesonide (PULMICORT) 0.5 mg/2 mL nebulizer solution Take 2 mLs (0.5 mg total) by nebulization daily as needed (Shortness of breath, wheezing, cough). Controller 120 mL 5    carvediloL (COREG) 6.25 MG tablet Take 6.25 mg by mouth 2 (two) times daily.       cetirizine (ZYRTEC) 10 MG tablet Take 1 tablet (10 mg total) by mouth once daily.  0    coenzyme Q10 (CO Q-10) 100 mg capsule Take 1 capsule (100 mg total) by mouth once daily. 30 capsule 11    escitalopram oxalate (LEXAPRO) 10 MG tablet Take 10 mg by mouth once daily.      ezetimibe (ZETIA) 10 mg tablet 1 tablet once daily.      fluticasone propionate (FLONASE) 50 mcg/actuation nasal spray 2 sprays (100 mcg total) by Each Nostril route once daily. 16 g 6    fluticasone-umeclidin-vilanter (TRELEGY ELLIPTA) 200-62.5-25 mcg inhaler Inhale 1 puff into the lungs once daily. 180 each 3    melatonin 10 mg Tab Take 2 tablets by mouth nightly as needed.      montelukast (SINGULAIR) 10 mg tablet Take 1 tablet (10 mg total) by mouth once daily. 90 tablet 3    rosuvastatin (CRESTOR) 5 MG tablet Take 5 mg by mouth once daily.      spironolactone (ALDACTONE) 50 MG tablet Take 50 mg by mouth once daily.      valsartan (DIOVAN) 40 MG tablet Take 40 mg by mouth 2 (two) times daily.      azithromycin (ZITHROMAX) 500 MG tablet Take 1 tablet (500 mg total) by mouth once daily. (Patient not taking: Reported on 10/16/2024) 3 tablet 1    guaiFENesin-codeine 100-10 mg/5 ml (TUSSI-ORGANIDIN NR)  mg/5 mL syrup Take 5 mLs by mouth every 8 (eight) hours as needed for Cough. (Patient not taking: Reported on 10/16/2024) 237 mL 0    hydrocortisone (ANUSOL-HC) 25 mg suppository Place 1 suppository (25 mg total) rectally 2 (two) times daily.  for 10 days 20 suppository 0    predniSONE (DELTASONE) 20 MG tablet Take one pill per day for three days. Can repeat as needed for shortness of breath, wheezing, cough. (Patient not taking: Reported on 10/16/2024) 15 tablet 0     No current facility-administered medications for this visit.     Review of patient's allergies indicates:  No Known Allergies   Past Medical History:   Diagnosis Date    Allergy     Asthma     Bilateral leg edema     Hyperlipidemia     Hypertension     Leg pain, bilateral     left leg more severe    Varicose vein      Past Surgical History:   Procedure Laterality Date     SECTION, LOW TRANSVERSE      x 3    EYE SURGERY  2023    lt retinal surgery    HYSTERECTOMY      pace maker  2020    SPINE SURGERY  2021    stents both legs         Review of Systems   Constitutional:  Negative for activity change, appetite change, chills, diaphoresis, malaise/fatigue and unexpected weight change.   HENT:  Negative for ear discharge, hearing loss, rhinorrhea, trouble swallowing and voice change.    Eyes:  Negative for blurred vision, photophobia, pain, discharge and visual disturbance.   Respiratory:  Negative for chest tightness, shortness of breath, wheezing and stridor.    Cardiovascular:  Negative for chest pain, palpitations, orthopnea and PND.   Gastrointestinal:  Positive for constipation. Negative for abdominal pain, blood in stool, diarrhea and vomiting.   Endocrine: Negative for cold intolerance, heat intolerance, polydipsia and polyuria.   Genitourinary:  Negative for difficulty urinating, dysuria, flank pain, hematuria and menstrual problem.   Musculoskeletal:  Negative for arthralgias, joint swelling, myalgias, neck pain and neck stiffness.   Skin:  Negative for pallor.   Neurological:  Negative for dizziness, speech difficulty, weakness and headaches.   Hematological:  Does not bruise/bleed easily.   Psychiatric/Behavioral:  Negative for confusion, dysphoric mood,  "self-injury, sleep disturbance and suicidal ideas. The patient is not nervous/anxious.       OBJECTIVE:      Vitals:    10/16/24 1010   BP: 100/62   Pulse: 89   SpO2: 97%   Weight: 93.4 kg (206 lb)   Height: 5' 7" (1.702 m)     Physical Exam  Vitals and nursing note reviewed.   Constitutional:       General: She is not in acute distress.     Appearance: She is well-developed.   HENT:      Head: Normocephalic and atraumatic.      Right Ear: Tympanic membrane normal.      Left Ear: Tympanic membrane normal.      Nose: Nose normal.      Mouth/Throat:      Pharynx: Uvula midline.   Eyes:      General: Lids are normal.      Conjunctiva/sclera: Conjunctivae normal.      Pupils: Pupils are equal, round, and reactive to light.      Right eye: Pupil is round and reactive.      Left eye: Pupil is round and reactive.   Neck:      Thyroid: No thyromegaly.      Vascular: No JVD.      Trachea: Trachea normal.   Cardiovascular:      Rate and Rhythm: Normal rate and regular rhythm.      Pulses: Normal pulses.      Heart sounds: Normal heart sounds.   Pulmonary:      Effort: Pulmonary effort is normal. No tachypnea or respiratory distress.      Breath sounds: Normal breath sounds. No wheezing, rhonchi or rales.   Abdominal:      General: Bowel sounds are normal.      Palpations: Abdomen is soft.      Tenderness: There is no abdominal tenderness.   Musculoskeletal:         General: Normal range of motion.      Cervical back: Normal range of motion and neck supple.      Right lower leg: No edema.      Left lower leg: No edema.   Lymphadenopathy:      Cervical: No cervical adenopathy.   Skin:     General: Skin is warm and dry.      Findings: No rash.   Neurological:      Mental Status: She is alert and oriented to person, place, and time.   Psychiatric:         Mood and Affect: Mood normal.         Speech: Speech normal.         Behavior: Behavior normal. Behavior is cooperative.         Thought Content: Thought content normal.         " Judgment: Judgment normal.        Patient Outreach on 10/09/2024   Component Date Value Ref Range Status    A1c 07/10/2024 5.8   Final    Lipids, Total Cholesterol 07/10/2024 207   Final    Triglycerides 07/10/2024 66  mg/dL Final    HDL 07/10/2024 64  mg/dL Final    LDL Cholesterol 07/10/2024 131  mg/dL Final   ]  Assessment:       1. Essential (primary) hypertension    2. Mixed hyperlipidemia    3. Prediabetes    4. Vitamin D deficiency    5. Screening mammogram for breast cancer    6. Hemorrhoids, unspecified hemorrhoid type        Plan:       Essential (primary) hypertension  -     Comprehensive Metabolic Panel; Future; Expected date: 10/30/2024  -     Lipid Panel; Future; Expected date: 10/30/2024  -     Hemoglobin A1C; Future; Expected date: 10/30/2024  -     Microalbumin/Creatinine Ratio, Urine; Future; Expected date: 10/16/2024  -     CBC Auto Differential; Future; Expected date: 10/30/2024    Mixed hyperlipidemia  -     Comprehensive Metabolic Panel; Future; Expected date: 10/30/2024  -     Lipid Panel; Future; Expected date: 10/30/2024  -     Hemoglobin A1C; Future; Expected date: 10/30/2024  -     Microalbumin/Creatinine Ratio, Urine; Future; Expected date: 10/16/2024  -     CBC Auto Differential; Future; Expected date: 10/30/2024    Prediabetes  -     Comprehensive Metabolic Panel; Future; Expected date: 10/30/2024  -     Lipid Panel; Future; Expected date: 10/30/2024  -     Hemoglobin A1C; Future; Expected date: 10/30/2024  -     Microalbumin/Creatinine Ratio, Urine; Future; Expected date: 10/16/2024  -     CBC Auto Differential; Future; Expected date: 10/30/2024    Vitamin D deficiency  Stable   Cont current supplement    Screening mammogram for breast cancer  -     Mammo Digital Screening Bilat; Future; Expected date: 11/20/2024    Hemorrhoids, unspecified hemorrhoid type  -     hydrocortisone (ANUSOL-HC) 25 mg suppository; Place 1 suppository (25 mg total) rectally 2 (two) times daily. for 10 days   Dispense: 20 suppository; Refill: 0  If not improved will follow up with gastro       Follow up in about 6 months (around 4/16/2025) for htn .      10/16/2024 ROBERTA Meraz, FNP

## 2024-10-22 ENCOUNTER — TELEPHONE (OUTPATIENT)
Dept: FAMILY MEDICINE | Facility: CLINIC | Age: 64
End: 2024-10-22
Payer: MEDICARE

## 2024-10-22 NOTE — TELEPHONE ENCOUNTER
----- Message from Jonathon Muñoz NP sent at 10/22/2024 12:42 PM CDT -----  Lipids are elevated, is she taking the crestor and zetia daily? Potassium is a little elevated. Please fax all labs to her cardiologist, they prescribe all her meds and they may need to be adjusted

## 2024-10-23 ENCOUNTER — TELEPHONE (OUTPATIENT)
Dept: FAMILY MEDICINE | Facility: CLINIC | Age: 64
End: 2024-10-23
Payer: MEDICARE

## 2024-10-23 NOTE — TELEPHONE ENCOUNTER
Had been out of cholesterol meds due to a mix up @ mail order pharmacy.  Started a new vitamin you spoke about @ her visit which has potassium and does eat a lot of dark leafy greens and spinach salads to attribute to slight potassium elevation

## 2024-11-19 ENCOUNTER — OFFICE VISIT (OUTPATIENT)
Dept: ORTHOPEDICS | Facility: CLINIC | Age: 64
End: 2024-11-19
Payer: MEDICARE

## 2024-11-19 VITALS — BODY MASS INDEX: 31.71 KG/M2 | HEIGHT: 67 IN | WEIGHT: 202 LBS

## 2024-11-19 DIAGNOSIS — J30.1 CHRONIC SEASONAL ALLERGIC RHINITIS DUE TO POLLEN: ICD-10-CM

## 2024-11-19 DIAGNOSIS — M75.51 SUBACROMIAL BURSITIS OF RIGHT SHOULDER JOINT: Primary | ICD-10-CM

## 2024-11-19 PROCEDURE — 99213 OFFICE O/P EST LOW 20 MIN: CPT | Mod: HCNC,S$GLB,, | Performed by: ORTHOPAEDIC SURGERY

## 2024-11-19 PROCEDURE — 3044F HG A1C LEVEL LT 7.0%: CPT | Mod: HCNC,CPTII,S$GLB, | Performed by: ORTHOPAEDIC SURGERY

## 2024-11-19 PROCEDURE — 3060F POS MICROALBUMINURIA REV: CPT | Mod: HCNC,CPTII,S$GLB, | Performed by: ORTHOPAEDIC SURGERY

## 2024-11-19 PROCEDURE — 99999 PR PBB SHADOW E&M-EST. PATIENT-LVL III: CPT | Mod: PBBFAC,HCNC,, | Performed by: ORTHOPAEDIC SURGERY

## 2024-11-19 PROCEDURE — 3008F BODY MASS INDEX DOCD: CPT | Mod: HCNC,CPTII,S$GLB, | Performed by: ORTHOPAEDIC SURGERY

## 2024-11-19 PROCEDURE — 3066F NEPHROPATHY DOC TX: CPT | Mod: HCNC,CPTII,S$GLB, | Performed by: ORTHOPAEDIC SURGERY

## 2024-11-19 PROCEDURE — 1160F RVW MEDS BY RX/DR IN RCRD: CPT | Mod: HCNC,CPTII,S$GLB, | Performed by: ORTHOPAEDIC SURGERY

## 2024-11-19 PROCEDURE — 4010F ACE/ARB THERAPY RXD/TAKEN: CPT | Mod: HCNC,CPTII,S$GLB, | Performed by: ORTHOPAEDIC SURGERY

## 2024-11-19 PROCEDURE — 1159F MED LIST DOCD IN RCRD: CPT | Mod: HCNC,CPTII,S$GLB, | Performed by: ORTHOPAEDIC SURGERY

## 2024-11-19 RX ORDER — MONTELUKAST SODIUM 10 MG/1
10 TABLET ORAL DAILY
Qty: 90 TABLET | Refills: 3 | Status: SHIPPED | OUTPATIENT
Start: 2024-11-19

## 2024-11-19 RX ORDER — FUROSEMIDE 20 MG/1
20 TABLET ORAL
COMMUNITY
Start: 2024-10-24

## 2024-11-19 NOTE — PROGRESS NOTES
[facility]    Subjective:     Chief Complaint:   Chief Complaint   Patient presents with    Right Shoulder - Pain     Right shoulder lat injected 24 and currently doing PT which is helping a lot, the injection has helped a lot as well, she is not 100% but much better       Past Medical History:   Diagnosis Date    Allergy     Asthma     Bilateral leg edema     Hyperlipidemia     Hypertension     Leg pain, bilateral     left leg more severe    Varicose vein        Past Surgical History:   Procedure Laterality Date     SECTION, LOW TRANSVERSE      x 3    EYE SURGERY  2023    lt retinal surgery    HYSTERECTOMY      pace maker  2020    SPINE SURGERY  2021    stents both legs         Current Outpatient Medications   Medication Sig    albuterol (ACCUNEB) 1.25 mg/3 mL Nebu Take 3 mLs (1.25 mg total) by nebulization every 6 (six) hours as needed (Cough). Rescue    albuterol (PROVENTIL/VENTOLIN HFA) 90 mcg/actuation inhaler Inhale 2 puffs into the lungs every 6 (six) hours as needed for Wheezing. use as directed    aspirin 325 MG tablet Take 325 mg by mouth once daily.    azithromycin (ZITHROMAX) 500 MG tablet Take 1 tablet (500 mg total) by mouth once daily.    budesonide (PULMICORT) 0.5 mg/2 mL nebulizer solution Take 2 mLs (0.5 mg total) by nebulization daily as needed (Shortness of breath, wheezing, cough). Controller    carvediloL (COREG) 6.25 MG tablet Take 6.25 mg by mouth 2 (two) times daily.     cetirizine (ZYRTEC) 10 MG tablet Take 1 tablet (10 mg total) by mouth once daily.    coenzyme Q10 (CO Q-10) 100 mg capsule Take 1 capsule (100 mg total) by mouth once daily.    escitalopram oxalate (LEXAPRO) 10 MG tablet Take 10 mg by mouth once daily.    ezetimibe (ZETIA) 10 mg tablet 1 tablet once daily.    fluticasone propionate (FLONASE) 50 mcg/actuation nasal spray 2 sprays (100 mcg total) by Each Nostril route once daily.    fluticasone-umeclidin-vilanter (TRELEGY ELLIPTA)  200-62.5-25 mcg inhaler Inhale 1 puff into the lungs once daily.    furosemide (LASIX) 20 MG tablet Take 20 mg by mouth.    guaiFENesin-codeine 100-10 mg/5 ml (TUSSI-ORGANIDIN NR)  mg/5 mL syrup Take 5 mLs by mouth every 8 (eight) hours as needed for Cough.    melatonin 10 mg Tab Take 2 tablets by mouth nightly as needed.    montelukast (SINGULAIR) 10 mg tablet Take 1 tablet (10 mg total) by mouth once daily.    predniSONE (DELTASONE) 20 MG tablet Take one pill per day for three days. Can repeat as needed for shortness of breath, wheezing, cough.    rosuvastatin (CRESTOR) 5 MG tablet Take 5 mg by mouth once daily.    spironolactone (ALDACTONE) 50 MG tablet Take 50 mg by mouth once daily.    valsartan (DIOVAN) 40 MG tablet Take 40 mg by mouth 2 (two) times daily.     No current facility-administered medications for this visit.       Review of patient's allergies indicates:  No Known Allergies    Family History   Problem Relation Name Age of Onset    Heart disease Mother      Heart failure Father      Migraines Sister 1/2     Cancer Sister 1/2         lung then pancreatic       Social History     Socioeconomic History    Marital status:    Tobacco Use    Smoking status: Former     Current packs/day: 0.00     Types: Cigarettes     Quit date: 1985     Years since quittin.9     Passive exposure: Past    Smokeless tobacco: Never   Substance and Sexual Activity    Alcohol use: Yes     Comment: occ    Drug use: No    Sexual activity: Yes   Social History Narrative    Live with      Social Drivers of Health     Financial Resource Strain: Low Risk  (2024)    Overall Financial Resource Strain (CARDIA)     Difficulty of Paying Living Expenses: Not hard at all   Food Insecurity: No Food Insecurity (2024)    Hunger Vital Sign     Worried About Running Out of Food in the Last Year: Never true     Ran Out of Food in the Last Year: Never true   Transportation Needs: No Transportation Needs  (7/11/2024)    PRAPARE - Transportation     Lack of Transportation (Medical): No     Lack of Transportation (Non-Medical): No   Physical Activity: Insufficiently Active (7/11/2024)    Exercise Vital Sign     Days of Exercise per Week: 2 days     Minutes of Exercise per Session: 60 min   Stress: No Stress Concern Present (7/11/2024)    [place] [employer] - Occupational Stress Questionnaire     Feeling of Stress : Not at all   Housing Stability: Low Risk  (7/11/2024)    Housing Stability Vital Sign     Unable to Pay for Housing in the Last Year: No     Homeless in the Last Year: No       History of present illness:  Ms. Mao comes in today for follow-up for her right shoulder.  She is 2 months out from a right shoulder injection and formal physical therapy.  She reports she has had drastic improvement in her symptoms after PT and the injection.  She is pleased with where she is at.  She would like to continue with PT.  She is tapering from 3 times a week to twice a week.  I believe that is amenable.    Review of Systems:    Constitution: Negative for chills, fever, and sweats.  Negative for unexplained weight loss.    HENT:  Negative for headaches and blurry vision.    Cardiovascular:Negative for chest pain or irregular heart beat. Negative for hypertension.    Respiratory:  Negative for cough and shortness of breath.    Gastrointestinal: Negative for abdominal pain, heartburn, melena, nausea, and vomitting.    Genitourinary:  Negative bladder incontinence and dysuria.    Musculoskeletal:  See HPI for details.     Neurological: Negative for numbness.    Psychiatric/Behavioral: Negative for depression.  The patient is not nervous/anxious.      Endocrine: Negative for polyuria    Hematologic/Lymphatic: Negative for bleeding problem.  Does not bruise/bleed easily.    Skin: Negative for poor would healing and rash    Objective:      Physical Examination:    Vital Signs:  There were no vitals filed  "for this visit.    Body mass index is 31.64 kg/m².    This a well-developed, well nourished patient in no acute distress.  They are alert and oriented and cooperative to examination.        Right shoulder exam: Skin to right shoulder is clean dry and intact.  No erythema or ecchymosis.  No signs or symptoms of infection.  She is neurovascularly intact throughout the right upper extremity.  She has full range of motion with forward flexion, external rotation, internal rotation, and abduction.  Her right rotator cuff tendon is intact to resisted muscle testing and fairly strong.    Pertinent New Results:    XRAY Report / Interpretation:   No new radiographs taken on today's clinic visit.    Assessment/Plan:      1. Right shoulder rotator cuff calcific tendinitis.      Patient was responding well to her recent corticosteroid injection and formal physical therapy.  We will keep her in PT but decrease it to twice a week.  We will check her back on an as-needed basis.    Anam Mcdonnell, Physician Assistant, served in the capacity as a "scribe" for this patient encounter.  A "face-to-face" encounter occurred with Dr. Ramon Jones on this date.  The treatment plan and medical decision-making is outlined above. Patient was seen and examined with a chaperone.       This note was created using Dragon voice recognition software that occasionally misinterpreted phrases or words.          "

## 2024-11-20 ENCOUNTER — HOSPITAL ENCOUNTER (OUTPATIENT)
Dept: RADIOLOGY | Facility: HOSPITAL | Age: 64
Discharge: HOME | End: 2024-11-20
Attending: NURSE PRACTITIONER
Payer: MEDICARE

## 2024-11-20 VITALS — BODY MASS INDEX: 31.71 KG/M2 | HEIGHT: 67 IN | WEIGHT: 202 LBS

## 2024-11-20 DIAGNOSIS — Z12.31 SCREENING MAMMOGRAM FOR BREAST CANCER: ICD-10-CM

## 2024-11-20 PROCEDURE — 77067 SCR MAMMO BI INCL CAD: CPT | Mod: 26,,, | Performed by: RADIOLOGY

## 2024-11-20 PROCEDURE — 77063 BREAST TOMOSYNTHESIS BI: CPT | Mod: 26,,, | Performed by: RADIOLOGY

## 2024-11-20 PROCEDURE — 77063 BREAST TOMOSYNTHESIS BI: CPT | Mod: TC,PO

## 2024-12-04 ENCOUNTER — PATIENT MESSAGE (OUTPATIENT)
Dept: PULMONOLOGY | Facility: CLINIC | Age: 64
End: 2024-12-04
Payer: MEDICARE

## 2024-12-07 ENCOUNTER — CLINICAL SUPPORT (OUTPATIENT)
Dept: CARDIOLOGY | Facility: HOSPITAL | Age: 64
End: 2024-12-07
Attending: EMERGENCY MEDICINE
Payer: MEDICARE

## 2024-12-07 ENCOUNTER — HOSPITAL ENCOUNTER (INPATIENT)
Facility: HOSPITAL | Age: 64
LOS: 1 days | Discharge: HOME | DRG: 194 | End: 2024-12-09
Attending: EMERGENCY MEDICINE | Admitting: FAMILY MEDICINE
Payer: MEDICARE

## 2024-12-07 VITALS — HEIGHT: 67 IN | WEIGHT: 203.94 LBS | BODY MASS INDEX: 32.01 KG/M2

## 2024-12-07 DIAGNOSIS — R06.02 SHORTNESS OF BREATH: ICD-10-CM

## 2024-12-07 DIAGNOSIS — R05.9 COUGH, UNSPECIFIED TYPE: ICD-10-CM

## 2024-12-07 DIAGNOSIS — J18.9 PNEUMONIA OF LEFT LOWER LOBE DUE TO INFECTIOUS ORGANISM: ICD-10-CM

## 2024-12-07 DIAGNOSIS — J12.1 PNEUMONIA DUE TO RESPIRATORY SYNCYTIAL VIRUS (RSV): Primary | ICD-10-CM

## 2024-12-07 DIAGNOSIS — J45.51 SEVERE PERSISTENT ASTHMA WITH ACUTE EXACERBATION: ICD-10-CM

## 2024-12-07 DIAGNOSIS — R07.9 CHEST PAIN: ICD-10-CM

## 2024-12-07 PROBLEM — J45.909 ASTHMA: Status: ACTIVE | Noted: 2024-12-07

## 2024-12-07 PROBLEM — N39.0 UTI (URINARY TRACT INFECTION): Status: ACTIVE | Noted: 2024-12-07

## 2024-12-07 LAB
ADENOVIRUS: NOT DETECTED
ALBUMIN SERPL BCP-MCNC: 3.8 G/DL (ref 3.5–5.2)
ALP SERPL-CCNC: 53 U/L (ref 55–135)
ALT SERPL W/O P-5'-P-CCNC: 13 U/L (ref 10–44)
ANION GAP SERPL CALC-SCNC: 8 MMOL/L (ref 8–16)
APICAL FOUR CHAMBER EJECTION FRACTION: 53 %
APICAL TWO CHAMBER EJECTION FRACTION: 61 %
AST SERPL-CCNC: 24 U/L (ref 10–40)
BACTERIA #/AREA URNS HPF: ABNORMAL /HPF
BASOPHILS # BLD AUTO: 0.05 K/UL (ref 0–0.2)
BASOPHILS NFR BLD: 0.4 % (ref 0–1.9)
BILIRUB SERPL-MCNC: 0.6 MG/DL (ref 0.1–1)
BILIRUB UR QL STRIP: NEGATIVE
BNP SERPL-MCNC: 47 PG/ML (ref 0–99)
BORDETELLA PARAPERTUSSIS (IS1001): NOT DETECTED
BORDETELLA PERTUSSIS (PTXP): NOT DETECTED
BSA FOR ECHO PROCEDURE: 2.09 M2
BUN SERPL-MCNC: 17 MG/DL (ref 8–23)
CALCIUM SERPL-MCNC: 8.6 MG/DL (ref 8.7–10.5)
CHLAMYDIA PNEUMONIAE: NOT DETECTED
CHLORIDE SERPL-SCNC: 101 MMOL/L (ref 95–110)
CLARITY UR: ABNORMAL
CO2 SERPL-SCNC: 28 MMOL/L (ref 23–29)
COLOR UR: YELLOW
CORONAVIRUS 229E, COMMON COLD VIRUS: NOT DETECTED
CORONAVIRUS HKU1, COMMON COLD VIRUS: NOT DETECTED
CORONAVIRUS NL63, COMMON COLD VIRUS: NOT DETECTED
CORONAVIRUS OC43, COMMON COLD VIRUS: NOT DETECTED
CREAT SERPL-MCNC: 0.7 MG/DL (ref 0.5–1.4)
CV ECHO LV RWT: 0.23 CM
DIFFERENTIAL METHOD BLD: ABNORMAL
ECHO LV POSTERIOR WALL: 0.7 CM (ref 0.6–1.1)
EOSINOPHIL # BLD AUTO: 0.1 K/UL (ref 0–0.5)
EOSINOPHIL NFR BLD: 0.5 % (ref 0–8)
ERYTHROCYTE [DISTWIDTH] IN BLOOD BY AUTOMATED COUNT: 14.3 % (ref 11.5–14.5)
EST. GFR  (NO RACE VARIABLE): >60 ML/MIN/1.73 M^2
FLUBV RNA NPH QL NAA+NON-PROBE: NOT DETECTED
FRACTIONAL SHORTENING: 21.7 % (ref 28–44)
GLUCOSE SERPL-MCNC: 89 MG/DL (ref 70–110)
GLUCOSE UR QL STRIP: NEGATIVE
HCT VFR BLD AUTO: 40.3 % (ref 37–48.5)
HGB BLD-MCNC: 13.2 G/DL (ref 12–16)
HGB UR QL STRIP: ABNORMAL
HPIV1 RNA NPH QL NAA+NON-PROBE: NOT DETECTED
HPIV2 RNA NPH QL NAA+NON-PROBE: NOT DETECTED
HPIV3 RNA NPH QL NAA+NON-PROBE: NOT DETECTED
HPIV4 RNA NPH QL NAA+NON-PROBE: NOT DETECTED
HUMAN METAPNEUMOVIRUS: NOT DETECTED
HYALINE CASTS #/AREA URNS LPF: 4 /LPF
IMM GRANULOCYTES # BLD AUTO: 0.06 K/UL (ref 0–0.04)
IMM GRANULOCYTES NFR BLD AUTO: 0.5 % (ref 0–0.5)
INFLUENZA A (SUBTYPES H1,H1-2009,H3): NOT DETECTED
INFLUENZA A, MOLECULAR: NEGATIVE
INFLUENZA B, MOLECULAR: NEGATIVE
INTERVENTRICULAR SEPTUM: 0.8 CM (ref 0.6–1.1)
KETONES UR QL STRIP: ABNORMAL
LACTATE SERPL-SCNC: 0.8 MMOL/L (ref 0.5–1.9)
LDH SERPL L TO P-CCNC: 0.59 MMOL/L (ref 0.5–2.2)
LEFT ATRIUM SIZE: 4.3 CM
LEFT INTERNAL DIMENSION IN SYSTOLE: 4.7 CM (ref 2.1–4)
LEFT VENTRICLE DIASTOLIC VOLUME INDEX: 88.24 ML/M2
LEFT VENTRICLE DIASTOLIC VOLUME: 180 ML
LEFT VENTRICLE END DIASTOLIC VOLUME APICAL 2 CHAMBER: 99.3 ML
LEFT VENTRICLE END DIASTOLIC VOLUME APICAL 4 CHAMBER: 103 ML
LEFT VENTRICLE MASS INDEX: 84.3 G/M2
LEFT VENTRICLE SYSTOLIC VOLUME INDEX: 50.2 ML/M2
LEFT VENTRICLE SYSTOLIC VOLUME: 102.36 ML
LEFT VENTRICULAR INTERNAL DIMENSION IN DIASTOLE: 6 CM (ref 3.5–6)
LEFT VENTRICULAR MASS: 171.9 G
LEUKOCYTE ESTERASE UR QL STRIP: ABNORMAL
LVED V (TEICH): 180 ML
LVES V (TEICH): 102.36 ML
LYMPHOCYTES # BLD AUTO: 3.3 K/UL (ref 1–4.8)
LYMPHOCYTES NFR BLD: 28.8 % (ref 18–48)
MAGNESIUM SERPL-MCNC: 2.1 MG/DL (ref 1.6–2.6)
MCH RBC QN AUTO: 30.6 PG (ref 27–31)
MCHC RBC AUTO-ENTMCNC: 32.8 G/DL (ref 32–36)
MCV RBC AUTO: 93 FL (ref 82–98)
MICROSCOPIC COMMENT: ABNORMAL
MONOCYTES # BLD AUTO: 0.9 K/UL (ref 0.3–1)
MONOCYTES NFR BLD: 8.1 % (ref 4–15)
MYCOPLASMA PNEUMONIAE: NOT DETECTED
NEUTROPHILS # BLD AUTO: 7.2 K/UL (ref 1.8–7.7)
NEUTROPHILS NFR BLD: 61.7 % (ref 38–73)
NITRITE UR QL STRIP: POSITIVE
NRBC BLD-RTO: 0 /100 WBC
OHS LV EJECTION FRACTION SIMPSONS BIPLANE MOD: 56 %
PH UR STRIP: 6 [PH] (ref 5–8)
PLATELET # BLD AUTO: 434 K/UL (ref 150–450)
PMV BLD AUTO: 9.1 FL (ref 9.2–12.9)
POTASSIUM SERPL-SCNC: 3.4 MMOL/L (ref 3.5–5.1)
PROCALCITONIN SERPL IA-MCNC: 0.06 NG/ML (ref 0–0.5)
PROT SERPL-MCNC: 7.2 G/DL (ref 6–8.4)
PROT UR QL STRIP: ABNORMAL
RA PRESSURE ESTIMATED: 3 MMHG
RBC # BLD AUTO: 4.32 M/UL (ref 4–5.4)
RBC #/AREA URNS HPF: 3 /HPF (ref 0–4)
RESPIRATORY INFECTION PANEL SOURCE: ABNORMAL
RIGHT VENTRICULAR END-DIASTOLIC DIMENSION: 2.8 CM
RSV RNA NPH QL NAA+NON-PROBE: DETECTED
RV+EV RNA NPH QL NAA+NON-PROBE: NOT DETECTED
SAMPLE: NORMAL
SARS-COV-2 RDRP RESP QL NAA+PROBE: NEGATIVE
SARS-COV-2 RNA RESP QL NAA+PROBE: NOT DETECTED
SODIUM SERPL-SCNC: 137 MMOL/L (ref 136–145)
SP GR UR STRIP: 1.03 (ref 1–1.03)
SPECIMEN SOURCE: NORMAL
SQUAMOUS #/AREA URNS HPF: 0 /HPF
TROPONIN I SERPL HS-MCNC: 5.6 PG/ML (ref 0–14.9)
TROPONIN I SERPL HS-MCNC: 7.3 PG/ML (ref 0–14.9)
TROPONIN I SERPL HS-MCNC: 8.2 PG/ML (ref 0–14.9)
URN SPEC COLLECT METH UR: ABNORMAL
UROBILINOGEN UR STRIP-ACNC: ABNORMAL EU/DL
WBC # BLD AUTO: 11.61 K/UL (ref 3.9–12.7)
WBC #/AREA URNS HPF: 30 /HPF (ref 0–5)
Z-SCORE OF LEFT VENTRICULAR DIMENSION IN END DIASTOLE: -0.13
Z-SCORE OF LEFT VENTRICULAR DIMENSION IN END SYSTOLE: 1.83

## 2024-12-07 PROCEDURE — 93005 ELECTROCARDIOGRAM TRACING: CPT | Performed by: INTERNAL MEDICINE

## 2024-12-07 PROCEDURE — G0378 HOSPITAL OBSERVATION PER HR: HCPCS

## 2024-12-07 PROCEDURE — 96375 TX/PRO/DX INJ NEW DRUG ADDON: CPT

## 2024-12-07 PROCEDURE — 93308 TTE F-UP OR LMTD: CPT | Mod: 26,,, | Performed by: INTERNAL MEDICINE

## 2024-12-07 PROCEDURE — 96365 THER/PROPH/DIAG IV INF INIT: CPT

## 2024-12-07 PROCEDURE — 83605 ASSAY OF LACTIC ACID: CPT | Performed by: EMERGENCY MEDICINE

## 2024-12-07 PROCEDURE — 85025 COMPLETE CBC W/AUTO DIFF WBC: CPT | Performed by: EMERGENCY MEDICINE

## 2024-12-07 PROCEDURE — 86803 HEPATITIS C AB TEST: CPT | Performed by: EMERGENCY MEDICINE

## 2024-12-07 PROCEDURE — 36415 COLL VENOUS BLD VENIPUNCTURE: CPT | Performed by: EMERGENCY MEDICINE

## 2024-12-07 PROCEDURE — 94799 UNLISTED PULMONARY SVC/PX: CPT

## 2024-12-07 PROCEDURE — 99900035 HC TECH TIME PER 15 MIN (STAT)

## 2024-12-07 PROCEDURE — 83880 ASSAY OF NATRIURETIC PEPTIDE: CPT | Performed by: EMERGENCY MEDICINE

## 2024-12-07 PROCEDURE — 81001 URINALYSIS AUTO W/SCOPE: CPT | Performed by: EMERGENCY MEDICINE

## 2024-12-07 PROCEDURE — 87086 URINE CULTURE/COLONY COUNT: CPT | Performed by: EMERGENCY MEDICINE

## 2024-12-07 PROCEDURE — 99285 EMERGENCY DEPT VISIT HI MDM: CPT | Mod: 25

## 2024-12-07 PROCEDURE — 94640 AIRWAY INHALATION TREATMENT: CPT

## 2024-12-07 PROCEDURE — 25500020 PHARM REV CODE 255: Performed by: INTERNAL MEDICINE

## 2024-12-07 PROCEDURE — 99900031 HC PATIENT EDUCATION (STAT)

## 2024-12-07 PROCEDURE — 87389 HIV-1 AG W/HIV-1&-2 AB AG IA: CPT | Performed by: EMERGENCY MEDICINE

## 2024-12-07 PROCEDURE — 94761 N-INVAS EAR/PLS OXIMETRY MLT: CPT

## 2024-12-07 PROCEDURE — 96372 THER/PROPH/DIAG INJ SC/IM: CPT

## 2024-12-07 PROCEDURE — 93010 ELECTROCARDIOGRAM REPORT: CPT | Mod: ,,, | Performed by: INTERNAL MEDICINE

## 2024-12-07 PROCEDURE — 84484 ASSAY OF TROPONIN QUANT: CPT | Mod: 91

## 2024-12-07 PROCEDURE — 93308 TTE F-UP OR LMTD: CPT

## 2024-12-07 PROCEDURE — 25000242 PHARM REV CODE 250 ALT 637 W/ HCPCS: Performed by: EMERGENCY MEDICINE

## 2024-12-07 PROCEDURE — 25000003 PHARM REV CODE 250

## 2024-12-07 PROCEDURE — 83735 ASSAY OF MAGNESIUM: CPT | Performed by: EMERGENCY MEDICINE

## 2024-12-07 PROCEDURE — 87581 M.PNEUMON DNA AMP PROBE: CPT

## 2024-12-07 PROCEDURE — 87186 SC STD MICRODIL/AGAR DIL: CPT | Performed by: EMERGENCY MEDICINE

## 2024-12-07 PROCEDURE — 25000003 PHARM REV CODE 250: Performed by: EMERGENCY MEDICINE

## 2024-12-07 PROCEDURE — 87635 SARS-COV-2 COVID-19 AMP PRB: CPT | Performed by: EMERGENCY MEDICINE

## 2024-12-07 PROCEDURE — 25000242 PHARM REV CODE 250 ALT 637 W/ HCPCS

## 2024-12-07 PROCEDURE — 84145 PROCALCITONIN (PCT): CPT | Performed by: EMERGENCY MEDICINE

## 2024-12-07 PROCEDURE — 87040 BLOOD CULTURE FOR BACTERIA: CPT | Performed by: EMERGENCY MEDICINE

## 2024-12-07 PROCEDURE — 94640 AIRWAY INHALATION TREATMENT: CPT | Mod: XB

## 2024-12-07 PROCEDURE — 63600175 PHARM REV CODE 636 W HCPCS

## 2024-12-07 PROCEDURE — 25000242 PHARM REV CODE 250 ALT 637 W/ HCPCS: Performed by: INTERNAL MEDICINE

## 2024-12-07 PROCEDURE — 80053 COMPREHEN METABOLIC PANEL: CPT | Performed by: EMERGENCY MEDICINE

## 2024-12-07 PROCEDURE — 87502 INFLUENZA DNA AMP PROBE: CPT | Performed by: EMERGENCY MEDICINE

## 2024-12-07 PROCEDURE — 84484 ASSAY OF TROPONIN QUANT: CPT | Mod: 91 | Performed by: EMERGENCY MEDICINE

## 2024-12-07 PROCEDURE — 63600175 PHARM REV CODE 636 W HCPCS: Performed by: EMERGENCY MEDICINE

## 2024-12-07 RX ORDER — TALC
6 POWDER (GRAM) TOPICAL NIGHTLY PRN
Status: DISCONTINUED | OUTPATIENT
Start: 2024-12-07 | End: 2024-12-09 | Stop reason: HOSPADM

## 2024-12-07 RX ORDER — ENOXAPARIN SODIUM 100 MG/ML
40 INJECTION SUBCUTANEOUS EVERY 24 HOURS
Status: DISCONTINUED | OUTPATIENT
Start: 2024-12-07 | End: 2024-12-09 | Stop reason: HOSPADM

## 2024-12-07 RX ORDER — SPIRONOLACTONE 50 MG/1
50 TABLET, FILM COATED ORAL DAILY
Status: DISCONTINUED | OUTPATIENT
Start: 2024-12-07 | End: 2024-12-09 | Stop reason: HOSPADM

## 2024-12-07 RX ORDER — ONDANSETRON HYDROCHLORIDE 2 MG/ML
4 INJECTION, SOLUTION INTRAVENOUS EVERY 6 HOURS PRN
Status: DISCONTINUED | OUTPATIENT
Start: 2024-12-07 | End: 2024-12-09 | Stop reason: HOSPADM

## 2024-12-07 RX ORDER — IPRATROPIUM BROMIDE 0.5 MG/2.5ML
0.5 SOLUTION RESPIRATORY (INHALATION)
Status: COMPLETED | OUTPATIENT
Start: 2024-12-07 | End: 2024-12-07

## 2024-12-07 RX ORDER — ACETAMINOPHEN 325 MG/1
650 TABLET ORAL EVERY 4 HOURS PRN
Status: DISCONTINUED | OUTPATIENT
Start: 2024-12-07 | End: 2024-12-09 | Stop reason: HOSPADM

## 2024-12-07 RX ORDER — BUDESONIDE 0.5 MG/2ML
1 INHALANT ORAL EVERY 12 HOURS
Status: DISCONTINUED | OUTPATIENT
Start: 2024-12-07 | End: 2024-12-09 | Stop reason: HOSPADM

## 2024-12-07 RX ORDER — SODIUM,POTASSIUM PHOSPHATES 280-250MG
2 POWDER IN PACKET (EA) ORAL
Status: DISCONTINUED | OUTPATIENT
Start: 2024-12-07 | End: 2024-12-09 | Stop reason: HOSPADM

## 2024-12-07 RX ORDER — ESCITALOPRAM OXALATE 10 MG/1
10 TABLET ORAL DAILY
Status: DISCONTINUED | OUTPATIENT
Start: 2024-12-07 | End: 2024-12-09 | Stop reason: HOSPADM

## 2024-12-07 RX ORDER — ALUMINUM HYDROXIDE, MAGNESIUM HYDROXIDE, AND SIMETHICONE 1200; 120; 1200 MG/30ML; MG/30ML; MG/30ML
30 SUSPENSION ORAL 4 TIMES DAILY PRN
Status: DISCONTINUED | OUTPATIENT
Start: 2024-12-07 | End: 2024-12-09 | Stop reason: HOSPADM

## 2024-12-07 RX ORDER — IPRATROPIUM BROMIDE 0.5 MG/2.5ML
0.5 SOLUTION RESPIRATORY (INHALATION) EVERY 6 HOURS
Status: DISCONTINUED | OUTPATIENT
Start: 2024-12-07 | End: 2024-12-09 | Stop reason: HOSPADM

## 2024-12-07 RX ORDER — HYDROCODONE BITARTRATE AND ACETAMINOPHEN 5; 325 MG/1; MG/1
1 TABLET ORAL EVERY 6 HOURS PRN
Status: DISCONTINUED | OUTPATIENT
Start: 2024-12-07 | End: 2024-12-09 | Stop reason: HOSPADM

## 2024-12-07 RX ORDER — SACUBITRIL AND VALSARTAN 49; 51 MG/1; MG/1
1 TABLET, FILM COATED ORAL 2 TIMES DAILY
Status: DISCONTINUED | OUTPATIENT
Start: 2024-12-07 | End: 2024-12-07

## 2024-12-07 RX ORDER — SACUBITRIL AND VALSARTAN 49; 51 MG/1; MG/1
1 TABLET, FILM COATED ORAL 2 TIMES DAILY
COMMUNITY

## 2024-12-07 RX ORDER — CARVEDILOL 6.25 MG/1
6.25 TABLET ORAL 2 TIMES DAILY
Status: DISCONTINUED | OUTPATIENT
Start: 2024-12-07 | End: 2024-12-09 | Stop reason: HOSPADM

## 2024-12-07 RX ORDER — ALBUTEROL SULFATE 90 UG/1
2 INHALANT RESPIRATORY (INHALATION) EVERY 6 HOURS PRN
Status: DISCONTINUED | OUTPATIENT
Start: 2024-12-07 | End: 2024-12-07

## 2024-12-07 RX ORDER — CETIRIZINE HYDROCHLORIDE 10 MG/1
10 TABLET ORAL NIGHTLY
Status: DISCONTINUED | OUTPATIENT
Start: 2024-12-07 | End: 2024-12-09 | Stop reason: HOSPADM

## 2024-12-07 RX ORDER — CODEINE PHOSPHATE AND GUAIFENESIN 10; 100 MG/5ML; MG/5ML
5 SOLUTION ORAL EVERY 4 HOURS PRN
Status: DISCONTINUED | OUTPATIENT
Start: 2024-12-07 | End: 2024-12-09 | Stop reason: HOSPADM

## 2024-12-07 RX ORDER — BUDESONIDE 0.5 MG/2ML
0.5 INHALANT ORAL DAILY PRN
Status: DISCONTINUED | OUTPATIENT
Start: 2024-12-07 | End: 2024-12-09 | Stop reason: HOSPADM

## 2024-12-07 RX ORDER — LEVALBUTEROL INHALATION SOLUTION 1.25 MG/3ML
SOLUTION RESPIRATORY (INHALATION)
Status: COMPLETED
Start: 2024-12-07 | End: 2024-12-07

## 2024-12-07 RX ORDER — CEFTRIAXONE 2 G/1
2 INJECTION, POWDER, FOR SOLUTION INTRAMUSCULAR; INTRAVENOUS
Status: DISCONTINUED | OUTPATIENT
Start: 2024-12-08 | End: 2024-12-09 | Stop reason: HOSPADM

## 2024-12-07 RX ORDER — LANOLIN ALCOHOL/MO/W.PET/CERES
800 CREAM (GRAM) TOPICAL
Status: DISCONTINUED | OUTPATIENT
Start: 2024-12-07 | End: 2024-12-09 | Stop reason: HOSPADM

## 2024-12-07 RX ORDER — EZETIMIBE 10 MG/1
10 TABLET ORAL NIGHTLY
Status: DISCONTINUED | OUTPATIENT
Start: 2024-12-07 | End: 2024-12-09 | Stop reason: HOSPADM

## 2024-12-07 RX ORDER — METHYLPREDNISOLONE SOD SUCC 125 MG
125 VIAL (EA) INJECTION
Status: COMPLETED | OUTPATIENT
Start: 2024-12-07 | End: 2024-12-07

## 2024-12-07 RX ORDER — MONTELUKAST SODIUM 10 MG/1
10 TABLET ORAL NIGHTLY
Status: DISCONTINUED | OUTPATIENT
Start: 2024-12-07 | End: 2024-12-09 | Stop reason: HOSPADM

## 2024-12-07 RX ORDER — IPRATROPIUM BROMIDE AND ALBUTEROL SULFATE 2.5; .5 MG/3ML; MG/3ML
3 SOLUTION RESPIRATORY (INHALATION) EVERY 6 HOURS PRN
Status: DISCONTINUED | OUTPATIENT
Start: 2024-12-07 | End: 2024-12-09 | Stop reason: HOSPADM

## 2024-12-07 RX ORDER — SODIUM CHLORIDE 0.9 % (FLUSH) 0.9 %
10 SYRINGE (ML) INJECTION EVERY 12 HOURS PRN
Status: DISCONTINUED | OUTPATIENT
Start: 2024-12-07 | End: 2024-12-09 | Stop reason: HOSPADM

## 2024-12-07 RX ORDER — FLUTICASONE PROPIONATE 50 MCG
2 SPRAY, SUSPENSION (ML) NASAL DAILY
Status: DISCONTINUED | OUTPATIENT
Start: 2024-12-07 | End: 2024-12-09 | Stop reason: HOSPADM

## 2024-12-07 RX ORDER — LEVALBUTEROL 1.25 MG/.5ML
3.75 SOLUTION, CONCENTRATE RESPIRATORY (INHALATION)
Status: DISPENSED | OUTPATIENT
Start: 2024-12-07 | End: 2024-12-08

## 2024-12-07 RX ORDER — AMOXICILLIN 250 MG
1 CAPSULE ORAL DAILY PRN
Status: DISCONTINUED | OUTPATIENT
Start: 2024-12-07 | End: 2024-12-09 | Stop reason: HOSPADM

## 2024-12-07 RX ORDER — ARFORMOTEROL TARTRATE 15 UG/2ML
15 SOLUTION RESPIRATORY (INHALATION) 2 TIMES DAILY
Status: DISCONTINUED | OUTPATIENT
Start: 2024-12-07 | End: 2024-12-09 | Stop reason: HOSPADM

## 2024-12-07 RX ORDER — CEFTRIAXONE 2 G/1
2 INJECTION, POWDER, FOR SOLUTION INTRAMUSCULAR; INTRAVENOUS ONCE
Status: COMPLETED | OUTPATIENT
Start: 2024-12-07 | End: 2024-12-07

## 2024-12-07 RX ORDER — ASPIRIN 325 MG
325 TABLET ORAL DAILY
Status: DISCONTINUED | OUTPATIENT
Start: 2024-12-07 | End: 2024-12-09 | Stop reason: HOSPADM

## 2024-12-07 RX ORDER — NALOXONE HCL 0.4 MG/ML
0.02 VIAL (ML) INJECTION
Status: DISCONTINUED | OUTPATIENT
Start: 2024-12-07 | End: 2024-12-09 | Stop reason: HOSPADM

## 2024-12-07 RX ORDER — DOXYCYCLINE 100 MG/1
100 CAPSULE ORAL EVERY 12 HOURS
Status: DISCONTINUED | OUTPATIENT
Start: 2024-12-07 | End: 2024-12-08

## 2024-12-07 RX ADMIN — GUAIFENESIN AND CODEINE PHOSPHATE 5 ML: 100; 10 SOLUTION ORAL at 10:12

## 2024-12-07 RX ADMIN — CEFTRIAXONE 2 G: 2 INJECTION, POWDER, FOR SOLUTION INTRAMUSCULAR; INTRAVENOUS at 01:12

## 2024-12-07 RX ADMIN — ESCITALOPRAM OXALATE 10 MG: 10 TABLET ORAL at 05:12

## 2024-12-07 RX ADMIN — AZITHROMYCIN MONOHYDRATE 500 MG: 500 INJECTION, POWDER, LYOPHILIZED, FOR SOLUTION INTRAVENOUS at 05:12

## 2024-12-07 RX ADMIN — IPRATROPIUM BROMIDE 0.5 MG: 0.5 SOLUTION RESPIRATORY (INHALATION) at 06:12

## 2024-12-07 RX ADMIN — BUDESONIDE INHALATION 1 MG: 0.5 SUSPENSION RESPIRATORY (INHALATION) at 06:12

## 2024-12-07 RX ADMIN — MONTELUKAST 10 MG: 10 TABLET, FILM COATED ORAL at 09:12

## 2024-12-07 RX ADMIN — IPRATROPIUM BROMIDE 0.5 MG: 0.5 SOLUTION RESPIRATORY (INHALATION) at 01:12

## 2024-12-07 RX ADMIN — EZETIMIBE 10 MG: 10 TABLET ORAL at 09:12

## 2024-12-07 RX ADMIN — IOHEXOL 100 ML: 350 INJECTION, SOLUTION INTRAVENOUS at 03:12

## 2024-12-07 RX ADMIN — METHYLPREDNISOLONE SODIUM SUCCINATE 125 MG: 125 INJECTION, POWDER, FOR SOLUTION INTRAMUSCULAR; INTRAVENOUS at 01:12

## 2024-12-07 RX ADMIN — ASPIRIN 325 MG ORAL TABLET 325 MG: 325 PILL ORAL at 05:12

## 2024-12-07 RX ADMIN — CETIRIZINE HYDROCHLORIDE 10 MG: 10 TABLET, FILM COATED ORAL at 09:12

## 2024-12-07 RX ADMIN — ARFORMOTEROL TARTRATE 15 MCG: 15 SOLUTION RESPIRATORY (INHALATION) at 06:12

## 2024-12-07 RX ADMIN — DOXYCYCLINE HYCLATE 100 MG: 100 CAPSULE ORAL at 09:12

## 2024-12-07 RX ADMIN — LEVALBUTEROL HYDROCHLORIDE 3.75 MG: 1.25 SOLUTION RESPIRATORY (INHALATION) at 01:12

## 2024-12-07 RX ADMIN — ENOXAPARIN SODIUM 40 MG: 40 INJECTION SUBCUTANEOUS at 05:12

## 2024-12-07 RX ADMIN — SPIRONOLACTONE 50 MG: 50 TABLET, FILM COATED ORAL at 05:12

## 2024-12-07 NOTE — ASSESSMENT & PLAN NOTE
See above  Per patient EF <30% October 2024 at Dr. Jaffe's office, put back on entresto at that time and taken off ARB    Patient is identified as having Systolic (HFrEF) heart failure that is Chronic. CHF is currently controlled. Latest ECHO performed and demonstrates- Results for orders placed during the hospital encounter of 06/12/22    Echo    Interpretation Summary  · The left ventricle is normal in size with concentric hypertrophy and mildly decreased systolic function.  · The estimated ejection fraction is 40%.  · Grade I left ventricular diastolic dysfunction.  · There is mild left ventricular global hypokinesis.  · Normal right ventricular size with normal right ventricular systolic function.  · Moderate left atrial enlargement.  · Mild tricuspid regurgitation.  · Normal central venous pressure (3 mmHg).  · The estimated PA systolic pressure is 10 mmHg.  . Continue Beta Blocker Aldactone ARNI and monitor clinical status closely. Monitor on telemetry. Patient is off CHF pathway.  Monitor strict Is&Os and daily weights.  Place on fluid restriction of 1.5 L. Cardiology is not consulted. Continue to stress to patient importance of self efficacy and  on diet for CHF. Last BNP reviewed- and noted below   Recent Labs   Lab 12/07/24  1138   BNP 47   .

## 2024-12-07 NOTE — PROGRESS NOTES
Automatic Inhaler to Nebulizer Interchange    fluticasone/umeclidinium/vilanterol (Trelegy) 200 mcg/ 62.5 mcg/ 25 mcg changed to budesonide 1 mg twice daily AND ipratropium 0.5 mg every 6 hour scheduled AND arformoterol 15 mcg twice daily per [facility] Automatic Therapeutic Substitutions Protocol.    Please contact pharmacy at extension [phone] with any questions.     Thank you,   Sera Breen

## 2024-12-07 NOTE — ED PROVIDER NOTES
Encounter Date: 2024       History     Chief Complaint   Patient presents with    Shortness of Breath    Cough    Nasal Congestion     Had chest xray at urgent care today and was sent here for abnormal reading.     Per the patient's history, she has a history of nonischemic cardiomyopathy with an ejection fraction less than 30% and asthma.  She reports 1 week history of cough and shortness of breath.  Symptoms started with chills with no definite fever.  Symptoms are progressing.  No change in leg edema.  No chest pain, pleurisy hemoptysis.  Patient went to urgent care clinic and told had abnormal x-ray and go to the emergency room.      Review of patient's allergies indicates:  No Known Allergies  Past Medical History:   Diagnosis Date    Allergy     Asthma     Bilateral leg edema     Hyperlipidemia     Hypertension     Leg pain, bilateral     left leg more severe    Varicose vein      Past Surgical History:   Procedure Laterality Date     SECTION, LOW TRANSVERSE      x 3    EYE SURGERY  2023    lt retinal surgery    HYSTERECTOMY      pace maker  2020    SPINE SURGERY  2021    stents both legs       Family History   Problem Relation Name Age of Onset    Heart disease Mother      Heart failure Father      Migraines Sister 1/2     Cancer Sister 1/2         lung then pancreatic     Social History     Tobacco Use    Smoking status: Former     Current packs/day: 0.00     Types: Cigarettes     Quit date: 1985     Years since quittin.0     Passive exposure: Past    Smokeless tobacco: Never   Substance Use Topics    Alcohol use: Yes     Comment: occ    Drug use: No     Review of Systems   Constitutional:  Positive for chills. Negative for fever.   HENT:  Negative for congestion.    Eyes:  Negative for visual disturbance.   Respiratory:  Positive for cough and shortness of breath.    Cardiovascular:  Negative for chest pain and palpitations.   Gastrointestinal:  Negative for abdominal  pain and vomiting.   Genitourinary:  Negative for dysuria.   Musculoskeletal:  Negative for joint swelling.   Neurological:  Negative for headaches.   Psychiatric/Behavioral:  Negative for confusion.        Physical Exam     Initial Vitals [12/07/24 1035]   BP Pulse Resp Temp SpO2   123/79 99 20 97.4 °F (36.3 °C) 97 %      MAP       --         Physical Exam    Nursing note and vitals reviewed.  Constitutional: She is not diaphoretic. No distress.   HENT:   Head: Normocephalic and atraumatic.   Eyes: Conjunctivae are normal.   Neck:   Normal range of motion.  Cardiovascular:  Normal rate.           Pulmonary/Chest:   Tachypneic, end expiratory wheezing   Abdominal: Abdomen is soft. There is no abdominal tenderness.   Musculoskeletal:         General: Normal range of motion.      Cervical back: Normal range of motion.     Neurological: She is alert. She has normal strength. No cranial nerve deficit or sensory deficit.   No gross deficits   Skin: No rash noted.   Psychiatric: She has a normal mood and affect.         ED Course   Procedures  Labs Reviewed   COMPREHENSIVE METABOLIC PANEL - Abnormal       Result Value    Sodium 137      Potassium 3.4 (*)     Chloride 101      CO2 28      Glucose 89      BUN 17      Creatinine 0.7      Calcium 8.6 (*)     Total Protein 7.2      Albumin 3.8      Total Bilirubin 0.6      Alkaline Phosphatase 53 (*)     AST 24      ALT 13      eGFR >60.0      Anion Gap 8     URINALYSIS, REFLEX TO URINE CULTURE - Abnormal    Specimen UA Urine, Clean Catch      Color, UA Yellow      Appearance, UA Hazy (*)     pH, UA 6.0      Specific Gravity, UA 1.030      Protein, UA 1+ (*)     Glucose, UA Negative      Ketones, UA Trace (*)     Bilirubin (UA) Negative      Occult Blood UA 2+ (*)     Nitrite, UA Positive (*)     Urobilinogen, UA 2.0-3.0 (*)     Leukocytes, UA 3+ (*)     Narrative:     Specimen Source->Urine   URINALYSIS MICROSCOPIC - Abnormal    RBC, UA 3      WBC, UA 30 (*)     Bacteria Many  (*)     Squam Epithel, UA 0      Hyaline Casts, UA 4 (*)     Microscopic Comment SEE COMMENT      Narrative:     Specimen Source->Urine   CULTURE, BLOOD   CULTURE, BLOOD   CULTURE, URINE   MAGNESIUM    Magnesium 2.1     TROPONIN I HIGH SENSITIVITY    Troponin I High Sensitivity 7.3     B-TYPE NATRIURETIC PEPTIDE    BNP 47     SARS-COV-2 RNA AMPLIFICATION, QUAL    SARS-CoV-2 RNA, Amplification, Qual Negative     INFLUENZA A AND B ANTIGEN    Influenza A, Molecular Negative      Influenza B, Molecular Negative      Flu A & B Source Nasal swab      Narrative:     Specimen Source->Nasopharyngeal Swab   CBC W/ AUTO DIFFERENTIAL   TROPONIN I HIGH SENSITIVITY   LACTIC ACID, PLASMA   HEPATITIS C ANTIBODY   HIV 1 / 2 ANTIBODY   ISTAT LACTATE    POC Lactate 0.59      Sample VENOUS     POCT LACTATE        ECG Results              EKG 12-lead (In process)        Collection Time Result Time QRS Duration OHS QTC Calculation    12/07/24 11:10:21 12/07/24 11:11:41 100 423                     In process by Interface, Lab In [facility] (12/07/24 11:11:47)                   Narrative:    Test Reason : R06.02,    Vent. Rate :  87 BPM     Atrial Rate :  87 BPM     P-R Int : 202 ms          QRS Dur : 100 ms      QT Int : 352 ms       P-R-T Axes :  33 -23  17 degrees    QTcB Int : 423 ms    Normal sinus rhythm  Anterior infarct (cited on or before 12-Jun-2022)  Abnormal ECG  When compared with ECG of 13-Jun-2022 14:05,  No significant change was found    Referred By: AAAREFERRAL SELF           Confirmed By:                                   Imaging Results              X-Ray Chest PA And Lateral (Final result)  Result time 12/07/24 13:01:35      Final result by Quincy Razo Jr., MD (12/07/24 13:01:35)                   Impression:      Streaky interstitial opacities in the basilar segments likely attributed towards pulmonary scarring or atelectasis.  Focal area of retrocardiac atelectasis is observed.      Electronically signed  by: Quincy Razo MD  Date:    12/07/2024  Time:    13:01               Narrative:    EXAMINATION:  XR CHEST PA AND LATERAL    CLINICAL HISTORY:  Shortness breath;    TECHNIQUE:  PA and lateral views of the chest were performed.    COMPARISON:  01/08/2024    FINDINGS:  Dual lead pacemaker device projects over the left lower chest wall with leads depicted within the right atrium and right ventricle in optimal position.  Cardiomediastinal silhouette top-normal in size exacerbated by the decreased inspiratory volume head body habitus.  Minimal eventration the right hemidiaphragm.  Focal areas of bibasilar atelectasis are observed more prominent in the left lower lobe.  Streaky interstitial opacities in the left retrocardiac region are noted.  Heart at the upper edge of normal.  Pulmonary vascularity normal.                                       Medications   cefTRIAXone injection 2 g (has no administration in time range)   azithromycin (ZITHROMAX) 500 mg in 0.9% NaCl 250 mL IVPB (admixture device) (has no administration in time range)   methylPREDNISolone sodium succinate injection 125 mg (has no administration in time range)   levalbuterol nebulizer solution 3.75 mg (has no administration in time range)   ipratropium 0.02 % nebulizer solution 0.5 mg (has no administration in time range)     Medical Decision Making  Patient presents with shortness of breath.  Differential diagnosis includes CHF, pneumonia, venous thrombotic event, here CBC CMP troponin unremarkable.  BNP 47.  Chest x-ray with possible retrocardiac infiltrate.  EKG sinus rhythm with poor Q-wave progression in precordial leads.  Here patient was started on prednisone and Zithromax.  She is continues to deteriorate.  She is failing outpatient treatment.  She has multiple comorbidities including cardiomyopathy.  Broad-spectrum antibiotics initiated.  Will give Solu-Medrol and nebulizers for bronchospasm.  Hospitalist consulted for possible  admission.    Amount and/or Complexity of Data Reviewed  Labs: ordered. Decision-making details documented in ED Course.  Radiology: ordered. Decision-making details documented in ED Course.  ECG/medicine tests:  Decision-making details documented in ED Course.    Risk  Prescription drug management.                                      Clinical Impression:  Final diagnoses:  [R06.02] Shortness of breath  [J18.9] Pneumonia of left lower lobe due to infectious organism (Primary)          ED Disposition Condition    Admit Stable                Scott Fernandez MD  12/07/24 [id number]

## 2024-12-07 NOTE — PHARMACY MED REC
"Admission Medication History     The home medication history was taken by Julien Oliveira.    You may go to "Admission" then "Reconcile Home Medications" tabs to review and/or act upon these items.     The home medication list has been updated by the Pharmacy department.   Please read ALL comments highlighted in yellow.   Please address this information as you see fit.    Feel free to contact us if you have any questions or require assistance.      The medications listed below were removed from the home medication list. Please reorder if appropriate:  Patient reports no longer taking the following medication(s):  Melatonin 10 mg  Rosuvastatin 5 mg    Medications listed below were obtained from: Patient/family and Analytic software- [employer]  No current facility-administered medications on file prior to encounter.     Current Outpatient Medications on File Prior to Encounter   Medication Sig Dispense Refill    albuterol (ACCUNEB) 1.25 mg/3 mL Nebu Take 3 mLs (1.25 mg total) by nebulization every 6 (six) hours as needed (Cough). Rescue 75 mL 11    albuterol (PROVENTIL/VENTOLIN HFA) 90 mcg/actuation inhaler Inhale 2 puffs into the lungs every 6 (six) hours as needed for Wheezing. use as directed 18 g 11    aspirin 325 MG tablet Take 325 mg by mouth once daily.      budesonide (PULMICORT) 0.5 mg/2 mL nebulizer solution Take 2 mLs (0.5 mg total) by nebulization daily as needed (Shortness of breath, wheezing, cough). Controller 120 mL 5    carvediloL (COREG) 6.25 MG tablet Take 6.25 mg by mouth 2 (two) times daily.       cetirizine (ZYRTEC) 10 MG tablet Take 1 tablet (10 mg total) by mouth once daily. (Patient taking differently: Take 10 mg by mouth every evening.)  0    escitalopram oxalate (LEXAPRO) 10 MG tablet Take 10 mg by mouth once daily.      ezetimibe (ZETIA) 10 mg tablet Take 1 tablet by mouth every evening.      fluticasone propionate (FLONASE) 50 mcg/actuation nasal spray 2 sprays (100 mcg total) by Each Nostril " route once daily. 16 g 6    fluticasone-umeclidin-vilanter (TRELEGY ELLIPTA) 200-62.5-25 mcg inhaler Inhale 1 puff into the lungs once daily. 180 each 3    furosemide (LASIX) 20 MG tablet Take 20 mg by mouth daily as needed (Fluid Gain).      montelukast (SINGULAIR) 10 mg tablet Take 1 tablet (10 mg total) by mouth once daily. (Patient taking differently: Take 10 mg by mouth every evening.) 90 tablet 3    sacubitriL-valsartan (ENTRESTO) 49-51 mg per tablet Take 1 tablet by mouth 2 (two) times daily. SAMPLES FROM DOCTOR      spironolactone (ALDACTONE) 50 MG tablet Take 50 mg by mouth once daily.      azithromycin (ZITHROMAX) 500 MG tablet Take 1 tablet (500 mg total) by mouth once daily. (Patient not taking: Reported on 12/7/2024) 3 tablet 1    coenzyme Q10 (CO Q-10) 100 mg capsule Take 1 capsule (100 mg total) by mouth once daily. (Patient not taking: Reported on 12/7/2024) 30 capsule 11    guaiFENesin-codeine 100-10 mg/5 ml (TUSSI-ORGANIDIN NR)  mg/5 mL syrup Take 5 mLs by mouth every 8 (eight) hours as needed for Cough. (Patient not taking: Reported on 12/7/2024) 237 mL 0    predniSONE (DELTASONE) 20 MG tablet Take one pill per day for three days. Can repeat as needed for shortness of breath, wheezing, cough. (Patient not taking: Reported on 12/7/2024) 15 tablet 0    valsartan (DIOVAN) 40 MG tablet Take 40 mg by mouth 2 (two) times daily. (Patient not taking: Reported on 12/7/2024)      [DISCONTINUED] melatonin 10 mg Tab Take 2 tablets by mouth nightly as needed.      [DISCONTINUED] rosuvastatin (CRESTOR) 5 MG tablet Take 5 mg by mouth once daily.             Julien Oliveira  EXT 1923                .

## 2024-12-07 NOTE — CARE UPDATE
12/07/24 1344   Patient Assessment/Suction   Level of Consciousness (AVPU) alert   Respiratory Effort Slight;Short of breath   Expansion/Accessory Muscles/Retractions no use of accessory muscles   All Lung Fields Breath Sounds Anterior:;coarse;wheezes, expiratory   Rhythm/Pattern, Respiratory shortness of breath   Cough Frequency infrequent   Cough Type dry   PRE-TX-O2   Device (Oxygen Therapy) room air   SpO2 96 %   Pulse Oximetry Type Continuous   $ Pulse Oximetry - Multiple Charge Pulse Oximetry - Multiple   Pulse 91   Resp 20   Aerosol Therapy   $ Aerosol Therapy Charges Aerosol Treatment   Daily Review of Necessity (SVN) completed   Respiratory Treatment Status (SVN) given   Treatment Route (SVN) mask;oxygen   Patient Position HOB elevated   Post Treatment Assessment (SVN) patient reports breathing improved   Signs of Intolerance (SVN) none   Breath Sounds Post-Respiratory Treatment   Throughout All Fields Post-Treatment All Fields   Throughout All Fields Post-Treatment aeration increased   Post-treatment Heart Rate (beats/min) 89   Post-treatment Resp Rate (breaths/min) 22   Education   $ Education Bronchodilator;45 min

## 2024-12-07 NOTE — HPI
64-year-old female presented to ED eval of shortness of breath. pMHx HFrEF s/p AICD, HTN, HLD, eosinophilic asthma, cardiomyopathy, varicose veins.  Patient reported over the last week she has had progressively worsening shortness of breath, with associated chills, nonproductive cough, L rib pain, and orthopnea.  She states she keeps prescriptions of azithromycin and prednisone written by her pulmonologist at home in case exacerbation of asthma, states these medications usually resolve similar symptoms for her and that she has taken both of these medicines for the last 3 days without any improvement in symptoms.  Denies chest pain.  Denies abdominal pain, nausea, vomiting, changes in bowel habits.  Denies recent trauma.  Denies recent surgery or procedure.  Patient visited urgent care earlier today for these symptoms and was advised to follow-up in the ED 2/2 abnormal CXR.  Patient has history of asthma, cardiomyopathy, and HF s/p AICD.  Patient follows with Dr. Jaffe for Cardiology, states she had an echo in October at his office with an EF of less than 30% and at that time she was taken off of valsartan and put back on Entresto.  Today, CXR impression with streaky interstitial opacities in the basilar segments likely attributed towards pulmonary scarring or atelectasis; focal area of retrocardiac atelectasis is observed.  Lactic acid, CBC, CMP, BNP unremarkable.  Troponins 7.3 and 5.6.  Flu/COVID negative.  Admit to hospital medicine for further eval.

## 2024-12-07 NOTE — H&P
[facility] - Emergency Dept  Hospital Medicine  History & Physical    Patient Name: Daylin Lynch  MRN: [id number]  Patient Class: OP- Observation  Admission Date: 12/7/2024  Attending Physician: Jl Norwood MD   Primary Care Provider: Jonathon Muñoz NP         Patient information was obtained from patient, past medical records, and ER records.     Subjective:     Principal Problem:Shortness of breath    Chief Complaint:   Chief Complaint   Patient presents with    Shortness of Breath    Cough    Nasal Congestion     Had chest xray at urgent care today and was sent here for abnormal reading.        HPI: 64-year-old female presented to ED eval of shortness of breath. pMHx HFrEF s/p AICD, HTN, HLD, eosinophilic asthma, cardiomyopathy, varicose veins.  Patient reported over the last week she has had progressively worsening shortness of breath, with associated chills, nonproductive cough, L rib pain, and orthopnea.  She states she keeps prescriptions of azithromycin and prednisone written by her pulmonologist at home in case exacerbation of asthma, states these medications usually resolve similar symptoms for her and that she has taken both of these medicines for the last 3 days without any improvement in symptoms.  Denies chest pain.  Denies abdominal pain, nausea, vomiting, changes in bowel habits.  Denies recent trauma.  Denies recent surgery or procedure.  Patient visited urgent care earlier today for these symptoms and was advised to follow-up in the ED 2/2 abnormal CXR.  Patient has history of asthma, cardiomyopathy, and HF s/p AICD.  Patient follows with Dr. Jaffe for Cardiology, states she had an echo in October at his office with an EF of less than 30% and at that time she was taken off of valsartan and put back on Entresto.  Today, CXR impression with streaky interstitial opacities in the basilar segments likely attributed towards pulmonary scarring or atelectasis; focal area of retrocardiac  atelectasis is observed.  Also noted with UTI.  Lactic acid, CBC, CMP, BNP unremarkable.  Troponins 7.3 and 5.6.  Flu/COVID negative.  Admit to hospital medicine for further eval.      Past Medical History:   Diagnosis Date    Allergy     Asthma     Bilateral leg edema     Hyperlipidemia     Hypertension     Leg pain, bilateral     left leg more severe    Varicose vein        Past Surgical History:   Procedure Laterality Date     SECTION, LOW TRANSVERSE      x 3    EYE SURGERY  2023    lt retinal surgery    HYSTERECTOMY      pace maker  2020    SPINE SURGERY  2021    stents both legs         Review of patient's allergies indicates:  No Known Allergies    No current facility-administered medications on file prior to encounter.     Current Outpatient Medications on File Prior to Encounter   Medication Sig    albuterol (ACCUNEB) 1.25 mg/3 mL Nebu Take 3 mLs (1.25 mg total) by nebulization every 6 (six) hours as needed (Cough). Rescue    albuterol (PROVENTIL/VENTOLIN HFA) 90 mcg/actuation inhaler Inhale 2 puffs into the lungs every 6 (six) hours as needed for Wheezing. use as directed    aspirin 325 MG tablet Take 325 mg by mouth once daily.    budesonide (PULMICORT) 0.5 mg/2 mL nebulizer solution Take 2 mLs (0.5 mg total) by nebulization daily as needed (Shortness of breath, wheezing, cough). Controller    carvediloL (COREG) 6.25 MG tablet Take 6.25 mg by mouth 2 (two) times daily.     cetirizine (ZYRTEC) 10 MG tablet Take 1 tablet (10 mg total) by mouth once daily. (Patient taking differently: Take 10 mg by mouth every evening.)    escitalopram oxalate (LEXAPRO) 10 MG tablet Take 10 mg by mouth once daily.    ezetimibe (ZETIA) 10 mg tablet Take 1 tablet by mouth every evening.    fluticasone propionate (FLONASE) 50 mcg/actuation nasal spray 2 sprays (100 mcg total) by Each Nostril route once daily.    fluticasone-umeclidin-vilanter (TRELEGY ELLIPTA) 200-62.5-25 mcg inhaler Inhale 1 puff into  the lungs once daily.    furosemide (LASIX) 20 MG tablet Take 20 mg by mouth daily as needed (Fluid Gain).    montelukast (SINGULAIR) 10 mg tablet Take 1 tablet (10 mg total) by mouth once daily. (Patient taking differently: Take 10 mg by mouth every evening.)    sacubitriL-valsartan (ENTRESTO) 49-51 mg per tablet Take 1 tablet by mouth 2 (two) times daily. SAMPLES FROM DOCTOR    spironolactone (ALDACTONE) 50 MG tablet Take 50 mg by mouth once daily.    azithromycin (ZITHROMAX) 500 MG tablet Take 1 tablet (500 mg total) by mouth once daily. (Patient not taking: Reported on 2024)    coenzyme Q10 (CO Q-10) 100 mg capsule Take 1 capsule (100 mg total) by mouth once daily. (Patient not taking: Reported on 2024)    guaiFENesin-codeine 100-10 mg/5 ml (TUSSI-ORGANIDIN NR)  mg/5 mL syrup Take 5 mLs by mouth every 8 (eight) hours as needed for Cough. (Patient not taking: Reported on 2024)    predniSONE (DELTASONE) 20 MG tablet Take one pill per day for three days. Can repeat as needed for shortness of breath, wheezing, cough. (Patient not taking: Reported on 2024)    valsartan (DIOVAN) 40 MG tablet Take 40 mg by mouth 2 (two) times daily. (Patient not taking: Reported on 2024)    [DISCONTINUED] melatonin 10 mg Tab Take 2 tablets by mouth nightly as needed.    [DISCONTINUED] rosuvastatin (CRESTOR) 5 MG tablet Take 5 mg by mouth once daily.     Family History       Problem Relation (Age of Onset)    Cancer Sister    Heart disease Mother    Heart failure Father    Migraines Sister          Tobacco Use    Smoking status: Former     Current packs/day: 0.00     Types: Cigarettes     Quit date: 1985     Years since quittin.0     Passive exposure: Past    Smokeless tobacco: Never   Substance and Sexual Activity    Alcohol use: Yes     Comment: occ    Drug use: No    Sexual activity: Yes     Review of Systems   Constitutional:  Positive for chills. Negative for fever.   HENT:  Positive for  congestion and sinus pain.    Respiratory:  Positive for cough and shortness of breath.    Cardiovascular:  Negative for chest pain.   Gastrointestinal:  Negative for abdominal pain, constipation, diarrhea, nausea and vomiting.   Genitourinary:  Negative for flank pain.   Musculoskeletal:  Positive for myalgias.   Neurological:  Negative for syncope.   Psychiatric/Behavioral:  Negative for confusion.      Objective:     Vital Signs (Most Recent):  Temp: 97.4 °F (36.3 °C) (12/07/24 1035)  Pulse: 88 (12/07/24 1353)  Resp: 20 (12/07/24 1344)  BP: (!) 165/91 (12/07/24 1330)  SpO2: 99 % (12/07/24 1353) Vital Signs (24h Range):  Temp:  [97.4 °F (36.3 °C)] 97.4 °F (36.3 °C)  Pulse:  [] 88  Resp:  [20] 20  SpO2:  [93 %-99 %] 99 %  BP: (120-165)/(66-91) 165/91     Weight: 92.5 kg (204 lb)  Body mass index is 31.95 kg/m².     Physical Exam  Vitals reviewed.   Constitutional:       General: She is not in acute distress.  HENT:      Head: Normocephalic and atraumatic.      Nose: Nose normal.      Mouth/Throat:      Mouth: Mucous membranes are moist.   Eyes:      Conjunctiva/sclera: Conjunctivae normal.   Cardiovascular:      Rate and Rhythm: Normal rate.   Pulmonary:      Effort: Pulmonary effort is normal. No respiratory distress.      Breath sounds: Rales present.   Abdominal:      General: Bowel sounds are normal.      Palpations: Abdomen is soft.      Tenderness: There is no abdominal tenderness.   Musculoskeletal:         General: Normal range of motion.      Cervical back: Normal range of motion.      Right lower leg: No edema.      Left lower leg: No edema.   Skin:     General: Skin is warm and dry.      Capillary Refill: Capillary refill takes less than 2 seconds.   Neurological:      Mental Status: She is alert and oriented to person, place, and time.   Psychiatric:         Mood and Affect: Mood normal.                Significant Labs: All pertinent labs within the past 24 hours have been reviewed.  CBC:   Recent  "Labs   Lab 12/07/24  1320   WBC 11.61   HGB 13.2   HCT 40.3        CMP:   Recent Labs   Lab 12/07/24  1138      K 3.4*      CO2 28   GLU 89   BUN 17   CREATININE 0.7   CALCIUM 8.6*   PROT 7.2   ALBUMIN 3.8   BILITOT 0.6   ALKPHOS 53*   AST 24   ALT 13   ANIONGAP 8     Cardiac Markers:   Recent Labs   Lab 12/07/24  1138   BNP 47     Magnesium:   Recent Labs   Lab 12/07/24  1138   MG 2.1     Troponin:   Recent Labs   Lab 12/07/24  1138 12/07/24  1320   TROPONINIHS 7.3 5.6     TSH: No results for input(s): "TSH" in the last 4320 hours.  Urine Studies:   Recent Labs   Lab 12/07/24  1117   COLORU Yellow   APPEARANCEUA Hazy*   PHUR 6.0   SPECGRAV 1.030   PROTEINUA 1+*   GLUCUA Negative   KETONESU Trace*   BILIRUBINUA Negative   OCCULTUA 2+*   NITRITE Positive*   UROBILINOGEN 2.0-3.0*   LEUKOCYTESUR 3+*   RBCUA 3   WBCUA 30*   BACTERIA Many*   SQUAMEPITHEL 0   HYALINECASTS 4*       Significant Imaging: I have reviewed all pertinent imaging results/findings within the past 24 hours.  Assessment/Plan:     * Shortness of breath  Hx asthma, HFrEF s/p AICD, cardiomyopathy  CXR 12/7/24 impression with focal area of retrocardiac atelectasis   CTA chest  Serial troponins, procalcitonin, blood cx's, daily CBC  Resp infection panel  Rocephin and doxycycline for now  RT assess and treat  Supplemental O2 PRN  Continuous pulse ox    Primary cardiomyopathy  See above  EKG PRN  Limited echo  Resume home entresto  Tele monitoring      Heart failure with reduced ejection fraction  See above  Per patient EF <30% October 2024 at Dr. Jaffe's office, put back on entresto at that time and taken off ARB    Patient is identified as having Systolic (HFrEF) heart failure that is Chronic. CHF is currently controlled. Latest ECHO performed and demonstrates- Results for orders placed during the hospital encounter of 06/12/22    Echo    Interpretation Summary  · The left ventricle is normal in size with concentric hypertrophy and " mildly decreased systolic function.  · The estimated ejection fraction is 40%.  · Grade I left ventricular diastolic dysfunction.  · There is mild left ventricular global hypokinesis.  · Normal right ventricular size with normal right ventricular systolic function.  · Moderate left atrial enlargement.  · Mild tricuspid regurgitation.  · Normal central venous pressure (3 mmHg).  · The estimated PA systolic pressure is 10 mmHg.  . Continue Beta Blocker Aldactone ARNI and monitor clinical status closely. Monitor on telemetry. Patient is off CHF pathway.  Monitor strict Is&Os and daily weights.  Place on fluid restriction of 1.5 L. Cardiology is not consulted. Continue to stress to patient importance of self efficacy and  on diet for CHF. Last BNP reviewed- and noted below   Recent Labs   Lab 12/07/24  1138   BNP 47         Eosinophilic asthma  See above  Resume home inhalers      UTI (urinary tract infection)  Rocephin  Urine cx  I&O      Automatic implantable cardiac defibrillator in situ  AICD interrogation        VTE Risk Mitigation (From admission, onward)           Ordered     enoxaparin injection 40 mg  Daily         12/07/24 1439     IP VTE HIGH RISK PATIENT  Once         12/07/24 1439     Place sequential compression device  Until discontinued         12/07/24 1439                         On 12/07/2024, patient should be placed in hospital observation services under my care in collaboration with Dr. Norwood.      Automatic Inhaler to Nebulizer Interchange    fluticasone/umeclidinium/vilanterol (Trelegy) 200 mcg/ 62.5 mcg/ 25 mcg changed to budesonide 1 mg twice daily AND ipratropium 0.5 mg every 6 hour scheduled AND arformoterol 15 mcg twice daily per [facility] Automatic Therapeutic Substitutions Protocol.    Please contact pharmacy at extension [phone] with any questions.     Thank you,   Sera Hale NP  Department of Hospital Medicine  [facility] - Emergency Dept

## 2024-12-07 NOTE — SUBJECTIVE & OBJECTIVE
Past Medical History:   Diagnosis Date    Allergy     Asthma     Bilateral leg edema     Hyperlipidemia     Hypertension     Leg pain, bilateral     left leg more severe    Varicose vein        Past Surgical History:   Procedure Laterality Date     SECTION, LOW TRANSVERSE      x 3    EYE SURGERY  2023    lt retinal surgery    HYSTERECTOMY      pace maker  2020    SPINE SURGERY  2021    stents both legs         Review of patient's allergies indicates:  No Known Allergies    No current facility-administered medications on file prior to encounter.     Current Outpatient Medications on File Prior to Encounter   Medication Sig    albuterol (ACCUNEB) 1.25 mg/3 mL Nebu Take 3 mLs (1.25 mg total) by nebulization every 6 (six) hours as needed (Cough). Rescue    albuterol (PROVENTIL/VENTOLIN HFA) 90 mcg/actuation inhaler Inhale 2 puffs into the lungs every 6 (six) hours as needed for Wheezing. use as directed    aspirin 325 MG tablet Take 325 mg by mouth once daily.    budesonide (PULMICORT) 0.5 mg/2 mL nebulizer solution Take 2 mLs (0.5 mg total) by nebulization daily as needed (Shortness of breath, wheezing, cough). Controller    carvediloL (COREG) 6.25 MG tablet Take 6.25 mg by mouth 2 (two) times daily.     cetirizine (ZYRTEC) 10 MG tablet Take 1 tablet (10 mg total) by mouth once daily. (Patient taking differently: Take 10 mg by mouth every evening.)    escitalopram oxalate (LEXAPRO) 10 MG tablet Take 10 mg by mouth once daily.    ezetimibe (ZETIA) 10 mg tablet Take 1 tablet by mouth every evening.    fluticasone propionate (FLONASE) 50 mcg/actuation nasal spray 2 sprays (100 mcg total) by Each Nostril route once daily.    fluticasone-umeclidin-vilanter (TRELEGY ELLIPTA) 200-62.5-25 mcg inhaler Inhale 1 puff into the lungs once daily.    furosemide (LASIX) 20 MG tablet Take 20 mg by mouth daily as needed (Fluid Gain).    montelukast (SINGULAIR) 10 mg tablet Take 1 tablet (10 mg total) by mouth  once daily. (Patient taking differently: Take 10 mg by mouth every evening.)    sacubitriL-valsartan (ENTRESTO) 49-51 mg per tablet Take 1 tablet by mouth 2 (two) times daily. SAMPLES FROM DOCTOR    spironolactone (ALDACTONE) 50 MG tablet Take 50 mg by mouth once daily.    azithromycin (ZITHROMAX) 500 MG tablet Take 1 tablet (500 mg total) by mouth once daily. (Patient not taking: Reported on 2024)    coenzyme Q10 (CO Q-10) 100 mg capsule Take 1 capsule (100 mg total) by mouth once daily. (Patient not taking: Reported on 2024)    guaiFENesin-codeine 100-10 mg/5 ml (TUSSI-ORGANIDIN NR)  mg/5 mL syrup Take 5 mLs by mouth every 8 (eight) hours as needed for Cough. (Patient not taking: Reported on 2024)    predniSONE (DELTASONE) 20 MG tablet Take one pill per day for three days. Can repeat as needed for shortness of breath, wheezing, cough. (Patient not taking: Reported on 2024)    valsartan (DIOVAN) 40 MG tablet Take 40 mg by mouth 2 (two) times daily. (Patient not taking: Reported on 2024)    [DISCONTINUED] melatonin 10 mg Tab Take 2 tablets by mouth nightly as needed.    [DISCONTINUED] rosuvastatin (CRESTOR) 5 MG tablet Take 5 mg by mouth once daily.     Family History       Problem Relation (Age of Onset)    Cancer Sister    Heart disease Mother    Heart failure Father    Migraines Sister          Tobacco Use    Smoking status: Former     Current packs/day: 0.00     Types: Cigarettes     Quit date: 1985     Years since quittin.0     Passive exposure: Past    Smokeless tobacco: Never   Substance and Sexual Activity    Alcohol use: Yes     Comment: occ    Drug use: No    Sexual activity: Yes     Review of Systems   Constitutional:  Positive for chills. Negative for fever.   HENT:  Positive for congestion and sinus pain.    Respiratory:  Positive for cough and shortness of breath.    Cardiovascular:  Negative for chest pain.   Gastrointestinal:  Negative for abdominal pain,  constipation, diarrhea, nausea and vomiting.   Genitourinary:  Negative for flank pain.   Musculoskeletal:  Positive for myalgias.   Neurological:  Negative for syncope.   Psychiatric/Behavioral:  Negative for confusion.      Objective:     Vital Signs (Most Recent):  Temp: 97.4 °F (36.3 °C) (12/07/24 1035)  Pulse: 88 (12/07/24 1353)  Resp: 20 (12/07/24 1344)  BP: (!) 165/91 (12/07/24 1330)  SpO2: 99 % (12/07/24 1353) Vital Signs (24h Range):  Temp:  [97.4 °F (36.3 °C)] 97.4 °F (36.3 °C)  Pulse:  [] 88  Resp:  [20] 20  SpO2:  [93 %-99 %] 99 %  BP: (120-165)/(66-91) 165/91     Weight: 92.5 kg (204 lb)  Body mass index is 31.95 kg/m².     Physical Exam  Vitals reviewed.   Constitutional:       General: She is not in acute distress.  HENT:      Head: Normocephalic and atraumatic.      Nose: Nose normal.      Mouth/Throat:      Mouth: Mucous membranes are moist.   Eyes:      Conjunctiva/sclera: Conjunctivae normal.   Cardiovascular:      Rate and Rhythm: Normal rate.   Pulmonary:      Effort: Pulmonary effort is normal. No respiratory distress.      Breath sounds: Rales present.   Abdominal:      General: Bowel sounds are normal.      Palpations: Abdomen is soft.      Tenderness: There is no abdominal tenderness.   Musculoskeletal:         General: Normal range of motion.      Cervical back: Normal range of motion.      Right lower leg: No edema.      Left lower leg: No edema.   Skin:     General: Skin is warm and dry.      Capillary Refill: Capillary refill takes less than 2 seconds.   Neurological:      Mental Status: She is alert and oriented to person, place, and time.   Psychiatric:         Mood and Affect: Mood normal.                Significant Labs: All pertinent labs within the past 24 hours have been reviewed.  CBC:   Recent Labs   Lab 12/07/24  1320   WBC 11.61   HGB 13.2   HCT 40.3        CMP:   Recent Labs   Lab 12/07/24  1138      K 3.4*      CO2 28   GLU 89   BUN 17   CREATININE  "0.7   CALCIUM 8.6*   PROT 7.2   ALBUMIN 3.8   BILITOT 0.6   ALKPHOS 53*   AST 24   ALT 13   ANIONGAP 8     Cardiac Markers:   Recent Labs   Lab 12/07/24  1138   BNP 47     Magnesium:   Recent Labs   Lab 12/07/24  1138   MG 2.1     Troponin:   Recent Labs   Lab 12/07/24  1138 12/07/24  1320   TROPONINIHS 7.3 5.6     TSH: No results for input(s): "TSH" in the last 4320 hours.  Urine Studies:   Recent Labs   Lab 12/07/24  1117   COLORU Yellow   APPEARANCEUA Hazy*   PHUR 6.0   SPECGRAV 1.030   PROTEINUA 1+*   GLUCUA Negative   KETONESU Trace*   BILIRUBINUA Negative   OCCULTUA 2+*   NITRITE Positive*   UROBILINOGEN 2.0-3.0*   LEUKOCYTESUR 3+*   RBCUA 3   WBCUA 30*   BACTERIA Many*   SQUAMEPITHEL 0   HYALINECASTS 4*       Significant Imaging: I have reviewed all pertinent imaging results/findings within the past 24 hours.  "

## 2024-12-07 NOTE — ASSESSMENT & PLAN NOTE
Hx asthma, HFrEF s/p AICD, cardiomyopathy  CXR 12/7/24 impression with focal area of retrocardiac atelectasis   CTA chest  Serial troponins, procalcitonin, blood cx's, daily CBC  Resp infection panel  Rocephin and doxycycline for now  RT assess and treat  Supplemental O2 PRN  Continuous pulse ox

## 2024-12-08 PROBLEM — J12.1 PNEUMONIA DUE TO RESPIRATORY SYNCYTIAL VIRUS (RSV): Status: ACTIVE | Noted: 2024-12-07

## 2024-12-08 LAB
ALBUMIN SERPL BCP-MCNC: 3.7 G/DL (ref 3.5–5.2)
ALP SERPL-CCNC: 52 U/L (ref 55–135)
ALT SERPL W/O P-5'-P-CCNC: 12 U/L (ref 10–44)
ANION GAP SERPL CALC-SCNC: 9 MMOL/L (ref 8–16)
AST SERPL-CCNC: 19 U/L (ref 10–40)
BASOPHILS # BLD AUTO: 0.02 K/UL (ref 0–0.2)
BASOPHILS NFR BLD: 0.2 % (ref 0–1.9)
BILIRUB SERPL-MCNC: 0.4 MG/DL (ref 0.1–1)
BUN SERPL-MCNC: 17 MG/DL (ref 8–23)
CALCIUM SERPL-MCNC: 8.9 MG/DL (ref 8.7–10.5)
CHLORIDE SERPL-SCNC: 104 MMOL/L (ref 95–110)
CO2 SERPL-SCNC: 26 MMOL/L (ref 23–29)
CREAT SERPL-MCNC: 0.6 MG/DL (ref 0.5–1.4)
DIFFERENTIAL METHOD BLD: ABNORMAL
EOSINOPHIL # BLD AUTO: 0 K/UL (ref 0–0.5)
EOSINOPHIL NFR BLD: 0 % (ref 0–8)
ERYTHROCYTE [DISTWIDTH] IN BLOOD BY AUTOMATED COUNT: 14.1 % (ref 11.5–14.5)
EST. GFR  (NO RACE VARIABLE): >60 ML/MIN/1.73 M^2
GLUCOSE SERPL-MCNC: 139 MG/DL (ref 70–110)
HCT VFR BLD AUTO: 35.4 % (ref 37–48.5)
HCV AB SERPL QL IA: NEGATIVE
HGB BLD-MCNC: 11.4 G/DL (ref 12–16)
HIV 1+2 AB+HIV1 P24 AG SERPL QL IA: NEGATIVE
IMM GRANULOCYTES # BLD AUTO: 0.1 K/UL (ref 0–0.04)
IMM GRANULOCYTES NFR BLD AUTO: 0.8 % (ref 0–0.5)
LYMPHOCYTES # BLD AUTO: 2.6 K/UL (ref 1–4.8)
LYMPHOCYTES NFR BLD: 20 % (ref 18–48)
MAGNESIUM SERPL-MCNC: 2.3 MG/DL (ref 1.6–2.6)
MCH RBC QN AUTO: 29.8 PG (ref 27–31)
MCHC RBC AUTO-ENTMCNC: 32.2 G/DL (ref 32–36)
MCV RBC AUTO: 93 FL (ref 82–98)
MONOCYTES # BLD AUTO: 0.5 K/UL (ref 0.3–1)
MONOCYTES NFR BLD: 3.9 % (ref 4–15)
NEUTROPHILS # BLD AUTO: 9.7 K/UL (ref 1.8–7.7)
NEUTROPHILS NFR BLD: 75.1 % (ref 38–73)
NRBC BLD-RTO: 0 /100 WBC
OHS QRS DURATION: 100 MS
OHS QTC CALCULATION: 423 MS
PLATELET # BLD AUTO: 445 K/UL (ref 150–450)
PMV BLD AUTO: 9.4 FL (ref 9.2–12.9)
POTASSIUM SERPL-SCNC: 3.7 MMOL/L (ref 3.5–5.1)
PROT SERPL-MCNC: 7.1 G/DL (ref 6–8.4)
RBC # BLD AUTO: 3.82 M/UL (ref 4–5.4)
SODIUM SERPL-SCNC: 139 MMOL/L (ref 136–145)
TROPONIN I SERPL HS-MCNC: 6.4 PG/ML (ref 0–14.9)
TROPONIN I SERPL HS-MCNC: 6.4 PG/ML (ref 0–14.9)
WBC # BLD AUTO: 12.92 K/UL (ref 3.9–12.7)

## 2024-12-08 PROCEDURE — 27000207 HC ISOLATION

## 2024-12-08 PROCEDURE — 84484 ASSAY OF TROPONIN QUANT: CPT

## 2024-12-08 PROCEDURE — 83735 ASSAY OF MAGNESIUM: CPT

## 2024-12-08 PROCEDURE — 96375 TX/PRO/DX INJ NEW DRUG ADDON: CPT

## 2024-12-08 PROCEDURE — 85025 COMPLETE CBC W/AUTO DIFF WBC: CPT

## 2024-12-08 PROCEDURE — 12000002 HC ACUTE/MED SURGE SEMI-PRIVATE ROOM

## 2024-12-08 PROCEDURE — 94761 N-INVAS EAR/PLS OXIMETRY MLT: CPT

## 2024-12-08 PROCEDURE — 25000242 PHARM REV CODE 250 ALT 637 W/ HCPCS: Performed by: INTERNAL MEDICINE

## 2024-12-08 PROCEDURE — 27000221 HC OXYGEN, UP TO 24 HOURS

## 2024-12-08 PROCEDURE — 94640 AIRWAY INHALATION TREATMENT: CPT | Mod: XB

## 2024-12-08 PROCEDURE — 84484 ASSAY OF TROPONIN QUANT: CPT | Mod: 91

## 2024-12-08 PROCEDURE — 25000003 PHARM REV CODE 250: Performed by: NURSE PRACTITIONER

## 2024-12-08 PROCEDURE — 80053 COMPREHEN METABOLIC PANEL: CPT

## 2024-12-08 PROCEDURE — 25000242 PHARM REV CODE 250 ALT 637 W/ HCPCS

## 2024-12-08 PROCEDURE — 96376 TX/PRO/DX INJ SAME DRUG ADON: CPT

## 2024-12-08 PROCEDURE — 63600175 PHARM REV CODE 636 W HCPCS: Performed by: NURSE PRACTITIONER

## 2024-12-08 PROCEDURE — 99900031 HC PATIENT EDUCATION (STAT)

## 2024-12-08 PROCEDURE — 63600175 PHARM REV CODE 636 W HCPCS

## 2024-12-08 PROCEDURE — 25000003 PHARM REV CODE 250

## 2024-12-08 PROCEDURE — 36415 COLL VENOUS BLD VENIPUNCTURE: CPT

## 2024-12-08 RX ORDER — PHENAZOPYRIDINE HYDROCHLORIDE 100 MG/1
200 TABLET, FILM COATED ORAL 3 TIMES DAILY PRN
Status: DISCONTINUED | OUTPATIENT
Start: 2024-12-08 | End: 2024-12-09 | Stop reason: HOSPADM

## 2024-12-08 RX ORDER — DEXAMETHASONE SODIUM PHOSPHATE 4 MG/ML
8 INJECTION, SOLUTION INTRA-ARTICULAR; INTRALESIONAL; INTRAMUSCULAR; INTRAVENOUS; SOFT TISSUE ONCE
Status: COMPLETED | OUTPATIENT
Start: 2024-12-08 | End: 2024-12-08

## 2024-12-08 RX ADMIN — IPRATROPIUM BROMIDE 0.5 MG: 0.5 SOLUTION RESPIRATORY (INHALATION) at 01:12

## 2024-12-08 RX ADMIN — IPRATROPIUM BROMIDE 0.5 MG: 0.5 SOLUTION RESPIRATORY (INHALATION) at 12:12

## 2024-12-08 RX ADMIN — GUAIFENESIN AND CODEINE PHOSPHATE 5 ML: 100; 10 SOLUTION ORAL at 01:12

## 2024-12-08 RX ADMIN — CARVEDILOL 6.25 MG: 6.25 TABLET, FILM COATED ORAL at 09:12

## 2024-12-08 RX ADMIN — ESCITALOPRAM OXALATE 10 MG: 10 TABLET ORAL at 09:12

## 2024-12-08 RX ADMIN — BUDESONIDE INHALATION 1 MG: 0.5 SUSPENSION RESPIRATORY (INHALATION) at 07:12

## 2024-12-08 RX ADMIN — SPIRONOLACTONE 50 MG: 50 TABLET, FILM COATED ORAL at 09:12

## 2024-12-08 RX ADMIN — IPRATROPIUM BROMIDE 0.5 MG: 0.5 SOLUTION RESPIRATORY (INHALATION) at 07:12

## 2024-12-08 RX ADMIN — CETIRIZINE HYDROCHLORIDE 10 MG: 10 TABLET, FILM COATED ORAL at 09:12

## 2024-12-08 RX ADMIN — ARFORMOTEROL TARTRATE 15 MCG: 15 SOLUTION RESPIRATORY (INHALATION) at 07:12

## 2024-12-08 RX ADMIN — DEXAMETHASONE SODIUM PHOSPHATE 8 MG: 4 INJECTION, SOLUTION INTRA-ARTICULAR; INTRALESIONAL; INTRAMUSCULAR; INTRAVENOUS; SOFT TISSUE at 11:12

## 2024-12-08 RX ADMIN — PHENAZOPYRIDINE HYDROCHLORIDE 200 MG: 100 TABLET ORAL at 02:12

## 2024-12-08 RX ADMIN — ASPIRIN 325 MG ORAL TABLET 325 MG: 325 PILL ORAL at 09:12

## 2024-12-08 RX ADMIN — MONTELUKAST 10 MG: 10 TABLET, FILM COATED ORAL at 09:12

## 2024-12-08 RX ADMIN — CEFTRIAXONE 2 G: 2 INJECTION, POWDER, FOR SOLUTION INTRAMUSCULAR; INTRAVENOUS at 02:12

## 2024-12-08 RX ADMIN — GUAIFENESIN AND CODEINE PHOSPHATE 5 ML: 100; 10 SOLUTION ORAL at 09:12

## 2024-12-08 RX ADMIN — EZETIMIBE 10 MG: 10 TABLET ORAL at 09:12

## 2024-12-08 RX ADMIN — FLUTICASONE PROPIONATE 100 MCG: 50 SPRAY, METERED NASAL at 09:12

## 2024-12-08 RX ADMIN — ENOXAPARIN SODIUM 40 MG: 40 INJECTION SUBCUTANEOUS at 06:12

## 2024-12-08 NOTE — HOSPITAL COURSE
Ms. Lynch was admitted for UTI and dyspnea.  While here, her labs and vital signs were monitored and she was maintained on telemetry.  She was placed on empiric IV Rocephin and doxycycline for possible pneumonia.  Urine and blood cultures were collected.  There was concern for possible acute heart failure since she has a history of cardiomyopathy with AICD and low EF.  Her troponin and BNP were negative and her limited echocardiogram showed an EF of 55-60%.  She had a CTA of the chest which was negative for PE and showed a small right pneumonia.  Her respiratory panel grew out RSV.  Her urine culture started growing GNR.  The doxycycline was discontinued since the pneumonia was a viral etiology in the IV Rocephin was continued for her GNR UTI.  She initially did not require supplemental O2, but then she desatted to 88% on room air and supplemental O2 was initiated.  She was maintained on supportive care for the RSV with antitussive PRN and nebulizers. She was also given a dose of IV dexamethasone for her symptoms. Her final urine culture grew out a highly sensitive E coli. Her overall condition improved, and her supplemental O2 was weaned down.  She is being discharged to home today.  She will have a desat study prior and be set up for home O2 if qualifies.  She will continue a course of cefdinir, steroid taper, and follow up with her PCP and pulmonologist.  She did not have any adverse events while here.    Physical Exam:  General- AAOx3, appears to feel better today  HEENT- PERRLA, EOMI  Neck- Supple, No JVD  CV- Regular rate and rhythm  Resp- Lungs CTA Bilaterally, diminished at the bases bilaterally, No increased WOB, not currently wearing supplemental O2  GI- Non tender/non-distended, BS normoactive x4 quads  Extrem- FERGUSON, No edema  Neuro- no deficits  Skin-  warm and dry

## 2024-12-08 NOTE — PLAN OF CARE
RN called and spoke with pt's , Boy, via phone.  Information on face sheet confirmed.  PCP is Dr Jonathon Muñoz.  Preferred pharmacy is [facility] on [facility].   to transport home at discharge.  No discharge needs noted at this time       12/08/24 1825   Discharge Assessment   Assessment Type Discharge Planning Assessment   Confirmed/corrected address, phone number and insurance Yes   Source of Information family   If unable to respond/provide information was family/caregiver contacted? Yes   Contact Name/Number , Boy Lynch [phone]   When was your last doctors appointment? 10/16/24   Does patient/caregiver understand observation status Yes   Communicated ARRON with patient/caregiver No   Reason For Admission shortness of breath   People in Home child(ernestina), adult;spouse   Do you expect to return to your current living situation? Yes   Do you have help at home or someone to help you manage your care at home? Yes   Who are your caregiver(s) and their phone number(s)?    Prior to hospitilization cognitive status: Alert/Oriented   Current cognitive status: Alert/Oriented   Walking or Climbing Stairs Difficulty no   Dressing/Bathing Difficulty no   Equipment Currently Used at Home none   Readmission within 30 days? No   Patient currently being followed by outpatient case management? No   Do you currently have service(s) that help you manage your care at home? No   Do you take prescription medications? Yes   Do you have prescription coverage? No   Do you have any problems affording any of your prescribed medications? No   Is the patient taking medications as prescribed? yes   Who is going to help you get home at discharge?    How do you get to doctors appointments? car, drives self   Are you on dialysis? No   Do you take coumadin? No   Discharge Plan A Home with family   Discharge Plan B Home with family   DME Needed Upon Discharge  none   Discharge Plan discussed with: Spouse/sig  other   Transition of Care Barriers None

## 2024-12-08 NOTE — CARE UPDATE
12/07/24 1849   Patient Assessment/Suction   Level of Consciousness (AVPU) alert   Respiratory Effort Normal;Unlabored   Expansion/Accessory Muscles/Retractions no use of accessory muscles;no retractions;expansion symmetric   All Lung Fields Breath Sounds coarse;wheezes, expiratory   Rhythm/Pattern, Respiratory pattern regular;no shortness of breath reported;shallow   Cough Frequency infrequent   PRE-TX-O2   Device (Oxygen Therapy) room air   SpO2 97 %   Pulse Oximetry Type Continuous   Pulse 97   Resp 18   Aerosol Therapy   $ Aerosol Therapy Charges Aerosol Treatment  (brov)   Daily Review of Necessity (SVN) completed   Respiratory Treatment Status (SVN) given   Treatment Route (SVN) mask;oxygen   Patient Position HOB elevated   Post Treatment Assessment (SVN) increased aeration   Signs of Intolerance (SVN) none   Breath Sounds Post-Respiratory Treatment   Throughout All Fields Post-Treatment All Fields   Throughout All Fields Post-Treatment aeration increased   Post-treatment Heart Rate (beats/min) 97   Post-treatment Resp Rate (breaths/min) 18

## 2024-12-08 NOTE — ASSESSMENT & PLAN NOTE
Patient has a diagnosis of pneumonia. The cause of the pneumonia is viral in etiology due to  RSV. The pneumonia is stable. The patient has the following signs/symptoms of pneumonia: persistent hypoxia  and cough. The patient does have a current oxygen requirement and the patient does not have a home oxygen requirement. I have reviewed the pertinent imaging. The following cultures have been collected: Blood cultures The culture results are listed below.     Current antimicrobial regimen consists of the antibiotics listed below. Will monitor patient closely and Adjust treatment plan as follows discontinue doxycycline, give IV dexamethasone today for symptoms, continue IV Rocephin for UTI.  Continue symptomatic treatment:  nebulizers, antitussives, supplemental O2, weaning as tolerated.    Antibiotics (From admission, onward)      Start     Stop Route Frequency Ordered    12/08/24 1400  cefTRIAXone injection 2 g         -- IV Every 24 hours (non-standard times) 12/07/24 1442            Microbiology Results (last 7 days)       Procedure Component Value Units Date/Time    Urine culture [[id number]]  (Abnormal) Collected: 12/07/24 1117    Order Status: Completed Specimen: Urine Updated: 12/08/24 0653     Urine Culture, Routine GRAM NEGATIVE MELVI, LACTOSE   >100,000 cfu/ml  Identification and susceptibility pending      Narrative:      Specimen Source->Urine    Respiratory Infection Panel (PCR), Nasopharyngeal [[id number]]  (Abnormal) Collected: 12/07/24 1719    Order Status: Completed Specimen: Nasopharyngeal Swab Updated: 12/07/24 1918     Respiratory Infection Panel Source NP swab     Adenovirus Not Detected     Coronavirus 229E, Common Cold Virus Not Detected     Coronavirus HKU1, Common Cold Virus Not Detected     Coronavirus NL63, Common Cold Virus Not Detected     Coronavirus OC43, Common Cold Virus Not Detected     Comment: Coronavirus strains 229E, HKU1, NL63, and OC43 can cause the common   cold   and  are not associated with the respiratory disease outbreak caused   by  the COVID-19 (SARS-CoV-2 novel Coronavirus) strain.           SARS-CoV2 (COVID-19) Qualitative PCR Not Detected     Human Metapneumovirus Not Detected     Human Rhinovirus/Enterovirus Not Detected     Influenza A (subtypes H1, H1-2009,H3) Not Detected     Influenza B Not Detected     Parainfluenza Virus 1 Not Detected     Parainfluenza Virus 2 Not Detected     Parainfluenza Virus 3 Not Detected     Parainfluenza Virus 4 Not Detected     Respiratory Syncytial Virus Detected     Comment: RSV result(s) called and verbal readback obtained from Maude hCoi RN 1W by MS1 12/07/2024 19:18          Bordetella Parapertussis (YK0050) Not Detected     Bordetella pertussis (ptxP) Not Detected     Chlamydia pneumoniae Not Detected     Mycoplasma pneumoniae Not Detected     Comment: Respiratory Infection Panel testing performed by Multiplex PCR.       Narrative:      Respiratory Infection Panel source->NP Swab   RSV result(s) called and verbal readback obtained from Maude Choi RN 1W by MS1 12/07/2024 19:18    Blood culture x two cultures. Draw prior to antibiotics. [[id number]] Collected: 12/07/24 1147    Order Status: Completed Specimen: Blood from Peripheral, Hand, Left Updated: 12/07/24 1917     Blood Culture, Routine No Growth to date    Narrative:      Aerobic and anaerobic    Blood culture x two cultures. Draw prior to antibiotics. [[id number]] Collected: 12/07/24 1148    Order Status: Completed Specimen: Blood Updated: 12/07/24 1917     Blood Culture, Routine No Growth to date    Narrative:      Aerobic and anaerobic

## 2024-12-08 NOTE — ASSESSMENT & PLAN NOTE
Patient has a diagnosis of pneumonia. The cause of the pneumonia is viral in etiology due to  RSV. The pneumonia is stable. The patient has the following signs/symptoms of pneumonia: persistent hypoxia  and cough. The patient does have a current oxygen requirement and the patient does not have a home oxygen requirement. I have reviewed the pertinent imaging. The following cultures have been collected: Blood cultures The culture results are listed below.     Current antimicrobial regimen consists of the antibiotics listed below. Will monitor patient closely and Adjust treatment plan as follows discontinue doxycycline, continue IV Rocephin for UTI.  Continue symptomatic treatment:  nebulizers, antitussives, supplemental O2, weaning as tolerated.    Antibiotics (From admission, onward)      Start     Stop Route Frequency Ordered    12/08/24 1400  cefTRIAXone injection 2 g         -- IV Every 24 hours (non-standard times) 12/07/24 1442            Microbiology Results (last 7 days)       Procedure Component Value Units Date/Time    Urine culture [[id number]]  (Abnormal) Collected: 12/07/24 1117    Order Status: Completed Specimen: Urine Updated: 12/08/24 0653     Urine Culture, Routine GRAM NEGATIVE MELVI, LACTOSE   >100,000 cfu/ml  Identification and susceptibility pending      Narrative:      Specimen Source->Urine    Respiratory Infection Panel (PCR), Nasopharyngeal [[id number]]  (Abnormal) Collected: 12/07/24 1719    Order Status: Completed Specimen: Nasopharyngeal Swab Updated: 12/07/24 1918     Respiratory Infection Panel Source NP swab     Adenovirus Not Detected     Coronavirus 229E, Common Cold Virus Not Detected     Coronavirus HKU1, Common Cold Virus Not Detected     Coronavirus NL63, Common Cold Virus Not Detected     Coronavirus OC43, Common Cold Virus Not Detected     Comment: Coronavirus strains 229E, HKU1, NL63, and OC43 can cause the common   cold   and are not associated with the respiratory  disease outbreak caused   by  the COVID-19 (SARS-CoV-2 novel Coronavirus) strain.           SARS-CoV2 (COVID-19) Qualitative PCR Not Detected     Human Metapneumovirus Not Detected     Human Rhinovirus/Enterovirus Not Detected     Influenza A (subtypes H1, H1-2009,H3) Not Detected     Influenza B Not Detected     Parainfluenza Virus 1 Not Detected     Parainfluenza Virus 2 Not Detected     Parainfluenza Virus 3 Not Detected     Parainfluenza Virus 4 Not Detected     Respiratory Syncytial Virus Detected     Comment: RSV result(s) called and verbal readback obtained from Maude Choi RN 1W by MS1 12/07/2024 19:18          Bordetella Parapertussis ([id number]) Not Detected     Bordetella pertussis (ptxP) Not Detected     Chlamydia pneumoniae Not Detected     Mycoplasma pneumoniae Not Detected     Comment: Respiratory Infection Panel testing performed by Multiplex PCR.       Narrative:      Respiratory Infection Panel source->NP Swab   RSV result(s) called and verbal readback obtained from Maude Choi RN 1W by MS1 12/07/2024 19:18    Blood culture x two cultures. Draw prior to antibiotics. [[id number]] Collected: 12/07/24 1147    Order Status: Completed Specimen: Blood from Peripheral, Hand, Left Updated: 12/07/24 1917     Blood Culture, Routine No Growth to date    Narrative:      Aerobic and anaerobic    Blood culture x two cultures. Draw prior to antibiotics. [[id number]] Collected: 12/07/24 1148    Order Status: Completed Specimen: Blood Updated: 12/07/24 1917     Blood Culture, Routine No Growth to date    Narrative:      Aerobic and anaerobic

## 2024-12-08 NOTE — ASSESSMENT & PLAN NOTE
See above  Per patient EF <30% October 2024 at Dr. Jaffe's office, put back on entresto at that time and taken off ARB    Patient is identified as having Systolic (HFrEF) heart failure that is Chronic. CHF is currently controlled. Results for orders placed during the hospital encounter of 12/07/24    Limited Echo  Interpretation Summary    Left Ventricle: The left ventricle is normal in size. Normal wall thickness. There is normal systolic function with a visually estimated ejection fraction of 55 - 60%.    Left Atrium: Left atrium is mildly dilated.    IVC/SVC: Normal venous pressure at 3 mmHg. .     Continue Beta Blocker Aldactone ARNI and monitor clinical status closely. Monitor on telemetry. Patient is off CHF pathway.  Monitor strict Is&Os and daily weights.  Place on fluid restriction of 1.5 L. Cardiology is not consulted. Continue to stress to patient importance of self efficacy and  on diet for CHF. Last BNP reviewed- and noted below   Recent Labs   Lab 12/07/24  1138   BNP 47

## 2024-12-08 NOTE — CARE UPDATE
12/08/24 0709   Patient Assessment/Suction   Level of Consciousness (AVPU) alert   Respiratory Effort Normal;Unlabored   Expansion/Accessory Muscles/Retractions no use of accessory muscles;no retractions;expansion symmetric   All Lung Fields Breath Sounds diminished   Rhythm/Pattern, Respiratory unlabored;pattern regular;depth regular   Cough Frequency infrequent   Cough Type dry   PRE-TX-O2   Device (Oxygen Therapy) room air  (placed on 2L NC satd increased to 93%)   SpO2 (!) 88 %   Pulse Oximetry Type Continuous   $ Pulse Oximetry - Multiple Charge Pulse Oximetry - Multiple   Pulse 77   Resp 17   Aerosol Therapy   $ Aerosol Therapy Charges Aerosol Treatment   Daily Review of Necessity (SVN) completed   Respiratory Treatment Status (SVN) given   Treatment Route (SVN) oxygen;mask   Patient Position HOB elevated   Post Treatment Assessment (SVN) breath sounds unchanged   Signs of Intolerance (SVN) none

## 2024-12-08 NOTE — PLAN OF CARE
BRANDEN spoke with pt's , Boy, via phone, as patient is on isolation.  Elisa explained.   verbalized understanding and stated it is fine for CM to sign and leave a copy in the room.

## 2024-12-08 NOTE — PROGRESS NOTES
[facility] Medicine  Progress Note    Patient Name: Daylin Lynch  MRN: [id number]  Patient Class: OP- Observation   Admission Date: 12/7/2024  Length of Stay: 0 days  Attending Physician: Juventino Lao DO  Primary Care Provider: Jonathon Muñoz NP        Subjective     Principal Problem:Pneumonia due to respiratory syncytial virus (RSV)        HPI:  64-year-old female presented to ED eval of shortness of breath. pMHx HFrEF s/p AICD, HTN, HLD, eosinophilic asthma, cardiomyopathy, varicose veins.  Patient reported over the last week she has had progressively worsening shortness of breath, with associated chills, nonproductive cough, L rib pain, and orthopnea.  She states she keeps prescriptions of azithromycin and prednisone written by her pulmonologist at home in case exacerbation of asthma, states these medications usually resolve similar symptoms for her and that she has taken both of these medicines for the last 3 days without any improvement in symptoms.  Denies chest pain.  Denies abdominal pain, nausea, vomiting, changes in bowel habits.  Denies recent trauma.  Denies recent surgery or procedure.  Patient visited urgent care earlier today for these symptoms and was advised to follow-up in the ED 2/2 abnormal CXR.  Patient has history of asthma, cardiomyopathy, and HF s/p AICD.  Patient follows with Dr. Jaffe for Cardiology, states she had an echo in October at his office with an EF of less than 30% and at that time she was taken off of valsartan and put back on Entresto.  Today, CXR impression with streaky interstitial opacities in the basilar segments likely attributed towards pulmonary scarring or atelectasis; focal area of retrocardiac atelectasis is observed.  Lactic acid, CBC, CMP, BNP unremarkable.  Troponins 7.3 and 5.6.  Flu/COVID negative.  Admit to hospital medicine for further eval.      Overview/Hospital Course:  No notes on file    Interval History: a    Review of Systems    Constitutional:  Positive for activity change, appetite change and fatigue.   HENT:  Positive for congestion.    Respiratory:  Positive for cough and wheezing.      Objective:     Vital Signs (Most Recent):  Temp: 97.5 °F (36.4 °C) (12/08/24 0759)  Pulse: 69 (12/08/24 0759)  Resp: 18 (12/08/24 0759)  BP: 119/74 (12/08/24 0759)  SpO2: 98 % (12/08/24 0759) Vital Signs (24h Range):  Temp:  [97.4 °F (36.3 °C)-98.7 °F (37.1 °C)] 97.5 °F (36.4 °C)  Pulse:  [] 69  Resp:  [17-20] 18  SpO2:  [88 %-100 %] 98 %  BP: ()/(58-91) 119/74     Weight: 94.6 kg (208 lb 8.9 oz)  Body mass index is 32.66 kg/m².    Intake/Output Summary (Last 24 hours) at 12/8/2024 1002  Last data filed at 12/8/2024 0433  Gross per 24 hour   Intake --   Output 400 ml   Net -400 ml         Physical Exam        Significant Labs: All pertinent labs within the past 24 hours have been reviewed.  CBC:   Recent Labs   Lab 12/07/24  1320 12/08/24  0633   WBC 11.61 12.92*   HGB 13.2 11.4*   HCT 40.3 35.4*    445     CMP:   Recent Labs   Lab 12/07/24  1138 12/08/24  0633    139   K 3.4* 3.7    104   CO2 28 26   GLU 89 139*   BUN 17 17   CREATININE 0.7 0.6   CALCIUM 8.6* 8.9   PROT 7.2 7.1   ALBUMIN 3.8 3.7   BILITOT 0.6 0.4   ALKPHOS 53* 52*   AST 24 19   ALT 13 12   ANIONGAP 8 9     Cardiac Markers:   Recent Labs   Lab 12/07/24  1138   BNP 47     Troponin:   Recent Labs   Lab 12/07/24  1812 12/08/24  0024 12/08/24  0633   TROPONINIHS 8.2 6.4 6.4     Urine Studies:   Recent Labs   Lab 12/07/24  1117   COLORU Yellow   APPEARANCEUA Hazy*   PHUR 6.0   SPECGRAV 1.030   PROTEINUA 1+*   GLUCUA Negative   KETONESU Trace*   BILIRUBINUA Negative   OCCULTUA 2+*   NITRITE Positive*   UROBILINOGEN 2.0-3.0*   LEUKOCYTESUR 3+*   RBCUA 3   WBCUA 30*   BACTERIA Many*   SQUAMEPITHEL 0   HYALINECASTS 4*     Microbiology Results (last 7 days)       Procedure Component Value Units Date/Time    Urine culture [[id number]]  (Abnormal) Collected: 12/07/24  1117    Order Status: Completed Specimen: Urine Updated: 12/08/24 0653     Urine Culture, Routine GRAM NEGATIVE MELVI, LACTOSE   >100,000 cfu/ml  Identification and susceptibility pending      Narrative:      Specimen Source->Urine    Respiratory Infection Panel (PCR), Nasopharyngeal [[id number]]  (Abnormal) Collected: 12/07/24 1719    Order Status: Completed Specimen: Nasopharyngeal Swab Updated: 12/07/24 1918     Respiratory Infection Panel Source NP swab     Adenovirus Not Detected     Coronavirus 229E, Common Cold Virus Not Detected     Coronavirus HKU1, Common Cold Virus Not Detected     Coronavirus NL63, Common Cold Virus Not Detected     Coronavirus OC43, Common Cold Virus Not Detected     Comment: Coronavirus strains 229E, HKU1, NL63, and OC43 can cause the common   cold   and are not associated with the respiratory disease outbreak caused   by  the COVID-19 (SARS-CoV-2 novel Coronavirus) strain.           SARS-CoV2 (COVID-19) Qualitative PCR Not Detected     Human Metapneumovirus Not Detected     Human Rhinovirus/Enterovirus Not Detected     Influenza A (subtypes H1, H1-2009,H3) Not Detected     Influenza B Not Detected     Parainfluenza Virus 1 Not Detected     Parainfluenza Virus 2 Not Detected     Parainfluenza Virus 3 Not Detected     Parainfluenza Virus 4 Not Detected     Respiratory Syncytial Virus Detected     Comment: RSV result(s) called and verbal readback obtained from Maude Choi RN 1W by MS1 12/07/2024 19:18          Bordetella Parapertussis ([id number]) Not Detected     Bordetella pertussis (ptxP) Not Detected     Chlamydia pneumoniae Not Detected     Mycoplasma pneumoniae Not Detected     Comment: Respiratory Infection Panel testing performed by Multiplex PCR.       Narrative:      Respiratory Infection Panel source->NP Swab   RSV result(s) called and verbal readback obtained from Maude Choi RN 1W by MS1 12/07/2024 19:18    Blood culture x two cultures. Draw prior to  antibiotics. [[id number]] Collected: 12/07/24 1147    Order Status: Completed Specimen: Blood from Peripheral, Hand, Left Updated: 12/07/24 1917     Blood Culture, Routine No Growth to date    Narrative:      Aerobic and anaerobic    Blood culture x two cultures. Draw prior to antibiotics. [[id number]] Collected: 12/07/24 1148    Order Status: Completed Specimen: Blood Updated: 12/07/24 1917     Blood Culture, Routine No Growth to date    Narrative:      Aerobic and anaerobic          Echo    Left Ventricle: The left ventricle is normal in size. Normal wall   thickness. There is normal systolic function with a visually estimated   ejection fraction of 55 - 60%.    Left Atrium: Left atrium is mildly dilated.    IVC/SVC: Normal venous pressure at 3 mmHg.  CTA Chest Non-Coronary (PE Studies)  Narrative: CMS MANDATED QUALITY DATA - CT RADIATION - 436    All CT scans at this facility utilize dose modulation, iterative reconstruction, and/or weight based dosing when appropriate to reduce radiation dose to as low as reasonably achievable    EXAMINATION:  CTA CHEST NON CORONARY (PE STUDIES)    CLINICAL HISTORY:  retrocardiac atelectasis, also r/o PE;    TECHNIQUE:  Routine CT angiogram of the chest protocol performed after the IV administration of 100 mL Omnipaque 350. 3D reconstructions/reformats/MIPs obtained. All CT scans at this facility are performed  using dose modulation techniques as appropriate to performed exam including the following:  automated exposure control; adjustment of mA and/or kV according to the patients size (this includes techniques or standardized protocols for targeted exams where dose is matched to indication/reason for exam: i.e. extremities or head);  iterative reconstruction technique.    COMPARISON:  Conventional radiographs of the chest of 12/07/2024.    FINDINGS:  High quality examination for evaluation of pulmonary thromboembolism.  Normal opacification of the pulmonary arterial system out  to the tertiary order branch vessels without evidence of pruning, webbing, or truncation of the pulmonary arterial system that would allow suspicion towards the presence of a acute pulmonary embolism.  Pacemaking device projected over the left upper chest wall causes significant streak artifact limiting diagnostic evaluation of the aorta.    Both right left pulmonary arteries of appear well maintained without evidence of occlusion.  Third order branch vessels are limited opacification given the limitations of the examination due to bolus timing.    Soft tissue prominence within both leon are noted that appear symmetrical in etiology that likely the consequence of bilateral hilar adenopathy in the early stage.  There is no evidence of pathologic lymphadenopathy.    Parenchymal lung window settings show evidence of a small patch of pneumonia occupying the right costophrenic sulcus in the setting of bibasilar atelectasis and scarring.  There is scarring in the left upper lobe lingular division that migrates over the left hemidiaphragm.    The cardiac chambers appear normal in size and overall configuration without significant enlargement.  Mild anterior pericardial thickening.    Upper abdominal cavity reveals no significant abnormality within the visualized portions of the upper abdominal cavity.  Impression: 1.  No CT evidence of acute pulmonary thromboembolism.    2.  Soft tissue prominence within both leon may reflect the presence of early hilar adenopathy, although pathologic lymphadenopathy is not visualized.    3.  Small patch of pneumonia in the costophrenic sulcus on the right.    Electronically signed by: Quincy Razo MD  Date:    12/07/2024  Time:    15:38  X-Ray Chest PA And Lateral  Narrative: EXAMINATION:  XR CHEST PA AND LATERAL    CLINICAL HISTORY:  Shortness breath;    TECHNIQUE:  PA and lateral views of the chest were performed.    COMPARISON:  01/08/2024    FINDINGS:  Dual lead pacemaker device  projects over the left lower chest wall with leads depicted within the right atrium and right ventricle in optimal position.  Cardiomediastinal silhouette top-normal in size exacerbated by the decreased inspiratory volume head body habitus.  Minimal eventration the right hemidiaphragm.  Focal areas of bibasilar atelectasis are observed more prominent in the left lower lobe.  Streaky interstitial opacities in the left retrocardiac region are noted.  Heart at the upper edge of normal.  Pulmonary vascularity normal.  Impression: Streaky interstitial opacities in the basilar segments likely attributed towards pulmonary scarring or atelectasis.  Focal area of retrocardiac atelectasis is observed.    Electronically signed by: Quincy Razo MD  Date:    12/07/2024  Time:    13:01        Significant Imaging: I have reviewed all pertinent imaging results/findings within the past 24 hours.    Assessment and Plan     * Pneumonia due to respiratory syncytial virus (RSV)  Patient has a diagnosis of pneumonia. The cause of the pneumonia is viral in etiology due to  RSV. The pneumonia is stable. The patient has the following signs/symptoms of pneumonia: persistent hypoxia  and cough. The patient does have a current oxygen requirement and the patient does not have a home oxygen requirement. I have reviewed the pertinent imaging. The following cultures have been collected: Blood cultures The culture results are listed below.     Current antimicrobial regimen consists of the antibiotics listed below. Will monitor patient closely and Adjust treatment plan as follows discontinue doxycycline, give IV dexamethasone today for symptoms, continue IV Rocephin for UTI.  Continue symptomatic treatment:  nebulizers, antitussives, supplemental O2, weaning as tolerated.    Antibiotics (From admission, onward)      Start     Stop Route Frequency Ordered    12/08/24 1400  cefTRIAXone injection 2 g         -- IV Every 24 hours (non-standard times)  12/07/24 1442            Microbiology Results (last 7 days)       Procedure Component Value Units Date/Time    Urine culture [[id number]]  (Abnormal) Collected: 12/07/24 1117    Order Status: Completed Specimen: Urine Updated: 12/08/24 0653     Urine Culture, Routine GRAM NEGATIVE MELVI, LACTOSE   >100,000 cfu/ml  Identification and susceptibility pending      Narrative:      Specimen Source->Urine    Respiratory Infection Panel (PCR), Nasopharyngeal [[id number]]  (Abnormal) Collected: 12/07/24 1719    Order Status: Completed Specimen: Nasopharyngeal Swab Updated: 12/07/24 1918     Respiratory Infection Panel Source NP swab     Adenovirus Not Detected     Coronavirus 229E, Common Cold Virus Not Detected     Coronavirus HKU1, Common Cold Virus Not Detected     Coronavirus NL63, Common Cold Virus Not Detected     Coronavirus OC43, Common Cold Virus Not Detected     Comment: Coronavirus strains 229E, HKU1, NL63, and OC43 can cause the common   cold   and are not associated with the respiratory disease outbreak caused   by  the COVID-19 (SARS-CoV-2 novel Coronavirus) strain.           SARS-CoV2 (COVID-19) Qualitative PCR Not Detected     Human Metapneumovirus Not Detected     Human Rhinovirus/Enterovirus Not Detected     Influenza A (subtypes H1, H1-2009,H3) Not Detected     Influenza B Not Detected     Parainfluenza Virus 1 Not Detected     Parainfluenza Virus 2 Not Detected     Parainfluenza Virus 3 Not Detected     Parainfluenza Virus 4 Not Detected     Respiratory Syncytial Virus Detected     Comment: RSV result(s) called and verbal readback obtained from Maude Choi RN 1W by MS1 12/07/2024 19:18          Bordetella Parapertussis ([id number]) Not Detected     Bordetella pertussis (ptxP) Not Detected     Chlamydia pneumoniae Not Detected     Mycoplasma pneumoniae Not Detected     Comment: Respiratory Infection Panel testing performed by Multiplex PCR.       Narrative:      Respiratory Infection Panel  source->NP Swab   RSV result(s) called and verbal readback obtained from Maude Choi RN 1W by MS1 12/07/2024 19:18    Blood culture x two cultures. Draw prior to antibiotics. [[id number]] Collected: 12/07/24 1147    Order Status: Completed Specimen: Blood from Peripheral, Hand, Left Updated: 12/07/24 1917     Blood Culture, Routine No Growth to date    Narrative:      Aerobic and anaerobic    Blood culture x two cultures. Draw prior to antibiotics. [[id number]] Collected: 12/07/24 1148    Order Status: Completed Specimen: Blood Updated: 12/07/24 1917     Blood Culture, Routine No Growth to date    Narrative:      Aerobic and anaerobic            UTI (urinary tract infection)  Urine culture growing GNR  Continue IV Rocephin   Await final culture    Eosinophilic asthma  Stable.  Continue chronic medications.    Automatic implantable cardiac defibrillator in situ  AICD interrogation pending    Heart failure with reduced ejection fraction  See above  Per patient EF <30% October 2024 at Dr. Jaffe's office, put back on entresto at that time and taken off ARB    Patient is identified as having Systolic (HFrEF) heart failure that is Chronic. CHF is currently controlled. Results for orders placed during the hospital encounter of 12/07/24    Limited Echo  Interpretation Summary    Left Ventricle: The left ventricle is normal in size. Normal wall thickness. There is normal systolic function with a visually estimated ejection fraction of 55 - 60%.    Left Atrium: Left atrium is mildly dilated.    IVC/SVC: Normal venous pressure at 3 mmHg. .     Continue Beta Blocker Aldactone ARNI and monitor clinical status closely. Monitor on telemetry. Patient is off CHF pathway.  Monitor strict Is&Os and daily weights.  Place on fluid restriction of 1.5 L. Cardiology is not consulted. Continue to stress to patient importance of self efficacy and  on diet for CHF. Last BNP reviewed- and noted below   Recent Labs   Lab  12/07/24  1138   BNP 47         Primary cardiomyopathy  Stable.  Has AICD.  See chronic systolic HF a/P      VTE Risk Mitigation (From admission, onward)           Ordered     enoxaparin injection 40 mg  Daily         12/07/24 1439     IP VTE HIGH RISK PATIENT  Once         12/07/24 1439     Place sequential compression device  Until discontinued         12/07/24 1439                    Discharge Planning   ARRON:      Code Status: Full Code   Medical Readiness for Discharge Date:                            Patricia Ribeiro NP  Department of Hospital Medicine   [facility]

## 2024-12-08 NOTE — PLAN OF CARE
Problem: Pneumonia  Goal: Effective Oxygenation and Ventilation  Outcome: Progressing     Problem: Adult Inpatient Plan of Care  Goal: Plan of Care Review  Outcome: Progressing

## 2024-12-08 NOTE — SUBJECTIVE & OBJECTIVE
Interval History:  Sitting up in bed, awake and alert.  Reports feeling a little better this morning. Chest CTA negative for PE and showed small right pneumonia.  Echocardiogram showed EF 55-60%.  Blood cultures NGTD.  Urine culture growing GNR.  Respiratory panel positive RSV.  AICD interrogation pending.  RT reported that patient desatted this morning to 88% and now requiring supplemental O2.  Patient also endorses Feels fatigued.     Review of Systems   Constitutional:  Positive for activity change, appetite change and fatigue.   HENT:  Positive for congestion.    Respiratory:  Positive for cough and wheezing.      Objective:     Vital Signs (Most Recent):  Temp: 97.5 °F (36.4 °C) (12/08/24 0759)  Pulse: 69 (12/08/24 0759)  Resp: 18 (12/08/24 0759)  BP: 119/74 (12/08/24 0759)  SpO2: 98 % (12/08/24 0759) Vital Signs (24h Range):  Temp:  [97.4 °F (36.3 °C)-98.7 °F (37.1 °C)] 97.5 °F (36.4 °C)  Pulse:  [] 69  Resp:  [17-20] 18  SpO2:  [88 %-100 %] 98 %  BP: ()/(58-91) 119/74     Weight: 94.6 kg (208 lb 8.9 oz)  Body mass index is 32.66 kg/m².    Intake/Output Summary (Last 24 hours) at 12/8/2024 1002  Last data filed at 12/8/2024 0433  Gross per 24 hour   Intake --   Output 400 ml   Net -400 ml         Physical Exam  Constitutional:       General: She is not in acute distress.     Appearance: She is ill-appearing.   Eyes:      Extraocular Movements: Extraocular movements intact.      Pupils: Pupils are equal, round, and reactive to light.   Cardiovascular:      Rate and Rhythm: Normal rate and regular rhythm.      Pulses: Normal pulses.      Heart sounds: Normal heart sounds.   Pulmonary:      Effort: Pulmonary effort is normal. No respiratory distress.      Breath sounds: Normal breath sounds.      Comments: O2 via NC donned  Abdominal:      General: Bowel sounds are normal. There is no distension.      Palpations: Abdomen is soft.      Tenderness: There is no abdominal tenderness.   Musculoskeletal:          General: No swelling. Normal range of motion.      Cervical back: Normal range of motion and neck supple.   Skin:     General: Skin is warm and dry.      Capillary Refill: Capillary refill takes less than 2 seconds.   Neurological:      General: No focal deficit present.      Mental Status: She is alert and oriented to person, place, and time.             Significant Labs: All pertinent labs within the past 24 hours have been reviewed.  CBC:   Recent Labs   Lab 12/07/24  1320 12/08/24  0633   WBC 11.61 12.92*   HGB 13.2 11.4*   HCT 40.3 35.4*    445     CMP:   Recent Labs   Lab 12/07/24  1138 12/08/24  0633    139   K 3.4* 3.7    104   CO2 28 26   GLU 89 139*   BUN 17 17   CREATININE 0.7 0.6   CALCIUM 8.6* 8.9   PROT 7.2 7.1   ALBUMIN 3.8 3.7   BILITOT 0.6 0.4   ALKPHOS 53* 52*   AST 24 19   ALT 13 12   ANIONGAP 8 9     Cardiac Markers:   Recent Labs   Lab 12/07/24  1138   BNP 47     Troponin:   Recent Labs   Lab 12/07/24  1812 12/08/24  0024 12/08/24  0633   TROPONINIHS 8.2 6.4 6.4     Urine Studies:   Recent Labs   Lab 12/07/24  1117   COLORU Yellow   APPEARANCEUA Hazy*   PHUR 6.0   SPECGRAV 1.030   PROTEINUA 1+*   GLUCUA Negative   KETONESU Trace*   BILIRUBINUA Negative   OCCULTUA 2+*   NITRITE Positive*   UROBILINOGEN 2.0-3.0*   LEUKOCYTESUR 3+*   RBCUA 3   WBCUA 30*   BACTERIA Many*   SQUAMEPITHEL 0   HYALINECASTS 4*     Microbiology Results (last 7 days)       Procedure Component Value Units Date/Time    Urine culture [[id number]]  (Abnormal) Collected: 12/07/24 1117    Order Status: Completed Specimen: Urine Updated: 12/08/24 0653     Urine Culture, Routine GRAM NEGATIVE MELVI, LACTOSE   >100,000 cfu/ml  Identification and susceptibility pending      Narrative:      Specimen Source->Urine    Respiratory Infection Panel (PCR), Nasopharyngeal [[id number]]  (Abnormal) Collected: 12/07/24 1719    Order Status: Completed Specimen: Nasopharyngeal Swab Updated: 12/07/24 1918      Respiratory Infection Panel Source NP swab     Adenovirus Not Detected     Coronavirus 229E, Common Cold Virus Not Detected     Coronavirus HKU1, Common Cold Virus Not Detected     Coronavirus NL63, Common Cold Virus Not Detected     Coronavirus OC43, Common Cold Virus Not Detected     Comment: Coronavirus strains 229E, HKU1, NL63, and OC43 can cause the common   cold   and are not associated with the respiratory disease outbreak caused   by  the COVID-19 (SARS-CoV-2 novel Coronavirus) strain.           SARS-CoV2 (COVID-19) Qualitative PCR Not Detected     Human Metapneumovirus Not Detected     Human Rhinovirus/Enterovirus Not Detected     Influenza A (subtypes H1, H1-2009,H3) Not Detected     Influenza B Not Detected     Parainfluenza Virus 1 Not Detected     Parainfluenza Virus 2 Not Detected     Parainfluenza Virus 3 Not Detected     Parainfluenza Virus 4 Not Detected     Respiratory Syncytial Virus Detected     Comment: RSV result(s) called and verbal readback obtained from Maude Choi RN 1W by MS1 12/07/2024 19:18          Bordetella Parapertussis (EL3196) Not Detected     Bordetella pertussis (ptxP) Not Detected     Chlamydia pneumoniae Not Detected     Mycoplasma pneumoniae Not Detected     Comment: Respiratory Infection Panel testing performed by Multiplex PCR.       Narrative:      Respiratory Infection Panel source->NP Swab   RSV result(s) called and verbal readback obtained from Maude Choi RN 1W by MS1 12/07/2024 19:18    Blood culture x two cultures. Draw prior to antibiotics. [[id number]] Collected: 12/07/24 1147    Order Status: Completed Specimen: Blood from Peripheral, Hand, Left Updated: 12/07/24 1917     Blood Culture, Routine No Growth to date    Narrative:      Aerobic and anaerobic    Blood culture x two cultures. Draw prior to antibiotics. [[id number]] Collected: 12/07/24 1148    Order Status: Completed Specimen: Blood Updated: 12/07/24 1917     Blood Culture, Routine No Growth  to date    Narrative:      Aerobic and anaerobic          Echo    Left Ventricle: The left ventricle is normal in size. Normal wall   thickness. There is normal systolic function with a visually estimated   ejection fraction of 55 - 60%.    Left Atrium: Left atrium is mildly dilated.    IVC/SVC: Normal venous pressure at 3 mmHg.  CTA Chest Non-Coronary (PE Studies)  Narrative: CMS MANDATED QUALITY DATA - CT RADIATION - 436    All CT scans at this facility utilize dose modulation, iterative reconstruction, and/or weight based dosing when appropriate to reduce radiation dose to as low as reasonably achievable    EXAMINATION:  CTA CHEST NON CORONARY (PE STUDIES)    CLINICAL HISTORY:  retrocardiac atelectasis, also r/o PE;    TECHNIQUE:  Routine CT angiogram of the chest protocol performed after the IV administration of 100 mL Omnipaque 350. 3D reconstructions/reformats/MIPs obtained. All CT scans at this facility are performed  using dose modulation techniques as appropriate to performed exam including the following:  automated exposure control; adjustment of mA and/or kV according to the patients size (this includes techniques or standardized protocols for targeted exams where dose is matched to indication/reason for exam: i.e. extremities or head);  iterative reconstruction technique.    COMPARISON:  Conventional radiographs of the chest of 12/07/2024.    FINDINGS:  High quality examination for evaluation of pulmonary thromboembolism.  Normal opacification of the pulmonary arterial system out to the tertiary order branch vessels without evidence of pruning, webbing, or truncation of the pulmonary arterial system that would allow suspicion towards the presence of a acute pulmonary embolism.  Pacemaking device projected over the left upper chest wall causes significant streak artifact limiting diagnostic evaluation of the aorta.    Both right left pulmonary arteries of appear well maintained without evidence of  occlusion.  Third order branch vessels are limited opacification given the limitations of the examination due to bolus timing.    Soft tissue prominence within both leon are noted that appear symmetrical in etiology that likely the consequence of bilateral hilar adenopathy in the early stage.  There is no evidence of pathologic lymphadenopathy.    Parenchymal lung window settings show evidence of a small patch of pneumonia occupying the right costophrenic sulcus in the setting of bibasilar atelectasis and scarring.  There is scarring in the left upper lobe lingular division that migrates over the left hemidiaphragm.    The cardiac chambers appear normal in size and overall configuration without significant enlargement.  Mild anterior pericardial thickening.    Upper abdominal cavity reveals no significant abnormality within the visualized portions of the upper abdominal cavity.  Impression: 1.  No CT evidence of acute pulmonary thromboembolism.    2.  Soft tissue prominence within both leon may reflect the presence of early hilar adenopathy, although pathologic lymphadenopathy is not visualized.    3.  Small patch of pneumonia in the costophrenic sulcus on the right.    Electronically signed by: Quincy Razo MD  Date:    12/07/2024  Time:    15:38  X-Ray Chest PA And Lateral  Narrative: EXAMINATION:  XR CHEST PA AND LATERAL    CLINICAL HISTORY:  Shortness breath;    TECHNIQUE:  PA and lateral views of the chest were performed.    COMPARISON:  01/08/2024    FINDINGS:  Dual lead pacemaker device projects over the left lower chest wall with leads depicted within the right atrium and right ventricle in optimal position.  Cardiomediastinal silhouette top-normal in size exacerbated by the decreased inspiratory volume head body habitus.  Minimal eventration the right hemidiaphragm.  Focal areas of bibasilar atelectasis are observed more prominent in the left lower lobe.  Streaky interstitial opacities in the left  retrocardiac region are noted.  Heart at the upper edge of normal.  Pulmonary vascularity normal.  Impression: Streaky interstitial opacities in the basilar segments likely attributed towards pulmonary scarring or atelectasis.  Focal area of retrocardiac atelectasis is observed.    Electronically signed by: Quincy Razo MD  Date:    12/07/2024  Time:    13:01        Significant Imaging: I have reviewed all pertinent imaging results/findings within the past 24 hours.

## 2024-12-09 ENCOUNTER — PATIENT MESSAGE (OUTPATIENT)
Dept: PULMONOLOGY | Facility: CLINIC | Age: 64
End: 2024-12-09
Payer: MEDICARE

## 2024-12-09 VITALS
RESPIRATION RATE: 18 BRPM | HEART RATE: 76 BPM | BODY MASS INDEX: 32.73 KG/M2 | HEIGHT: 67 IN | WEIGHT: 208.56 LBS | OXYGEN SATURATION: 94 % | DIASTOLIC BLOOD PRESSURE: 79 MMHG | SYSTOLIC BLOOD PRESSURE: 136 MMHG | TEMPERATURE: 99 F

## 2024-12-09 LAB
ALBUMIN SERPL BCP-MCNC: 3.3 G/DL (ref 3.5–5.2)
ALP SERPL-CCNC: 46 U/L (ref 55–135)
ALT SERPL W/O P-5'-P-CCNC: 19 U/L (ref 10–44)
ANION GAP SERPL CALC-SCNC: 7 MMOL/L (ref 8–16)
AST SERPL-CCNC: 24 U/L (ref 10–40)
BACTERIA UR CULT: ABNORMAL
BASOPHILS # BLD AUTO: 0.02 K/UL (ref 0–0.2)
BASOPHILS NFR BLD: 0.1 % (ref 0–1.9)
BILIRUB SERPL-MCNC: 0.4 MG/DL (ref 0.1–1)
BUN SERPL-MCNC: 22 MG/DL (ref 8–23)
CALCIUM SERPL-MCNC: 8.7 MG/DL (ref 8.7–10.5)
CHLORIDE SERPL-SCNC: 105 MMOL/L (ref 95–110)
CO2 SERPL-SCNC: 29 MMOL/L (ref 23–29)
CREAT SERPL-MCNC: 0.6 MG/DL (ref 0.5–1.4)
DIFFERENTIAL METHOD BLD: ABNORMAL
EOSINOPHIL # BLD AUTO: 0 K/UL (ref 0–0.5)
EOSINOPHIL NFR BLD: 0 % (ref 0–8)
ERYTHROCYTE [DISTWIDTH] IN BLOOD BY AUTOMATED COUNT: 14.3 % (ref 11.5–14.5)
EST. GFR  (NO RACE VARIABLE): >60 ML/MIN/1.73 M^2
GLUCOSE SERPL-MCNC: 117 MG/DL (ref 70–110)
HCT VFR BLD AUTO: 34.4 % (ref 37–48.5)
HGB BLD-MCNC: 11 G/DL (ref 12–16)
IMM GRANULOCYTES # BLD AUTO: 0.12 K/UL (ref 0–0.04)
IMM GRANULOCYTES NFR BLD AUTO: 0.9 % (ref 0–0.5)
LYMPHOCYTES # BLD AUTO: 2.8 K/UL (ref 1–4.8)
LYMPHOCYTES NFR BLD: 20.8 % (ref 18–48)
MAGNESIUM SERPL-MCNC: 2.1 MG/DL (ref 1.6–2.6)
MCH RBC QN AUTO: 29.6 PG (ref 27–31)
MCHC RBC AUTO-ENTMCNC: 32 G/DL (ref 32–36)
MCV RBC AUTO: 93 FL (ref 82–98)
MONOCYTES # BLD AUTO: 0.7 K/UL (ref 0.3–1)
MONOCYTES NFR BLD: 5.4 % (ref 4–15)
NEUTROPHILS # BLD AUTO: 9.8 K/UL (ref 1.8–7.7)
NEUTROPHILS NFR BLD: 72.8 % (ref 38–73)
NRBC BLD-RTO: 0 /100 WBC
PLATELET # BLD AUTO: 433 K/UL (ref 150–450)
PMV BLD AUTO: 9.5 FL (ref 9.2–12.9)
POTASSIUM SERPL-SCNC: 4 MMOL/L (ref 3.5–5.1)
PROT SERPL-MCNC: 6.5 G/DL (ref 6–8.4)
RBC # BLD AUTO: 3.72 M/UL (ref 4–5.4)
SODIUM SERPL-SCNC: 141 MMOL/L (ref 136–145)
WBC # BLD AUTO: 13.42 K/UL (ref 3.9–12.7)

## 2024-12-09 PROCEDURE — 63600175 PHARM REV CODE 636 W HCPCS: Performed by: NURSE PRACTITIONER

## 2024-12-09 PROCEDURE — 25000003 PHARM REV CODE 250

## 2024-12-09 PROCEDURE — 94640 AIRWAY INHALATION TREATMENT: CPT

## 2024-12-09 PROCEDURE — 80053 COMPREHEN METABOLIC PANEL: CPT

## 2024-12-09 PROCEDURE — 25000242 PHARM REV CODE 250 ALT 637 W/ HCPCS: Performed by: INTERNAL MEDICINE

## 2024-12-09 PROCEDURE — 36415 COLL VENOUS BLD VENIPUNCTURE: CPT

## 2024-12-09 PROCEDURE — 63600175 PHARM REV CODE 636 W HCPCS

## 2024-12-09 PROCEDURE — 85025 COMPLETE CBC W/AUTO DIFF WBC: CPT

## 2024-12-09 PROCEDURE — 83735 ASSAY OF MAGNESIUM: CPT

## 2024-12-09 PROCEDURE — 94761 N-INVAS EAR/PLS OXIMETRY MLT: CPT

## 2024-12-09 PROCEDURE — 94618 PULMONARY STRESS TESTING: CPT

## 2024-12-09 PROCEDURE — 99900031 HC PATIENT EDUCATION (STAT)

## 2024-12-09 PROCEDURE — 99900035 HC TECH TIME PER 15 MIN (STAT)

## 2024-12-09 RX ORDER — IPRATROPIUM BROMIDE AND ALBUTEROL SULFATE 2.5; .5 MG/3ML; MG/3ML
3 SOLUTION RESPIRATORY (INHALATION) EVERY 6 HOURS PRN
Qty: 75 ML | Refills: 0 | Status: SHIPPED | OUTPATIENT
Start: 2024-12-09 | End: 2025-12-09

## 2024-12-09 RX ORDER — PHENAZOPYRIDINE HYDROCHLORIDE 200 MG/1
200 TABLET, FILM COATED ORAL 3 TIMES DAILY PRN
Qty: 9 TABLET | Refills: 0 | Status: SHIPPED | OUTPATIENT
Start: 2024-12-09 | End: 2024-12-12

## 2024-12-09 RX ORDER — CEFDINIR 300 MG/1
300 CAPSULE ORAL 2 TIMES DAILY
Qty: 8 CAPSULE | Refills: 0 | Status: SHIPPED | OUTPATIENT
Start: 2024-12-10 | End: 2024-12-14

## 2024-12-09 RX ORDER — CODEINE PHOSPHATE AND GUAIFENESIN 10; 100 MG/5ML; MG/5ML
10 SOLUTION ORAL EVERY 6 HOURS PRN
Qty: 237 ML | Refills: 0 | Status: SHIPPED | OUTPATIENT
Start: 2024-12-09 | End: 2024-12-19

## 2024-12-09 RX ORDER — PREDNISONE 20 MG/1
TABLET ORAL
Qty: 15 TABLET | Refills: 0 | Status: SHIPPED | OUTPATIENT
Start: 2024-12-09 | End: 2024-12-14

## 2024-12-09 RX ORDER — PREDNISONE 20 MG/1
60 TABLET ORAL ONCE
Status: COMPLETED | OUTPATIENT
Start: 2024-12-09 | End: 2024-12-09

## 2024-12-09 RX ADMIN — SPIRONOLACTONE 50 MG: 50 TABLET, FILM COATED ORAL at 08:12

## 2024-12-09 RX ADMIN — IPRATROPIUM BROMIDE 0.5 MG: 0.5 SOLUTION RESPIRATORY (INHALATION) at 01:12

## 2024-12-09 RX ADMIN — GUAIFENESIN AND CODEINE PHOSPHATE 5 ML: 100; 10 SOLUTION ORAL at 03:12

## 2024-12-09 RX ADMIN — PREDNISONE 60 MG: 20 TABLET ORAL at 08:12

## 2024-12-09 RX ADMIN — ESCITALOPRAM OXALATE 10 MG: 10 TABLET ORAL at 08:12

## 2024-12-09 RX ADMIN — CARVEDILOL 6.25 MG: 6.25 TABLET, FILM COATED ORAL at 08:12

## 2024-12-09 RX ADMIN — BUDESONIDE INHALATION 1 MG: 0.5 SUSPENSION RESPIRATORY (INHALATION) at 06:12

## 2024-12-09 RX ADMIN — IPRATROPIUM BROMIDE 0.5 MG: 0.5 SOLUTION RESPIRATORY (INHALATION) at 06:12

## 2024-12-09 RX ADMIN — IPRATROPIUM BROMIDE 0.5 MG: 0.5 SOLUTION RESPIRATORY (INHALATION) at 12:12

## 2024-12-09 RX ADMIN — CEFTRIAXONE 2 G: 2 INJECTION, POWDER, FOR SOLUTION INTRAMUSCULAR; INTRAVENOUS at 08:12

## 2024-12-09 RX ADMIN — ASPIRIN 325 MG ORAL TABLET 325 MG: 325 PILL ORAL at 08:12

## 2024-12-09 RX ADMIN — ARFORMOTEROL TARTRATE 15 MCG: 15 SOLUTION RESPIRATORY (INHALATION) at 06:12

## 2024-12-09 RX ADMIN — FLUTICASONE PROPIONATE 100 MCG: 50 SPRAY, METERED NASAL at 08:12

## 2024-12-09 RX ADMIN — GUAIFENESIN AND CODEINE PHOSPHATE 5 ML: 100; 10 SOLUTION ORAL at 08:12

## 2024-12-09 NOTE — DISCHARGE INSTRUCTIONS
Discharge Instructions, [facility] Medicine    Thank you for choosing [facility] for your medical care. The primary doctor who is taking care of you at the time of your discharge is Lissa Easley MD.     You were admitted to the hospital with Pneumonia due to respiratory syncytial virus (RSV).     Please note your discharge instructions, including diet/activity restrictions, follow-up appointments, and medication changes.  If you have any questions about your medical issues, prescriptions, or any other questions, please feel free to contact the [facility] Medicine Dept at [phone] and we will help.    If you are previously with Home health, outpatient PT/OT or under a therapy program, you are cleared to return to those programs.    Please direct all long term medication refills and follow up to your primary care provider, Jonathon Muñoz NP. Thank you again for letting us take care of your health care needs.    Please note the following discharge instructions per your discharging physician-  Patricia Ribeiro NP

## 2024-12-09 NOTE — CARE UPDATE
12/09/24 0949   Home Oxygen Qualification   $ Home O2 Qualification Pulmonary Stress Test/6 min walk;Tech time 15 minutes   Room Air SpO2 At Rest 96 %   Room Air SpO2 During Ambulation 97 %   SpO2 Post Ambulation 97 %   Post Ambulation Heart Rate 110 bpm   Home O2 Eval Comments Patient does not require home O2.

## 2024-12-09 NOTE — CARE UPDATE
12/09/24 0641   Patient Assessment/Suction   Level of Consciousness (AVPU) alert   Respiratory Effort Normal;Unlabored   Expansion/Accessory Muscles/Retractions no use of accessory muscles   All Lung Fields Breath Sounds clear   Rhythm/Pattern, Respiratory unlabored   Cough Frequency no cough   PRE-TX-O2   Device (Oxygen Therapy) room air   SpO2 95 %   Pulse Oximetry Type Intermittent   $ Pulse Oximetry - Multiple Charge Pulse Oximetry - Multiple   Pulse 66   Resp 18   Aerosol Therapy   $ Aerosol Therapy Charges Aerosol Treatment   Daily Review of Necessity (SVN) completed   Respiratory Treatment Status (SVN) given   Treatment Route (SVN) mask;oxygen   Patient Position HOB elevated   Post Treatment Assessment (SVN) breath sounds unchanged   Signs of Intolerance (SVN) none   Breath Sounds Post-Respiratory Treatment   Throughout All Fields Post-Treatment All Fields   Throughout All Fields Post-Treatment no change   Post-treatment Heart Rate (beats/min) 62   Post-treatment Resp Rate (breaths/min) 17   Education   $ Education Bronchodilator;15 min

## 2024-12-09 NOTE — PLAN OF CARE
Problem: Pneumonia  Goal: Fluid Balance  Outcome: Met  Goal: Resolution of Infection Signs and Symptoms  Outcome: Met  Goal: Effective Oxygenation and Ventilation  Outcome: Met     Problem: Adult Inpatient Plan of Care  Goal: Plan of Care Review  Outcome: Met  Goal: Patient-Specific Goal (Individualized)  Outcome: Met  Goal: Absence of Hospital-Acquired Illness or Injury  Outcome: Met  Goal: Optimal Comfort and Wellbeing  Outcome: Met  Goal: Readiness for Transition of Care  Outcome: Met

## 2024-12-09 NOTE — PLAN OF CARE
Spoke with Boy Lynch (Spouse)- [phone], regarding discharge IMM, Understands statement and IMM will be scanned to chart.

## 2024-12-09 NOTE — PLAN OF CARE
Patient clear to dc from CM standpoint.  Follow up with PCP scheduled and added to AVS.  Unable to schedule pulmonology follow up within recommended time frame.  [employer] message sent to pulm clinic requesting they contact patient with follow up appointment.  Verified that patient has nebulizer at home.       12/09/24 1013   Final Note   Assessment Type Final Discharge Note   Anticipated Discharge Disposition Home   What phone number can be called within the next 1-3 days to see how you are doing after discharge? [phone]   Hospital Resources/Appts/Education Provided Appointments scheduled and added to AVS   Post-Acute Status   Discharge Delays None known at this time

## 2024-12-09 NOTE — DISCHARGE SUMMARY
[facility] Medicine  Discharge Summary      Patient Name: Daylin Lynch  MRN: [id number]  [facility]: [id number]  Patient Class: IP- Inpatient  Admission Date: 12/7/2024  Hospital Length of Stay: 1 days  Discharge Date and Time:  12/09/2024 8:36 AM  Attending Physician: Lissa Easley MD   Discharging Provider: Patricia Ribeiro NP  Primary Care Provider: Jonathon Muñoz NP    Primary Care Team: Networked reference to record PCT     HPI:   64-year-old female presented to ED eval of shortness of breath. pMHx HFrEF s/p AICD, HTN, HLD, eosinophilic asthma, cardiomyopathy, varicose veins.  Patient reported over the last week she has had progressively worsening shortness of breath, with associated chills, nonproductive cough, L rib pain, and orthopnea.  She states she keeps prescriptions of azithromycin and prednisone written by her pulmonologist at home in case exacerbation of asthma, states these medications usually resolve similar symptoms for her and that she has taken both of these medicines for the last 3 days without any improvement in symptoms.  Denies chest pain.  Denies abdominal pain, nausea, vomiting, changes in bowel habits.  Denies recent trauma.  Denies recent surgery or procedure.  Patient visited urgent care earlier today for these symptoms and was advised to follow-up in the ED 2/2 abnormal CXR.  Patient has history of asthma, cardiomyopathy, and HF s/p AICD.  Patient follows with Dr. Jaffe for Cardiology, states she had an echo in October at his office with an EF of less than 30% and at that time she was taken off of valsartan and put back on Entresto.  Today, CXR impression with streaky interstitial opacities in the basilar segments likely attributed towards pulmonary scarring or atelectasis; focal area of retrocardiac atelectasis is observed.  Lactic acid, CBC, CMP, BNP unremarkable.  Troponins 7.3 and 5.6.  Flu/COVID negative.  Admit to hospital medicine for further eval.      * No  surgery found *      Hospital Course:   Ms. Lynch was admitted for UTI and dyspnea.  While here, her labs and vital signs were monitored and she was maintained on telemetry.  She was placed on empiric IV Rocephin and doxycycline for possible pneumonia.  Urine and blood cultures were collected.  There was concern for possible acute heart failure since she has a history of cardiomyopathy with AICD and low EF.  Her troponin and BNP were negative and her limited echocardiogram showed an EF of 55-60%.  She had a CTA of the chest which was negative for PE and showed a small right pneumonia.  Her respiratory panel grew out RSV.  Her urine culture started growing GNR.  The doxycycline was discontinued since the pneumonia was a viral etiology in the IV Rocephin was continued for her GNR UTI.  She initially did not require supplemental O2, but then she desatted to 88% on room air and supplemental O2 was initiated.  She was maintained on supportive care for the RSV with antitussive PRN and nebulizers. She was also given a dose of IV dexamethasone for her symptoms. Her final urine culture grew out a highly sensitive E coli. Her overall condition improved, and her supplemental O2 was weaned down.  She is being discharged to home today.  She will have a desat study prior and be set up for home O2 if qualifies.  She will continue a course of cefdinir, steroid taper, and follow up with her PCP and pulmonologist.  She did not have any adverse events while here.    Physical Exam:  General- AAOx3, appears to feel better today  HEENT- PERRLA, EOMI  Neck- Supple, No JVD  CV- Regular rate and rhythm  Resp- Lungs CTA Bilaterally, diminished at the bases bilaterally, No increased WOB, not currently wearing supplemental O2  GI- Non tender/non-distended, BS normoactive x4 quads  Extrem- FERGUSON, No edema  Neuro- no deficits  Skin-  warm and dry     Goals of Care Treatment Preferences:  Code Status: Full Code         Consults:       Final Active  Diagnoses:    Diagnosis Date Noted POA    PRINCIPAL PROBLEM:  Pneumonia due to respiratory syncytial virus (RSV) [J12.1] 12/07/2024 Yes    UTI (urinary tract infection) [N39.0] 12/07/2024 Yes    Eosinophilic asthma [J82.83] 10/10/2023 Yes    Heart failure with reduced ejection fraction [I50.20] 01/18/2021 Yes    Automatic implantable cardiac defibrillator in situ [Z95.810] 09/25/2020 Yes    Primary cardiomyopathy [I42.9] 06/01/2020 Yes      Problems Resolved During this Admission:       Discharged Condition: stable    Disposition: Home or Self Care    Follow Up:   Follow-up Information       Wendy Heath NP Follow up in 2 week(s).    Specialty: Pulmonary Disease  Why: Sooner if symptoms worsen  Contact information:  [address]  [address]  [facility] [address]  [phone]               Jonathon Muñoz NP Follow up in 2 week(s).    Specialty: Family Medicine  Contact information:  [address]  [facility] [address]  [phone]                           Patient Instructions:      Diet Cardiac     Notify your health care provider if you experience any of the following:  temperature >100.4     Notify your health care provider if you experience any of the following:  persistent nausea and vomiting or diarrhea     Notify your health care provider if you experience any of the following:  severe uncontrolled pain     Notify your health care provider if you experience any of the following:  difficulty breathing or increased cough     Notify your health care provider if you experience any of the following:  severe persistent headache     Notify your health care provider if you experience any of the following:  persistent dizziness, light-headedness, or visual disturbances     Activity as tolerated       Significant Diagnostic Studies: Labs: CMP   Recent Labs   Lab 12/07/24  1138 12/08/24  0633 12/09/24  0607    139 141   K 3.4* 3.7 4.0    104 105   CO2 28 26 29   GLU 89 139* 117*   BUN 17 17 22   CREATININE  0.7 0.6 0.6   CALCIUM 8.6* 8.9 8.7   PROT 7.2 7.1 6.5   ALBUMIN 3.8 3.7 3.3*   BILITOT 0.6 0.4 0.4   ALKPHOS 53* 52* 46*   AST 24 19 24   ALT 13 12 19   ANIONGAP 8 9 7*   , CBC   Recent Labs   Lab 12/07/24  1320 12/08/24  0633 12/09/24  0607   WBC 11.61 12.92* 13.42*   HGB 13.2 11.4* 11.0*   HCT 40.3 35.4* 34.4*    445 433   , and All labs within the past 24 hours have been reviewed  Microbiology:   Microbiology Results (last 7 days)       Procedure Component Value Units Date/Time    Urine culture [[id number]]  (Abnormal)  (Susceptibility) Collected: 12/07/24 1117    Order Status: Completed Specimen: Urine Updated: 12/09/24 0635     Urine Culture, Routine ESCHERICHIA COLI  >100,000 cfu/ml      Narrative:      Specimen Source->Urine    Blood culture x two cultures. Draw prior to antibiotics. [[id number]] Collected: 12/07/24 1148    Order Status: Completed Specimen: Blood Updated: 12/08/24 1432     Blood Culture, Routine No Growth to date      No Growth to date    Narrative:      Aerobic and anaerobic    Blood culture x two cultures. Draw prior to antibiotics. [[id number]] Collected: 12/07/24 1147    Order Status: Completed Specimen: Blood from Peripheral, Hand, Left Updated: 12/08/24 1432     Blood Culture, Routine No Growth to date      No Growth to date    Narrative:      Aerobic and anaerobic    Respiratory Infection Panel (PCR), Nasopharyngeal [[id number]]  (Abnormal) Collected: 12/07/24 1719    Order Status: Completed Specimen: Nasopharyngeal Swab Updated: 12/07/24 1918     Respiratory Infection Panel Source NP swab     Adenovirus Not Detected     Coronavirus 229E, Common Cold Virus Not Detected     Coronavirus HKU1, Common Cold Virus Not Detected     Coronavirus NL63, Common Cold Virus Not Detected     Coronavirus OC43, Common Cold Virus Not Detected     Comment: Coronavirus strains 229E, HKU1, NL63, and OC43 can cause the common   cold   and are not associated with the respiratory disease outbreak  caused   by  the COVID-19 (SARS-CoV-2 novel Coronavirus) strain.           SARS-CoV2 (COVID-19) Qualitative PCR Not Detected     Human Metapneumovirus Not Detected     Human Rhinovirus/Enterovirus Not Detected     Influenza A (subtypes H1, H1-2009,H3) Not Detected     Influenza B Not Detected     Parainfluenza Virus 1 Not Detected     Parainfluenza Virus 2 Not Detected     Parainfluenza Virus 3 Not Detected     Parainfluenza Virus 4 Not Detected     Respiratory Syncytial Virus Detected     Comment: RSV result(s) called and verbal readback obtained from Maude   Choi RN 1W by MS1 12/07/2024 19:18          Bordetella Parapertussis (ZI0308) Not Detected     Bordetella pertussis (ptxP) Not Detected     Chlamydia pneumoniae Not Detected     Mycoplasma pneumoniae Not Detected     Comment: Respiratory Infection Panel testing performed by Multiplex PCR.       Narrative:      Respiratory Infection Panel source->NP Swab   RSV result(s) called and verbal readback obtained from Giving Assistant   Choi RN 1W by MS1 12/07/2024 19:18           Radiology: Echo    Left Ventricle: The left ventricle is normal in size. Normal wall   thickness. There is normal systolic function with a visually estimated   ejection fraction of 55 - 60%.    Left Atrium: Left atrium is mildly dilated.    IVC/SVC: Normal venous pressure at 3 mmHg.  CTA Chest Non-Coronary (PE Studies)  Narrative: CMS MANDATED QUALITY DATA - CT RADIATION - 436    All CT scans at this facility utilize dose modulation, iterative reconstruction, and/or weight based dosing when appropriate to reduce radiation dose to as low as reasonably achievable    EXAMINATION:  CTA CHEST NON CORONARY (PE STUDIES)    CLINICAL HISTORY:  retrocardiac atelectasis, also r/o PE;    TECHNIQUE:  Routine CT angiogram of the chest protocol performed after the IV administration of 100 mL Omnipaque 350. 3D reconstructions/reformats/MIPs obtained. All CT scans at this facility are performed  using dose  modulation techniques as appropriate to performed exam including the following:  automated exposure control; adjustment of mA and/or kV according to the patients size (this includes techniques or standardized protocols for targeted exams where dose is matched to indication/reason for exam: i.e. extremities or head);  iterative reconstruction technique.    COMPARISON:  Conventional radiographs of the chest of 12/07/2024.    FINDINGS:  High quality examination for evaluation of pulmonary thromboembolism.  Normal opacification of the pulmonary arterial system out to the tertiary order branch vessels without evidence of pruning, webbing, or truncation of the pulmonary arterial system that would allow suspicion towards the presence of a acute pulmonary embolism.  Pacemaking device projected over the left upper chest wall causes significant streak artifact limiting diagnostic evaluation of the aorta.    Both right left pulmonary arteries of appear well maintained without evidence of occlusion.  Third order branch vessels are limited opacification given the limitations of the examination due to bolus timing.    Soft tissue prominence within both leon are noted that appear symmetrical in etiology that likely the consequence of bilateral hilar adenopathy in the early stage.  There is no evidence of pathologic lymphadenopathy.    Parenchymal lung window settings show evidence of a small patch of pneumonia occupying the right costophrenic sulcus in the setting of bibasilar atelectasis and scarring.  There is scarring in the left upper lobe lingular division that migrates over the left hemidiaphragm.    The cardiac chambers appear normal in size and overall configuration without significant enlargement.  Mild anterior pericardial thickening.    Upper abdominal cavity reveals no significant abnormality within the visualized portions of the upper abdominal cavity.  Impression: 1.  No CT evidence of acute pulmonary  thromboembolism.    2.  Soft tissue prominence within both leon may reflect the presence of early hilar adenopathy, although pathologic lymphadenopathy is not visualized.    3.  Small patch of pneumonia in the costophrenic sulcus on the right.    Electronically signed by: Quincy Razo MD  Date:    12/07/2024  Time:    15:38  X-Ray Chest PA And Lateral  Narrative: EXAMINATION:  XR CHEST PA AND LATERAL    CLINICAL HISTORY:  Shortness breath;    TECHNIQUE:  PA and lateral views of the chest were performed.    COMPARISON:  01/08/2024    FINDINGS:  Dual lead pacemaker device projects over the left lower chest wall with leads depicted within the right atrium and right ventricle in optimal position.  Cardiomediastinal silhouette top-normal in size exacerbated by the decreased inspiratory volume head body habitus.  Minimal eventration the right hemidiaphragm.  Focal areas of bibasilar atelectasis are observed more prominent in the left lower lobe.  Streaky interstitial opacities in the left retrocardiac region are noted.  Heart at the upper edge of normal.  Pulmonary vascularity normal.  Impression: Streaky interstitial opacities in the basilar segments likely attributed towards pulmonary scarring or atelectasis.  Focal area of retrocardiac atelectasis is observed.    Electronically signed by: Quincy Razo MD  Date:    12/07/2024  Time:    13:01       Pending Diagnostic Studies:       None           Medications:  Reconciled Home Medications:      Medication List        START taking these medications      albuterol-ipratropium 2.5 mg-0.5 mg/3 mL nebulizer solution  Commonly known as: DUO-NEB  Take 3 mLs by nebulization every 6 (six) hours as needed for Wheezing or Shortness of Breath. Use with nebulizer machine     cefdinir 300 MG capsule  Commonly known as: OMNICEF  Take 1 capsule (300 mg total) by mouth 2 (two) times daily. for 4 days  Start taking on: December 10, 2024     phenazopyridine 200 MG tablet  Commonly  known as: PYRIDIUM  Take 1 tablet (200 mg total) by mouth 3 (three) times daily as needed (Urinary burning).            CHANGE how you take these medications      cetirizine 10 MG tablet  Commonly known as: ZYRTEC  Take 1 tablet (10 mg total) by mouth once daily.  What changed: when to take this     guaiFENesin-codeine 100-10 mg/5 ml  mg/5 mL syrup  Commonly known as: TUSSI-ORGANIDIN NR  Take 10 mLs by mouth every 6 (six) hours as needed for Cough or Congestion.  What changed:   how much to take  when to take this  reasons to take this     montelukast 10 mg tablet  Commonly known as: SINGULAIR  Take 1 tablet (10 mg total) by mouth once daily.  What changed: when to take this     predniSONE 20 MG tablet  Commonly known as: DELTASONE  Take 3 tablets (60 mg total) by mouth once daily for 2 days, THEN 2 tablets (40 mg total) once daily for 3 days. Take one pill per day for three days. Can repeat as needed for shortness of breath, wheezing, cough..  Start taking on: December 9, 2024  What changed: See the new instructions.            CONTINUE taking these medications      * albuterol 1.25 mg/3 mL Nebu  Commonly known as: ACCUNEB  Take 3 mLs (1.25 mg total) by nebulization every 6 (six) hours as needed (Cough). Rescue     * albuterol 90 mcg/actuation inhaler  Commonly known as: PROVENTIL/VENTOLIN HFA  Inhale 2 puffs into the lungs every 6 (six) hours as needed for Wheezing. use as directed     aspirin 325 MG tablet  Take 325 mg by mouth once daily.     budesonide 0.5 mg/2 mL nebulizer solution  Commonly known as: PULMICORT  Take 2 mLs (0.5 mg total) by nebulization daily as needed (Shortness of breath, wheezing, cough). Controller     carvediloL 6.25 MG tablet  Commonly known as: COREG  Take 6.25 mg by mouth 2 (two) times daily.     ENTRESTO 49-51 mg per tablet  Generic drug: sacubitriL-valsartan  Take 1 tablet by mouth 2 (two) times daily. SAMPLES FROM DOCTOR     EScitalopram oxalate 10 MG tablet  Commonly known  as: LEXAPRO  Take 10 mg by mouth once daily.     ezetimibe 10 mg tablet  Commonly known as: ZETIA  Take 1 tablet by mouth every evening.     fluticasone propionate 50 mcg/actuation nasal spray  Commonly known as: FLONASE  2 sprays (100 mcg total) by Each Nostril route once daily.     furosemide 20 MG tablet  Commonly known as: LASIX  Take 20 mg by mouth daily as needed (Fluid Gain).     spironolactone 50 MG tablet  Commonly known as: ALDACTONE  Take 50 mg by mouth once daily.     TRELEGY ELLIPTA 200-62.5-25 mcg inhaler  Generic drug: fluticasone-umeclidin-vilanter  Inhale 1 puff into the lungs once daily.           * This list has 2 medication(s) that are the same as other medications prescribed for you. Read the directions carefully, and ask your doctor or other care provider to review them with you.                ASK your doctor about these medications      valsartan 40 MG tablet  Commonly known as: DIOVAN  Take 40 mg by mouth 2 (two) times daily.              Indwelling Lines/Drains at time of discharge:   Lines/Drains/Airways       None                   Time spent on the discharge of patient: 46 minutes         Patricia Ribeiro NP  Department of Hospital Medicine  [facility]

## 2024-12-10 ENCOUNTER — PATIENT OUTREACH (OUTPATIENT)
Dept: FAMILY MEDICINE | Facility: CLINIC | Age: 64
End: 2024-12-10
Payer: MEDICARE

## 2024-12-10 ENCOUNTER — TELEPHONE (OUTPATIENT)
Dept: FAMILY MEDICINE | Facility: CLINIC | Age: 64
End: 2024-12-10
Payer: MEDICARE

## 2024-12-10 NOTE — TELEPHONE ENCOUNTER
----- Message from Nurse Carter sent at 12/10/2024  7:59 AM CST -----  Call patient - needs post-hospital phone call within 2 business days and hospital follow up visit scheduled within 7-14 days.

## 2024-12-10 NOTE — PROGRESS NOTES
Discharge Information     Discharge Date:  12/10/24     Primary Discharge Diagnosis:  RSV/Pneumonia      Discharge Summary:  Reviewed      Medication & Order Review     Were medication changes made or new medications added?   Yes    If so, has the patient filled the prescriptions?  Yes     Was Home Health ordered? No    If so, has Home Health contacted patient and/or initiated services?  No    Name of Home Health Agency? N/A    Durable Medical Equipment ordered?  No     If so, has the DME provider contacted patient and delivered equipment?  N/A    Follow Up               Any problems since discharge? No    How is the patient feeling since returning home?      Have you set up recommended follow up appointments?  (cardiology, surgery, etc.)    Schedule Hospital Follow-up appointment within 7-14 days (preferably 7).      Notes:  Spoke to patient, still has cough but stable and started all discharge medications, aware of upcoming appt with Jonathon Muñoz NP on 12/19/24 @ 1:40            Ruth Alegria

## 2024-12-11 ENCOUNTER — PATIENT OUTREACH (OUTPATIENT)
Dept: ADMINISTRATIVE | Facility: CLINIC | Age: 64
End: 2024-12-11
Payer: MEDICARE

## 2024-12-11 NOTE — PROGRESS NOTES
C3 nurse attempted to contact Daylin Lynch  for a TCC post hospital discharge follow up call. No answer. Left voicemail with callback information. The patient has a scheduled HOSFU appointment with Jonathon Muñoz NP  on 12/19/24 @ 2698.

## 2024-12-12 LAB
BACTERIA BLD CULT: NORMAL
BACTERIA BLD CULT: NORMAL

## 2024-12-12 NOTE — PROGRESS NOTES
3rd Attempt made to reach patient for TCC call. Pt's  states he will let patient know we are trying to reach her and requests to call her back in a couple of minutes.

## 2024-12-12 NOTE — PROGRESS NOTES
C3 nurse spoke with Daylin Lynch  for a TCC post hospital discharge follow up call. The patient has a scheduled HOSFU appointment with Jonathon Muñoz NP on 12/19/24 @ 2979.

## 2024-12-12 NOTE — PROGRESS NOTES
2nd attempt made to reach patient for TCC call. Left voicemail please call [phone] and leave first name, last name and  for Ignacio. I will return your call.

## 2024-12-19 ENCOUNTER — OFFICE VISIT (OUTPATIENT)
Dept: FAMILY MEDICINE | Facility: CLINIC | Age: 64
End: 2024-12-19
Payer: MEDICARE

## 2024-12-19 VITALS
OXYGEN SATURATION: 95 % | HEART RATE: 88 BPM | WEIGHT: 203 LBS | DIASTOLIC BLOOD PRESSURE: 58 MMHG | HEIGHT: 67 IN | BODY MASS INDEX: 31.86 KG/M2 | SYSTOLIC BLOOD PRESSURE: 100 MMHG

## 2024-12-19 DIAGNOSIS — N30.00 ACUTE CYSTITIS WITHOUT HEMATURIA: ICD-10-CM

## 2024-12-19 DIAGNOSIS — Z09 HOSPITAL DISCHARGE FOLLOW-UP: Primary | ICD-10-CM

## 2024-12-19 NOTE — PROGRESS NOTES
SUBJECTIVE:    Patient ID: Daylin Lynch is a 64 y.o. female.    Chief Complaint: Hospital Follow Up (RSV and pneumonia)    Patient is here today for a hospital follow up  Hospital Course:   Ms. Lynch was admitted for UTI and dyspnea.  While here, her labs and vital signs were monitored and she was maintained on telemetry.  She was placed on empiric IV Rocephin and doxycycline for possible pneumonia.  Urine and blood cultures were collected.  There was concern for possible acute heart failure since she has a history of cardiomyopathy with AICD and low EF.  Her troponin and BNP were negative and her limited echocardiogram showed an EF of 55-60%.  She had a CTA of the chest which was negative for PE and showed a small right pneumonia.  Her respiratory panel grew out RSV.  Her urine culture started growing GNR.  The doxycycline was discontinued since the pneumonia was a viral etiology in the IV Rocephin was continued for her GNR UTI.  She initially did not require supplemental O2, but then she desatted to 88% on room air and supplemental O2 was initiated.  She was maintained on supportive care for the RSV with antitussive PRN and nebulizers. She was also given a dose of IV dexamethasone for her symptoms. Her final urine culture grew out a highly sensitive E coli. Her overall condition improved, and her supplemental O2 was weaned down.  She is being discharged to home today.  She will have a desat study prior and be set up for home O2 if qualifies.  She will continue a course of cefdinir, steroid taper, and follow up with her PCP and pulmonologist.  She did not have any adverse events while here.           She has completed all meds and is feeling better overall. Has a little urinary pressure but overall symptoms are improved.   Has follow up with Pulmonology 12/27  Has follow up with Cardiology Woody    Admit Date: 12/07/24  Discharge Date: 12/9/24  Discharge Facility: Hospital      Family and/or Caretaker present at  visit? No  Medication Reconciliation:  Medications changed/added/deleted.   New Prescriptions filled after discharge: yes  Discharge summary reviewed:  yes  Diagnostic tests reviewed/disposition: No diagnosic tests pending after this hospitalization  Disease/illness education: yes  Follow up appointments scheduled:  yes              with Cardiology   Follow up labs/tests ordered:   no  Home Health ordered on discharge: Patient does not have home health established from hospital visit.  They do not need home health.  If needed, we will set up home health for the patient  Home Health company name:   Establishment or re-establishment of referral orders for community resources: No other necessary community resources  DME ordered at discharge:   no  How patient is feeling since discharge from the hospital?  better     Discussion with other health care providers: No discussion with other health care providers necessary  Patient follow up phone call documented on separate encounter.    No results displayed because visit has over 200 results.      Office Visit on 10/16/2024   Component Date Value Ref Range Status    Glucose 10/21/2024 108 (H)  70 - 99 mg/dL Final    BUN 10/21/2024 32 (H)  8 - 27 mg/dL Final    Creatinine 10/21/2024 0.98  0.57 - 1.00 mg/dL Final    eGFR 10/21/2024 65  >59 mL/min/1.73 Final    BUN/Creatinine Ratio 10/21/2024 33 (H)  12 - 28 Final    Sodium 10/21/2024 143  134 - 144 mmol/L Final    Potassium 10/21/2024 5.3 (H)  3.5 - 5.2 mmol/L Final    Chloride 10/21/2024 105  96 - 106 mmol/L Final    CO2 10/21/2024 23  20 - 29 mmol/L Final    Calcium 10/21/2024 9.3  8.7 - 10.3 mg/dL Final    Protein, Total 10/21/2024 6.6  6.0 - 8.5 g/dL Final    Albumin 10/21/2024 4.4  3.9 - 4.9 g/dL Final    Globulin, Total 10/21/2024 2.2  1.5 - 4.5 g/dL Final    Total Bilirubin 10/21/2024 0.4  0.0 - 1.2 mg/dL Final    Alkaline Phosphatase 10/21/2024 58  44 - 121 IU/L Final    AST 10/21/2024 19  0 - 40 IU/L Final    ALT  10/21/2024 11  0 - 32 IU/L Final    Cholesterol 10/21/2024 259 (H)  100 - 199 mg/dL Final    Triglycerides 10/21/2024 46  0 - 149 mg/dL Final    HDL 10/21/2024 67  >39 mg/dL Final    VLDL Cholesterol Bud 10/21/2024 7  5 - 40 mg/dL Final    LDL Calculated 10/21/2024 185 (H)  0 - 99 mg/dL Final    Hemoglobin A1c 10/21/2024 5.9 (H)  4.8 - 5.6 % Final    Creatinine, Urine 10/21/2024 90.6  Not Estab. mg/dL Final    Microalb, Ur 10/21/2024 81.3  Not Estab. ug/mL Final    Microalb/Crt. Ratio 10/21/2024 90 (H)  0 - 29 mg/g creat Final    WBC 10/21/2024 7.0  3.4 - 10.8 x10E3/uL Final    RBC 10/21/2024 4.15  3.77 - 5.28 x10E6/uL Final    Hemoglobin 10/21/2024 12.7  11.1 - 15.9 g/dL Final    Hematocrit 10/21/2024 39.3  34.0 - 46.6 % Final    MCV 10/21/2024 95  79 - 97 fL Final    MCH 10/21/2024 30.6  26.6 - 33.0 pg Final    MCHC 10/21/2024 32.3  31.5 - 35.7 g/dL Final    RDW 10/21/2024 13.0  11.7 - 15.4 % Final    Platelets 10/21/2024 390  150 - 450 x10E3/uL Final    Neutrophils 10/21/2024 54  Not Estab. % Final    Lymphs 10/21/2024 36  Not Estab. % Final    Monocytes 10/21/2024 7  Not Estab. % Final    Eos 10/21/2024 2  Not Estab. % Final    Basos 10/21/2024 1  Not Estab. % Final    Neutrophils (Absolute) 10/21/2024 3.8  1.4 - 7.0 x10E3/uL Final    Lymphs (Absolute) 10/21/2024 2.6  0.7 - 3.1 x10E3/uL Final    Monocytes(Absolute) 10/21/2024 0.5  0.1 - 0.9 x10E3/uL Final    Eos (Absolute) 10/21/2024 0.1  0.0 - 0.4 x10E3/uL Final    Baso (Absolute) 10/21/2024 0.0  0.0 - 0.2 x10E3/uL Final    Immature Granulocytes 10/21/2024 0  Not Estab. % Final    Immature Grans (Abs) 10/21/2024 0.0  0.0 - 0.1 x10E3/uL Final   Patient Outreach on 10/09/2024   Component Date Value Ref Range Status    A1c 07/10/2024 5.8   Final    Lipids, Total Cholesterol 07/10/2024 207   Final    Triglycerides 07/10/2024 66  mg/dL Final    HDL 07/10/2024 64  mg/dL Final    LDL Cholesterol 07/10/2024 131  mg/dL Final       Past Medical History:   Diagnosis Date     Allergy     Asthma     Bilateral leg edema     Hyperlipidemia     Hypertension     Leg pain, bilateral     left leg more severe    Varicose vein      Past Surgical History:   Procedure Laterality Date     SECTION, LOW TRANSVERSE      x 3    EYE SURGERY  2023    lt retinal surgery    HYSTERECTOMY      pace maker  2020    SPINE SURGERY  2021    stents both legs       Family History   Problem Relation Name Age of Onset    Heart disease Mother      Heart failure Father      Migraines Sister 1/2     Cancer Sister 1/2         lung then pancreatic       Marital Status:   Alcohol History:  reports current alcohol use.  Tobacco History:  reports that she quit smoking about 39 years ago. Her smoking use included cigarettes. She has been exposed to tobacco smoke. She has never used smokeless tobacco.  Drug History:  reports no history of drug use.    Review of patient's allergies indicates:  No Known Allergies    Current Outpatient Medications:     albuterol (PROVENTIL/VENTOLIN HFA) 90 mcg/actuation inhaler, Inhale 2 puffs into the lungs every 6 (six) hours as needed for Wheezing. use as directed, Disp: 18 g, Rfl: 11    albuterol-ipratropium (DUO-NEB) 2.5 mg-0.5 mg/3 mL nebulizer solution, Take 3 mLs by nebulization every 6 (six) hours as needed for Wheezing or Shortness of Breath. Use with nebulizer machine, Disp: 75 mL, Rfl: 0    aspirin 325 MG tablet, Take 325 mg by mouth once daily., Disp: , Rfl:     budesonide (PULMICORT) 0.5 mg/2 mL nebulizer solution, Take 2 mLs (0.5 mg total) by nebulization daily as needed (Shortness of breath, wheezing, cough). Controller, Disp: 120 mL, Rfl: 5    carvediloL (COREG) 6.25 MG tablet, Take 6.25 mg by mouth 2 (two) times daily. , Disp: , Rfl:     cetirizine (ZYRTEC) 10 MG tablet, Take 1 tablet (10 mg total) by mouth once daily., Disp: , Rfl: 0    escitalopram oxalate (LEXAPRO) 10 MG tablet, Take 10 mg by mouth once daily., Disp: , Rfl:     ezetimibe  "(ZETIA) 10 mg tablet, Take 1 tablet by mouth every evening., Disp: , Rfl:     fluticasone propionate (FLONASE) 50 mcg/actuation nasal spray, 2 sprays (100 mcg total) by Each Nostril route once daily., Disp: 16 g, Rfl: 6    fluticasone-umeclidin-vilanter (TRELEGY ELLIPTA) 200-62.5-25 mcg inhaler, Inhale 1 puff into the lungs once daily., Disp: 180 each, Rfl: 3    furosemide (LASIX) 20 MG tablet, Take 20 mg by mouth daily as needed (Fluid Gain)., Disp: , Rfl:     montelukast (SINGULAIR) 10 mg tablet, Take 1 tablet (10 mg total) by mouth once daily., Disp: 90 tablet, Rfl: 3    sacubitriL-valsartan (ENTRESTO) 49-51 mg per tablet, Take 1 tablet by mouth 2 (two) times daily. SAMPLES FROM DOCTOR, Disp: , Rfl:     spironolactone (ALDACTONE) 50 MG tablet, Take 50 mg by mouth once daily., Disp: , Rfl:     Review of Systems   Constitutional:  Positive for fatigue. Negative for appetite change, chills, diaphoresis, fever and unexpected weight change.   HENT:  Negative for ear discharge, hearing loss, trouble swallowing and voice change.    Eyes:  Negative for photophobia and pain.   Respiratory:  Negative for cough, chest tightness, shortness of breath and stridor.    Cardiovascular:  Negative for chest pain and palpitations.   Gastrointestinal:  Negative for abdominal pain, blood in stool and vomiting.   Endocrine: Negative for cold intolerance and heat intolerance.   Genitourinary:  Negative for difficulty urinating, dysuria and flank pain.   Musculoskeletal:  Negative for joint swelling and neck stiffness.   Skin:  Negative for pallor.   Neurological:  Negative for dizziness, speech difficulty and weakness.   Hematological:  Does not bruise/bleed easily.   Psychiatric/Behavioral:  Negative for confusion and dysphoric mood. The patient is not nervous/anxious.         Objective:      Vitals:    12/19/24 1335   BP: (!) 100/58   Pulse: 88   SpO2: 95%   Weight: 92.1 kg (203 lb)   Height: 5' 7" (1.702 m)     Physical Exam  Vitals " and nursing note reviewed.   Constitutional:       General: She is not in acute distress.     Appearance: She is well-developed.   HENT:      Head: Normocephalic and atraumatic.      Right Ear: Tympanic membrane normal.      Left Ear: Tympanic membrane normal.      Nose: Nose normal.      Mouth/Throat:      Pharynx: Uvula midline.   Eyes:      General: Lids are normal.      Conjunctiva/sclera: Conjunctivae normal.      Pupils: Pupils are equal, round, and reactive to light.      Right eye: Pupil is round and reactive.      Left eye: Pupil is round and reactive.   Neck:      Thyroid: No thyromegaly.      Vascular: No JVD.      Trachea: Trachea normal.   Cardiovascular:      Rate and Rhythm: Normal rate and regular rhythm.      Pulses: Normal pulses.      Heart sounds: Normal heart sounds.   Pulmonary:      Effort: Pulmonary effort is normal. No tachypnea or respiratory distress.      Breath sounds: Normal breath sounds. No wheezing, rhonchi or rales.   Abdominal:      General: Bowel sounds are normal.      Palpations: Abdomen is soft.      Tenderness: There is no abdominal tenderness. There is no right CVA tenderness or left CVA tenderness.   Musculoskeletal:         General: Normal range of motion.      Cervical back: Normal range of motion and neck supple.      Right lower leg: No edema.      Left lower leg: No edema.   Lymphadenopathy:      Cervical: No cervical adenopathy.   Skin:     General: Skin is warm and dry.      Findings: No rash.   Neurological:      Mental Status: She is alert and oriented to person, place, and time.   Psychiatric:         Mood and Affect: Mood normal.         Speech: Speech normal.         Behavior: Behavior normal. Behavior is cooperative.         Thought Content: Thought content normal.         Judgment: Judgment normal.           Last visit note, most recent available labs, and health maintenance reviewed    Assessment:       1. Hospital discharge follow-up    2. Acute cystitis  without hematuria         Plan:       Hospital discharge follow-up  Hospital records reviewed  Medications reconciled    Acute cystitis without hematuria  -     Urinalysis, Reflex to Urine Culture Urine, Clean Catch; Future; Expected date: 12/19/2024      Follow up if symptoms worsen or fail to improve, for has f/u .

## 2024-12-20 LAB
APPEARANCE UR: CLEAR
BACTERIA #/AREA URNS HPF: ABNORMAL /[HPF]
BILIRUB UR QL STRIP: NEGATIVE
CASTS URNS MICRO: ABNORMAL
CASTS URNS QL MICRO: PRESENT /LPF
COLOR UR: YELLOW
EPI CELLS #/AREA URNS HPF: ABNORMAL /HPF (ref 0–10)
GLUCOSE UR QL STRIP: NEGATIVE
HGB UR QL STRIP: NEGATIVE
KETONES UR QL STRIP: ABNORMAL
LEUKOCYTE ESTERASE UR QL STRIP: NEGATIVE
MICRO URNS: ABNORMAL
NITRITE UR QL STRIP: NEGATIVE
PH UR STRIP: 5.5 [PH] (ref 5–7.5)
PROT UR QL STRIP: ABNORMAL
RBC #/AREA URNS HPF: ABNORMAL /HPF (ref 0–2)
SP GR UR STRIP: 1.03 (ref 1–1.03)
URINALYSIS REFLEX: ABNORMAL
UROBILINOGEN UR STRIP-MCNC: 0.2 MG/DL (ref 0.2–1)
WBC #/AREA URNS HPF: ABNORMAL /HPF (ref 0–5)

## 2024-12-27 ENCOUNTER — OFFICE VISIT (OUTPATIENT)
Dept: PULMONOLOGY | Facility: CLINIC | Age: 64
End: 2024-12-27
Payer: MEDICARE

## 2024-12-27 VITALS
DIASTOLIC BLOOD PRESSURE: 72 MMHG | HEART RATE: 101 BPM | HEIGHT: 67 IN | BODY MASS INDEX: 32.08 KG/M2 | OXYGEN SATURATION: 95 % | SYSTOLIC BLOOD PRESSURE: 104 MMHG | WEIGHT: 204.38 LBS

## 2024-12-27 DIAGNOSIS — J45.50 SEVERE PERSISTENT ASTHMA WITHOUT COMPLICATION: Primary | ICD-10-CM

## 2024-12-27 DIAGNOSIS — I50.42 CHRONIC COMBINED SYSTOLIC AND DIASTOLIC HEART FAILURE: ICD-10-CM

## 2024-12-27 DIAGNOSIS — J30.1 CHRONIC SEASONAL ALLERGIC RHINITIS DUE TO POLLEN: ICD-10-CM

## 2024-12-27 DIAGNOSIS — J12.1 RSV (RESPIRATORY SYNCYTIAL VIRUS PNEUMONIA): ICD-10-CM

## 2024-12-27 DIAGNOSIS — J82.83 EOSINOPHILIC ASTHMA: ICD-10-CM

## 2024-12-27 DIAGNOSIS — J18.9 ATYPICAL PNEUMONIA: ICD-10-CM

## 2024-12-27 PROCEDURE — 99999 PR PBB SHADOW E&M-EST. PATIENT-LVL III: CPT | Mod: PBBFAC,HCNC,, | Performed by: NURSE PRACTITIONER

## 2024-12-27 RX ORDER — FLUTICASONE PROPIONATE 50 MCG
2 SPRAY, SUSPENSION (ML) NASAL DAILY
Qty: 16 G | Refills: 6 | Status: SHIPPED | OUTPATIENT
Start: 2024-12-27

## 2024-12-27 NOTE — PATIENT INSTRUCTIONS
Continue Trelegy one puff once per day.  This inhaler contains an inhaled steroid component. Rinse mouth after each use due to risk for thrush development. If mouth or tongue develops white sores please contact the clinic and I will order a prescription mouth wash.     Continue Albuterol inhaler as needed for shortness of breath, wheezing, cough.     Prednisone - Keep on Hand and take only as needed, use sparingly.    Azithromycin - Keep on hand, take only as needed for yellow or green mucous production.    Continue current prescription medication regiment. Keep follow up appointment as scheduled. Please call the office if you have any questions or concerns.

## 2025-01-24 ENCOUNTER — HOSPITAL ENCOUNTER (OUTPATIENT)
Dept: RADIOLOGY | Facility: HOSPITAL | Age: 65
Discharge: HOME | End: 2025-01-24
Attending: NURSE PRACTITIONER
Payer: MEDICARE

## 2025-01-24 DIAGNOSIS — J12.1 RSV (RESPIRATORY SYNCYTIAL VIRUS PNEUMONIA): ICD-10-CM

## 2025-01-24 PROCEDURE — 71046 X-RAY EXAM CHEST 2 VIEWS: CPT | Mod: TC

## 2025-01-24 PROCEDURE — 71046 X-RAY EXAM CHEST 2 VIEWS: CPT | Mod: 26,,, | Performed by: RADIOLOGY

## 2025-02-22 DIAGNOSIS — Z00.00 ENCOUNTER FOR MEDICARE ANNUAL WELLNESS EXAM: ICD-10-CM

## 2025-03-24 ENCOUNTER — HOSPITAL ENCOUNTER (OUTPATIENT)
Dept: RADIOLOGY | Facility: CLINIC | Age: 65
Discharge: HOME | End: 2025-03-24
Payer: MEDICARE

## 2025-03-24 ENCOUNTER — OFFICE VISIT (OUTPATIENT)
Dept: PODIATRY | Facility: CLINIC | Age: 65
End: 2025-03-24
Payer: MEDICARE

## 2025-03-24 VITALS — RESPIRATION RATE: 20 BRPM | WEIGHT: 201.94 LBS | BODY MASS INDEX: 31.7 KG/M2 | HEIGHT: 67 IN

## 2025-03-24 DIAGNOSIS — S93.491A SPRAIN OF ANTERIOR TALOFIBULAR LIGAMENT OF RIGHT ANKLE, INITIAL ENCOUNTER: ICD-10-CM

## 2025-03-24 DIAGNOSIS — M79.671 RIGHT FOOT PAIN: ICD-10-CM

## 2025-03-24 DIAGNOSIS — S92.351A DISPLACED FRACTURE OF FIFTH METATARSAL BONE, RIGHT FOOT, INITIAL ENCOUNTER FOR CLOSED FRACTURE: Primary | ICD-10-CM

## 2025-03-24 DIAGNOSIS — S99.921A INJURY OF RIGHT FOOT, INITIAL ENCOUNTER: ICD-10-CM

## 2025-03-24 PROCEDURE — 4010F ACE/ARB THERAPY RXD/TAKEN: CPT | Mod: HCNC,CPTII,S$GLB,

## 2025-03-24 PROCEDURE — 99204 OFFICE O/P NEW MOD 45 MIN: CPT | Mod: HCNC,S$GLB,,

## 2025-03-24 PROCEDURE — 99999 PR PBB SHADOW E&M-EST. PATIENT-LVL III: CPT | Mod: PBBFAC,HCNC,,

## 2025-03-24 PROCEDURE — 73630 X-RAY EXAM OF FOOT: CPT | Mod: HCNC,RT,S$GLB, | Performed by: RADIOLOGY

## 2025-03-24 PROCEDURE — 3008F BODY MASS INDEX DOCD: CPT | Mod: HCNC,CPTII,S$GLB,

## 2025-03-24 RX ORDER — ASPIRIN 325 MG
50000 TABLET, DELAYED RELEASE (ENTERIC COATED) ORAL
Qty: 12 CAPSULE | Refills: 0 | Status: SHIPPED | OUTPATIENT
Start: 2025-03-24 | End: 2025-06-16

## 2025-03-24 RX ORDER — ROSUVASTATIN CALCIUM 10 MG/1
TABLET, COATED ORAL
COMMUNITY

## 2025-03-24 RX ORDER — UBIDECARENONE 100 MG
CAPSULE ORAL
COMMUNITY

## 2025-03-24 RX ORDER — HYDROCODONE BITARTRATE AND ACETAMINOPHEN 5; 325 MG/1; MG/1
1 TABLET ORAL EVERY 6 HOURS PRN
Qty: 27 TABLET | Refills: 0 | Status: SHIPPED | OUTPATIENT
Start: 2025-03-24

## 2025-03-24 RX ORDER — CALCIUM CARBONATE 500(1250)
1 TABLET,CHEWABLE ORAL DAILY
Qty: 30 TABLET | Refills: 11 | Status: SHIPPED | OUTPATIENT
Start: 2025-03-24 | End: 2026-03-24

## 2025-03-24 NOTE — PROGRESS NOTES
"  [address]  [place] [address]  Phone: [phone]   [phone]    Patient's PCP:Jonathon Muñoz NP  Referring Provider: Aaarefketty Self    Subjective:      Chief Complaint:: Foot Injury and Foot Pain (Right foot pain after fall)    HPI  Daylin Lynch is a 64 y.o. female who presents today with a complaint of right foot pain and swelling. The current episode started this morning.  The symptoms include pain ranging from burning to sharp throbbing, swelling and bruising. Probable cause of complaint fall after foot giving out under her.  The symptoms are aggravated by walking, weightbearing, range of motion, applied pressure. The problem has stayed the same. Treatment to date have included elevation which provided no relief.     Vitals:    25 1421   Resp: 20   Weight: 91.6 kg (201 lb 15.1 oz)   Height: 5' 7" (1.702 m)      Shoe Size: 9    Past Surgical History:   Procedure Laterality Date     SECTION, LOW TRANSVERSE      x 3    EYE SURGERY  2023    lt retinal surgery    HYSTERECTOMY      pace maker  2020    SPINE SURGERY  2021    stents both legs       Past Medical History:   Diagnosis Date    Allergy     Asthma     Bilateral leg edema     Hyperlipidemia     Hypertension     Leg pain, bilateral     left leg more severe    Varicose vein      Family History   Problem Relation Name Age of Onset    Heart disease Mother      Heart failure Father      Migraines Sister 1/2     Cancer Sister 1/2         lung then pancreatic        Social History:   Marital Status:   Alcohol History:  reports current alcohol use.  Tobacco History:  reports that she quit smoking about 39 years ago. Her smoking use included cigarettes. She has been exposed to tobacco smoke. She has never used smokeless tobacco.  Drug History:  reports no history of drug use.    Review of patient's allergies indicates:  No Known Allergies    Current Medications[1]    Review of Systems "   Constitutional:  Negative for activity change, chills and fever.   Cardiovascular:  Negative for leg swelling.   Gastrointestinal:  Negative for diarrhea, nausea and vomiting.   Musculoskeletal:  Negative for arthralgias, back pain, gait problem, joint swelling and myalgias.   Skin:  Positive for color change (right foot). Negative for pallor, rash and wound.   Neurological:  Negative for dizziness, tremors, weakness and numbness.       Objective:        Physical Exam:   Foot Exam    Right Foot/Ankle     Inspection and Palpation  Right foot ecchymosis: plantar lateral aspect of the foot and dorsal lateral ankle.  Tenderness: (plantar lateral aspect of the foot and dorsal lateral ankle)  Swelling: (plantar lateral aspect of the foot and dorsal lateral ankle)  Arch: pes cavus  Hammertoes: second toe, third toe, fourth toe and fifth toe  Hallux valgus: no  Hallux limitus: no  Skin Exam: abnormal color; no blister, no callus, no drainage, no cellulitis and no ulcer   Fungus Toenails: not present  Neurovascular  Dorsalis pedis: 2+  Posterior tibial: 2+  Capillary Refill: 3+  Varicose veins: not present  Saphenous nerve sensation: normal  Tibial nerve sensation: normal  Superficial peroneal nerve sensation: normal  Deep peroneal nerve sensation: normal  Sural nerve sensation: normal    Range of Motion    Passive  Ankle eversion: pain  Passive eversion: Pain at 5th met.Ankle inversion: pain  Passive inversion: pain at ATFL.    Tests  Anterior drawer: negative   Varus tilt: negative   Calcaneal squeeze: negative   Talar tilt: negative   Comments  + piano sign 5th met, pain with palpation midshaft   No pain along the peroneal tendons with palpation/ROM/resisted ROM          Imaging:   EXAMINATION:  XR FOOT COMPLETE 3 VIEW RIGHT     CLINICAL HISTORY:  . Pain in right foot     TECHNIQUE:  AP, lateral, and oblique views of the right foot were performed.     COMPARISON:  None     FINDINGS:  Oblique fracture mid and distal  shaft of the 5th metatarsal with mild displacement.     Small plantar calcaneal spur.     No dislocation     Bunion 1st metatarsal head.     Calcification in the soft tissues lower leg suggesting phleboliths     Impression:     Mildly displaced fracture right 5th metatarsal        Electronically signed by:Milagros Ace MD  Date:                                            03/24/2025  Time:                                           16:08         Assessment:       1. Displaced fracture of fifth metatarsal bone, right foot, initial encounter for closed fracture    2. Sprain of anterior talofibular ligament of right ankle, initial encounter    3. Injury of right foot, initial encounter    4. Right foot pain      Plan:   Displaced fracture of fifth metatarsal bone, right foot, initial encounter for closed fracture  -     cholecalciferol, vitamin D3, 1,250 mcg (50,000 unit) capsule; Take 1 capsule (50,000 Units total) by mouth every 7 days.  Dispense: 12 capsule; Refill: 0  -     calcium carbonate (CALCIUM 500) 500 mg calcium (1,250 mg) chewable tablet; Take 1 tablet (500 mg total) by mouth once daily.  Dispense: 30 tablet; Refill: 11  -     HYDROcodone-acetaminophen (NORCO) 5-325 mg per tablet; Take 1 tablet by mouth every 6 (six) hours as needed for Pain.  Dispense: 27 tablet; Refill: 0  -     AIR CAST WALKER BOOT FOR HOME USE  -     EVEN-UP FOR HOME USE    Sprain of anterior talofibular ligament of right ankle, initial encounter    Injury of right foot, initial encounter    Right foot pain  -     X-Ray Foot Complete Right      I provided patient education verbally regarding: Patient diagnosis, treatment options, as well as alternatives, risks, and benefits.   Right foot xray obtained, reviewed, independently interpreted above, and discussed with patient. Minimally displaced spiral oblique fracture through mid diaphyseal shaft of the 5th metatarsal.   Non surgical fractures right foot, closed, extra-articulator,  minimally displaced  Neurovascular status intact  Pain medication as directed   Rest the Right foot as much as possible, WB as little as possible  Elevate above the level of the heart   Ice directly on the top of the foot or behind the right knee   Keep foot protected in CAM walker fracture boot, WBAT, use pain to guide tolerance and use. Try to minimize unnecessary WB  Even up dispensed   Vitamin D and Calcium   Return in 4 - 6 weeks for follow up imaging, return sooner if no improvement or symptoms worsen   This note was created using Dragon voice recognition software that occasionally misinterpreted phrases or words.       Zheng Altamirano DPM  Podiatry / Foot and Ankle Surgery   Secure chat preferred          [1]  Current Outpatient Medications   Medication Sig Dispense Refill    albuterol (PROVENTIL/VENTOLIN HFA) 90 mcg/actuation inhaler Inhale 2 puffs into the lungs every 6 (six) hours as needed for Wheezing. use as directed 18 g 11    albuterol-ipratropium (DUO-NEB) 2.5 mg-0.5 mg/3 mL nebulizer solution Take 3 mLs by nebulization every 6 (six) hours as needed for Wheezing or Shortness of Breath. Use with nebulizer machine 75 mL 0    aspirin 325 MG tablet Take 325 mg by mouth once daily.      budesonide (PULMICORT) 0.5 mg/2 mL nebulizer solution Take 2 mLs (0.5 mg total) by nebulization daily as needed (Shortness of breath, wheezing, cough). Controller 120 mL 5    calcium carbonate (CALCIUM 500) 500 mg calcium (1,250 mg) chewable tablet Take 1 tablet (500 mg total) by mouth once daily. 30 tablet 11    carvediloL (COREG) 6.25 MG tablet Take 6.25 mg by mouth 2 (two) times daily.       cetirizine (ZYRTEC) 10 MG tablet Take 1 tablet (10 mg total) by mouth once daily.  0    cholecalciferol, vitamin D3, 1,250 mcg (50,000 unit) capsule Take 1 capsule (50,000 Units total) by mouth every 7 days. 12 capsule 0    coenzyme Q10 100 mg capsule Take 1 capsule every day by oral route for 90 days.      escitalopram  oxalate (LEXAPRO) 10 MG tablet Take 10 mg by mouth once daily.      ezetimibe (ZETIA) 10 mg tablet Take 1 tablet by mouth every evening.      fluticasone propionate (FLONASE) 50 mcg/actuation nasal spray 2 sprays (100 mcg total) by Each Nostril route once daily. 16 g 6    fluticasone-umeclidin-vilanter (TRELEGY ELLIPTA) 200-62.5-25 mcg inhaler Inhale 1 puff into the lungs once daily. 180 each 3    furosemide (LASIX) 20 MG tablet Take 20 mg by mouth daily as needed (Fluid Gain).      HYDROcodone-acetaminophen (NORCO) 5-325 mg per tablet Take 1 tablet by mouth every 6 (six) hours as needed for Pain. 27 tablet 0    montelukast (SINGULAIR) 10 mg tablet Take 1 tablet (10 mg total) by mouth once daily. 90 tablet 3    rosuvastatin (CRESTOR) 10 MG tablet Take 1 tablet every day by oral route for 90 days.      sacubitriL-valsartan (ENTRESTO) 49-51 mg per tablet Take 1 tablet by mouth 2 (two) times daily. SAMPLES FROM DOCTOR      spironolactone (ALDACTONE) 50 MG tablet Take 50 mg by mouth once daily.       No current facility-administered medications for this visit.

## 2025-04-09 ENCOUNTER — PATIENT MESSAGE (OUTPATIENT)
Dept: FAMILY MEDICINE | Facility: CLINIC | Age: 65
End: 2025-04-09
Payer: MEDICARE

## 2025-04-14 ENCOUNTER — OFFICE VISIT (OUTPATIENT)
Dept: PODIATRY | Facility: CLINIC | Age: 65
End: 2025-04-14
Payer: MEDICARE

## 2025-04-14 ENCOUNTER — HOSPITAL ENCOUNTER (OUTPATIENT)
Dept: RADIOLOGY | Facility: CLINIC | Age: 65
Discharge: HOME | End: 2025-04-14
Payer: MEDICARE

## 2025-04-14 VITALS — BODY MASS INDEX: 32.32 KG/M2 | WEIGHT: 206.38 LBS

## 2025-04-14 DIAGNOSIS — S99.921D INJURY OF RIGHT FOOT, SUBSEQUENT ENCOUNTER: Primary | ICD-10-CM

## 2025-04-14 DIAGNOSIS — M79.671 RIGHT FOOT PAIN: ICD-10-CM

## 2025-04-14 DIAGNOSIS — S92.351A DISPLACED FRACTURE OF FIFTH METATARSAL BONE, RIGHT FOOT, INITIAL ENCOUNTER FOR CLOSED FRACTURE: ICD-10-CM

## 2025-04-14 PROCEDURE — 99213 OFFICE O/P EST LOW 20 MIN: CPT | Mod: HCNC,S$GLB,,

## 2025-04-14 PROCEDURE — 99999 PR PBB SHADOW E&M-EST. PATIENT-LVL III: CPT | Mod: PBBFAC,HCNC,,

## 2025-04-14 PROCEDURE — 3008F BODY MASS INDEX DOCD: CPT | Mod: HCNC,CPTII,S$GLB,

## 2025-04-14 PROCEDURE — 1159F MED LIST DOCD IN RCRD: CPT | Mod: HCNC,CPTII,S$GLB,

## 2025-04-14 PROCEDURE — 73630 X-RAY EXAM OF FOOT: CPT | Mod: HCNC,RT,S$GLB, | Performed by: RADIOLOGY

## 2025-04-14 PROCEDURE — 1160F RVW MEDS BY RX/DR IN RCRD: CPT | Mod: HCNC,CPTII,S$GLB,

## 2025-04-14 PROCEDURE — 4010F ACE/ARB THERAPY RXD/TAKEN: CPT | Mod: HCNC,CPTII,S$GLB,

## 2025-04-14 NOTE — PROGRESS NOTES
[address] [facility] [address]  [place] [address]  Phone: [phone]   [phone]    Patient's PCP:Jonathon Muñoz NP  Referring Provider: No ref. provider found    Subjective:      Chief Complaint:: Right foot fracture     Follow-up  Pertinent negatives include no arthralgias, chills, fever, joint swelling, nausea, numbness, rash, vomiting or weakness.     Daylin Lynch is a 64 y.o. female who presents today for follow up of fracture, RT. PT states she is not having any pain. PT presents in walking boot. PT denies any new complaints at this time. She states she had not worn the walking boot because the more distal strap was cutting into the lateral aspect of her foot. Her  modified the boot by removing the most distal strap in its entirety, which did no help as patient was still unable to wear the boot. She admits that the boot was tearing up her foot so she had to wear her tennis shoes and had only wore the boot for several days. She relays that the 1-2 weeks after our last encounter she couch elevating most the time but has done a considerable amount of WB the last week, since the pain has resolved. She asks if there is another boot or shoe that she can use that will not tear up her foot.     Vitals:    25 1344   Weight: 93.6 kg (206 lb 5.6 oz)   PainSc: 0-No pain      Shoe Size: 9    Past Surgical History:   Procedure Laterality Date     SECTION, LOW TRANSVERSE      x 3    EYE SURGERY  2023    lt retinal surgery    HYSTERECTOMY      pace maker  2020    SPINE SURGERY  2021    stents both legs       Past Medical History:   Diagnosis Date    Allergy     Asthma     Bilateral leg edema     Hyperlipidemia     Hypertension     Leg pain, bilateral     left leg more severe    Varicose vein      Family History   Problem Relation Name Age of Onset    Heart disease Mother      Heart failure Father      Migraines Sister 1/2     Cancer Sister 1/2         lung then pancreatic         Social History:   Marital Status:   Alcohol History:  reports current alcohol use.  Tobacco History:  reports that she quit smoking about 39 years ago. Her smoking use included cigarettes. She has been exposed to tobacco smoke. She has never used smokeless tobacco.  Drug History:  reports no history of drug use.    Review of patient's allergies indicates:  No Known Allergies    Current Medications[1]    Review of Systems   Constitutional:  Negative for activity change, chills and fever.   Cardiovascular:  Negative for leg swelling.   Gastrointestinal:  Negative for diarrhea, nausea and vomiting.   Musculoskeletal:  Negative for arthralgias, back pain and joint swelling.   Skin:  Negative for color change, pallor, rash and wound.   Neurological:  Negative for dizziness, tremors, weakness and numbness.         Objective:        Physical Exam:   Foot Exam    Right Foot/Ankle     Inspection and Palpation  Ecchymosis: none  Tenderness: (Mild 5th metatarsal)  Swelling: none   Arch: pes cavus  Hammertoes: second toe, third toe, fourth toe and fifth toe  Hallux valgus: no  Hallux limitus: no  Skin Exam: skin intact; no blister, no callus, no drainage, no cellulitis, no abnormal color and no ulcer   Fungus Toenails: not present  Neurovascular  Dorsalis pedis: 2+  Posterior tibial: 2+  Capillary Refill: 3+  Varicose veins: not present  Saphenous nerve sensation: normal  Tibial nerve sensation: normal  Superficial peroneal nerve sensation: normal  Deep peroneal nerve sensation: normal  Sural nerve sensation: normal    Range of Motion    Passive  Ankle eversion: pain  Passive eversion: Pain at 5th met.Ankle inversion: pain  Passive inversion: pain at ATFL.    Tests  Anterior drawer: negative   Varus tilt: negative   Calcaneal squeeze: negative   Talar tilt: negative   Comments  + piano sign 5th met, pain with palpation midshaft   No pain along the peroneal tendons with palpation/ROM/resisted ROM    Pain, edema, and  ecchymosis lateral ankle resolved           Imaging:   EXAMINATION:  XR FOOT COMPLETE 3 VIEW RIGHT     CLINICAL HISTORY:  . Pain in right foot     TECHNIQUE:  AP, lateral, and oblique views of the right foot were performed.     COMPARISON:  03/24/2025     FINDINGS:  Subacute age fracture of the 5th metatarsal with no detrimental change in alignment.  Plantar calcaneal spur.  Scattered degenerative changes in the midfoot.        Electronically signed by:Nestor Roe  Date:                                            04/14/2025  Time:                                           14:24       Assessment:       1. Injury of right foot, subsequent encounter    2. Displaced fracture of fifth metatarsal bone, right foot, initial encounter for closed fracture    3. Right foot pain      Plan:   Injury of right foot, subsequent encounter    Displaced fracture of fifth metatarsal bone, right foot, initial encounter for closed fracture  -     SURGERY SHOE FOR HOME USE    Right foot pain  -     X-Ray Foot Complete Right      I provided patient education verbally regarding: Patient diagnosis, treatment options, as well as alternatives, risks, and benefits.      Right foot xray obtained, reviewed, independently interpreted above, and discussed with patient. Mild increase in displacement, likely due to not wearing the boot as instructed.     Ankle sprain symptoms resolved. Negative Anterior drawer.     Patient requests alternative to boot. Informed CAM boot is the gold standard, she will only use post op shoe. Post op shoe dispensed      Pain symptoms minimal     Rest the Right foot as much as possible, WBAT in post op shoe use pain to guide tolerance and use.        Keep foot protected in post surgical shoe next 3 weeks, avoid going bare foot or in tennis shoes     No high impact activity     Vitamin D and Calcium      Return in 4 weeks for follow up imaging, return sooner if no improvement or symptoms worsen      This note was  created using Dragon voice recognition software that occasionally misinterpreted phrases or words.          Zheng Altamirano DPM  Podiatry / Foot and Ankle Surgery   Secure chat preferred        [1]   Current Outpatient Medications   Medication Sig Dispense Refill    albuterol (PROVENTIL/VENTOLIN HFA) 90 mcg/actuation inhaler Inhale 2 puffs into the lungs every 6 (six) hours as needed for Wheezing. use as directed 18 g 11    albuterol-ipratropium (DUO-NEB) 2.5 mg-0.5 mg/3 mL nebulizer solution Take 3 mLs by nebulization every 6 (six) hours as needed for Wheezing or Shortness of Breath. Use with nebulizer machine 75 mL 0    aspirin 325 MG tablet Take 325 mg by mouth once daily.      budesonide (PULMICORT) 0.5 mg/2 mL nebulizer solution Take 2 mLs (0.5 mg total) by nebulization daily as needed (Shortness of breath, wheezing, cough). Controller 120 mL 5    calcium carbonate (CALCIUM 500) 500 mg calcium (1,250 mg) chewable tablet Take 1 tablet (500 mg total) by mouth once daily. 30 tablet 11    carvediloL (COREG) 6.25 MG tablet Take 6.25 mg by mouth 2 (two) times daily.       cetirizine (ZYRTEC) 10 MG tablet Take 1 tablet (10 mg total) by mouth once daily.  0    cholecalciferol, vitamin D3, 1,250 mcg (50,000 unit) capsule Take 1 capsule (50,000 Units total) by mouth every 7 days. 12 capsule 0    coenzyme Q10 100 mg capsule Take 1 capsule every day by oral route for 90 days.      escitalopram oxalate (LEXAPRO) 10 MG tablet Take 10 mg by mouth once daily.      ezetimibe (ZETIA) 10 mg tablet Take 1 tablet by mouth every evening.      fluticasone propionate (FLONASE) 50 mcg/actuation nasal spray 2 sprays (100 mcg total) by Each Nostril route once daily. 16 g 6    fluticasone-umeclidin-vilanter (TRELEGY ELLIPTA) 200-62.5-25 mcg inhaler Inhale 1 puff into the lungs once daily. 180 each 3    furosemide (LASIX) 20 MG tablet Take 20 mg by mouth daily as needed (Fluid Gain).      HYDROcodone-acetaminophen (NORCO) 5-325 mg per  tablet Take 1 tablet by mouth every 6 (six) hours as needed for Pain. 27 tablet 0    montelukast (SINGULAIR) 10 mg tablet Take 1 tablet (10 mg total) by mouth once daily. 90 tablet 3    rosuvastatin (CRESTOR) 10 MG tablet Take 1 tablet every day by oral route for 90 days.      sacubitriL-valsartan (ENTRESTO) 49-51 mg per tablet Take 1 tablet by mouth 2 (two) times daily. SAMPLES FROM DOCTOR      spironolactone (ALDACTONE) 50 MG tablet Take 50 mg by mouth once daily.       No current facility-administered medications for this visit.

## 2025-04-15 NOTE — PROGRESS NOTES
SUBJECTIVE:      Patient ID: Daylin Lynch is a 64 y.o. female.    Chief Complaint: Follow-up (No bottles//Pt is here for a follow up on htn/A1c//pt would like left ear looked at, feels like water in the ear//KE)    HPI  History of Present Illness    CHIEF COMPLAINT:  Daylin presents today for follow up.    MUSCULOSKELETAL INJURY:  She sustained a foot fracture on  after falling onto a cement floor when her leg gave way while getting up from the sofa. Had recent follow-up with podiatrist     HYPERLIPIDEMIA:  She restarted cholesterol medications per cardiology in January with Rosuvastatin 5mg and Ezetimibe 5mg daily. She expresses concerns about statin use due to strong family history of dementia in her mother, aunts, and grandmother. Cholesterol levels will be rechecked in May.    DIABETES MANAGEMENT:  A1C was 5.8, showing improvement in blood sugar control.    PULMONARY:  She has upcoming pulmonologist follow-up on May 9th with planned chest imaging.    ENT:  She reports sensation of fluid in ear similar to water retention after hair washing. No ear popping with Valsalva maneuver. Denies ear pain or other symptoms.    MEDICATIONS:  She takes weekly vitamin D3 supplements as prescribed. Calcium supplementation of 500mg daily was prescribed but not received due to prescription issues.         Past Surgical History:   Procedure Laterality Date     SECTION, LOW TRANSVERSE      x 3    EYE SURGERY  2023    lt retinal surgery    HYSTERECTOMY      pace maker  2020    SPINE SURGERY  2021    stents both legs       Family History   Problem Relation Name Age of Onset    Heart disease Mother      Heart failure Father      Migraines Sister 1/2     Cancer Sister 1/2         lung then pancreatic      Social History     Socioeconomic History    Marital status:    Tobacco Use    Smoking status: Former     Current packs/day: 0.00     Types: Cigarettes     Quit date: 1985     Years  since quittin.3     Passive exposure: Past    Smokeless tobacco: Never   Substance and Sexual Activity    Alcohol use: Yes     Comment: occ    Drug use: No    Sexual activity: Yes   Social History Narrative    Live with      Social Drivers of Health     Financial Resource Strain: Low Risk  (2025)    Overall Financial Resource Strain (CARDIA)     Difficulty of Paying Living Expenses: Not hard at all   Food Insecurity: No Food Insecurity (2025)    Hunger Vital Sign     Worried About Running Out of Food in the Last Year: Never true     Ran Out of Food in the Last Year: Never true   Transportation Needs: No Transportation Needs (2025)    PRAPARE - Transportation     Lack of Transportation (Medical): No     Lack of Transportation (Non-Medical): No   Physical Activity: Insufficiently Active (2025)    Exercise Vital Sign     Days of Exercise per Week: 3 days     Minutes of Exercise per Session: 30 min   Stress: No Stress Concern Present (2025)    [employer] - Occupational Stress Questionnaire     Feeling of Stress : Not at all   Housing Stability: Low Risk  (2025)    Housing Stability Vital Sign     Unable to Pay for Housing in the Last Year: No     Number of Times Moved in the Last Year: 0     Homeless in the Last Year: No     Current Medications[1]  Review of patient's allergies indicates:  No Known Allergies   Past Medical History:   Diagnosis Date    Allergy     Asthma     Bilateral leg edema     Hyperlipidemia     Hypertension     Leg pain, bilateral     left leg more severe    Varicose vein      Past Surgical History:   Procedure Laterality Date     SECTION, LOW TRANSVERSE      x 3    EYE SURGERY  2023    lt retinal surgery    HYSTERECTOMY      pace maker  2020    SPINE SURGERY  2021    stents both legs         Review of Systems   Constitutional:  Negative for activity change, appetite change, chills, diaphoresis and unexpected  "weight change.   HENT:  Negative for ear discharge, hearing loss, rhinorrhea, trouble swallowing and voice change.    Eyes:  Negative for photophobia, pain, discharge and visual disturbance.   Respiratory:  Negative for cough, chest tightness, shortness of breath, wheezing and stridor.    Cardiovascular:  Negative for chest pain and palpitations.   Gastrointestinal:  Negative for abdominal pain, blood in stool, constipation, diarrhea and vomiting.   Endocrine: Negative for cold intolerance, heat intolerance, polydipsia and polyuria.   Genitourinary:  Negative for difficulty urinating, dysuria, flank pain, hematuria and menstrual problem.   Musculoskeletal:  Positive for arthralgias. Negative for joint swelling, neck pain and neck stiffness.   Skin:  Negative for pallor.   Neurological:  Negative for dizziness, speech difficulty, weakness and headaches.   Hematological:  Does not bruise/bleed easily.   Psychiatric/Behavioral:  Negative for confusion, dysphoric mood, self-injury, sleep disturbance and suicidal ideas. The patient is not nervous/anxious.       OBJECTIVE:      Vitals:    04/16/25 1019   BP: 122/64   Pulse: 76   SpO2: 100%   Weight: 93.4 kg (206 lb)   Height: 5' 7" (1.702 m)     Physical Exam  Vitals and nursing note reviewed.   Constitutional:       General: She is not in acute distress.     Appearance: She is well-developed.   HENT:      Head: Normocephalic and atraumatic.      Right Ear: Tympanic membrane normal.      Left Ear: Tympanic membrane normal.      Nose: Nose normal.      Mouth/Throat:      Pharynx: Uvula midline.   Eyes:      General: Lids are normal.      Conjunctiva/sclera: Conjunctivae normal.      Pupils: Pupils are equal, round, and reactive to light.      Right eye: Pupil is round and reactive.      Left eye: Pupil is round and reactive.   Neck:      Thyroid: No thyromegaly.      Vascular: No JVD.      Trachea: Trachea normal.   Cardiovascular:      Rate and Rhythm: Normal rate and " regular rhythm.      Pulses: Normal pulses.      Heart sounds: Normal heart sounds.   Pulmonary:      Effort: Pulmonary effort is normal. No tachypnea or respiratory distress.      Breath sounds: Normal breath sounds. No wheezing, rhonchi or rales.   Abdominal:      General: Bowel sounds are normal.      Palpations: Abdomen is soft.      Tenderness: There is no abdominal tenderness.   Musculoskeletal:         General: Normal range of motion.      Cervical back: Normal range of motion and neck supple.      Right lower leg: No edema.      Left lower leg: No edema.   Lymphadenopathy:      Cervical: No cervical adenopathy.   Skin:     General: Skin is warm and dry.      Findings: No rash.   Neurological:      Mental Status: She is alert and oriented to person, place, and time.   Psychiatric:         Mood and Affect: Mood normal.         Speech: Speech normal.         Behavior: Behavior normal. Behavior is cooperative.         Thought Content: Thought content normal.         Judgment: Judgment normal.       No visits with results within 1 Month(s) from this visit.   Latest known visit with results is:   Office Visit on 12/19/2024   Component Date Value Ref Range Status    Specific Gravity, UA 12/19/2024 1.028  1.005 - 1.030 Final    pH, UA 12/19/2024 5.5  5.0 - 7.5 Final    Color, UA 12/19/2024 Yellow  Yellow Final    Clarity, UA 12/19/2024 Clear  Clear Final    Leukocytes, UA 12/19/2024 Negative  Negative Final    Protein, UA 12/19/2024 1+ (A)  Negative/Trace Final    Glucose, UA 12/19/2024 Negative  Negative Final    Ketones, UA 12/19/2024 Trace (A)  Negative Final    Occult Blood UA 12/19/2024 Negative  Negative Final    Bilirubin, UA 12/19/2024 Negative  Negative Final    Urobilinogen, UA 12/19/2024 0.2  0.2 - 1.0 mg/dL Final    Nitrite, UA 12/19/2024 Negative  Negative Final    Microscopic Examination 12/19/2024 See below:   Final    Microscopic was indicated and was performed.    Urinalysis Reflex 12/19/2024  Comment   Final    This specimen will not reflex to a Urine Culture.    WBC, UA 12/19/2024 0-5  0 - 5 /hpf Final    RBC, UA 12/19/2024 0-2  0 - 2 /hpf Final    Epithelial Cells (non renal) 12/19/2024 0-10  0 - 10 /hpf Final    Casts 12/19/2024 Present (A)  None seen /lpf Final    Cast Type 12/19/2024 Hyaline casts  N/A Final    Bacteria, UA 12/19/2024 None seen  None seen/Few Final        Last visit note, most recent available labs, and health maintenance reviewed    Assessment:       1. Prediabetes    2. Essential (primary) hypertension    3. Mixed hyperlipidemia    4. Severe persistent asthma without complication    5. Dysfunction of Eustachian tube, unspecified laterality        Plan:       Prediabetes  -     POCT HEMOGLOBIN A1C    Essential (primary) hypertension    Mixed hyperlipidemia    Severe persistent asthma without complication    Dysfunction of Eustachian tube, unspecified laterality      Assessment & Plan    SEVERE PERSISTENT ASTHMA WITHOUT COMPLICATION:  stable  - Scheduled follow-up visit with pulmonologist on May 9th for repeat chest scan.    STRESS FRACTURE, RIGHT FOOT:  - Daylin sustained a foot fracture on March 24th.  -- Recommend continued follow-up with podiatrist.    HYPERLIPIDEMIA:  - Daylin restarted on low-dose combination therapy (Rosuvastatin 5 mg and Ezetimibe 5 mg) by cardiology in January.  - Last cholesterol check was in October when off medication.  - Follow-up scheduled with cardiology in May for cholesterol recheck.    TYPE 2 DIABETES MELLITUS:  - Recent A1C result of 5.4 indicates improvement in glucose control and favorable blood sugar management.    EUSTACHIAN TUBE DISORDER:  - Evaluated ear complaint, suspecting possible eustachian tube dysfunction related to allergies.  - Ear exam found no abnormalities.  - Explained that fluid sensation may be due to allergies affecting the eustachian tube.  - cont flonase    VITAMIN D DEFICIENCY AND CALCIUM SUPPLEMENTATION:  - Normal DEXA scan  from 2023  - currently on vitamin D    Recommend calcium supplement 500 mg daily          Follow up in about 6 months (around 10/16/2025) for pr dm .  This note was generated with the assistance of ambient listening technology. Verbal consent was obtained by the patient and accompanying visitor(s) for the recording of patient appointment to facilitate this note. I attest to having reviewed and edited the generated note for accuracy, though some syntax or spelling errors may persist. Please contact the author of this note for any clarification.        4/16/2025 ROBERTA Meraz, [place]             [1]   Current Outpatient Medications   Medication Sig Dispense Refill    albuterol (PROVENTIL/VENTOLIN HFA) 90 mcg/actuation inhaler Inhale 2 puffs into the lungs every 6 (six) hours as needed for Wheezing. use as directed 18 g 11    albuterol-ipratropium (DUO-NEB) 2.5 mg-0.5 mg/3 mL nebulizer solution Take 3 mLs by nebulization every 6 (six) hours as needed for Wheezing or Shortness of Breath. Use with nebulizer machine 75 mL 0    aspirin 325 MG tablet Take 325 mg by mouth once daily.      calcium carbonate (CALCIUM 500) 500 mg calcium (1,250 mg) chewable tablet Take 1 tablet (500 mg total) by mouth once daily. 30 tablet 11    carvediloL (COREG) 6.25 MG tablet Take 6.25 mg by mouth 2 (two) times daily.       cholecalciferol, vitamin D3, 1,250 mcg (50,000 unit) capsule Take 1 capsule (50,000 Units total) by mouth every 7 days. 12 capsule 0    coenzyme Q10 100 mg capsule Take 1 capsule every day by oral route for 90 days.      escitalopram oxalate (LEXAPRO) 10 MG tablet Take 10 mg by mouth once daily.      ezetimibe (ZETIA) 10 mg tablet Take 1 tablet by mouth every evening.      fluticasone propionate (FLONASE) 50 mcg/actuation nasal spray 2 sprays (100 mcg total) by Each Nostril route once daily. 16 g 6    fluticasone-umeclidin-vilanter (TRELEGY ELLIPTA) 200-62.5-25 mcg inhaler Inhale 1 puff into the lungs once daily. 180  each 3    furosemide (LASIX) 20 MG tablet Take 20 mg by mouth daily as needed (Fluid Gain).      HYDROcodone-acetaminophen (NORCO) 5-325 mg per tablet Take 1 tablet by mouth every 6 (six) hours as needed for Pain. 27 tablet 0    montelukast (SINGULAIR) 10 mg tablet Take 1 tablet (10 mg total) by mouth once daily. 90 tablet 3    rosuvastatin (CRESTOR) 10 MG tablet Take 1 tablet every day by oral route for 90 days.      sacubitriL-valsartan (ENTRESTO) 49-51 mg per tablet Take 1 tablet by mouth 2 (two) times daily. SAMPLES FROM DOCTOR      spironolactone (ALDACTONE) 50 MG tablet Take 50 mg by mouth once daily.      budesonide (PULMICORT) 0.5 mg/2 mL nebulizer solution Take 2 mLs (0.5 mg total) by nebulization daily as needed (Shortness of breath, wheezing, cough). Controller 120 mL 5    cetirizine (ZYRTEC) 10 MG tablet Take 1 tablet (10 mg total) by mouth once daily.  0     No current facility-administered medications for this visit.

## 2025-04-16 ENCOUNTER — OFFICE VISIT (OUTPATIENT)
Dept: FAMILY MEDICINE | Facility: CLINIC | Age: 65
End: 2025-04-16
Payer: MEDICARE

## 2025-04-16 VITALS
SYSTOLIC BLOOD PRESSURE: 122 MMHG | HEIGHT: 67 IN | BODY MASS INDEX: 32.33 KG/M2 | OXYGEN SATURATION: 100 % | DIASTOLIC BLOOD PRESSURE: 64 MMHG | WEIGHT: 206 LBS | HEART RATE: 76 BPM

## 2025-04-16 DIAGNOSIS — I10 ESSENTIAL (PRIMARY) HYPERTENSION: ICD-10-CM

## 2025-04-16 DIAGNOSIS — E78.2 MIXED HYPERLIPIDEMIA: ICD-10-CM

## 2025-04-16 DIAGNOSIS — R73.03 PREDIABETES: Primary | ICD-10-CM

## 2025-04-16 DIAGNOSIS — H69.90 DYSFUNCTION OF EUSTACHIAN TUBE, UNSPECIFIED LATERALITY: ICD-10-CM

## 2025-04-16 DIAGNOSIS — J45.50 SEVERE PERSISTENT ASTHMA WITHOUT COMPLICATION: ICD-10-CM

## 2025-04-16 LAB — HBA1C MFR BLD: 5.4 %

## 2025-04-23 ENCOUNTER — HOSPITAL ENCOUNTER (OUTPATIENT)
Dept: RADIOLOGY | Facility: HOSPITAL | Age: 65
Discharge: HOME | End: 2025-04-23
Attending: NURSE PRACTITIONER
Payer: MEDICARE

## 2025-04-23 DIAGNOSIS — J12.1 RSV (RESPIRATORY SYNCYTIAL VIRUS PNEUMONIA): ICD-10-CM

## 2025-04-23 PROCEDURE — 71250 CT THORAX DX C-: CPT | Mod: TC

## 2025-04-23 PROCEDURE — 71250 CT THORAX DX C-: CPT | Mod: 26,,, | Performed by: RADIOLOGY

## 2025-04-25 ENCOUNTER — RESULTS FOLLOW-UP (OUTPATIENT)
Dept: PULMONOLOGY | Facility: CLINIC | Age: 65
End: 2025-04-25

## 2025-05-09 ENCOUNTER — OFFICE VISIT (OUTPATIENT)
Dept: PULMONOLOGY | Facility: CLINIC | Age: 65
End: 2025-05-09
Payer: MEDICARE

## 2025-05-09 VITALS
BODY MASS INDEX: 32.18 KG/M2 | DIASTOLIC BLOOD PRESSURE: 71 MMHG | HEART RATE: 81 BPM | WEIGHT: 205 LBS | SYSTOLIC BLOOD PRESSURE: 108 MMHG | HEIGHT: 67 IN | OXYGEN SATURATION: 98 %

## 2025-05-09 DIAGNOSIS — I50.42 CHRONIC COMBINED SYSTOLIC AND DIASTOLIC HEART FAILURE: ICD-10-CM

## 2025-05-09 DIAGNOSIS — J12.1 RSV (RESPIRATORY SYNCYTIAL VIRUS PNEUMONIA): ICD-10-CM

## 2025-05-09 DIAGNOSIS — Z78.9 NON-SMOKER: ICD-10-CM

## 2025-05-09 DIAGNOSIS — J45.50 SEVERE PERSISTENT ASTHMA WITHOUT COMPLICATION: Primary | ICD-10-CM

## 2025-05-09 DIAGNOSIS — J82.83 EOSINOPHILIC ASTHMA: ICD-10-CM

## 2025-05-09 PROCEDURE — 99999 PR PBB SHADOW E&M-EST. PATIENT-LVL IV: CPT | Mod: PBBFAC,HCNC,, | Performed by: NURSE PRACTITIONER

## 2025-05-09 NOTE — PROGRESS NOTES
"5/9/2025    Daylin Lynch  In office visit     Chief Complaint   Patient presents with    Asthma       HPI:  05/09/2025:  Recently had mechanical fall - injured R foot - following with podiatrist - wearing boot.  Did not yet receive the RSV vaccine as of yet - states will make an appt soon.  Underwent CT Chest revealing small stable lung nodules.  Using Trelegy one puff once per day and Albuterol PRN, approx once every other day.  Denies recent use of abx or steroid since prior office visit.        12/27/2024:  Recent hospitalization on 12/7 at [facility] for UTI, RSV, Pneumonia. Hospital course reviewed as below:  Hospital Course:   "Ms. Lynch was admitted for UTI and dyspnea.  While here, her labs and vital signs were monitored and she was maintained on telemetry.  She was placed on empiric IV Rocephin and doxycycline for possible pneumonia.  Urine and blood cultures were collected.  There was concern for possible acute heart failure since she has a history of cardiomyopathy with AICD and low EF.  Her troponin and BNP were negative and her limited echocardiogram showed an EF of 55-60%.  She had a CTA of the chest which was negative for PE and showed a small right pneumonia.  Her respiratory panel grew out RSV.  Her urine culture started growing GNR.  The doxycycline was discontinued since the pneumonia was a viral etiology in the IV Rocephin was continued for her GNR UTI.  She initially did not require supplemental O2, but then she desatted to 88% on room air and supplemental O2 was initiated.  She was maintained on supportive care for the RSV with antitussive PRN and nebulizers. She was also given a dose of IV dexamethasone for her symptoms. Her final urine culture grew out a highly sensitive E coli. Her overall condition improved, and her supplemental O2 was weaned down.  She is being discharged to home today.  She will have a desat study prior and be set up for home O2 if qualifies.  She will continue a " "course of cefdinir, steroid taper, and follow up with her PCP and pulmonologist.  She did not have any adverse events while here."  Patient was subsequently discharged home on 12/9. Was discharged home without supplemental oxygen.  Since discharge home is feeling much better - does feel like she has low energy levels - has a scheduled follow up with cardiology next month, is anticipating repeat echo.   States cough and mucous has resolved.   During Thanksgiving week she initially noticed respiratory complaints - started abx and steroids at that time however no improvement, presented to  and CXR was obtained, patient was then sent to the ER.   Using Trelegy once per day and Albuterol PRN - currently using twice per day - states at times she notices waking up in the middle of the night gasping for air - doesn't occur nightly but when she was sick noticed it would occur. Has never been tested for NHUNG in the past.   Patient Instructions   Continue Trelegy one puff once per day.  This inhaler contains an inhaled steroid component. Rinse mouth after each use due to risk for thrush development. If mouth or tongue develops white sores please contact the clinic and I will order a prescription mouth wash.   Continue Albuterol inhaler as needed for shortness of breath, wheezing, cough.   Prednisone - Keep on Hand and take only as needed, use sparingly.  Azithromycin - Keep on hand, take only as needed for yellow or green mucous production.  Continue current prescription medication regiment. Keep follow up appointment as scheduled. Please call the office if you have any questions or concerns.             10/14/2024:  Using Trelegy once per day and Albuterol PRN - states Albuterol use is down currently - states not even using every other day.  States three weeks ago got illness - started Prednisone regimen and Codeine cough syrup at that time with great reported benefit. Took one pill per day for five days of Prednisone. " Denies recent use of abx therapy. Feels as if sickness is related to weather changes at times. Using codeine cough syrup only as needed for intermittent cough - reports great benefit with use. Cough is associated with nocturnal arousals, difficulty staying asleep. Initially cough was productive however not any longer.  Patient Instructions   Continue Trelegy one puff once per day.  This inhaler contains an inhaled steroid component. Rinse mouth after each use due to risk for thrush development. If mouth or tongue develops white sores please contact the clinic and I will order a prescription mouth wash.   Continue Albuterol inhaler as needed for shortness of breath, wheezing, cough.   Prednisone - Keep on Hand and take only as needed, use sparingly.  Azithromycin - Keep on hand, take only as needed for yellow or green mucous production.  Codeine cough syrup prescribed - to be taken only as needed for cough. This will make you drowsy, do not drive or operative heavy machinery after use. Do not take with other sedating medications. Do not mix with alcohol. Utilize fall precautions with use.   Continue current prescription medication regiment. Keep follow up appointment as scheduled. Please call the office if you have any questions or concerns.         04/09/2024:  Took one Nucala injection in January - states can't afford the medication and did not notice great benefit with use anyway.  Using Trelegy once per day and Albuterol PRN - states Albuterol use is approx 2x per day currently.  Last month developed illness that lasted two weeks while in [place] - states she lost her voice during that time and suffered with chest tightness.   Completed prednisone 5 day regimen and Azithromycin 3 day regimen at that time with reported benefit.   Using codeine cough syrup only as needed for persistent cough - reports great benefit with use. Cough is associated with nocturnal arousals, difficulty staying asleep. Initially cough was  productive however not any longer.  Patient Instructions   Continue Trelegy once per day.  Albuterol as needed.   Prednisone - Keep on Hand and take only as needed, use sparingly.  Azithromycin - Keep on hand, take only as needed.  Codeine cough syrup prescribed - to be taken only as needed for cough. This will make you drowsy, do not drive or operative heavy machinery after use. Do not take with other sedating medications. Do not mix with alcohol. Utilize fall precautions with use.   Continue current prescription medication regiment. Keep follow up appointment as scheduled. Please call the office if you have any questions or concerns.           01/08/2024:  Spent majority of the holidays in [place] visit in family.  Developed illness while there, completed 5 day regimen of prednisone at that time with reported benefit.  States since returning home she has now developed illness again.  Recently completed another 5 day regimen of prednisone and 3 day regimen azithromycin with minimal improvement.  Cough:  Persistent since onset, nonproductive.  Associated with nocturnal arousals, difficulty falling asleep.  Associated with wheezing and chest tightness.  Patient endorses generalized low energy.  Patient denies fever, GI complaints.  Currently taking Trelegy once per day and albuterol as needed.  Currently requiring albuterol approximately up to 8 times per day.  Not currently use nebulizer treatment.  Is on Nucala monthly, however has not received Nucala injection since November due to insurance/pharmacy.  Patient Instructions   Concern for acute asthma exacerbation.  Take prednisone as prescribed.  This is the regimen that is instructed to be taken 3 pills for 3 days followed by 2 pills for 3 days followed by 1 pill for 3 days.  I have sent another prescription for prednisone with instructions to take 1 pill per day for 5 days.  Do not take this regimen at this time, this is for your on hand need.  Azithromycin  ordered.  I would not take this at this time.  This is only to be taken as needed for green-yellow mucus production.  Chest x-ray now to ensure no developing pneumonia.  We will restart Nucala injection today.  Sample given in office today.    LOT [id number]  EXPIRATION MAY 2026  Codeine cough syrup prescribed - to be taken only as needed for cough. This will make you drowsy, do not drive or operative heavy machinery after use. Do not take with other sedating medications. Do not mix with alcohol. Utilize fall precautions with use.   Continue Trelegy once per day.  Albuterol as needed.  I have refilled your nebulized medicines as well.  Continue current prescription medication regiment. Keep follow up appointment as scheduled. Please call the office if you have any questions or concerns.             10/10/2023:  Approximately three weeks ago visited a nursery - ever since developed symptoms of asthma flare. Required Azithromycin and Prednisone 20 mg x 5 days. States with intervention, her symptoms have improved however she is still experiencing a lingering cough. Cough is productive with clear mucous. Mild wheezing and chest tightness reported.   States currently using Trelegy once per day. Using Albuterol PRN - approx 4 times per day as of late.  Still on Nucala - next dose is due October 20 - states she recently got a bill for $1000 for Nucala - will investigate.   Patient Instructions   Continue current asthma regiment including Trelegy once per day and Albuterol as needed for shortness of breath, wheezing, cough.  Continue Nucala monthly. Will reach out to Rep and see about getting assistance through the .   Will send prescription for Trelegy to [facility]. You must call and enroll in their inhaler program. Call the number on the handout I gave you in office.   Chest Xray if no improvement of symptoms.   Keep Prednisone and Azithromycin on hand for as needed symptoms - take sparingly, take as  prescribed.  Continue current medication regiment. Keep follow up appointment as scheduled. Please call the office if you have any questions or concerns.         04/10/2023:  Overall doing well!   States currently using Trelegy once per day with great benefit in comparison to Symbicort.  Using Albuterol only as needed, not requiring daily.  Took a trip to [place] last month - after day 3 of trip got ill - took Azithromycin 3 day regiment and Prednisone 5 day regiment at that time with great benefit of symptoms.  Still on Nucala - states tolerating well and is noticing great benefit with use.  Patient Instructions   Continue current asthma regiment including Trelegy once per day and Albuterol as needed for shortness of breath, wheezing, cough.  Continue Nucala monthly.  Keep Prednisone and Azithromycin on hand for as needed symptoms - take sparingly, take as prescribed.  Continue current medication regiment. Keep follow up appointment as scheduled. Please call the office if you have any questions or concerns.         1/9/2023:  Recent trip to [place] - states got sick during Travel. Completed Prednisone regiment for 5 days with benefit.  Still on Nucala - recently approved per insurance - states next dose in due 1/15.  Started weight watchers again, looking forward to losing weight.  Using Symbicort two puffs twice daily with benefit. Using Albuterol only as needed - states over the last week she has required Albuterol more - using approximately 3x per day.  Recently received letter stating disability was approved. Looking to get on Medicare soon.  Patient Instructions   Continue current asthma regiment including Symbicort twice per day and Albuterol as needed.  Nebulizer machine ordered with Albuterol and Budesonide - to be taken only as needed.  Keep Prednisone on hand to be taken only as needed, use sparingly.  Will give sample Trelegy today - this is one puff once per day. This inhaler contains an inhaled steroid  component. Rinse mouth after each use due to risk for thrush development. If mouth or tongue develops white sores please contact the clinic and I will order a prescription mouth wash.   Stop Symbicort while using Trelegy.  Continue current medication regiment. Keep follow up appointment as scheduled. Please call the office if you have any questions or concerns.           11/8/2022:  Recent trip to [place] to visit family - states while on trip had an asthma flare. Started taking Prednisone yesterday, on day 2.   Reports use of Prednisone two times over the last six months alone - had to take regiment upon discharge from hospital in June 2022. Reports great benefit with steroid use.   Using Symbicort two puffs twice daily with benefit. Using Albuterol only as needed - states over the last week she has required Albuterol more - using approximately 3x per day.  On Singulair nightly with benefit.   Reports chest tightness.   Shortness of breath: Persistent over the last week or so - associated with wheezing, worse at night time. Associated with nocturnal arousals nightly. States can't perform ADLS without stopping for rest.  Cough: Persistent, worse at night time, non productive. Relieved somewhat with cough syrup.  Patient Instructions   Asthma - severe persistent, uncontrolled with 2 steroid courses in the past six months. Eosinophil count 500 in June 2022.   Can check CBC now, then initiate Nucala in clinic.  Nucala sample given in clinic  LOT number [id number]  Exp May 2025  Continue Symbicort twice per day. Albuterol as needed for shortness of breath, wheezing, cough.  Continue current medication regiment. Keep follow up appointment as scheduled. Please call the office if you have any questions or concerns.         8/8/2022:  Recent trip to [place] - had several issues with shortness of breath, coughing while on trip. Overall doing better since returning home.   Cough: Overall improved - nonproductive, no time  correlation identified. Denies wheezing.  Shortness of breath: Persistent with minimal exertion - improves with rest.   Currently using Symbicort - two puffs twice per day with reported benefit. Using Albuterol only as needed, not daily use - states while on trip in [place] required several times per day, however hasn't needed much since returning home.  Hasn't taken Prednisone since prior visit.   Patient Instructions   Overall you seem to be doing better.  Would continue Symbicort two puffs twice daily with Albuterol use as needed for shortness of breath, wheezing.  Keep Prednisone on hand to take as needed for shortness of breath, cough, wheezing.  Can consider biologic in the future if symptoms worsen.  Consider PFT if the future if no improvement.  Continue current medication regiment. Keep follow up appointment as scheduled. Please call the office if you have any questions or concerns.         6/24/22:  Hx: Annual bronchitis in the winter, pleurisy, CHF, AICD placement,   Recent ER visit on 6/12/22 to [facility] for CP, SOB - patient was noted to have elevated troponin at that time, decision made for transfer to [facility] for possible cath. Patient underwent series of testing including echocardiogram revealing EF 40% in the setting of known systolic heart failure. Patient did not undergo angiogram during this hospitalization. CTA obtained revealing no PE, however concern regarding atypical pneumonia due to bilateral ground glass opacities. Patient was treated with IV Rocephin, Azithromycin, and steroid - reported great improvement at that time with medication therapy. Patient was subsequently discharged home, without supplemental oxygen, with a prescription for Levaquin (reports completion), five days worth of steroid PO, and PRN albuterol inhaler.   Was seen per PCP on Tuesday 6/21 and prescribed Symbicort inhaler, Flonase, and Promethazine cough syrup.   Cough: Persistent over the last 6 months - states it's  overall improved since hospitalization however not completely resolved. States cough was attributed in the past to congestive heart failure. Was diagnosed with bronchitis per primary care provider in March - given codeine cough syrup at that time, no antibiotic or steroids. Cough persists throughout the day however is noticeably worse at night time, when lying down. Non productive. Associated with coughing fits, dizziness. Associated with chest tightness, wheezing. No relief noted with tessalon pearls, however has tried codeine and promethazine cough syrup separately - states codeine worked better.  Shortness of breath: Gradual onset over the last six months - feels chest heaviness at rest however experiences exertional dyspnea - states affecting ADLS. Can't take a shower and get dressed without stopping for rest. Improves with rest.   Does endorse sinus congestion, ear congestion, post nasal drip - started Zyrtec daily in April, does not appreciate benefit.   Has started Symbicort - taking two puffs in the morning - has been on for three days now, reports benefit with use.  Has tried Albuterol in the past without noticeable benefit.   Takes Protonix daily.  Per review of EMR discharge summary:  Hospital Course:   61-year-old lady with numerous medical problems including chronic systolic CHF who has been suffering with dry cough for last couple months presented to ED with cough and cough related pain found to have mildly elevated troponin and was subsequently transferred to [facility].  Serial EKGs, cardiac enzymes remained negative for acute coronary syndrome.  Stress test ruled out any reversible ischemia.  Her ejection fraction is overall stable from previous echo.  Overall symptomatology is more consistent with pulmonary in origin, CTA chest showed atypical pneumonia.  She responded well after starting Rocephin and azithromycin as well as 1 dose of IV steroid.  Patient has chronic postnasal drip.  She was discharged  home in hemodynamically stable condition with prescription of Levaquin, p.r.n. albuterol.  She was advised to follow-up with her PCP, I advised her that if her symptoms do not improve she may discuss with her cardiologist if Entresto is the culprit of chronic dry cough.  I also generated outpatient pulmonary referral.   Patient Instructions   Repeat chest xray in 1-2 weeks to evaluate post atypical pneumonia.  Symptoms are somewhat concerning for asthmatic component. Would continue Symbicort however start using 2 puffs twice per day.   Can use Albuterol as needed for shortness of breath, cough.  For sinus complaints - can try Singulair at night time with Zyrtec during the day.   Can send to ENT if interested.  Etiology of cough is unknown at this time - however could be multifactorial.  Entresto?  GERD - Currently on Protonix  Codeine cough syrup to be taken as needed for cough, caution as may make drowsy. Do not drive after taking.  Prednisone, take only as needed.  Continue current medication regiment. Keep follow up appointment as scheduled. Please call the office if you have any questions or concerns.         Social Hx: Lives with  - 2 animals in the home. Former [employer] employee, . No Asbestosis exposure, Smoking Hx: Former smoker, on and off for approx ten years - quit in   Family Hx: No Lung Cancer, Mother COPD, No Asthma  Medical Hx: Previous pneumonia ; No previous shoulder surgery - Defibrillator placement         The chief compliant problem varies with instability at times.  PFSH:  Past Medical History:   Diagnosis Date    Allergy     Asthma     Bilateral leg edema     Hyperlipidemia     Hypertension     Leg pain, bilateral     left leg more severe    Varicose vein          Past Surgical History:   Procedure Laterality Date     SECTION, LOW TRANSVERSE      x 3    EYE SURGERY  2023    lt retinal surgery    HYSTERECTOMY      pace maker  2020    SPINE SURGERY   "2021    stents both legs       Social History     Tobacco Use    Smoking status: Former     Current packs/day: 0.00     Types: Cigarettes     Quit date: 1985     Years since quittin.4     Passive exposure: Past    Smokeless tobacco: Never   Substance Use Topics    Alcohol use: Yes     Comment: occ    Drug use: No     Family History   Problem Relation Name Age of Onset    Heart disease Mother      Heart failure Father      Migraines Sister 1/2     Cancer Sister 1/2         lung then pancreatic     Review of patient's allergies indicates:  No Known Allergies  I have reviewed past medical, family, and social history. I have reviewed previous nurse notes.    Performance Status:The patient's activity level is functions out of house.      Review of Systems:  a review of eleven systems covering constitutional, Eye, HEENT, Psych, Respiratory, Cardiac, GI, , Musculoskeletal, Endocrine, Dermatologic was negative except for pertinent findings as listed ABOVE and below: pertinent positive as above, rest is good       Exam:Comprehensive exam done. /71 (BP Location: Right arm, Patient Position: Sitting)   Pulse 81   Ht 5' 7" (1.702 m)   Wt 93 kg (205 lb 0.4 oz)   SpO2 98% Comment: ON ROOM AIR AT REST  BMI 32.11 kg/m²   Exam included Vitals as listed  Constitutional: She is oriented to person, place, and time. She appears well-developed. No distress.   Nose: Nose normal.   Mouth/Throat: Uvula is midline, oropharynx is clear and moist and mucous membranes are normal. No dental caries. No oropharyngeal exudate, posterior oropharyngeal edema, posterior oropharyngeal erythema or tonsillar abscesses.  Mallapatti (M) score 3  Eyes: Pupils are equal, round, and reactive to light.   Neck: No JVD present. No thyromegaly present.   Cardiovascular: Normal rate, regular rhythm and normal heart sounds. Exam reveals no gallop and no friction rub.   No murmur heard.  Pulmonary/Chest: Effort normal and breath sounds " normal. No accessory muscle usage or stridor. No apnea and no tachypnea. No respiratory distress, decreased breath sounds, wheezes, rhonchi, rales, or tenderness. Clear breath sounds throughout, on room air, in no acute distress.  Abdominal: Soft. She exhibits no mass. There is no tenderness. No hepatosplenomegaly, hernias and normoactive bowel sounds  Musculoskeletal: Normal range of motion. exhibits no edema. Boot in place to R foot.  Neurological:  alert and oriented to person, place, and time. not disoriented.   Skin: Skin is warm and dry. Capillary refill takes less 2 sec. No cyanosis or erythema. No pallor. Nails show no clubbing.   Psychiatric: normal mood and affect. behavior is normal. Judgment and thought content normal.         Radiographs (ct chest and cxr) reviewed: view by direct vision     CT Chest Without Contrast 4/23/2025 - Stable lung nodules noted.    CTA Chest Non-Coronary (PE Studies)12/7/2024 Impression: No evidence of PE - RLL infiltration - adenopathy.      Chest Xray 7/20/2022  FINDINGS:  An AICD is noted in place on the left.  The cardiac size and contours within normal limits.  No intrapulmonary mass or infiltrate is seen.  No pneumothorax or pleural effusion is noted.   Impression:   AICD noted in place otherwise negative chest    CTA Chest Non Coronary  6/13/2022   IMPRESSION:   1.  No pulmonary embolism identified.  2.  Patchy groundglass lung opacities are noted bilaterally, mostly involving the lung bases. These most likely reflect inspiratory effort or subsegmental atelectasis. Atypical infection could have a similar appearance in the proper clinical setting      X-Ray Chest PA And Lateral   6/13/2022   Impression:   AICD in place otherwise negative chest      Patient's labs were reviewed including CBC and CMP    Lab Results   Component Value Date    WBC 13.42 (H) 12/09/2024    RBC 3.72 (L) 12/09/2024    HGB 11.0 (L) 12/09/2024    HCT 34.4 (L) 12/09/2024    MCV 93 12/09/2024    MCH  29.6 12/09/2024    MCHC 32.0 12/09/2024    RDW 14.3 12/09/2024     12/09/2024    MPV 9.5 12/09/2024    GRAN 9.8 (H) 12/09/2024    GRAN 72.8 12/09/2024    LYMPH 2.8 12/09/2024    LYMPH 20.8 12/09/2024    MONO 0.7 12/09/2024    MONO 5.4 12/09/2024    EOS 0.0 12/09/2024    BASO 0.02 12/09/2024    EOSINOPHIL 0.0 12/09/2024    BASOPHIL 0.1 12/09/2024   CMP  Sodium   Date Value Ref Range Status   12/09/2024 141 136 - 145 mmol/L Final   01/23/2017 139 134 - 144 mmol/L      Potassium   Date Value Ref Range Status   12/09/2024 4.0 3.5 - 5.1 mmol/L Final     Chloride   Date Value Ref Range Status   12/09/2024 105 95 - 110 mmol/L Final   01/23/2017 101 98 - 110 mmol/L      CO2   Date Value Ref Range Status   12/09/2024 29 23 - 29 mmol/L Final     Glucose   Date Value Ref Range Status   12/09/2024 117 (H) 70 - 110 mg/dL Final   01/23/2017 85 70 - 99 mg/dL      BUN   Date Value Ref Range Status   12/09/2024 22 8 - 23 mg/dL Final     Creatinine   Date Value Ref Range Status   12/09/2024 0.6 0.5 - 1.4 mg/dL Final   01/23/2017 0.58 (L) 0.60 - 1.40 mg/dL      Calcium   Date Value Ref Range Status   12/09/2024 8.7 8.7 - 10.5 mg/dL Final     Total Protein   Date Value Ref Range Status   12/09/2024 6.5 6.0 - 8.4 g/dL Final     Albumin   Date Value Ref Range Status   12/09/2024 3.3 (L) 3.5 - 5.2 g/dL Final   01/23/2017 4.1 3.1 - 4.7 g/dL      Total Bilirubin   Date Value Ref Range Status   12/09/2024 0.4 0.1 - 1.0 mg/dL Final     Comment:     For infants and newborns, interpretation of results should be based  on gestational age, weight and in agreement with clinical  observations.    Premature Infant recommended reference ranges:  Up to 24 hours.............<8.0 mg/dL  Up to 48 hours............<12.0 mg/dL  3-5 days..................<15.0 mg/dL  6-29 days.................<15.0 mg/dL       Alkaline Phosphatase   Date Value Ref Range Status   12/09/2024 46 (L) 55 - 135 U/L Final     AST   Date Value Ref Range Status   12/09/2024  24 10 - 40 U/L Final     ALT   Date Value Ref Range Status   12/09/2024 19 10 - 44 U/L Final     Anion Gap   Date Value Ref Range Status   12/09/2024 7 (L) 8 - 16 mmol/L Final     eGFR   Date Value Ref Range Status   12/09/2024 >60.0 >60 mL/min/1.73 m^2 Final         PFT will be done and results to be reviewed  Pulmonary Functions Testing Results:      Transthoracic echo (TTE) complete 6/13/2022:   Summary  The left ventricle is normal in size with concentric hypertrophy and mildly decreased systolic function.  The estimated ejection fraction is 40%.  Grade I left ventricular diastolic dysfunction.  There is mild left ventricular global hypokinesis.  Normal right ventricular size with normal right ventricular systolic function.  Moderate left atrial enlargement.  Mild tricuspid regurgitation.  Normal central venous pressure (3 mmHg).  The estimated PA systolic pressure is 10 mmHg.        Plan:  Clinical impression is resonably certain and repeated evaluation prn +/- follow up will be needed as below.    Daylin was seen today for asthma.    Diagnoses and all orders for this visit:    Severe persistent asthma without complication    Eosinophilic asthma    Chronic combined systolic and diastolic heart failure    RSV (respiratory syncytial virus pneumonia)    Non-smoker        Follow up in about 6 months (around 11/9/2025), or if symptoms worsen or fail to improve.    Discussed with patient above for education the following:      Patient Instructions   Continue Trelegy one puff once per day.  This inhaler contains an inhaled steroid component. Rinse mouth after each use due to risk for thrush development. If mouth or tongue develops white sores please contact the clinic and I will order a prescription mouth wash.     Continue Albuterol inhaler as needed for shortness of breath, wheezing, cough.     Prednisone - Keep on Hand and take only as needed, use sparingly.    Azithromycin - Keep on hand, take only as needed for  yellow or green mucous production.    Continue current prescription medication regiment. Keep follow up appointment as scheduled. Please call the office if you have any questions or concerns.

## 2025-05-20 ENCOUNTER — HOSPITAL ENCOUNTER (OUTPATIENT)
Dept: RADIOLOGY | Facility: CLINIC | Age: 65
Discharge: HOME | End: 2025-05-20
Payer: MEDICARE

## 2025-05-20 ENCOUNTER — OFFICE VISIT (OUTPATIENT)
Dept: PODIATRY | Facility: CLINIC | Age: 65
End: 2025-05-20
Payer: MEDICARE

## 2025-05-20 VITALS — WEIGHT: 207.69 LBS | BODY MASS INDEX: 32.6 KG/M2 | HEIGHT: 67 IN

## 2025-05-20 DIAGNOSIS — S99.921D INJURY OF RIGHT FOOT, SUBSEQUENT ENCOUNTER: ICD-10-CM

## 2025-05-20 DIAGNOSIS — S93.491D SPRAIN OF ANTERIOR TALOFIBULAR LIGAMENT OF RIGHT ANKLE, SUBSEQUENT ENCOUNTER: ICD-10-CM

## 2025-05-20 DIAGNOSIS — M79.671 RIGHT FOOT PAIN: ICD-10-CM

## 2025-05-20 DIAGNOSIS — S92.351A DISPLACED FRACTURE OF FIFTH METATARSAL BONE, RIGHT FOOT, INITIAL ENCOUNTER FOR CLOSED FRACTURE: Primary | ICD-10-CM

## 2025-05-20 PROCEDURE — 99999 PR PBB SHADOW E&M-EST. PATIENT-LVL IV: CPT | Mod: PBBFAC,HCNC,,

## 2025-05-20 PROCEDURE — 1159F MED LIST DOCD IN RCRD: CPT | Mod: CPTII,HCNC,S$GLB,

## 2025-05-20 PROCEDURE — 3008F BODY MASS INDEX DOCD: CPT | Mod: CPTII,HCNC,S$GLB,

## 2025-05-20 PROCEDURE — 4010F ACE/ARB THERAPY RXD/TAKEN: CPT | Mod: CPTII,HCNC,S$GLB,

## 2025-05-20 PROCEDURE — 73630 X-RAY EXAM OF FOOT: CPT | Mod: HCNC,RT,S$GLB, | Performed by: RADIOLOGY

## 2025-05-20 PROCEDURE — 99213 OFFICE O/P EST LOW 20 MIN: CPT | Mod: HCNC,S$GLB,,

## 2025-05-20 PROCEDURE — 3044F HG A1C LEVEL LT 7.0%: CPT | Mod: CPTII,HCNC,S$GLB,

## 2025-05-20 PROCEDURE — 1160F RVW MEDS BY RX/DR IN RCRD: CPT | Mod: CPTII,HCNC,S$GLB,

## 2025-05-20 NOTE — PROGRESS NOTES
"  [address] [facility] [address] [place] [address]  Phone: [phone]   [phone]    Patient's PCP:Jonathon Muñzo NP  Referring Provider: No ref. provider found    Subjective:      Chief Complaint:: Right foot fracture     Follow-up  Pertinent negatives include no arthralgias, chills, fever, joint swelling, nausea, numbness, rash, vomiting or weakness.     HPI 2025  Daylin Lynch is a 64 y.o. female who presents today for follow up of Displaced fracture of fifth metatarsal bone, right foot. PT presents in post op shoe. States she has been complaint with wearing post op shoe since last visit. PT denies any new complaints at this time. States no pain when WB or in post op shoe. Relays ankle pains noted previously remains resolved as well.     HPI 2025  Daylin Lynch is a 64 y.o. female who presents today for follow up of fracture, RT. PT states she is not having any pain. PT presents in walking boot. PT denies any new complaints at this time. She states she had not worn the walking boot because the more distal strap was cutting into the lateral aspect of her foot. Her  modified the boot by removing the most distal strap in its entirety, which did no help as patient was still unable to wear the boot. She admits that the boot was tearing up her foot so she had to wear her tennis shoes and had only wore the boot for several days. She relays that the 1-2 weeks after our last encounter she couch elevating most the time but has done a considerable amount of WB the last week, since the pain has resolved. She asks if there is another boot or shoe that she can use that will not tear up her foot.     Vitals:    25 1350   Weight: 94.2 kg (207 lb 10.8 oz)   Height: 5' 7" (1.702 m)   PainSc: 0-No pain      Shoe Size: 9    Past Surgical History:   Procedure Laterality Date     SECTION, LOW TRANSVERSE      x 3    EYE SURGERY  2023    lt retinal surgery    HYSTERECTOMY      pace maker  " 09/25/2020    SPINE SURGERY  05/2021    stents both legs       Past Medical History:   Diagnosis Date    Allergy     Asthma     Bilateral leg edema     Hyperlipidemia     Hypertension     Leg pain, bilateral     left leg more severe    Varicose vein      Family History   Problem Relation Name Age of Onset    Heart disease Mother      Heart failure Father      Migraines Sister 1/2     Cancer Sister 1/2         lung then pancreatic        Social History:   Marital Status:   Alcohol History:  reports current alcohol use.  Tobacco History:  reports that she quit smoking about 39 years ago. Her smoking use included cigarettes. She has been exposed to tobacco smoke. She has never used smokeless tobacco.  Drug History:  reports no history of drug use.    Review of patient's allergies indicates:  No Known Allergies    Current Medications[1]    Review of Systems   Constitutional:  Negative for activity change, chills and fever.   Cardiovascular:  Negative for leg swelling.   Gastrointestinal:  Negative for diarrhea, nausea and vomiting.   Musculoskeletal:  Negative for arthralgias, back pain and joint swelling.   Skin:  Negative for color change, pallor, rash and wound.   Neurological:  Negative for dizziness, tremors, weakness and numbness.         Objective:        Physical Exam:   Foot Exam    Right Foot/Ankle     Inspection and Palpation  Ecchymosis: none  Tenderness: none   Swelling: none   Arch: pes cavus  Hammertoes: second toe, third toe, fourth toe and fifth toe  Hallux valgus: no  Hallux limitus: no  Skin Exam: no blister, no callus, no drainage, no cellulitis, no abnormal color and no ulcer   Fungus Toenails: not present  Neurovascular  Dorsalis pedis: 2+  Posterior tibial: 2+  Capillary Refill: 3+  Varicose veins: not present  Saphenous nerve sensation: normal  Tibial nerve sensation: normal  Superficial peroneal nerve sensation: normal  Deep peroneal nerve sensation: normal  Sural nerve sensation:  normal    Range of Motion    Normal right ankle ROM    Tests  Anterior drawer: negative   Varus tilt: negative   Calcaneal squeeze: negative   Talar tilt: negative   Comments  - piano sign 5th met, pain with palpation midshaft   No pain along the peroneal tendons with palpation/ROM/resisted ROM    Pain, edema, and ecchymosis lateral ankle remains resolved           Imaging:    XR Results:  Results for orders placed in visit on 05/20/25    X-Ray Foot Complete Right    Narrative  EXAMINATION:  XR FOOT COMPLETE 3 VIEW RIGHT    CLINICAL HISTORY:  . Displaced fracture of fifth metatarsal bone, right foot, initial encounter for closed fracture    TECHNIQUE:  AP, lateral, and oblique views of the right foot were performed.    COMPARISON:  None    FINDINGS:  There is healing oblique fracture of the midshaft of the right 5th metatarsal without angulation.  Other fractures are not seen.  Soft tissue swelling is not now seen.  A small blunt heel spur is noted.    Impression  Healing oblique fracture of the midshaft right 5th metatarsal without angulation.  Small heel spur.      Electronically signed by: Joseph Garza MD  Date:    05/20/2025  Time:    14:35         Assessment:       1. Displaced fracture of fifth metatarsal bone, right foot, initial encounter for closed fracture    2. Injury of right foot, subsequent encounter    3. Sprain of anterior talofibular ligament of right ankle, subsequent encounter    4. Right foot pain      Plan:   Displaced fracture of fifth metatarsal bone, right foot, initial encounter for closed fracture  -     X-Ray Foot Complete Right    Injury of right foot, subsequent encounter    Sprain of anterior talofibular ligament of right ankle, subsequent encounter  Comments:  Symptoms resolved    Right foot pain      I provided patient education verbally regarding: Patient diagnosis, treatment options, as well as alternatives, risks, and benefits.      Right foot xray obtained, reviewed,  independently interpreted above, and discussed with patient. Routine signs of healing appreciated, 50-60% consolidation across fracture site. Not fully filled in on lateral at distal plantar aspect.     Ankle sprain symptoms resolved. Negative Anterior drawer.     5th met fracture symptoms resolved.     Transition into stiff soled athletic shoes as tolerated over the next two weeks     No high impact activity for the next 4-6 weeks     Continue Vitamin D and Calcium      Discharged for 5th met fracture unless pain/symptoms regress. Clinically all symptoms have resolved.      This note was created using Dragon voice recognition software that occasionally misinterpreted phrases or words.       Zheng Altamirano DPM  Podiatry / Foot and Ankle Surgery   Secure chat preferred        [1]   Current Outpatient Medications   Medication Sig Dispense Refill    albuterol (PROVENTIL/VENTOLIN HFA) 90 mcg/actuation inhaler Inhale 2 puffs into the lungs every 6 (six) hours as needed for Wheezing. use as directed 18 g 11    albuterol-ipratropium (DUO-NEB) 2.5 mg-0.5 mg/3 mL nebulizer solution Take 3 mLs by nebulization every 6 (six) hours as needed for Wheezing or Shortness of Breath. Use with nebulizer machine 75 mL 0    aspirin 325 MG tablet Take 325 mg by mouth once daily.      budesonide (PULMICORT) 0.5 mg/2 mL nebulizer solution Take 2 mLs (0.5 mg total) by nebulization daily as needed (Shortness of breath, wheezing, cough). Controller 120 mL 5    calcium carbonate (CALCIUM 500) 500 mg calcium (1,250 mg) chewable tablet Take 1 tablet (500 mg total) by mouth once daily. 30 tablet 11    carvediloL (COREG) 6.25 MG tablet Take 6.25 mg by mouth 2 (two) times daily.       cetirizine (ZYRTEC) 10 MG tablet Take 1 tablet (10 mg total) by mouth once daily.  0    cholecalciferol, vitamin D3, 1,250 mcg (50,000 unit) capsule Take 1 capsule (50,000 Units total) by mouth every 7 days. 12 capsule 0    coenzyme Q10 100 mg capsule Take 1 capsule  every day by oral route for 90 days.      escitalopram oxalate (LEXAPRO) 10 MG tablet Take 10 mg by mouth once daily.      ezetimibe (ZETIA) 10 mg tablet Take 1 tablet by mouth every evening.      fluticasone propionate (FLONASE) 50 mcg/actuation nasal spray 2 sprays (100 mcg total) by Each Nostril route once daily. 16 g 6    fluticasone-umeclidin-vilanter (TRELEGY ELLIPTA) 200-62.5-25 mcg inhaler Inhale 1 puff into the lungs once daily. 180 each 3    furosemide (LASIX) 20 MG tablet Take 20 mg by mouth daily as needed (Fluid Gain).      HYDROcodone-acetaminophen (NORCO) 5-325 mg per tablet Take 1 tablet by mouth every 6 (six) hours as needed for Pain. 27 tablet 0    montelukast (SINGULAIR) 10 mg tablet Take 1 tablet (10 mg total) by mouth once daily. 90 tablet 3    rosuvastatin (CRESTOR) 10 MG tablet Take 1 tablet every day by oral route for 90 days.      sacubitriL-valsartan (ENTRESTO) 49-51 mg per tablet Take 1 tablet by mouth 2 (two) times daily. SAMPLES FROM DOCTOR      spironolactone (ALDACTONE) 50 MG tablet Take 50 mg by mouth once daily.       No current facility-administered medications for this visit.

## 2025-07-08 ENCOUNTER — PATIENT MESSAGE (OUTPATIENT)
Dept: PULMONOLOGY | Facility: CLINIC | Age: 65
End: 2025-07-08
Payer: MEDICARE

## 2025-07-08 DIAGNOSIS — J45.51 SEVERE PERSISTENT ASTHMA WITH ACUTE EXACERBATION: ICD-10-CM

## 2025-07-10 RX ORDER — PREDNISONE 20 MG/1
TABLET ORAL
Qty: 15 TABLET | Refills: 0 | Status: SHIPPED | OUTPATIENT
Start: 2025-07-10

## 2025-07-30 DIAGNOSIS — J45.51 SEVERE PERSISTENT ASTHMA WITH ACUTE EXACERBATION: ICD-10-CM

## 2025-07-30 RX ORDER — PREDNISONE 20 MG/1
TABLET ORAL
Qty: 15 TABLET | Refills: 0 | Status: CANCELLED | OUTPATIENT
Start: 2025-07-30

## 2025-08-05 ENCOUNTER — PATIENT MESSAGE (OUTPATIENT)
Dept: PULMONOLOGY | Facility: CLINIC | Age: 65
End: 2025-08-05
Payer: MEDICARE

## 2025-08-21 ENCOUNTER — TELEPHONE (OUTPATIENT)
Dept: FAMILY MEDICINE | Facility: CLINIC | Age: 65
End: 2025-08-21

## 2025-08-21 ENCOUNTER — OFFICE VISIT (OUTPATIENT)
Dept: FAMILY MEDICINE | Facility: CLINIC | Age: 65
End: 2025-08-21
Payer: MEDICARE

## 2025-08-21 VITALS
BODY MASS INDEX: 31.55 KG/M2 | OXYGEN SATURATION: 96 % | DIASTOLIC BLOOD PRESSURE: 60 MMHG | HEIGHT: 67 IN | HEART RATE: 84 BPM | SYSTOLIC BLOOD PRESSURE: 82 MMHG | WEIGHT: 201 LBS

## 2025-08-21 DIAGNOSIS — F41.9 ANXIETY: ICD-10-CM

## 2025-08-21 DIAGNOSIS — I87.2 VENOUS INSUFFICIENCY: ICD-10-CM

## 2025-08-21 DIAGNOSIS — I10 ESSENTIAL (PRIMARY) HYPERTENSION: Primary | ICD-10-CM

## 2025-08-21 DIAGNOSIS — J45.50 SEVERE PERSISTENT ASTHMA WITHOUT COMPLICATION: ICD-10-CM

## 2025-08-21 DIAGNOSIS — I50.20 HFREF (HEART FAILURE WITH REDUCED EJECTION FRACTION): ICD-10-CM

## 2025-08-21 DIAGNOSIS — E78.2 MIXED HYPERLIPIDEMIA: ICD-10-CM

## 2025-08-21 DIAGNOSIS — Z12.31 ENCOUNTER FOR SCREENING MAMMOGRAM FOR BREAST CANCER: ICD-10-CM

## 2025-08-21 PROBLEM — I49.5 SINUS NODE DYSFUNCTION: Status: RESOLVED | Noted: 2020-09-25 | Resolved: 2025-08-21

## 2025-08-21 PROCEDURE — 4010F ACE/ARB THERAPY RXD/TAKEN: CPT | Mod: CPTII,S$GLB,, | Performed by: NURSE PRACTITIONER

## 2025-08-21 PROCEDURE — 3008F BODY MASS INDEX DOCD: CPT | Mod: CPTII,S$GLB,, | Performed by: NURSE PRACTITIONER

## 2025-08-21 PROCEDURE — 3044F HG A1C LEVEL LT 7.0%: CPT | Mod: CPTII,S$GLB,, | Performed by: NURSE PRACTITIONER

## 2025-08-21 PROCEDURE — 1159F MED LIST DOCD IN RCRD: CPT | Mod: CPTII,S$GLB,, | Performed by: NURSE PRACTITIONER

## 2025-08-21 PROCEDURE — 3078F DIAST BP <80 MM HG: CPT | Mod: CPTII,S$GLB,, | Performed by: NURSE PRACTITIONER

## 2025-08-21 PROCEDURE — 3074F SYST BP LT 130 MM HG: CPT | Mod: CPTII,S$GLB,, | Performed by: NURSE PRACTITIONER

## 2025-08-21 PROCEDURE — 1160F RVW MEDS BY RX/DR IN RCRD: CPT | Mod: CPTII,S$GLB,, | Performed by: NURSE PRACTITIONER

## 2025-08-21 PROCEDURE — 99214 OFFICE O/P EST MOD 30 MIN: CPT | Mod: S$GLB,,, | Performed by: NURSE PRACTITIONER

## 2025-08-21 RX ORDER — SEMAGLUTIDE 0.25 MG/.5ML
0.25 INJECTION, SOLUTION SUBCUTANEOUS
COMMUNITY